# Patient Record
Sex: FEMALE | Race: WHITE | NOT HISPANIC OR LATINO | Employment: OTHER | URBAN - METROPOLITAN AREA
[De-identification: names, ages, dates, MRNs, and addresses within clinical notes are randomized per-mention and may not be internally consistent; named-entity substitution may affect disease eponyms.]

---

## 2017-01-18 ENCOUNTER — ALLSCRIPTS OFFICE VISIT (OUTPATIENT)
Dept: OTHER | Facility: OTHER | Age: 62
End: 2017-01-18

## 2017-01-18 DIAGNOSIS — E04.1 NONTOXIC SINGLE THYROID NODULE: ICD-10-CM

## 2017-01-19 ENCOUNTER — GENERIC CONVERSION - ENCOUNTER (OUTPATIENT)
Dept: OTHER | Facility: OTHER | Age: 62
End: 2017-01-19

## 2017-01-19 ENCOUNTER — LAB CONVERSION - ENCOUNTER (OUTPATIENT)
Dept: OTHER | Facility: OTHER | Age: 62
End: 2017-01-19

## 2017-01-19 LAB
A/G RATIO (HISTORICAL): 2 (CALC) (ref 1–2.5)
ALBUMIN SERPL BCP-MCNC: 4.4 G/DL (ref 3.6–5.1)
ALP SERPL-CCNC: 69 U/L (ref 33–130)
ALT SERPL W P-5'-P-CCNC: 23 U/L (ref 6–29)
AST SERPL W P-5'-P-CCNC: 18 U/L (ref 10–35)
BILIRUB SERPL-MCNC: 0.4 MG/DL (ref 0.2–1.2)
BUN SERPL-MCNC: 21 MG/DL (ref 7–25)
BUN/CREA RATIO (HISTORICAL): NORMAL (CALC) (ref 6–22)
CALCIUM SERPL-MCNC: 9.4 MG/DL (ref 8.6–10.4)
CHLORIDE SERPL-SCNC: 105 MMOL/L (ref 98–110)
CHOLEST SERPL-MCNC: 211 MG/DL (ref 125–200)
CHOLEST/HDLC SERPL: 4.2 (CALC)
CO2 SERPL-SCNC: 30 MMOL/L (ref 20–31)
CREAT SERPL-MCNC: 0.66 MG/DL (ref 0.5–0.99)
EGFR AFRICAN AMERICAN (HISTORICAL): 109 ML/MIN/1.73M2
EGFR-AMERICAN CALC (HISTORICAL): 94 ML/MIN/1.73M2
GAMMA GLOBULIN (HISTORICAL): 2.2 G/DL (CALC) (ref 1.9–3.7)
GLUCOSE (HISTORICAL): 90 MG/DL (ref 65–99)
HDLC SERPL-MCNC: 50 MG/DL
LDL CHOLESTEROL (HISTORICAL): 142 MG/DL (CALC)
NON-HDL-CHOL (CHOL-HDL) (HISTORICAL): 161 MG/DL (CALC)
POTASSIUM SERPL-SCNC: 4.5 MMOL/L (ref 3.5–5.3)
SODIUM SERPL-SCNC: 142 MMOL/L (ref 135–146)
TOTAL PROTEIN (HISTORICAL): 6.6 G/DL (ref 6.1–8.1)
TRIGL SERPL-MCNC: 94 MG/DL
TSH SERPL DL<=0.05 MIU/L-ACNC: 2.68 MIU/L (ref 0.4–4.5)

## 2017-01-23 ENCOUNTER — HOSPITAL ENCOUNTER (OUTPATIENT)
Dept: ULTRASOUND IMAGING | Facility: HOSPITAL | Age: 62
Discharge: HOME | End: 2017-01-23
Payer: COMMERCIAL

## 2017-01-23 DIAGNOSIS — E04.1 NONTOXIC SINGLE THYROID NODULE: ICD-10-CM

## 2017-01-23 PROCEDURE — 76536 US EXAM OF HEAD AND NECK: CPT

## 2017-01-24 ENCOUNTER — GENERIC CONVERSION - ENCOUNTER (OUTPATIENT)
Dept: OTHER | Facility: OTHER | Age: 62
End: 2017-01-24

## 2017-02-10 ENCOUNTER — ALLSCRIPTS OFFICE VISIT (OUTPATIENT)
Dept: OTHER | Facility: OTHER | Age: 62
End: 2017-02-10

## 2017-04-03 ENCOUNTER — ALLSCRIPTS OFFICE VISIT (OUTPATIENT)
Dept: OTHER | Facility: OTHER | Age: 62
End: 2017-04-03

## 2017-04-05 ENCOUNTER — GENERIC CONVERSION - ENCOUNTER (OUTPATIENT)
Dept: OTHER | Facility: OTHER | Age: 62
End: 2017-04-05

## 2017-05-08 ENCOUNTER — TRANSCRIBE ORDERS (OUTPATIENT)
Dept: ADMINISTRATIVE | Facility: HOSPITAL | Age: 62
End: 2017-05-08

## 2017-05-08 DIAGNOSIS — Z12.39 SCREENING BREAST EXAMINATION: Primary | ICD-10-CM

## 2017-06-03 ENCOUNTER — HOSPITAL ENCOUNTER (OUTPATIENT)
Dept: MAMMOGRAPHY | Facility: HOSPITAL | Age: 62
Discharge: HOME | End: 2017-06-03
Payer: COMMERCIAL

## 2017-06-03 DIAGNOSIS — Z12.39 SCREENING BREAST EXAMINATION: ICD-10-CM

## 2017-06-03 PROCEDURE — G0202 SCR MAMMO BI INCL CAD: HCPCS

## 2017-06-05 ENCOUNTER — GENERIC CONVERSION - ENCOUNTER (OUTPATIENT)
Dept: OTHER | Facility: OTHER | Age: 62
End: 2017-06-05

## 2018-01-09 NOTE — RESULT NOTES
Verified Results  TEST IN QUESTIONTrina Shelton ORDER [id number] 12:00AM Lethaniel Na     Test Name Result Flag Reference   We are unable to ascertain the test(s) you desire  for the irreplaceable specimen you submitted     UNCLEAR ORDER:      CLARIFY L UPPER RESPIRATORY CULTURE   SPECIMEN(S) SUBMITTED:      BLUE CAP AMIES GEL SWAB   RESOLUTION:      **     **     *******     *******     *********    **     **   ******   **     **     **   **     **          **           ****   **     **   **     **     ** ***      **          **           **  ** **     **   **     **     *****       ** *****    *******      **    ***     **   **     **     **   **     **     **   **           **     **     **   **     **     **    **    **     **   **           **     **     **   *********     **    **    *********   *********    **     **     **

## 2018-01-11 NOTE — RESULT NOTES
Message   Please tell her blood work looked pretty good  CBC and thyroid function was normal as well as CBC  Cholesterol was mildly elevated at 211 with a good ratio  Continue diet and weight loss as well as exercise     Verified Results  (1) COMPREHENSIVE METABOLIC PANEL [id number] 47:25WL Derrick Mccain     Test Name Result Flag Reference   GLUCOSE 90 mg/dL  65-99   Fasting reference interval   UREA NITROGEN (BUN) 21 mg/dL  7-25   CREATININE 0 66 mg/dL  0 50-0 99   For patients >52years of age, the reference limit  for Creatinine is approximately 13% higher for people  identified as -American  eGFR NON-AFR  AMERICAN 94 mL/min/1 73m2  > OR = 60   eGFR AFRICAN AMERICAN 109 mL/min/1 73m2  > OR = 60   BUN/CREATININE RATIO   9-45   NOT APPLICABLE (calc)   SODIUM 142 mmol/L  135-146   POTASSIUM 4 5 mmol/L  3 5-5 3   CHLORIDE 105 mmol/L     CARBON DIOXIDE 30 mmol/L  20-31   CALCIUM 9 4 mg/dL  8 6-10 4   PROTEIN, TOTAL 6 6 g/dL  6 1-8 1   ALBUMIN 4 4 g/dL  3 6-5 1   GLOBULIN 2 2 g/dL (calc)  1 9-3 7   ALBUMIN/GLOBULIN RATIO 2 0 (calc)  1 0-2 5   BILIRUBIN, TOTAL 0 4 mg/dL  0 2-1 2   ALKALINE PHOSPHATASE 69 U/L     AST 18 U/L  10-35   ALT 23 U/L  6-29     (1) LIPID PANEL, FASTING [id number] 12:00AM Tito Alcaraz     Test Name Result Flag Reference   CHOLESTEROL, TOTAL 211 mg/dL H 125-200   HDL CHOLESTEROL 50 mg/dL  > OR = 46   TRIGLICERIDES 94 mg/dL  <395   LDL-CHOLESTEROL 142 mg/dL (calc) H <130   Desirable range <100 mg/dL for patients with CHD or  diabetes and <70 mg/dL for diabetic patients with  known heart disease  CHOL/HDLC RATIO 4 2 (calc)  < OR = 5 0   NON HDL CHOLESTEROL 161 mg/dL (calc) H    Target for non-HDL cholesterol is 30 mg/dL higher than   LDL cholesterol target       (Q) CBC (H/H, RBC, INDICES, WBC, PLT) [id number] 12:00AM Tito Alcaraz     Test Name Result Flag Reference   WHITE BLOOD CELL COUNT 7 0 Thousand/uL  3 8-10 8   RED BLOOD CELL COUNT 4 81 Million/uL  3 80-5 10 HEMOGLOBIN 14 0 g/dL  11 7-15 5   HEMATOCRIT 42 6 %  35 0-45 0   MCV 88 6 fL  80 0-100 0   MCH 29 2 pg  27 0-33 0   MCHC 33 0 g/dL  32 0-36 0   RDW 15 6 % H 11 0-15 0   PLATELET COUNT 740 Thousand/uL  140-400   MPV 10 7 fL  7 5-11 5   WE RECEIVED YOUR HANDWRITTEN TEST ORDER AND  PERFORMED A HEMOGRAM WITH A PLATELET WITHOUT  A DIFFERENTIAL  IF THIS IS NOT WHAT YOU INTENDED  TO ORDER, PLEASE CONTACT YOUR LOCAL CLIENT SERVICE  REPRESENTATIVE IMMEDIATELY SO THAT WE CAN   ADJUST OUR BILLING APPROPRIATELY  YOU MAY ALSO   INQUIRE ABOUT ALTERNATIVE OR ADDITIONAL TESTING            (Q) TSH, 3RD GENERATION [id number] 12:00AM Jerri Wisdom     Test Name Result Flag Reference   TSH 2 68 mIU/L  0 40-4 50       Plan  PMH: Need for immunization against influenza    · Fluzone Quadrivalent 0 5 ML Intramuscular Suspension; INJECT 0 5  ML  Intramuscular;  To Be Done: [id number]

## 2018-01-12 NOTE — RESULT NOTES
Message   Please tell sooner that her blood work is okay  Sugar kidney function liver function blood salts all good  Cholesterol reveals mildly elevated total cholesterol as well as LDL or bad cholesterol  She should continue with diet and weight loss in regards to this  Verified Results  (1) COMPREHENSIVE METABOLIC PANEL [id number] 41:71SY Lamberto Purdy     Test Name Result Flag Reference   GLUCOSE 98 mg/dL  65-99   Fasting reference interval   UREA NITROGEN (BUN) 17 mg/dL  7-25   CREATININE 0 64 mg/dL  0 50-0 99   For patients >52years of age, the reference limit  for Creatinine is approximately 13% higher for people  identified as -American  eGFR NON-AFR  AMERICAN 96 mL/min/1 73m2  > OR = 60   eGFR AFRICAN AMERICAN 111 mL/min/1 73m2  > OR = 60   BUN/CREATININE RATIO   2-58   NOT APPLICABLE (calc)   SODIUM 140 mmol/L  135-146   POTASSIUM 4 4 mmol/L  3 5-5 3   CHLORIDE 104 mmol/L     CARBON DIOXIDE 27 mmol/L  19-30   CALCIUM 9 4 mg/dL  8 6-10 4   PROTEIN, TOTAL 6 6 g/dL  6 1-8 1   ALBUMIN 4 4 g/dL  3 6-5 1   GLOBULIN 2 2 g/dL (calc)  1 9-3 7   ALBUMIN/GLOBULIN RATIO 2 0 (calc)  1 0-2 5   BILIRUBIN, TOTAL 0 6 mg/dL  0 2-1 2   ALKALINE PHOSPHATASE 68 U/L     AST 17 U/L  10-35   ALT 23 U/L  6-29     (1) LIPID PANEL, FASTING [id number] 12:00AM Tito Alcaraz     Test Name Result Flag Reference   CHOLESTEROL, TOTAL 212 mg/dL H 125-200   HDL CHOLESTEROL 49 mg/dL  > OR = 46   TRIGLICERIDES 548 mg/dL H <150   LDL-CHOLESTEROL 133 mg/dL (calc) H <130   Desirable range <100 mg/dL for patients with CHD or  diabetes and <70 mg/dL for diabetic patients with  known heart disease  CHOL/HDLC RATIO 4 3 (calc)  < OR = 5 0   NON HDL CHOLESTEROL 163 mg/dL (calc) H    Target for non-HDL cholesterol is 30 mg/dL higher than   LDL cholesterol target

## 2018-01-13 NOTE — RESULT NOTES
Verified Results  * XR FOOT 3+ VIEW LEFT [id number] 10:48AM Paula Valencia Order Number: [id number]     Test Name Result Flag Reference   XR FOOT 3+ VW LEFT (Report)     LEFT FOOT     INDICATION: Left foot pain  COMPARISON: None     VIEWS: 3; 3 images     FINDINGS:     There is no acute fracture or dislocation  No degenerative changes  No lytic or blastic lesions are seen  Soft tissues are unremarkable  IMPRESSION:     No acute osseous abnormality         Workstation performed: [id number]     Signed by:   Lana Espana MD   7/8/16

## 2018-01-13 NOTE — RESULT NOTES
Message   Please tell Griselda Gomez that her nodule has actually diminished in size and I'm really not concerned about it presently  We'll discuss timing future ultrasound at her next visit  Verified Results  US THYROID [id number] 10:27AM Madeline Alpers Order Number: [id number]    - Patient Instructions: To schedule this appointment, please contact Central Scheduling at [phone]  Test Name Result Flag Reference   US THYROID (Report)     THYROID ULTRASOUND     INDICATION: Nodule follow-up     COMPARISON: 5/4/2010 and prior     TECHNIQUE:  Ultrasound of the thyroid was performed with a high frequency linear transducer in transverse and sagittal planes including volumetric imaging sweeps as well as traditional still imaging technique  FINDINGS:   Normal homogeneous smooth echotexture  Right gland: 4 6 x 1 4 x 1 8 cm  No dominant nodules  6 mm calcified lower pole nodule, stable  Left gland: 4 5 x 1 3 x 1 8 cm  Dominant heterogeneous lower pole nodule 2 1 x 1 8 x 1 9 cm and previously 3 0 x 1 9 x 1 9 cm  Isthmus: The isthmus is 0 2 cm in AP dimension  IMPRESSION:      Reduction in size of the dominant nodule left lower pole  Reassess at follow-up               Workstation performed: [id number]     Signed by:   Tanya Gifford MD   1/24/17

## 2018-01-13 NOTE — RESULT NOTES
Message   Please tell so that her bone density scan was normal      Verified Results  * DXA BONE DENSITY SPINE HIP AND PELVIS [id number] 10:47AM Jameson Old Order Number: [id number]     Test Name Result Flag Reference   DXA BONE DENSITY SPINE HIP AND PELVIS (Report)     CENTRAL DXA SCAN     CLINICAL HISTORY:  56year old post-menopausal  female risk factors include family history of osteoporosis  TECHNIQUE: Bone densitometry was performed using a [employer] bone densitometer  Regions of interest appear properly placed  There are no obvious fractures or other confounding variables which could limit the study  COMPARISON: None  RESULTS:    LUMBAR SPINE: L1-L3:   BMD 1 406 gm/cm2   T-score 1 8   Z-score 3 3     LEFT TOTAL HIP:   BMD 1 034 gm/cm2   T-score 0 2   Z-score 1 3     LEFT FEMORAL NECK:   BMD 1 020 gm/cm2   T-score -0 1   Z-score 1 2     RIGHT TOTAL HIP:   BMD 1 008 gm/cm2   T-score 0 0   Z-score 1 1     RIGHT FEMORAL NECK:   BMD 0 960 gm/cm2   T-score -0 6   Z-score 0 8     ASSESSMENT:   1  Based on the WHO classification, this study is normal and the patient is considered at low risk for fracture  2  A daily intake of calcium of at least 1200 mg and vitamin D, 800-1000 IU, as well as weight bearing and muscle strengthening exercise, fall prevention and avoidance of tobacco and excessive alcohol intake as basic preventive measures are recommended  3  Repeat DXA scan in 18-24 months as clinically indicated        WHO CLASSIFICATION:   Normal (a T-score of -1 0 or higher)   Low bone mineral density (a T-score of less than -1 0 but higher than -2 5)   Osteoporosis (a T-score of -2 5 or less)   Severe osteoporosis (a T-score of -2 5 or less with a fragility fracture)             Workstation performed: [id number]

## 2018-01-14 VITALS
WEIGHT: 210 LBS | BODY MASS INDEX: 33.75 KG/M2 | TEMPERATURE: 98.7 F | SYSTOLIC BLOOD PRESSURE: 132 MMHG | DIASTOLIC BLOOD PRESSURE: 70 MMHG | HEIGHT: 66 IN

## 2018-01-14 VITALS
BODY MASS INDEX: 33.59 KG/M2 | HEIGHT: 66 IN | WEIGHT: 209 LBS | DIASTOLIC BLOOD PRESSURE: 70 MMHG | TEMPERATURE: 98.2 F | SYSTOLIC BLOOD PRESSURE: 140 MMHG

## 2018-01-15 VITALS
WEIGHT: 208 LBS | HEIGHT: 66 IN | SYSTOLIC BLOOD PRESSURE: 122 MMHG | TEMPERATURE: 97.1 F | DIASTOLIC BLOOD PRESSURE: 70 MMHG | BODY MASS INDEX: 33.43 KG/M2

## 2018-01-15 NOTE — RESULT NOTES
Verified Results  * MAMMO SCREENING BILATERAL W CAD [id number] 12:52PM Oma Villegas     Test Name Result Flag Reference   MAMMO SCREENING BILATERAL W CAD (Report)     Patient History:   Patient is postmenopausal    Family history of breast cancer at age 61 in mother, breast    cancer at age 61 in maternal aunt  Benign excisional biopsy of the right breast, December 29, 1999  Excisional biopsy of the right breast    Patient has never smoked  Patient's BMI is 31 9      Reason for exam: screening, asymptomatic  Mammo Screening Bilateral W CAD: Savita 3, 2017 - Check In #:    [de-identified]   Bilateral CC and MLO view(s) were taken  Technologist: Oscar Regalado RT(R)(M)   Prior study comparison: June 2, 2016, mammo screening bilateral W   CAD performed at [facility]  May 4,    2015, bilateral digital screening mammogram performed at [facility] April 22, 2014, bilateral    digital screening mammogram performed at [facility] March 22, 2013, bilateral mammogram, performed    at [address] [place] [address]  January 10, 2012,    bilateral mammogram, performed at [address] [place] [address]  November 30, 2010, bilateral mammogram, performed    at [address] [place] [address]  There are scattered fibroglandular densities  No dominant soft tissue mass, architectural distortion or    suspicious calcifications are noted  The skin and nipple    contours are within normal limits  No evidence of malignancy  No significant changes   when compared with prior studies  ACR BI-RADSï¾® Assessments: BiRad:1 - Negative     Recommendation:   Routine screening mammogram of both breasts in 1 year  A    reminder letter will be scheduled  Analyzed by CAD     8-10% of cancers will be missed on mammography  Management of a    palpable abnormality must be based on clinical grounds   Patients   will be notified of their results via letter from our facility  Accredited by [employer] of Radiology and FDA       Transcription Location: JEFF [facility] 98: [id number]     Risk Value(s):   Tyrer-Cuzick 10 Year: 9 100%, Tyrer-Cuzick Lifetime: 19 600%,    Myriad Table: 1 5%, CHERRY 5 Year: 4 8%, NCI Lifetime: 19 2%   Signed by:   Taylor Tillman MD   6/5/17

## 2018-01-16 NOTE — RESULT NOTES
Verified Results  (1) THROAT CULTURE (CULTURE, UPPER RESPIRATORY) [id number] 12:00AM Carlos Enrique Carson     Test Name Result Flag Reference   CULTURE, THROAT      CULTURE, THROAT         MICRO NUMBER:      [id number]    TEST STATUS:       FINAL    SPECIMEN SOURCE:   THROAT    SPECIMEN QUALITY:  ADEQUATE    RESULT:            No oropharyngeal pathogens recovered  (Q) TEST AUTHORIZATION [id number] 12:00AM Carlos Enrique Carson     Test Name Result Flag Reference   TEST(S) ORDERED ON$REQUISITION CULTURE, THROAT     TEST CODE: 394X     CLIENT CONTACT: CHALO BEARDEN     REPORT ALWAYS MESSAGE$SIGNATURE See Below     The laboratory testing on this patient was verbally requested  or confirmed by the ordering physician or his or her authorized  representative after contact with an employee of [employer]  Federal regulations require that we maintain on file written  authorization for all laboratory testing  Accordingly we are asking  that the ordering physician or his or her authorized representative  sign a copy of this report and promptly return it to the client            Signature:____________________________________________________

## 2018-03-06 ENCOUNTER — OFFICE VISIT (OUTPATIENT)
Dept: FAMILY MEDICINE CLINIC | Facility: CLINIC | Age: 63
End: 2018-03-06
Payer: COMMERCIAL

## 2018-03-06 VITALS
DIASTOLIC BLOOD PRESSURE: 60 MMHG | SYSTOLIC BLOOD PRESSURE: 130 MMHG | WEIGHT: 202 LBS | BODY MASS INDEX: 33.66 KG/M2 | HEIGHT: 65 IN

## 2018-03-06 DIAGNOSIS — K21.9 GERD WITHOUT ESOPHAGITIS: Primary | ICD-10-CM

## 2018-03-06 DIAGNOSIS — J04.0 LARYNGITIS: ICD-10-CM

## 2018-03-06 PROCEDURE — 99214 OFFICE O/P EST MOD 30 MIN: CPT | Performed by: FAMILY MEDICINE

## 2018-03-06 RX ORDER — DILTIAZEM HYDROCHLORIDE EXTENDED-RELEASE TABLETS 180 MG/1
1 TABLET, EXTENDED RELEASE ORAL DAILY
COMMUNITY
Start: 2016-06-27 | End: 2020-09-21 | Stop reason: ALTCHOICE

## 2018-03-06 RX ORDER — ESOMEPRAZOLE MAGNESIUM 40 MG/1
1 CAPSULE, DELAYED RELEASE ORAL DAILY
COMMUNITY
Start: 2011-12-20 | End: 2018-03-06 | Stop reason: SDUPTHER

## 2018-03-06 RX ORDER — TELMISARTAN AND HYDROCHLORTHIAZIDE 80; 25 MG/1; MG/1
TABLET ORAL
COMMUNITY
Start: 2011-05-24 | End: 2019-05-28 | Stop reason: ALTCHOICE

## 2018-03-06 RX ORDER — RANITIDINE 300 MG/1
300 CAPSULE ORAL EVERY EVENING
Qty: 30 CAPSULE | Refills: 1 | Status: SHIPPED | OUTPATIENT
Start: 2018-03-06 | End: 2018-09-17 | Stop reason: ALTCHOICE

## 2018-03-06 RX ORDER — ESOMEPRAZOLE MAGNESIUM 40 MG/1
40 CAPSULE, DELAYED RELEASE ORAL DAILY
Qty: 90 CAPSULE | Refills: 1 | Status: SHIPPED | OUTPATIENT
Start: 2018-03-06 | End: 2018-08-07 | Stop reason: SDUPTHER

## 2018-03-06 RX ORDER — NAPROXEN SODIUM 220 MG
220 TABLET ORAL
COMMUNITY
End: 2021-05-21

## 2018-03-06 NOTE — PROGRESS NOTES
Assessment/Plan:  GERD without esophagitis  Patient is having symptoms consistent with worsening of her gastroesophageal reflux disease  We are going to add Zantac to her Nexium  She is having no worrisome symptoms such as weight loss night sweats or significant dysphagia  She did have EGD in 2013 which was without mass stricture Swartz's X cetera  She is going to call next week with report of her condition  Laryngitis  She has had some persistent worse voice  She has a distant history of smoking  She does not use alcohol  She has no worrisome symptoms or findings such as weight loss adenopathy X cetera  Differential is large including her GERD leading to frequent cough, anxiety due to her recent family issues or less likely vocal cord polyp or laryngeal neoplasm  Due to the duration of her symptoms we did ask her to contact otolaryngology for evaluation such as nasolaryngoscopy  She agrees to this  She is given the information for contact with Dr Rodríguez Poe essential hypertension  Her hypertension is currently well controlled  Patient is a 75-year-old female who has a long history of gastroesophageal reflux disease maintained on Nexium 40 mg daily  Recently her GERD has been much worse  Her mother did pass away approximately 2 months ago and she has been having to deal with her father having issues with her passing  This may be contributing to the worsening of her GERD  We reviewed that she had EGD approximately 40 half years ago with Dr Eulalio Mejia  She did have mild reflux induced esophagitis in the lower 3rd of her esophagus  There was no hiatal hernia there was no mass  She has no night sweats loss of appetite or significant weight loss nor other constitutional symptom  We are going to have her continue her Nexium and had Zantac 300 mg HS  If her symptoms are not improving over the next 2-3 weeks we will consider having her go for follow-up EGD    She also has laryngitis which again may be related to her worsening reflux or could possibly be stress related  She has a distant history of tobacco smoking and no alcohol use  I do feel would be prudent for her to get nasal laryngoscopy and we gave her the name of Dr Alejandra Bell and [facility] for evaluation to which she agrees  She is going to call back next week with report of her condition  She will call sooner as needed  Diagnoses and all orders for this visit:    GERD without esophagitis  -     esomeprazole (NEXIUM) 40 MG capsule; Take 1 capsule (40 mg total) by mouth daily  -     ranitidine (ZANTAC) 300 MG capsule; Take 1 capsule (300 mg total) by mouth every evening    Laryngitis  -     Ambulatory Referral to Otolaryngology; Future    Other orders  -     aspirin 81 MG tablet; Take 81 mg by mouth  -     diltiazem (CARDIZEM LA) 180 MG 24 hr tablet; Take 1 capsule by mouth daily  -     Discontinue: esomeprazole (NEXIUM) 40 MG capsule; Take 1 capsule by mouth daily  -     telmisartan-hydrochlorothiazide (MICARDIS HCT) 80-25 MG per tablet; Take by mouth  -     naproxen sodium (ALEVE) 220 MG tablet; Take 220 mg by mouth          Subjective:   Chief Complaint   Patient presents with    Heartburn    Hoarse     I have hoarseness for 2 months and terrible heartburn for 2 weeks  Hoarseness began before Az  Mom    Hoarseness is worse  Burns and hurts in chest   No true dysphagia but some mild odynophagia  Had upper endoscopy  Colonoscopy as well  2013  Non exertional and no SOB  Worse when recumbence  Takes Nexium daily in AM for years  No LGI sx  Down 8 pounds from last year  Patient ID: Anastasiia Handy is a 57 y o  female  HPI  The patient is a 52-year-old female who presents today with 2 complaints  She states her heartburn has been terrible for 2 weeks  She has been compliant with her Nexium  She has also had a hoarse voice for 2 months or more  Her mother  exactly 2 months ago    She has had some increased stress dealing with her father who now lives alone but requires his children to provide increased visits and care  She did see Dr Janae Jiménez for an EGD last in 2013 as well as colonoscopy  We reviewed this  There was no stricture, neoplasm, Swartz's X cetera at that time  There was some mild esophagitis distally  She has been compliant with her Nexium since that time  She does state that her symptoms seem worse with recumbency  They are not exertional and she has no chest pain shortness of breath related to exertion  She has no lower GI complaints  She states she has had no change in appetite or weight loss though we did note she has lost 8 lb since a visit several months ago  She has had no wheezing  She does note that she does have cough which is worse when her reflux is worse she has had no sputum opts 6 cetera  No PND, orthopnea      The following portions of the patient's history were reviewed and updated as appropriate: allergies, current medications, past family history, past medical history, past social history, past surgical history and problem list     Review of Systems   Constitution: Negative for chills, decreased appetite, fever, malaise/fatigue, night sweats, weight gain and weight loss  She has lost 8 lb since her last visit that she was unaware of this   HENT: Positive for hoarse voice  Negative for congestion and sore throat  Cardiovascular: Negative for chest pain, irregular heartbeat and leg swelling  Respiratory: Positive for cough  Negative for shortness of breath, sputum production and wheezing  Hematologic/Lymphatic: Negative for adenopathy  Gastrointestinal: Positive for heartburn  Negative for bloating, anorexia, change in bowel habit, bowel incontinence, dysphagia, nausea and vomiting  Worsening GERD is noted in HPI   Genitourinary: Negative for bladder incontinence  Objective:    Physical Exam   Constitutional: She is oriented to person, place, and time  She appears well-developed and well-nourished  Obese in no apparent distress   HENT:   Mouth/Throat: Oropharynx is clear and moist  No oropharyngeal exudate  Eyes: No scleral icterus  Neck: Neck supple  No JVD present  No thyromegaly present  Cardiovascular: Regular rhythm and normal heart sounds  Pulmonary/Chest: Effort normal and breath sounds normal  She has no wheezes  She has no rales  Abdominal: Soft  Bowel sounds are normal    Obese abdomen  There is some epigastric tenderness and compression of the epigastrium reproduces her symptoms substernally  She has no mass, no organomegaly   Musculoskeletal: She exhibits no edema  Lymphadenopathy:     She has no cervical adenopathy  Neurological: She is alert and oriented to person, place, and time     Psychiatric:   Mildly anxious appearance

## 2018-03-07 NOTE — ASSESSMENT & PLAN NOTE
She has had some persistent worse voice  She has a distant history of smoking  She does not use alcohol  She has no worrisome symptoms or findings such as weight loss adenopathy X cetera  Differential is large including her GERD leading to frequent cough, anxiety due to her recent family issues or less likely vocal cord polyp or laryngeal neoplasm  Due to the duration of her symptoms we did ask her to contact otolaryngology for evaluation such as nasolaryngoscopy  She agrees to this    She is given the information for contact with Dr Arron Rangel

## 2018-03-07 NOTE — ASSESSMENT & PLAN NOTE
Patient is having symptoms consistent with worsening of her gastroesophageal reflux disease  We are going to add Zantac to her Nexium  She is having no worrisome symptoms such as weight loss night sweats or significant dysphagia  She did have EGD in 2013 which was without mass stricture Swartz's X cetera  She is going to call next week with report of her condition

## 2018-03-22 ENCOUNTER — OFFICE VISIT (OUTPATIENT)
Dept: FAMILY MEDICINE CLINIC | Facility: CLINIC | Age: 63
End: 2018-03-22
Payer: COMMERCIAL

## 2018-03-22 VITALS
WEIGHT: 203 LBS | BODY MASS INDEX: 34.04 KG/M2 | DIASTOLIC BLOOD PRESSURE: 82 MMHG | TEMPERATURE: 97.5 F | SYSTOLIC BLOOD PRESSURE: 130 MMHG

## 2018-03-22 DIAGNOSIS — K21.9 GERD WITHOUT ESOPHAGITIS: ICD-10-CM

## 2018-03-22 DIAGNOSIS — E78.5 HYPERLIPOPROTEINEMIA: ICD-10-CM

## 2018-03-22 DIAGNOSIS — I10 BENIGN ESSENTIAL HYPERTENSION: Primary | ICD-10-CM

## 2018-03-22 DIAGNOSIS — J04.0 LARYNGITIS: ICD-10-CM

## 2018-03-22 PROCEDURE — 99214 OFFICE O/P EST MOD 30 MIN: CPT | Performed by: FAMILY MEDICINE

## 2018-03-22 PROCEDURE — 3079F DIAST BP 80-89 MM HG: CPT | Performed by: FAMILY MEDICINE

## 2018-03-22 PROCEDURE — 36415 COLL VENOUS BLD VENIPUNCTURE: CPT | Performed by: FAMILY MEDICINE

## 2018-03-22 PROCEDURE — 3075F SYST BP GE 130 - 139MM HG: CPT | Performed by: FAMILY MEDICINE

## 2018-03-22 RX ORDER — DIPHENOXYLATE HYDROCHLORIDE AND ATROPINE SULFATE 2.5; .025 MG/1; MG/1
1 TABLET ORAL DAILY
COMMUNITY

## 2018-03-22 NOTE — PROGRESS NOTES
Assessment/Plan:  GERD without esophagitis  She is going to continue with Nexium as well as Zantac  She has no dysphagia, weight loss or other worrisome symptoms  She did have EGD in 2013  May need further evaluation if her symptoms do not improve  Nontoxic single thyroid nodule  This nodule was reduced in size at its last assessment in January of 2017  Will consider a follow-up exam in 6-12 months  Laryngitis  She has an appointment with Dr Lashell Singh tomorrow  Hopefully he will be able to directly visualize her larynx for further evaluation of her persistent hoarseness  She continues with treatment of her GERD symptoms  She has been a nonsmoker for many years  Currently her allergies are relatively quiescent although this may also be potential etiology for it  Benign essential hypertension  Blood pressure well controlled  No change in therapy presently  Hyperlipoproteinemia  Lipid profile today  Dr Lashell Singh tomorrow  Consider Sleep Study  Diagnoses and all orders for this visit:    Benign essential hypertension  -     CBC  -     Comprehensive metabolic panel    GERD without esophagitis    Hyperlipoproteinemia  -     Lipid panel  -     TSH, 3rd generation with T4 reflex    Laryngitis    Other orders  -     BIOTIN PO; Take by mouth  -     multivitamin (THERAGRAN) TABS; Take 1 tablet by mouth daily          Subjective:   Chief Complaint   Patient presents with    Medication Refill     FBW   Weight was 210 last spring and 203 now  Zantac did not help at all  I am seeing Dr Lashell Singh tomorrow  I am still hoarse  C/w Nexium as well  I wake up and it is worse  Sees Dr Chapo Acosta in May  I wake up with heartburn  AM Has, sedentary sleeping  Patient ID: Troy Walker is a 57 y o  female      HPI  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 for follow-up of multiple medical problems including essential hypertension, hyperlipidemia as well as gastroesophageal reflux disease and recent development of laryngitis  She tempted to contact Dr Agus Bhagat and [facility] at our direction after her last visit but she was told she cannot be seen until June  They referred her to [facility] and she has an appointment to see him tomorrow  She continues with laryngitis  Her symptoms do seem worse in the morning  She started Zantac in addition to her Nexium at her direction but she states this has not improved her symptoms  This is in regard to both reflux as well as hoarseness  She also complains of worsening fatigue  On questioning she has 1st morning headaches as well as sedentary sleepiness  On further questioning her  has stated that she snores  He is treated with CPAP but she has never been tested  She follows with Cardiology, Maryann and has an appointment with her in May  She has had no exertional chest pain shortness of breath X cetera  She has been compliant with her Cardizem as well as Micardis  The following portions of the patient's history were reviewed and updated as appropriate: allergies, current medications, past family history, past medical history, past social history, past surgical history and problem list     Review of Systems   Constitution: Positive for weight loss  Negative for decreased appetite  She has lost 7 lb with diet   HENT: Positive for hoarse voice and sore throat  Negative for congestion, ear pain and odynophagia  Cardiovascular: Positive for dyspnea on exertion  Negative for chest pain, leg swelling, orthopnea and paroxysmal nocturnal dyspnea  Respiratory: Positive for cough, shortness of breath and snoring  Negative for hemoptysis, sleep disturbances due to breathing and sputum production  Endocrine: Positive for polyuria  Negative for polydipsia and polyphagia  Hematologic/Lymphatic: Negative for adenopathy and bleeding problem  Gastrointestinal: Negative for constipation, diarrhea and hematochezia     Genitourinary: Positive for frequency, nocturia and urgency  Negative for incomplete emptying  Nocturia x 3  Had URO GYN procedure without improvement  Neurological: Positive for headaches  Psychiatric/Behavioral: Negative for depression  The patient is not nervous/anxious  Objective:    Physical Exam   Constitutional: She is oriented to person, place, and time  She appears well-developed and well-nourished  Obese   HENT:   Mouth/Throat: Oropharynx is clear and moist  No oropharyngeal exudate  Minimal erythema of the posterior pharynx  Uvula is not redundant  Neck is 14 inches in diameter  Eyes: No scleral icterus  Neck: No JVD present  No thyromegaly present  Cardiovascular: Normal rate, regular rhythm and normal heart sounds  Exam reveals no gallop  No murmur heard  Pulmonary/Chest: Effort normal and breath sounds normal  She has no wheezes  She has no rales  Abdominal: She exhibits no mass  There is no tenderness  Musculoskeletal: She exhibits no edema  Lymphadenopathy:     She has no cervical adenopathy  Neurological: She is alert and oriented to person, place, and time  Skin: No erythema  Psychiatric: She has a normal mood and affect

## 2018-03-22 NOTE — ASSESSMENT & PLAN NOTE
She is going to continue with Nexium as well as Zantac  She has no dysphagia, weight loss or other worrisome symptoms  She did have EGD in 2013  May need further evaluation if her symptoms do not improve

## 2018-03-22 NOTE — ASSESSMENT & PLAN NOTE
This nodule was reduced in size at its last assessment in January of 2017  Will consider a follow-up exam in 6-12 months

## 2018-03-22 NOTE — ASSESSMENT & PLAN NOTE
She has an appointment with Dr Ev Oshea tomorrow  Hopefully he will be able to directly visualize her larynx for further evaluation of her persistent hoarseness  She continues with treatment of her GERD symptoms  She has been a nonsmoker for many years  Currently her allergies are relatively quiescent although this may also be potential etiology for it

## 2018-03-27 ENCOUNTER — TRANSCRIBE ORDERS (OUTPATIENT)
Dept: LAB | Facility: CLINIC | Age: 63
End: 2018-03-27

## 2018-03-27 ENCOUNTER — OFFICE VISIT (OUTPATIENT)
Dept: LAB | Facility: CLINIC | Age: 63
End: 2018-03-27
Payer: COMMERCIAL

## 2018-03-27 DIAGNOSIS — J38.1 POLYP OF LARYNX: ICD-10-CM

## 2018-03-27 DIAGNOSIS — R49.8 NEUROLOGIC VOICE DISORDER: ICD-10-CM

## 2018-03-27 DIAGNOSIS — R49.8 NEUROLOGIC VOICE DISORDER: Primary | ICD-10-CM

## 2018-03-27 LAB
ATRIAL RATE: 72 BPM
P AXIS: 41 DEGREES
PR INTERVAL: 170 MS
QRS AXIS: 20 DEGREES
QRSD INTERVAL: 84 MS
QT INTERVAL: 402 MS
QTC INTERVAL: 440 MS
T WAVE AXIS: 45 DEGREES
VENTRICULAR RATE: 72 BPM

## 2018-03-27 PROCEDURE — 93010 ELECTROCARDIOGRAM REPORT: CPT | Performed by: INTERNAL MEDICINE

## 2018-03-27 PROCEDURE — 93005 ELECTROCARDIOGRAM TRACING: CPT

## 2018-03-27 NOTE — PRE-PROCEDURE INSTRUCTIONS
Pre-Surgery Instructions:   Medication Instructions    aspirin 81 MG tablet Instructed patient per Anesthesia Guidelines   BIOTIN PO Instructed patient per Anesthesia Guidelines   diltiazem (CARDIZEM LA) 180 MG 24 hr tablet Instructed patient per Anesthesia Guidelines   esomeprazole (NEXIUM) 40 MG capsule Instructed patient per Anesthesia Guidelines   multivitamin (THERAGRAN) TABS Instructed patient per Anesthesia Guidelines   naproxen sodium (ALEVE) 220 MG tablet Instructed patient per Anesthesia Guidelines   telmisartan-hydrochlorothiazide (MICARDIS HCT) 80-25 MG per tablet Instructed patient per Anesthesia Guidelines  Pre-op and bathing instructions given

## 2018-04-01 ENCOUNTER — ANESTHESIA EVENT (OUTPATIENT)
Dept: PERIOP | Facility: HOSPITAL | Age: 63
End: 2018-04-01
Payer: COMMERCIAL

## 2018-04-02 ENCOUNTER — HOSPITAL ENCOUNTER (OUTPATIENT)
Facility: HOSPITAL | Age: 63
Setting detail: OUTPATIENT SURGERY
Discharge: HOME | End: 2018-04-02
Attending: OTOLARYNGOLOGY | Admitting: OTOLARYNGOLOGY
Payer: COMMERCIAL

## 2018-04-02 ENCOUNTER — ANESTHESIA (OUTPATIENT)
Dept: PERIOP | Facility: HOSPITAL | Age: 63
End: 2018-04-02
Payer: COMMERCIAL

## 2018-04-02 VITALS
HEART RATE: 88 BPM | WEIGHT: 205 LBS | HEIGHT: 65 IN | BODY MASS INDEX: 34.16 KG/M2 | TEMPERATURE: 97.9 F | SYSTOLIC BLOOD PRESSURE: 117 MMHG | DIASTOLIC BLOOD PRESSURE: 60 MMHG | OXYGEN SATURATION: 96 % | RESPIRATION RATE: 18 BRPM

## 2018-04-02 DIAGNOSIS — R49.0 DYSPHONIA: ICD-10-CM

## 2018-04-02 DIAGNOSIS — J38.1 VOCAL CORD POLYP: ICD-10-CM

## 2018-04-02 PROCEDURE — 88307 TISSUE EXAM BY PATHOLOGIST: CPT | Performed by: PATHOLOGY

## 2018-04-02 RX ORDER — ONDANSETRON 2 MG/ML
INJECTION INTRAMUSCULAR; INTRAVENOUS AS NEEDED
Status: DISCONTINUED | OUTPATIENT
Start: 2018-04-02 | End: 2018-04-02 | Stop reason: SURG

## 2018-04-02 RX ORDER — FENTANYL CITRATE/PF 50 MCG/ML
50 SYRINGE (ML) INJECTION
Status: DISCONTINUED | OUTPATIENT
Start: 2018-04-02 | End: 2018-04-02 | Stop reason: HOSPADM

## 2018-04-02 RX ORDER — METOCLOPRAMIDE HYDROCHLORIDE 5 MG/ML
10 INJECTION INTRAMUSCULAR; INTRAVENOUS ONCE AS NEEDED
Status: DISCONTINUED | OUTPATIENT
Start: 2018-04-02 | End: 2018-04-02 | Stop reason: HOSPADM

## 2018-04-02 RX ORDER — EPINEPHRINE 1 MG/ML
INJECTION, SOLUTION, CONCENTRATE INTRAVENOUS AS NEEDED
Status: DISCONTINUED | OUTPATIENT
Start: 2018-04-02 | End: 2018-04-02 | Stop reason: HOSPADM

## 2018-04-02 RX ORDER — SODIUM CHLORIDE 9 MG/ML
INJECTION, SOLUTION INTRAVENOUS CONTINUOUS PRN
Status: DISCONTINUED | OUTPATIENT
Start: 2018-04-02 | End: 2018-04-02 | Stop reason: SURG

## 2018-04-02 RX ORDER — DIPHENHYDRAMINE HYDROCHLORIDE 50 MG/ML
12.5 INJECTION INTRAMUSCULAR; INTRAVENOUS ONCE AS NEEDED
Status: DISCONTINUED | OUTPATIENT
Start: 2018-04-02 | End: 2018-04-02 | Stop reason: HOSPADM

## 2018-04-02 RX ORDER — PROPOFOL 10 MG/ML
INJECTION, EMULSION INTRAVENOUS AS NEEDED
Status: DISCONTINUED | OUTPATIENT
Start: 2018-04-02 | End: 2018-04-02 | Stop reason: SURG

## 2018-04-02 RX ORDER — ONDANSETRON 2 MG/ML
4 INJECTION INTRAMUSCULAR; INTRAVENOUS ONCE AS NEEDED
Status: DISCONTINUED | OUTPATIENT
Start: 2018-04-02 | End: 2018-04-02 | Stop reason: HOSPADM

## 2018-04-02 RX ORDER — SUCCINYLCHOLINE/SOD CL,ISO/PF 100 MG/5ML
SYRINGE (ML) INTRAVENOUS AS NEEDED
Status: DISCONTINUED | OUTPATIENT
Start: 2018-04-02 | End: 2018-04-02 | Stop reason: SURG

## 2018-04-02 RX ORDER — EPINEPHRINE NASAL SOLUTION 1 MG/ML
SOLUTION NASAL AS NEEDED
Status: DISCONTINUED | OUTPATIENT
Start: 2018-04-02 | End: 2018-04-02 | Stop reason: HOSPADM

## 2018-04-02 RX ADMIN — Medication 100 MG: at 07:35

## 2018-04-02 RX ADMIN — DEXAMETHASONE SODIUM PHOSPHATE 10 MG: 10 INJECTION INTRAMUSCULAR; INTRAVENOUS at 07:41

## 2018-04-02 RX ADMIN — PROPOFOL 50 MG: 10 INJECTION, EMULSION INTRAVENOUS at 07:45

## 2018-04-02 RX ADMIN — FENTANYL CITRATE 50 MCG: 50 INJECTION, SOLUTION INTRAMUSCULAR; INTRAVENOUS at 08:59

## 2018-04-02 RX ADMIN — LIDOCAINE HYDROCHLORIDE 100 MG: 20 INJECTION, SOLUTION INTRAVENOUS at 07:35

## 2018-04-02 RX ADMIN — SODIUM CHLORIDE: 0.9 INJECTION, SOLUTION INTRAVENOUS at 07:10

## 2018-04-02 RX ADMIN — PROPOFOL 200 MG: 10 INJECTION, EMULSION INTRAVENOUS at 07:35

## 2018-04-02 RX ADMIN — FENTANYL CITRATE 50 MCG: 50 INJECTION, SOLUTION INTRAMUSCULAR; INTRAVENOUS at 09:06

## 2018-04-02 RX ADMIN — REMIFENTANIL HYDROCHLORIDE 0.1 MCG/KG/MIN: 1 INJECTION, POWDER, LYOPHILIZED, FOR SOLUTION INTRAVENOUS at 07:38

## 2018-04-02 RX ADMIN — ONDANSETRON 4 MG: 2 INJECTION INTRAMUSCULAR; INTRAVENOUS at 08:10

## 2018-04-02 NOTE — ANESTHESIA POSTPROCEDURE EVALUATION
Post-Op Assessment Note      CV Status:  Stable    Post-procedure mental status: arousable      Hydration Status:  Stable    PONV Controlled:  None    Airway Patency:  Patent    Post Op Vitals Reviewed: Yes          Staff: CRNA           BP   144/67   Temp   98 4   Pulse  86   Resp   24   SpO2   95% on 6LFM   Postop VS in PACU noted above, SV non-obstructed

## 2018-04-02 NOTE — ANESTHESIA PREPROCEDURE EVALUATION
Review of Systems/Medical History  Patient summary reviewed  Chart reviewed      Cardiovascular  EKG reviewed, Hypertension (Cardizem ,no Micardis this am) controlled,    Pulmonary  Smoker ex-smoker  , No shortness of breath, No recent URI ,        GI/Hepatic    GERD (Nexium this am) well controlled,             Endo/Other  History of thyroid disease (Thyroid Nodule) ,   Obesity (BMI-42)  morbid obesity   GYN       Hematology   Musculoskeletal    Arthritis     Neurology   Psychology           Physical Exam    Airway    Mallampati score: I  TM Distance: >3 FB  Neck ROM: full     Dental   Comment: Multiple Upper Caps ,     Cardiovascular      Pulmonary      Other Findings        Lab Results   Component Value Date    ALT 23 01/18/2017    AST 18 01/18/2017    BUN 21 01/18/2017    CALCIUM 9 4 01/18/2017     01/18/2017    CHOL 211 (H) 01/18/2017    CO2 30 01/18/2017    CREATININE 0 66 01/18/2017    HDL 50 01/18/2017    PROT 6 6 01/18/2017    K 4 5 01/18/2017     01/18/2017    TRIG 94 01/18/2017     Anesthesia Plan  ASA Score- 3     Anesthesia Type- general with ASA Monitors  Additional Monitors:   Airway Plan: ETT  Plan Factors-Patient not instructed to abstain from smoking on day of procedure  Patient did not smoke on day of surgery  Induction- intravenous  Postoperative Plan-     Informed Consent- Anesthetic plan and risks discussed with patient and spouse  I personally reviewed this patient with the CRNA  Discussed and agreed on the Anesthesia Plan with the CRNA  Sarabjit Weaver

## 2018-04-02 NOTE — DISCHARGE INSTRUCTIONS
Vocal Cord Polyps   AMBULATORY CARE:   A vocal cord polyp  is a growth that develops on your vocal cords  Vocal cord polyps can occur on one or both vocal cords  They may be caused by overuse of your voice  Examples include singing, yelling, or frequent talking required by a job such as teaching  Hypothyroidism, allergies, gastroesophageal reflux disease and smoking may also cause polyps  The most common symptom is a hoarse or husky voice  Contact your healthcare provider for the following:   · No improvement or worsening of your symptoms    · Questions or concerns about your condition or care  Treatment for vocal cord polyps  include voice rest and voice therapy  Voice therapy involves training to decrease the strain you put on your vocal cords  You may also need treatment for any conditions that are causing your polyps  Surgery may be done to remove the polyps  Follow up with your healthcare provider as directed:  Write down your questions so you remember to ask them during your visits  © 2017 [address] Information is for End User's use only and may not be sold, redistributed or otherwise used for commercial purposes  All illustrations and images included in CareNotes® are the copyrighted property of [employer]  or Benny Blevins  The above information is an  only  It is not intended as medical advice for individual conditions or treatments  Talk to your doctor, nurse or pharmacist before following any medical regimen to see if it is safe and effective for you

## 2018-04-02 NOTE — OP NOTE
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]    Procedure(s) (LRB):  MICROLARYNGOSCOPY AND EXCISION OF LEFT VOCAL FOLD POLYP (Left)    Specimen(s):  ID Type Source Tests Collected by Time Destination   1 : LEFT VOCAL FOLD POLYP Tissue Vocal cord TISSUE EXAM Paulette Shaver MD 2018 [id number]        Estimated Blood Loss:   Minimal    Drains:       Anesthesia Type:   General    Operative Indications:  Dysphonia [R49 0]  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]      Operative Findings:  Large vascular polyp left vocal fold, free edge, anterior    Complications:   None    Procedure and Technique:  After induction of general endotracheal anesthesia with a # 5 MLT endotracheal tube, the patient was draped in the sterile fashion  A tooth guard was placed on the upper teeth  The Sataloff anterior commissure direct laryngoscope was inserted, and placed in the suspension apparatus  Tape was used for anterior laryngeal traction  The operating microscope was positioned to provide a magnified view of the larynx  The left vocal fold contained a polypoid mass arising from the free edge, at about anterior one-third, with vessels feeding into it  The area was injected with epinephrine 1:1,000  Micro scissors and cupped forceps were used to excise the lesion at its base  Bleeding was controlled with adrenaline pledgets, and topical application of woven Surgicel  At the end of the procedure, the patient was released from suspension, and the laryngoscope and tooth guard were removed  The teeth and pharynx were examined for any injuries, and no injuries were seen    When the patient awoke from general anesthesia, the patient was extubated and transported to the recovery room in satisfactory condition     I was present for the entire procedure   I was present for the entire procedure    Patient Disposition:  PACU     SIGNATURE: Yamilex Sandhu MD  DATE: April 2, 2018  TIME: 8:36 AM

## 2018-07-09 ENCOUNTER — TRANSCRIBE ORDERS (OUTPATIENT)
Dept: ADMINISTRATIVE | Facility: HOSPITAL | Age: 63
End: 2018-07-09

## 2018-07-09 DIAGNOSIS — Z12.39 SCREENING BREAST EXAMINATION: Primary | ICD-10-CM

## 2018-07-13 ENCOUNTER — HOSPITAL ENCOUNTER (OUTPATIENT)
Dept: MAMMOGRAPHY | Facility: HOSPITAL | Age: 63
Discharge: HOME | End: 2018-07-13
Payer: MEDICARE

## 2018-07-13 DIAGNOSIS — Z12.39 SCREENING BREAST EXAMINATION: ICD-10-CM

## 2018-07-13 PROCEDURE — 77067 SCR MAMMO BI INCL CAD: CPT

## 2018-08-07 DIAGNOSIS — K21.9 GERD WITHOUT ESOPHAGITIS: ICD-10-CM

## 2018-08-07 RX ORDER — ESOMEPRAZOLE MAGNESIUM 40 MG/1
40 CAPSULE, DELAYED RELEASE ORAL DAILY
Qty: 90 CAPSULE | Refills: 0 | Status: SHIPPED | OUTPATIENT
Start: 2018-08-07 | End: 2018-09-17 | Stop reason: SDUPTHER

## 2018-09-17 ENCOUNTER — OFFICE VISIT (OUTPATIENT)
Dept: FAMILY MEDICINE CLINIC | Facility: CLINIC | Age: 63
End: 2018-09-17
Payer: MEDICARE

## 2018-09-17 VITALS
SYSTOLIC BLOOD PRESSURE: 130 MMHG | OXYGEN SATURATION: 98 % | HEIGHT: 65 IN | BODY MASS INDEX: 36.65 KG/M2 | HEART RATE: 99 BPM | WEIGHT: 220 LBS | TEMPERATURE: 97.2 F | DIASTOLIC BLOOD PRESSURE: 80 MMHG

## 2018-09-17 DIAGNOSIS — K21.9 GERD WITHOUT ESOPHAGITIS: Primary | ICD-10-CM

## 2018-09-17 DIAGNOSIS — E66.9 OBESITY (BMI 30-39.9): ICD-10-CM

## 2018-09-17 DIAGNOSIS — I83.899 VARICOSE VEINS OF LEG WITH EDEMA, UNSPECIFIED LATERALITY: ICD-10-CM

## 2018-09-17 PROBLEM — E78.00 PURE HYPERCHOLESTEROLEMIA: Status: ACTIVE | Noted: 2018-05-01

## 2018-09-17 PROCEDURE — 99214 OFFICE O/P EST MOD 30 MIN: CPT | Performed by: FAMILY MEDICINE

## 2018-09-17 RX ORDER — ESOMEPRAZOLE MAGNESIUM 40 MG/1
40 CAPSULE, DELAYED RELEASE ORAL DAILY
Qty: 90 CAPSULE | Refills: 1 | Status: SHIPPED | OUTPATIENT
Start: 2018-09-17 | End: 2019-05-06 | Stop reason: SDUPTHER

## 2018-09-17 NOTE — ASSESSMENT & PLAN NOTE
We noted a 15 lb weight loss since her last visit  We asked her to try to begin an exercise pattern to include 45 min of aerobic exercise 3 times a week  She states that she will try  She is also asked to try to cut down her total caloric intake  Again she will try

## 2018-09-17 NOTE — ASSESSMENT & PLAN NOTE
Continue with Nexium  Attempts at weaning in the past have been successful  She did have a bone density scan in 2016 which revealed normal bone density  We suggested that she had vitamin-D 2000 U daily to her regimen  She agrees

## 2018-09-17 NOTE — ASSESSMENT & PLAN NOTE
Her varicosities becoming more extra symptomatic    We are going to refer her to Dr Eugene [address] Dr  For evaluation

## 2018-09-17 NOTE — PROGRESS NOTES
Assessment/Plan:  GERD without esophagitis    Continue with Nexium  Attempts at weaning in the past have been successful  She did have a bone density scan in 2016 which revealed normal bone density  We suggested that she had vitamin-D 2000 U daily to her regimen  She agrees  Varicose veins of leg with edema, unspecified laterality    Her varicosities becoming more extra symptomatic  We are going to refer her to Dr Andie Enriquez Dr  For evaluation    Obesity (BMI 30-39  9)   We noted a 15 lb weight loss since her last visit  We asked her to try to begin an exercise pattern to include 45 min of aerobic exercise 3 times a week  She states that she will try  She is also asked to try to cut down her total caloric intake  Again she will try  Diagnoses and all orders for this visit:    GERD without esophagitis  -     esomeprazole (NEXIUM) 40 MG capsule; Take 1 capsule (40 mg total) by mouth daily    Varicose veins of leg with edema, unspecified laterality  -     Ambulatory referral to Vascular Surgery; Future    Obesity (BMI 30-39  9)          Subjective:   Chief Complaint   Patient presents with    Blood Pressure Check     pt here for med check today  Patient ID: Kb Nguyen is a 70 y o  female  C/w Nexium, not using Zantac  Cardiology following   HPI   the patient is a 78-year-old female with multiple medical problems who presents today for follow-up of gastroesophageal reflux disease  She states that she has been compliant with her Nexium and is relatively asymptomatic  She has no dysphagia weight loss anorexia or other constitutional symptoms  She discontinued Zantac  She has had a DEXA scan 2016 which reported normal bone mineral density  She follows with Cardiology for hyperlipidemia as well as essential hypertension  She also complains of worsening of the varicosity of over lower extremities  They are tender to the touch  No erythema fever calf pain swelling X cetera            The following portions of the patient's history were reviewed and updated as appropriate: allergies, current medications, past family history, past medical history, past social history, past surgical history and problem list     Review of Systems   Constitution: Positive for weight gain  Negative for decreased appetite and weight loss  Cardiovascular: Negative for chest pain, irregular heartbeat and leg swelling  Worsening venous varicosities lower extremities   Respiratory: Negative for cough, shortness of breath and sputum production  Endocrine: Negative for polydipsia, polyphagia and polyuria  Skin: Negative for rash  Gastrointestinal: Negative for dysphagia, heartburn and nausea  Neurological: Negative for headaches and light-headedness  Psychiatric/Behavioral: Negative for depression  The patient does not have insomnia and is not nervous/anxious  Objective:    Physical Exam   Constitutional: She is oriented to person, place, and time  She appears well-developed and well-nourished  No distress  Obese   Eyes: Conjunctivae are normal  No scleral icterus  Neck: Neck supple  No JVD present  No thyromegaly present  Cardiovascular: Normal rate, regular rhythm and normal heart sounds  She does have significant varicosities of her lower extremities bilaterally  I see no erythema no significant tenderness and no cord palpable   Pulmonary/Chest: Effort normal and breath sounds normal  No respiratory distress  She has no wheezes  She has no rales  Abdominal: Soft  Bowel sounds are normal  She exhibits no mass  There is no tenderness  Musculoskeletal: She exhibits no edema  Lymphadenopathy:     She has no cervical adenopathy  Neurological: She is alert and oriented to person, place, and time  Psychiatric: She has a normal mood and affect     Mildly anxious

## 2018-10-23 ENCOUNTER — OFFICE VISIT (OUTPATIENT)
Dept: VASCULAR SURGERY | Facility: CLINIC | Age: 63
End: 2018-10-23
Payer: MEDICARE

## 2018-10-23 VITALS
WEIGHT: 206 LBS | SYSTOLIC BLOOD PRESSURE: 120 MMHG | DIASTOLIC BLOOD PRESSURE: 80 MMHG | BODY MASS INDEX: 34.32 KG/M2 | HEIGHT: 65 IN | HEART RATE: 72 BPM

## 2018-10-23 DIAGNOSIS — I83.899 VARICOSE VEINS OF LEG WITH EDEMA, UNSPECIFIED LATERALITY: ICD-10-CM

## 2018-10-23 DIAGNOSIS — I87.2 CHRONIC VENOUS INSUFFICIENCY: Primary | ICD-10-CM

## 2018-10-23 PROCEDURE — 99203 OFFICE O/P NEW LOW 30 MIN: CPT | Performed by: NURSE PRACTITIONER

## 2018-10-23 RX ORDER — ACETAMINOPHEN 325 MG/1
650 TABLET ORAL EVERY 6 HOURS PRN
COMMUNITY
End: 2019-05-28 | Stop reason: ALTCHOICE

## 2018-10-23 NOTE — PROGRESS NOTES
Assessment/Plan:   70year old female with Obesity, HTN,HLD,GERD, OA s/p R knee replacement, chronic venous insufficiency who presents for evaluation of bilateral lower extremity varicosities  She is experiencing throbbing discomfort in the left thigh varicosity  She has a history of laser ablation and microphlebectomies of the left leg by Dr Evan Serrano in 2009    -We discussed the physiology of venous disease, treatment options and indications for treatment  -At this time recommending conservative measures  -this includes the daily use of compression stockings, weight loss, exercise, periodic leg elevation and moisturizers to maintain skin integrity    -I will see her back in the office in 3 months to check her symptoms with the use of compression  Problem List Items Addressed This Visit        Cardiovascular and Mediastinum    Varicose veins of leg with edema, unspecified laterality    Relevant Orders    Compression Stocking    Chronic venous insufficiency - Primary            Subjective: "I am here to have my veins checked "     Patient ID: Addie Fraser is a 70 y o  female          Chief Complaint:   Patient is a new to our practice  She was referred by Dr Angulo Bias  She is a retired nurse  She is complain of left leg pain and burning from her varicose veins  She says that her legs have had swelling which is causing her legs to feel tired and heavy  Her veins itch and burn at times  She states that she wears (20-30mmHg, last gotten about 5 years ago) compression stocking but not daily  She also has been elevating her legs  She had denies vein bleeding and blood clots  She says that she has had bulging veins for the past 25 years  She had previous vein surgery on the left leg done by Dr Nakita Chen in 2009  She is currently on ASA 81 mg           HPI  Varicose Veins    Addie Fraser is seen for evaluation of: [x]Varicose veins/legs  []Spider veins/legs  []Spider veins/face  []Venous stasis ulcer  []Superficial thrombophlebitis  []Other -      She complains of []none  [x]bulging veins  []dilated veins  []discolored veins         There is []no edema              []right leg edema  []left leg edema       [x]bilateral lower extremity edema     There is   []no leg pain          []right leg pain  []left leg pain         []bilateral leg pain  [x]bilateral leg pain; L>R   []bilateral leg pain; R>L     Pain is described as [x]aching              []itching  []sharp                []burning  [x]throbbing         []stinging  [x]heavy                [x]dull  []other -      Symptoms have been ongoing for:  Years    There is  [x]no pertinent medical history  []history of DVT  []PE  []superficial venous thrombosis     Prior treatment includes []none  []EVLT  []OTC stockings  [x]prescription compression stockings  []vein ligation  []vein stripping  []stab phlebectomy  []sclerotherapy injections  []Other -      Current treatment includes []none  []compression socks  []avoiding tight clothing  [x]leg elevation  [x]rest  []exercise  []weight management  []skin care  []periodic evaluation   []Other-     Treatment has been []effective  []ineffective     Review of Systems   Constitutional: Positive for fatigue  HENT: Positive for sinus pain and sinus pressure  Eyes: Positive for redness and itching  Respiratory: Negative  Cardiovascular: Positive for leg swelling  Painful veins   Gastrointestinal: Positive for abdominal pain  Endocrine: Positive for heat intolerance  Genitourinary: Positive for frequency and urgency  Musculoskeletal: Positive for back pain and neck pain  Leg pain   Skin: Negative  Allergic/Immunologic: Positive for environmental allergies  Neurological: Negative  Hematological: Negative  Psychiatric/Behavioral: Negative  Objective   Physical Exam   Constitutional: She is oriented to person, place, and time  She appears well-developed and well-nourished  HENT:   Head: Normocephalic and atraumatic  Eyes: EOM are normal    Neck: Neck supple  Cardiovascular: Normal heart sounds  Pulses:       Femoral pulses are 2+ on the right side, and 2+ on the left side  Dorsalis pedis pulses are 2+ on the right side, and 2+ on the left side  Posterior tibial pulses are 2+ on the right side, and 2+ on the left side  Pulmonary/Chest: Effort normal and breath sounds normal    Abdominal: Soft  Bowel sounds are normal    Musculoskeletal: Normal range of motion  She exhibits edema  1+ bilateral lower extremity edema    Neurological: She is alert and oriented to person, place, and time  Skin: Skin is warm  Psychiatric: Her behavior is normal  Thought content normal    Nursing note and vitals reviewed  The following portions of the patient's history were reviewed and updated as appropriate: allergies, current medications, past family history, past medical history, past social history, past surgical history and problem list     Review of Systems      Objective:     Vitals:    10/23/18 1342   BP: 120/80   BP Location: Left arm   Patient Position: Sitting   Pulse: 72   Weight: 93 4 kg (206 lb)   Height: 5' 5" (1 651 m)       Patient Active Problem List   Diagnosis    Allergic rhinitis    Benign essential hypertension    Dermatitis    GERD without esophagitis    Hyperlipoproteinemia    Nontoxic single thyroid nodule    Osteoarthritis of knee    Rectocele    Uterovaginal prolapse    Cystocele, lateral    Varicose veins of leg with edema, unspecified laterality    Family history of sudden cardiac death    Laryngitis    Pure hypercholesterolemia    Obesity (BMI 30-39  9)    Chronic venous insufficiency       Past Surgical History:   Procedure Laterality Date    BLADDER SURGERY  08/2012    Bladder "lift"    BREAST LUMPECTOMY  2000    COLONOSCOPY      HYSTERECTOMY  1990    KNEE SURGERY Right     x 2    NOSE SURGERY      HI LARYNGOSCOPY,DIRCT,OP SCOPE,BIOPSY Left 4/2/2018    Procedure: MICROLARYNGOSCOPY AND EXCISION OF LEFT VOCAL FOLD POLYP;  Surgeon: Montana Mcallister MD;  [facility];  Service: ENT    TONSILLECTOMY      TUBAL LIGATION      VARICOSE VEIN SURGERY Left 12/2009       Family History   Problem Relation Age of Onset   Thomasifzafar Godfrey Breast cancer Mother     Hypertension Mother     Varicose Veins Mother         of lower extremities    Hypertension Father     Coronary artery disease Father     Coronary artery disease Sister     Heart disease Sister     Varicose Veins Sister         of lower extremities       Social History     Social History    Marital status: /Civil Union     Spouse name: N/A    Number of children: N/A    Years of education: N/A     Occupational History    Not on file       Social History Main Topics    Smoking status: Former Smoker     Quit date: 1976    Smokeless tobacco: Never Used    Alcohol use Yes      Comment: rarely, twice per year / social drinker per [employer]                 Drug use: No    Sexual activity: Not on file     Other Topics Concern    Not on file     Social History Narrative    No narrative on file       Allergies   Allergen Reactions    Lisinopril      Other reaction(s): swollen legs    Hydrocodone-Acetaminophen Dizziness    Irbesartan-Hydrochlorothiazide      Other reaction(s): Hypertension, Other (See Comments)  Pt had hypertension, arm heaviness and headache         Current Outpatient Prescriptions:     acetaminophen (TYLENOL) 325 mg tablet, Take 650 mg by mouth every 6 (six) hours as needed for mild pain, Disp: , Rfl:     aspirin 81 MG tablet, Take 81 mg by mouth, Disp: , Rfl:     BIOTIN PO, Take by mouth, Disp: , Rfl:     diltiazem (CARDIZEM LA) 180 MG 24 hr tablet, Take 1 capsule by mouth daily, Disp: , Rfl:     esomeprazole (NEXIUM) 40 MG capsule, Take 1 capsule (40 mg total) by mouth daily, Disp: 90 capsule, Rfl: 1    multivitamin (THERAGRAN) TABS, Take 1 tablet by mouth daily, Disp: , Rfl:     naproxen sodium (ALEVE) 220 MG tablet, Take 220 mg by mouth, Disp: , Rfl:     telmisartan-hydrochlorothiazide (MICARDIS HCT) 80-25 MG per tablet, Take by mouth, Disp: , Rfl:       Vitals:    10/23/18 1342   BP: 120/80   Pulse: 72            Physical Exam

## 2018-10-23 NOTE — PATIENT INSTRUCTIONS
Varicose Veins   AMBULATORY CARE:   Varicose veins  are veins that become large, twisted, and swollen  They are common on the back of the calves, knees, and thighs  Varicose veins are caused by valves in your veins that do not work properly  This causes blood to collect and increase pressure in the veins of your legs  The increased pressure causes your veins to stretch, get larger, swell, and twist        Common symptoms include the following: Your symptoms may be worse after you stand or sit for long periods of time  You may have any of the following:  · Blue, purple, or bulging veins in your legs     · Pain, swelling, or muscle cramps in your legs    · Feeling of fatigue or heaviness in your legs    · Cramping in your legs  Seek care immediately if:   · You have a wound that does not heal or is infected  · You have an injury that has broken your skin and caused your varicose veins to bleed  · Your leg is swollen and hard  · You notice that your legs or feet are turning blue or black  · Your leg feels warm, tender, and painful  It may look swollen and red  Contact your healthcare provider if:   · You have pain in your leg that does not go away or gets worse  · You notice sudden large bruising on your legs  · You have a rash on your leg  · Your symptoms keep you from doing your daily activities  · You have questions or concerns about your condition or care  Treatment of varicose veins  aims to decrease symptoms, improve appearance, and prevent further problems  Treatment will depend on which veins are affected and how severe your condition is  You may need procedures to treat or remove your varicose veins  For example, your healthcare provider may inject a solution or use a laser to close the varicose veins  Surgery to remove long veins may also be done  Ask your healthcare provider for more information about procedures used to treat varicose veins    Manage varicose veins:   · Do not sit or stand for long periods of time  This can cause the blood to collect in your legs and make your symptoms worse  Bend or rotate your ankles several times every hour  Walk around for a few minutes every hour to get blood moving in your legs  · Do not cross your legs when you sit  This decreases blood flow to your feet and can make your symptoms worse  · Do not wear tight clothing or shoes  Do not wear high-heeled shoes  Do not wear clothes that are tight around the waist or knees  · Maintain a healthy weight  Being overweight or obese can make your varicose veins worse  Ask your healthcare provider how much you should weigh  Ask him or her to help you create a weight loss plan if you are overweight  · Wear pressure stockings as directed  The stockings are tight and put pressure on your legs  They improve blood flow and help prevent clots  · Elevate your legs  Keep them above the level of your heart for 15 to 30 minutes several times a day  You can also prop the end of your bed up slightly to elevate your legs while you sleep  This will help blood to flow back to your heart  · Get regular exercise  Talk to your healthcare provider about the best exercise plan for you  Exercise can improve blood flow to your legs and feet  Follow up with your healthcare provider as directed:  Write down your questions so you remember to ask them during your visits  © 2017 [employer] Information is for End User's use only and may not be sold, redistributed or otherwise used for commercial purposes  All illustrations and images included in CareNotes® are the copyrighted property of [employer]  or [employer]  The above information is an  only  It is not intended as medical advice for individual conditions or treatments  Talk to your doctor, nurse or pharmacist before following any medical regimen to see if it is safe and effective for you

## 2019-02-28 ENCOUNTER — TELEPHONE (OUTPATIENT)
Dept: ADMINISTRATIVE | Facility: HOSPITAL | Age: 64
End: 2019-02-28

## 2019-02-28 NOTE — TELEPHONE ENCOUNTER
Patient does not wish to follow up at this time  She has been wearing compression with no change and does not wish to follow up   Will call if she needs vascular

## 2019-03-18 ENCOUNTER — OFFICE VISIT (OUTPATIENT)
Dept: FAMILY MEDICINE CLINIC | Facility: CLINIC | Age: 64
End: 2019-03-18
Payer: MEDICARE

## 2019-03-18 VITALS
WEIGHT: 212 LBS | HEIGHT: 65 IN | HEART RATE: 77 BPM | TEMPERATURE: 97 F | BODY MASS INDEX: 35.32 KG/M2 | DIASTOLIC BLOOD PRESSURE: 84 MMHG | SYSTOLIC BLOOD PRESSURE: 140 MMHG | OXYGEN SATURATION: 99 %

## 2019-03-18 DIAGNOSIS — Z13.0 SCREENING FOR IRON DEFICIENCY ANEMIA: ICD-10-CM

## 2019-03-18 DIAGNOSIS — E78.5 HYPERLIPOPROTEINEMIA: ICD-10-CM

## 2019-03-18 DIAGNOSIS — Z13.1 SCREENING FOR DIABETES MELLITUS: ICD-10-CM

## 2019-03-18 DIAGNOSIS — Z23 ENCOUNTER FOR IMMUNIZATION: ICD-10-CM

## 2019-03-18 DIAGNOSIS — J38.2 VOCAL CORD NODULE: ICD-10-CM

## 2019-03-18 DIAGNOSIS — L57.0 ACTINIC KERATOSES: ICD-10-CM

## 2019-03-18 DIAGNOSIS — Z00.00 WELCOME TO MEDICARE PREVENTIVE VISIT: Primary | ICD-10-CM

## 2019-03-18 DIAGNOSIS — K21.9 GERD WITHOUT ESOPHAGITIS: ICD-10-CM

## 2019-03-18 DIAGNOSIS — Z11.59 NEED FOR HEPATITIS C SCREENING TEST: ICD-10-CM

## 2019-03-18 PROBLEM — J04.0 LARYNGITIS: Status: RESOLVED | Noted: 2018-03-06 | Resolved: 2019-03-18

## 2019-03-18 PROCEDURE — 17000 DESTRUCT PREMALG LESION: CPT | Performed by: FAMILY MEDICINE

## 2019-03-18 PROCEDURE — 17003 DESTRUCT PREMALG LES 2-14: CPT | Performed by: FAMILY MEDICINE

## 2019-03-18 PROCEDURE — 90670 PCV13 VACCINE IM: CPT | Performed by: FAMILY MEDICINE

## 2019-03-18 PROCEDURE — G0009 ADMIN PNEUMOCOCCAL VACCINE: HCPCS | Performed by: FAMILY MEDICINE

## 2019-03-18 PROCEDURE — 99214 OFFICE O/P EST MOD 30 MIN: CPT | Performed by: FAMILY MEDICINE

## 2019-03-18 NOTE — ASSESSMENT & PLAN NOTE
Patient is going to continue with her Nexium  Attempts at weaning have not been successful    She has had DEXA scan within the past year which was normal   She has had EGD by history

## 2019-03-18 NOTE — PROGRESS NOTES
Assessment/Plan:  GERD without esophagitis  Patient is going to continue with her Nexium  Attempts at weaning have not been successful  She has had DEXA scan within the past year which was normal   She has had EGD by history     Hyperlipoproteinemia  Lipid profile today  Continue follow-up with Cardiology    Actinic keratoses  Multiple actinic keratoses were noted today after patient point in the mouth  There pre malignant potential these lesions was discussed with the patient  She agreed to cryotherapy for definitive treatment  She is going to call back in 2 weeks if each lesion has not completely resolved  She agrees  Diagnoses and all orders for this visit:    Actinic keratoses    Encounter for immunization  -     PNEUMOCOCCAL CONJUGATE VACCINE 13-VALENT GREATER THAN 6 MONTHS  -     PREFERRED: influenza vaccine, [id number], high-dose, PF 0 5 mL, for patients 65 yr+ (FLUZONE HIGH-DOSE)    Vocal cord nodule    Hyperlipoproteinemia    GERD without esophagitis    Other orders  -     Lesion Destruction          Subjective:   Chief Complaint   Patient presents with   [facility] Visit     here for her welcom to medicare wellness today  pt given a freezer pack  pt declined the flu shot  she will get fbw today  Check aminah on my hand  GERD sx  Good  Takes Nexium daily  No dysphagia  DEXA normal last summer  1 lesion R infranasal and 4 LUE, 1 hand dorsum and 3 distal dorsal forearm  Patient ID: Nereida He is a 70 y o  female  HPI  The patient is a 68-year-old female with history of gastroesophageal reflux disease in addition to essential hypertension and hyperlipidemia who presents today for a Welcome to Medicare visit  She also has some lesions on her left arm and hand which she would like evaluated  She has follow-up in regards to her gastroesophageal reflux disease as well  She states that as long she takes her Nexium her symptoms are fairly well controlled    If she misses it she continues to have recurrent GERD symptoms  She has had no dysphagia or weight loss  She did have a DEXA scan last summer which was normal   She has had no cardiovascular pulmonary complaint  The following portions of the patient's history were reviewed and updated as appropriate: allergies, current medications, past family history, past medical history, past social history, past surgical history and problem list     Review of Systems   Constitution: Negative for decreased appetite, fever, malaise/fatigue, weight gain and weight loss  Cardiovascular: Negative for irregular heartbeat  Respiratory: Negative for cough, shortness of breath and wheezing  Skin: Positive for color change, itching and suspicious lesions  Gastrointestinal: Positive for heartburn  Negative for constipation, diarrhea, dysphagia and hematochezia  Neurological: Negative for focal weakness and headaches  Objective:    Physical Exam   Constitutional: She is oriented to person, place, and time  She appears well-developed and well-nourished  Cardiovascular: Normal rate and regular rhythm  Pulmonary/Chest: Effort normal    Neurological: She is alert and oriented to person, place, and time  Skin: There is erythema  She has on the dorsum of her left hand a crusty erythematous lesion which appears consistent with an actinic keratosis  She also has 3 similar lesions on her proximal dorsal left forearm and additionally 1 more in her right infra nasal region  There is no induration noted  No rolled borders  No telangiectasias  Psychiatric: She has a normal mood and affect  Nursing note and vitals reviewed      Lesion Destruction  Date/Time: 3/18/2019 12:35 PM  Performed by: Flores St MD  Authorized by: Flores St MD     Procedure Details - Lesion Destruction:     Number of Lesions:  5  Lesion 1:     Body area:  [address] location:  Upper lip    Malignancy: pre-malignant lesion      Destruction method: cryotherapy    Lesion 2:     Body area:  Upper extremity    Upper extremity location:  L hand    Malignancy: pre-malignant lesion      Destruction method: cryotherapy    Lesion 3:     Body area:  Upper extremity    Upper extremity location:  L lower arm    Malignancy: pre-malignant lesion      Destruction method: cryotherapy    Lesion 4:     Body area:  Upper extremity    Upper extremity location:  L lower arm    Malignancy: pre-malignant lesion      Destruction method: cryotherapy    Lesion 5:     Body area:  Upper extremity    Upper extremity location:  L lower arm    Malignancy: pre-malignant lesion      Destruction method: cryotherapy

## 2019-03-18 NOTE — ASSESSMENT & PLAN NOTE
Multiple actinic keratoses were noted today after patient point in the mouth  There pre malignant potential these lesions was discussed with the patient  She agreed to cryotherapy for definitive treatment  She is going to call back in 2 weeks if each lesion has not completely resolved  She agrees

## 2019-03-18 NOTE — ASSESSMENT & PLAN NOTE
We performed a Medicare welcome visit on the patient today  All components of the examination were discussed completed  Salient points include that she needs hepatitis C screening which was performed today  She has not had influenza or pneumococcal vaccination  She declines influenza vaccination but she accepted pneumococcal vaccination  We taras lipids for her cardiologist   Will of the patient know as the results when available  We gave her a copy of 5 wishes and freezer pack, discussed with her and she seems eager to complete this

## 2019-03-18 NOTE — PROGRESS NOTES
Assessment and Plan:    Problem List Items Addressed This Visit        Digestive    GERD without esophagitis     Patient is going to continue with her Nexium  Attempts at weaning have not been successful  She has had DEXA scan within the past year which was normal   She has had EGD by history             Musculoskeletal and Integument    Actinic keratoses     Multiple actinic keratoses were noted today after patient point in the mouth  There pre malignant potential these lesions was discussed with the patient  She agreed to cryotherapy for definitive treatment  She is going to call back in 2 weeks if each lesion has not completely resolved  She agrees  Relevant Orders    Lesion Destruction       Other    Welcome to Medicare preventive visit - Primary     We performed a Medicare welcome visit on the patient today  All components of the examination were discussed completed  Salient points include that she needs hepatitis C screening which was performed today  She has not had influenza or pneumococcal vaccination  She declines influenza vaccination but she accepted pneumococcal vaccination  We taras lipids for her cardiologist   Will of the patient know as the results when available  We gave her a copy of 5 wishes and freezer pack, discussed with her and she seems eager to complete this  Hyperlipoproteinemia     Lipid profile today    Continue follow-up with Cardiology           Other Visit Diagnoses     Encounter for immunization        Relevant Orders    PNEUMOCOCCAL CONJUGATE VACCINE 13-VALENT GREATER THAN 6 MONTHS (Completed)    PREFERRED: influenza vaccine, [id number], high-dose, PF 0 5 mL, for patients 65 yr+ (FLUZONE HIGH-DOSE)    Vocal cord nodule            Health Maintenance Due   Topic Date Due    Hepatitis C Screening  1955    Medicare Annual Wellness Visit (AWV)  1955    BMI: Followup Plan  07/23/1971    DTaP,Tdap,and Td Vaccines (1 - Tdap) 07/23/1974     I am always tired and need to lose weight  No walking  HPI:  Mateo Lopez is a 70 y o  female here for her IPPE(Welcome to Medicare Visit )    Patient Active Problem List   Diagnosis    Allergic rhinitis    Benign essential hypertension    Dermatitis    GERD without esophagitis    Hyperlipoproteinemia    Nontoxic single thyroid nodule    Osteoarthritis of knee    Rectocele    Uterovaginal prolapse    Cystocele, lateral    Varicose veins of leg with edema, unspecified laterality    Family history of sudden cardiac death    Pure hypercholesterolemia    Obesity (BMI 30-39  9)    Chronic venous insufficiency    Actinic keratoses    Welcome to Medicare preventive visit     Past Medical History:   Diagnosis Date    Breast lump     Disease of thyroid gland     nodule, benign    GERD (gastroesophageal reflux disease)     History of transfusion     at 9years old for bleeding during tonsillectomy, no reaction    Hypertension     Rectocele     Uterine leiomyoma     Varicose veins of both lower extremities      Past Surgical History:   Procedure Laterality Date    BLADDER SURGERY  08/2012    Bladder "lift"    BREAST LUMPECTOMY  2000    COLONOSCOPY      HYSTERECTOMY  1990    KNEE SURGERY Right     x 2    NOSE SURGERY      MO LARYNGOSCOPY,DIRCT,OP SCOPE,BIOPSY Left 4/2/2018    Procedure: MICROLARYNGOSCOPY AND EXCISION OF LEFT VOCAL FOLD POLYP;  Surgeon: Lorenza Lay MD;  [facility];  Service: ENT    TONSILLECTOMY      TUBAL LIGATION      VARICOSE VEIN SURGERY Left 12/2009     Family History   Problem Relation Age of Onset   [facility] Breast cancer Mother     Hypertension Mother     Varicose Veins Mother         of lower extremities    Hypertension Father     Coronary artery disease Father     Coronary artery disease Sister     Heart disease Sister     Varicose Veins Sister         of lower extremities     Social History     Tobacco Use   Smoking Status Former Smoker    Last attempt to quit: Amador Sanchez Years since quittin 2   Smokeless Tobacco Never Used     Social History     Substance and Sexual Activity   Alcohol Use Yes    Comment: rarely, twice per year / social drinker per [employer]script                   Social History     Substance and Sexual Activity   Drug Use No       Current Outpatient Medications   Medication Sig Dispense Refill    acetaminophen (TYLENOL) 325 mg tablet Take 650 mg by mouth every 6 (six) hours as needed for mild pain      aspirin 81 MG tablet Take 81 mg by mouth      BIOTIN PO Take by mouth      diltiazem (CARDIZEM LA) 180 MG 24 hr tablet Take 1 capsule by mouth daily      esomeprazole (NEXIUM) 40 MG capsule Take 1 capsule (40 mg total) by mouth daily 90 capsule 1    multivitamin (THERAGRAN) TABS Take 1 tablet by mouth daily      naproxen sodium (ALEVE) 220 MG tablet Take 220 mg by mouth      telmisartan-hydrochlorothiazide (MICARDIS HCT) 80-25 MG per tablet Take by mouth       No current facility-administered medications for this visit  Allergies   Allergen Reactions    Lisinopril      Other reaction(s): swollen legs    Hydrocodone-Acetaminophen Dizziness    Irbesartan-Hydrochlorothiazide      Other reaction(s): Hypertension, Other (See Comments)  Pt had hypertension, arm heaviness and headache     Immunization History   Administered Date(s) Administered    Pneumococcal Conjugate 13-Valent 2019       Patient Care Team:  Rodger Woodward MD as PCP - CECI Neal MD    Medicare Screening Tests and Risk Assessments:      Health Risk Assessment:  Patient rates overall health as good  Patient feels that their physical health rating is Same  Eyesight was rated as Same  Hearing was rated as Same  Patient feels that their emotional and mental health rating is Same  Pain experienced by patient in the last 7 days has been Some  Patient's pain rating has been 3/10       Emotional/Mental Health:  Patient has been feeling nervous/anxious  PHQ-9 Depression Screening:    Frequency of the following problems over the past two weeks:      1  Little interest or pleasure in doing things: 0 - not at all      2  Feeling down, depressed, or hopeless: 0 - not at all  PHQ-2 Score: 0          Broken Bones/Falls: Fall Risk Assessment:    In the past year, patient has experienced: No history of falling in past year          Bladder/Bowel:  Patient has leaked urine accidently in the last six months  Patient reports no loss of bowel control  Immunizations:  Patient has not had a flu vaccination within the last year  Patient has not received a pneumonia shot  Patient has not received a shingles shot  Patient has not received tetanus/diphtheria shot  Home Safety:  Patient does not have trouble with stairs inside or outside of their home  Patient currently reports that there are no safety hazards present in home, working smoke alarms, working carbon monoxide detectors  Preventative Screenings:   Breast cancer screening performed, 7/13/2018  colon cancer screen completed, 9/13/2013  cholesterol screen completed, 3/22/2018  glaucoma eye exam completed, 1/21/2019      Nutrition:  Current diet: Regular with servings of the following:    Medications:  Patient is currently taking over-the-counter supplements  Patient is able to manage medications  Lifestyle Choices:  Patient reports no tobacco use  Patient has smoked or used tobacco in the past   Patient has stopped her tobacco use  Patient reports no alcohol use  Patient drives a vehicle  Patient wears seat belt  Current level of exercise of physical activity described by patient as: limited          Activities of Daily Living:  Can get out of bed by his or her self, able to dress self, able to make own meals, able to do own shopping, able to bathe self, can do own laundry/housekeeping, can manage own money, pay bills and track expenses    Previous Hospitalizations:  No hospitalization or ED visit in past 12 months        Advanced Directives:  Patient has decided on a power of   Patient has spoken to designated power of   Patient has not completed advanced directive  Social Support: Amy Flanagan  [address] [place] [address]  [phone]    Preventative Screening/Counseling:      Cardiovascular:      General: Risks and Benefits Discussed and Screening Current      Counseling: Healthy Diet and Healthy Weight      Comments: Follows with Dr Miah Phillips has recommended lipid lowering levels  Diabetes:      General: Risks and Benefits Discussed and Screening Current      Counseling: Healthy Diet, Healthy Weight and Improve Physical Activity      Comments: FBS normal 3/18        Colorectal Cancer:      General: Risks and Benefits Discussed and Screening Current      Comments: Dr Joshua Rockwell, 12/18  10 year recall  Breast Cancer:      General: Risks and Benefits Discussed and Screening Current      Comments: Normal 7/18  Cervical Cancer:      General: Risks and Benefits Discussed      Comments: PAP normal 11/18 normal  LV GYN        Osteoporosis:      General: Risks and Benefits Discussed and Screening Current      Comments: Normal DEXA, 7/18        AAA:      General: Screening Not Indicated          Glaucoma:      General: Risks and Benefits Discussed and Screening Current      Comments: Girish Albright  IOP normal          HIV:      General: Risks and Benefits Discussed and Patient Declines          Hepatitis C:      General: Risks and Benefits Discussed      Counseling: has received general HCV counseling      Additional Comments: Perform today    Advanced Directives:   patient does not have an advanced directive  5 wishes given  Provider agrees with end of life decisions    Additional Comments: Discussed    Immunizations:      Influenza: Patient Declines      Pneumococcal: Risks & Benefits Discussed

## 2019-03-19 LAB
ALBUMIN SERPL-MCNC: 4.3 G/DL (ref 3.6–5.1)
ALBUMIN/GLOB SERPL: 1.9 (CALC) (ref 1–2.5)
ALP SERPL-CCNC: 82 U/L (ref 33–130)
ALT SERPL-CCNC: 18 U/L (ref 6–29)
AST SERPL-CCNC: 15 U/L (ref 10–35)
BILIRUB SERPL-MCNC: 0.5 MG/DL (ref 0.2–1.2)
BUN SERPL-MCNC: 20 MG/DL (ref 7–25)
BUN/CREAT SERPL: NORMAL (CALC) (ref 6–22)
CALCIUM SERPL-MCNC: 9.7 MG/DL (ref 8.6–10.4)
CHLORIDE SERPL-SCNC: 103 MMOL/L (ref 98–110)
CHOLEST SERPL-MCNC: 213 MG/DL
CHOLEST/HDLC SERPL: 3.5 (CALC)
CO2 SERPL-SCNC: 26 MMOL/L (ref 20–32)
CREAT SERPL-MCNC: 0.7 MG/DL (ref 0.5–0.99)
ERYTHROCYTE [DISTWIDTH] IN BLOOD BY AUTOMATED COUNT: 12.9 % (ref 11–15)
GLOBULIN SER CALC-MCNC: 2.3 G/DL (CALC) (ref 1.9–3.7)
GLUCOSE SERPL-MCNC: 82 MG/DL (ref 65–99)
HCT VFR BLD AUTO: 41.2 % (ref 35–45)
HCV AB S/CO SERPL IA: 0.12
HCV AB SERPL QL IA: NORMAL
HDLC SERPL-MCNC: 61 MG/DL
HGB BLD-MCNC: 13.8 G/DL (ref 11.7–15.5)
LDLC SERPL CALC-MCNC: 133 MG/DL (CALC)
MCH RBC QN AUTO: 30.7 PG (ref 27–33)
MCHC RBC AUTO-ENTMCNC: 33.5 G/DL (ref 32–36)
MCV RBC AUTO: 91.8 FL (ref 80–100)
NONHDLC SERPL-MCNC: 152 MG/DL (CALC)
PLATELET # BLD AUTO: 186 THOUSAND/UL (ref 140–400)
PMV BLD REES-ECKER: 11.7 FL (ref 7.5–12.5)
POTASSIUM SERPL-SCNC: 4.2 MMOL/L (ref 3.5–5.3)
PROT SERPL-MCNC: 6.6 G/DL (ref 6.1–8.1)
RBC # BLD AUTO: 4.49 MILLION/UL (ref 3.8–5.1)
SL AMB EGFR AFRICAN AMERICAN: 105 ML/MIN/1.73M2
SL AMB EGFR NON AFRICAN AMERICAN: 91 ML/MIN/1.73M2
SODIUM SERPL-SCNC: 142 MMOL/L (ref 135–146)
TRIGL SERPL-MCNC: 89 MG/DL
WBC # BLD AUTO: 6.4 THOUSAND/UL (ref 3.8–10.8)

## 2019-05-06 DIAGNOSIS — K21.9 GERD WITHOUT ESOPHAGITIS: ICD-10-CM

## 2019-05-06 RX ORDER — ESOMEPRAZOLE MAGNESIUM 40 MG/1
40 CAPSULE, DELAYED RELEASE ORAL DAILY
Qty: 90 CAPSULE | Refills: 1 | Status: SHIPPED | OUTPATIENT
Start: 2019-05-06 | End: 2019-09-23 | Stop reason: SDUPTHER

## 2019-05-28 ENCOUNTER — OFFICE VISIT (OUTPATIENT)
Dept: FAMILY MEDICINE CLINIC | Facility: CLINIC | Age: 64
End: 2019-05-28
Payer: MEDICARE

## 2019-05-28 VITALS
BODY MASS INDEX: 34.99 KG/M2 | HEART RATE: 75 BPM | OXYGEN SATURATION: 98 % | TEMPERATURE: 97 F | SYSTOLIC BLOOD PRESSURE: 138 MMHG | HEIGHT: 65 IN | WEIGHT: 210 LBS | DIASTOLIC BLOOD PRESSURE: 88 MMHG

## 2019-05-28 DIAGNOSIS — I10 BENIGN ESSENTIAL HYPERTENSION: ICD-10-CM

## 2019-05-28 DIAGNOSIS — R07.81 PLEURITIC CHEST PAIN: Primary | ICD-10-CM

## 2019-05-28 PROCEDURE — 99214 OFFICE O/P EST MOD 30 MIN: CPT | Performed by: FAMILY MEDICINE

## 2019-05-28 RX ORDER — TELMISARTAN AND HYDROCHLORTHIAZIDE 80; 12.5 MG/1; MG/1
TABLET ORAL 2 TIMES DAILY
COMMUNITY
Start: 2019-05-08 | End: 2021-02-10

## 2019-05-28 RX ORDER — GUAIFENESIN AND CODEINE PHOSPHATE 100; 10 MG/5ML; MG/5ML
5 SOLUTION ORAL 3 TIMES DAILY PRN
Qty: 120 ML | Refills: 0 | Status: SHIPPED | OUTPATIENT
Start: 2019-05-28 | End: 2019-09-23 | Stop reason: ALTCHOICE

## 2019-07-08 ENCOUNTER — TRANSCRIBE ORDERS (OUTPATIENT)
Dept: ADMINISTRATIVE | Facility: HOSPITAL | Age: 64
End: 2019-07-08

## 2019-07-08 DIAGNOSIS — Z12.39 BREAST SCREENING, UNSPECIFIED: Primary | ICD-10-CM

## 2019-07-17 ENCOUNTER — CLINICAL SUPPORT (OUTPATIENT)
Dept: FAMILY MEDICINE CLINIC | Facility: CLINIC | Age: 64
End: 2019-07-17
Payer: MEDICARE

## 2019-07-17 DIAGNOSIS — R53.83 FATIGUE, UNSPECIFIED TYPE: Primary | ICD-10-CM

## 2019-07-17 PROCEDURE — 36415 COLL VENOUS BLD VENIPUNCTURE: CPT

## 2019-07-18 LAB
BUN SERPL-MCNC: 21 MG/DL (ref 7–25)
BUN/CREAT SERPL: NORMAL (CALC) (ref 6–22)
CALCIUM SERPL-MCNC: 9.4 MG/DL (ref 8.6–10.4)
CHLORIDE SERPL-SCNC: 106 MMOL/L (ref 98–110)
CO2 SERPL-SCNC: 29 MMOL/L (ref 20–32)
CREAT SERPL-MCNC: 0.78 MG/DL (ref 0.5–0.99)
GLUCOSE SERPL-MCNC: 97 MG/DL (ref 65–99)
POTASSIUM SERPL-SCNC: 4 MMOL/L (ref 3.5–5.3)
SL AMB EGFR AFRICAN AMERICAN: 92 ML/MIN/1.73M2
SL AMB EGFR NON AFRICAN AMERICAN: 80 ML/MIN/1.73M2
SODIUM SERPL-SCNC: 143 MMOL/L (ref 135–146)
TSH SERPL-ACNC: 4.15 MIU/L (ref 0.4–4.5)

## 2019-09-17 ENCOUNTER — HOSPITAL ENCOUNTER (OUTPATIENT)
Dept: MAMMOGRAPHY | Facility: HOSPITAL | Age: 64
Discharge: HOME | End: 2019-09-17
Payer: MEDICARE

## 2019-09-17 VITALS — BODY MASS INDEX: 34.99 KG/M2 | WEIGHT: 210 LBS | HEIGHT: 65 IN

## 2019-09-17 DIAGNOSIS — Z12.39 BREAST SCREENING, UNSPECIFIED: ICD-10-CM

## 2019-09-17 PROCEDURE — 77067 SCR MAMMO BI INCL CAD: CPT

## 2019-09-23 ENCOUNTER — OFFICE VISIT (OUTPATIENT)
Dept: FAMILY MEDICINE CLINIC | Facility: CLINIC | Age: 64
End: 2019-09-23
Payer: MEDICARE

## 2019-09-23 VITALS
TEMPERATURE: 98.1 F | OXYGEN SATURATION: 95 % | HEIGHT: 65 IN | HEART RATE: 78 BPM | DIASTOLIC BLOOD PRESSURE: 82 MMHG | WEIGHT: 210 LBS | BODY MASS INDEX: 34.99 KG/M2 | SYSTOLIC BLOOD PRESSURE: 130 MMHG

## 2019-09-23 DIAGNOSIS — K21.9 GERD WITHOUT ESOPHAGITIS: ICD-10-CM

## 2019-09-23 PROCEDURE — 99213 OFFICE O/P EST LOW 20 MIN: CPT | Performed by: FAMILY MEDICINE

## 2019-09-23 RX ORDER — ESOMEPRAZOLE MAGNESIUM 40 MG/1
40 CAPSULE, DELAYED RELEASE ORAL DAILY
Qty: 90 CAPSULE | Refills: 1 | Status: SHIPPED | OUTPATIENT
Start: 2019-09-23 | End: 2020-03-23 | Stop reason: SDUPTHER

## 2019-09-23 NOTE — PROGRESS NOTES
Assessment/Plan:  GERD without esophagitis  She continues with Nexium which is effective at relieving her symptomatology  Efforts at weaning have been unsuccessful  Her prescriptions refilled today  She has had a normal DEXA scan and she has had EGD  Diagnoses and all orders for this visit:    GERD without esophagitis  -     esomeprazole (NEXIUM) 40 MG capsule; Take 1 capsule (40 mg total) by mouth daily          Subjective:   Chief Complaint   Patient presents with    GERD     here for refills today  pt will schedule for fbw  Patient ID: Emma Rodriguez is a 75 y o  female  HPI  The patient is a 57-year-old female who presents today for routine follow-up of gastroesophageal reflux disease  She states that she has been compliant with her Nexium which relieved her symptoms  She has had no odynophagia, dysphagia, anorexia weight loss or other constitutional symptoms  She does state that she has made attempts to wean from the Nexium but she has immediate return of her symptoms  She is compliant with her cardiovascular medication through Cardiology  The following portions of the patient's history were reviewed and updated as appropriate: allergies, current medications, past family history, past medical history, past social history, past surgical history and problem list     Review of Systems   HENT: Positive for congestion  Ocular pruritus  Congestion and PND   Cardiovascular: Positive for leg swelling  Negative for chest pain, irregular heartbeat, orthopnea and paroxysmal nocturnal dyspnea  Ankles swelling with bus trip  Respiratory: Positive for cough  Negative for shortness of breath and wheezing  Endocrine: Positive for polyuria  Negative for polydipsia and polyphagia  Gastrointestinal: Positive for heartburn  Negative for constipation, diarrhea and dysphagia  Genitourinary: Positive for bladder incontinence and urgency  Negative for dysuria  Objective:    Physical Exam   Constitutional: She is oriented to person, place, and time  She appears well-developed and well-nourished  Overweight in NAD   HENT:   Mouth/Throat: No oropharyngeal exudate  Neck: No JVD present  Cardiovascular: Normal rate, regular rhythm and normal heart sounds  Exam reveals no gallop  No murmur heard  Pulmonary/Chest: Effort normal and breath sounds normal  No respiratory distress  She has no wheezes  She has no rales  Abdominal: Soft  Bowel sounds are normal    Obese abdomen, no mass, organomegaly or tenderness   Lymphadenopathy:     She has no cervical adenopathy  Neurological: She is alert and oriented to person, place, and time  Psychiatric: She has a normal mood and affect  Thought content normal    Nursing note and vitals reviewed

## 2019-09-23 NOTE — ASSESSMENT & PLAN NOTE
She continues with Nexium which is effective at relieving her symptomatology  Efforts at weaning have been unsuccessful  Her prescriptions refilled today  She has had a normal DEXA scan and she has had EGD

## 2020-01-20 ENCOUNTER — OFFICE VISIT (OUTPATIENT)
Dept: FAMILY MEDICINE CLINIC | Facility: CLINIC | Age: 65
End: 2020-01-20
Payer: MEDICARE

## 2020-01-20 VITALS
HEIGHT: 65 IN | TEMPERATURE: 97.5 F | HEART RATE: 94 BPM | DIASTOLIC BLOOD PRESSURE: 80 MMHG | OXYGEN SATURATION: 95 % | SYSTOLIC BLOOD PRESSURE: 130 MMHG | BODY MASS INDEX: 34.32 KG/M2 | WEIGHT: 206 LBS

## 2020-01-20 DIAGNOSIS — H66.90 ACUTE OTITIS MEDIA, UNSPECIFIED OTITIS MEDIA TYPE: Primary | ICD-10-CM

## 2020-01-20 DIAGNOSIS — J98.01 BRONCHOSPASM: ICD-10-CM

## 2020-01-20 PROCEDURE — 99214 OFFICE O/P EST MOD 30 MIN: CPT | Performed by: FAMILY MEDICINE

## 2020-01-20 RX ORDER — AZITHROMYCIN 250 MG/1
TABLET, FILM COATED ORAL
Qty: 6 TABLET | Refills: 0 | Status: SHIPPED | OUTPATIENT
Start: 2020-01-20 | End: 2020-01-24

## 2020-01-20 RX ORDER — LATANOPROST 50 UG/ML
SOLUTION/ DROPS OPHTHALMIC
COMMUNITY
Start: 2020-01-13

## 2020-01-20 RX ORDER — ALBUTEROL SULFATE 90 UG/1
2 AEROSOL, METERED RESPIRATORY (INHALATION) EVERY 6 HOURS PRN
Qty: 1 INHALER | Refills: 5 | Status: SHIPPED | OUTPATIENT
Start: 2020-01-20 | End: 2022-04-20

## 2020-01-20 RX ORDER — PREDNISONE 10 MG/1
10 TABLET ORAL DAILY
Qty: 22 TABLET | Refills: 0 | Status: SHIPPED | OUTPATIENT
Start: 2020-01-20 | End: 2020-02-27 | Stop reason: ALTCHOICE

## 2020-01-20 NOTE — ASSESSMENT & PLAN NOTE
Patient has a bronchospastic cough  May represent RSV  I do not believe she had influenza  She did not have a flu shot in declines  We are going to treat her with an albuterol inhaler as well as a prednisone taper  She is asked to call she develops purulent sputum chest pain shortness of breath X cetera  She agrees

## 2020-01-20 NOTE — PROGRESS NOTES
BMI Counseling: Body mass index is 34 28 kg/m²  The BMI is above normal  Nutrition recommendations include decreasing portion sizes, encouraging healthy choices of fruits and vegetables and moderation in carbohydrate intake  Exercise recommendations include moderate physical activity 150 minutes/week and exercising 3-5 times per week  No pharmacotherapy was ordered  Assessment/Plan:  Bronchospasm  Patient has a bronchospastic cough  May represent RSV  I do not believe she had influenza  She did not have a flu shot in declines  We are going to treat her with an albuterol inhaler as well as a prednisone taper  She is asked to call she develops purulent sputum chest pain shortness of breath X cetera  She agrees  Acute otitis media  Patient has acute otitis media  We are going to treat with azithromycin  Diagnoses and all orders for this visit:    Acute otitis media, unspecified otitis media type  -     azithromycin (ZITHROMAX) 250 mg tablet; 2 stat and then 1 QD    Bronchospasm  -     predniSONE 10 mg tablet; Take 1 tablet (10 mg total) by mouth daily 4 daily for 2 days then 3 daily for 2 days then 2 daily for 2 days then 1 daily for 4 days  -     albuterol (PROVENTIL HFA,VENTOLIN HFA) 90 mcg/act inhaler; Inhale 2 puffs every 6 (six) hours as needed for wheezing or shortness of breath    Other orders  -     latanoprost (XALATAN) 0 005 % ophthalmic solution  -     Ginger, Zingiber officinalis, (GINGER ROOT PO); Take by mouth  -     Multiple Vitamins-Minerals (VITAMIN D3 COMPLETE PO); Take by mouth          Subjective:   Chief Complaint   Patient presents with    Cough     dry, chest tightness, L ear pain     I started last Monday  I didn't feel good  Started with the cough  Nocturnal  No sputum  Hoarse voice  I hear weird noises at night  Mild SOB  No fever  No HA or myalgias but L pleuritic CP  L otalgia  L ETD sx  Patient ID: Ernie Gar is a 75 y o  female      HPI  Patient is a 33-year-old female who presents today with a persistent cough  She states that she was in her usual state of good health until 1 week ago on Monday she states that I did not feel good  She had been it seen of the previous weekend neurologic people coughing around her  It started with a cough which is worse nocturnally  She has been wheezing  She has had no sputum  She has had clear nasal discharge  She has had a hoarse voice and left-sided otalgia  She does feel mildly short of breath with it  She had no headaches myalgia or fever  No nausea vomiting or diarrhea  No rash  No GI or other  symptom  The following portions of the patient's history were reviewed and updated as appropriate: allergies, current medications, past family history, past medical history, past social history, past surgical history and problem list     Review of Systems   Constitution: Negative for chills, decreased appetite, diaphoresis, fever and malaise/fatigue  HENT: Positive for congestion, ear pain and hearing loss  Negative for ear discharge and sore throat  Cardiovascular: Negative for chest pain, irregular heartbeat, leg swelling, orthopnea and paroxysmal nocturnal dyspnea  Respiratory: Positive for cough, shortness of breath, sleep disturbances due to breathing, sputum production and wheezing  Negative for hemoptysis  Endocrine: Negative for polydipsia, polyphagia and polyuria  Hematologic/Lymphatic: Negative for adenopathy and bleeding problem  Skin: Negative for rash  Musculoskeletal: Negative for myalgias  Gastrointestinal: Negative  Genitourinary: Negative  Neurological: Negative for dizziness and headaches  Objective:    Physical Exam   Constitutional: She is oriented to person, place, and time  She appears well-developed and well-nourished  No distress  HENT:   Mouth/Throat: Oropharynx is clear and moist  No oropharyngeal exudate  Left serous otitis media with bubbles    There is erythema of the tympanic membrane as well  No perforation noted  Eyes: Right eye exhibits no discharge  Left eye exhibits no discharge  Neck: No JVD present  No thyromegaly present  Cardiovascular: Normal rate and regular rhythm  No murmur heard  Pulmonary/Chest: Effort normal    Patient has mild to moderate diffuse expiratory wheezing  No crackles or rhonchi appreciated presently  Musculoskeletal: She exhibits no edema  Lymphadenopathy:     She has no cervical adenopathy  Neurological: She is alert and oriented to person, place, and time  Psychiatric: She has a normal mood and affect  Thought content normal    Nursing note and vitals reviewed

## 2020-02-27 ENCOUNTER — OFFICE VISIT (OUTPATIENT)
Dept: FAMILY MEDICINE CLINIC | Facility: CLINIC | Age: 65
End: 2020-02-27
Payer: MEDICARE

## 2020-02-27 VITALS
HEIGHT: 65 IN | HEART RATE: 93 BPM | TEMPERATURE: 98.6 F | SYSTOLIC BLOOD PRESSURE: 126 MMHG | WEIGHT: 210 LBS | OXYGEN SATURATION: 97 % | BODY MASS INDEX: 34.99 KG/M2 | DIASTOLIC BLOOD PRESSURE: 82 MMHG

## 2020-02-27 DIAGNOSIS — R05.9 COUGH: Primary | ICD-10-CM

## 2020-02-27 DIAGNOSIS — B34.9 VIRAL ILLNESS: ICD-10-CM

## 2020-02-27 PROBLEM — H66.90 ACUTE OTITIS MEDIA: Status: RESOLVED | Noted: 2020-01-20 | Resolved: 2020-02-27

## 2020-02-27 PROCEDURE — 1160F RVW MEDS BY RX/DR IN RCRD: CPT | Performed by: FAMILY MEDICINE

## 2020-02-27 PROCEDURE — 1036F TOBACCO NON-USER: CPT | Performed by: FAMILY MEDICINE

## 2020-02-27 PROCEDURE — 3074F SYST BP LT 130 MM HG: CPT | Performed by: FAMILY MEDICINE

## 2020-02-27 PROCEDURE — 4040F PNEUMOC VAC/ADMIN/RCVD: CPT | Performed by: FAMILY MEDICINE

## 2020-02-27 PROCEDURE — 3079F DIAST BP 80-89 MM HG: CPT | Performed by: FAMILY MEDICINE

## 2020-02-27 PROCEDURE — 3008F BODY MASS INDEX DOCD: CPT | Performed by: FAMILY MEDICINE

## 2020-02-27 PROCEDURE — 99213 OFFICE O/P EST LOW 20 MIN: CPT | Performed by: FAMILY MEDICINE

## 2020-02-27 RX ORDER — BENZONATATE 200 MG/1
200 CAPSULE ORAL 3 TIMES DAILY PRN
Qty: 20 CAPSULE | Refills: 0 | Status: SHIPPED | OUTPATIENT
Start: 2020-02-27 | End: 2020-03-23 | Stop reason: ALTCHOICE

## 2020-02-27 NOTE — PROGRESS NOTES
Assessment/Plan:  Cough  The patient has a cough which I believe is related to a viral upper respiratory illness  She has no fever of significance, normal respiratory rate, heart rate as well as pulse ox  Pulmonary exam is nonfocal   We are going to obtain nasopharyngeal specimen for flu and RSV PCR  We are going to give her some Tessalon for symptomatic relief and have her push fluids and rest   She is asked call tomorrow with report of her condition  Diagnoses and all orders for this visit:    Cough  -     benzonatate (TESSALON) 200 MG capsule; Take 1 capsule (200 mg total) by mouth 3 (three) times a day as needed for cough  -     Influenza A/B and RSV PCR    Viral illness          Subjective:   Chief Complaint   Patient presents with    Cough     dry cough, runny nose, wheezing last night        Patient ID: Latosha Bautista is a 75 y o  female  I seemed to get better but a couple of days ago I started up with the hacking cough  Albuterol inhaler helped with wheezing overnight and first am today  Pleuritic anterior CP  No fever or myalgias  HA overnight  No N/V/D/irritative  sx  or flank pain  Dry cough  HPI  The patient is a 76-year-old female who was seen about a month ago with respiratory syndrome  That time she was diagnosed with acute otitis media as well as bronchospasm  There is question as to whether she may be suffering from RSV  It did not appear that she was suffering from influenza  She had not had an influenza vaccine  She declined same  She was given albuterol inhaler as well as prednisone taper and she states that her symptoms resolved completely  She states that a couple of days ago she started with a hacking cough again  She had some wheezing overnight in a does 1st morning hours  She states her albuterol inhaler seems to help that  She has some pleuritic anterior chest pain  She has no exertional chest pain, PND, orthopnea edema X cetera    She has had no fevers or myalgias  She did have a headache overnight which resolved  No nausea vomiting diarrhea or irritative genitourinary symptoms  No flank pain  No sputum  No rash  The following portions of the patient's history were reviewed and updated as appropriate: allergies, current medications, past family history, past medical history, past social history, past surgical history and problem list     ROS    Limited pertinent review of systems is per the HPI  Objective:    Physical Exam   Constitutional: She is oriented to person, place, and time  She appears well-developed  Obese in no distress   HENT:   Mouth/Throat: No oropharyngeal exudate  Oral cavity /oropharynx reveals moist mucosa  No ulcer exudate lesion petechiae or other abnormality noted  She is boggy and erythematous nasal turbinates  Some minimal clear nasal discharge  Eyes: Conjunctivae are normal  Right eye exhibits no discharge  Left eye exhibits no discharge  Neck: No JVD present  No thyromegaly present  Cardiovascular: Normal rate and regular rhythm  Pulmonary/Chest: Effort normal and breath sounds normal  No respiratory distress  She has no wheezes  She has no rales  Musculoskeletal: She exhibits no edema  Lymphadenopathy:     She has no cervical adenopathy  Neurological: She is alert and oriented to person, place, and time  Skin: No rash noted  No erythema  Psychiatric: She has a normal mood and affect  Thought content normal    Nursing note and vitals reviewed

## 2020-02-28 DIAGNOSIS — J10.1 INFLUENZA A: Primary | ICD-10-CM

## 2020-02-28 LAB
FLUAV RNA SPEC QL NAA+PROBE: DETECTED
FLUBV RNA SPEC QL NAA+PROBE: NOT DETECTED
RSV RNA SPEC QL NAA+PROBE: NOT DETECTED

## 2020-02-28 RX ORDER — OSELTAMIVIR PHOSPHATE 75 MG/1
75 CAPSULE ORAL EVERY 12 HOURS SCHEDULED
Qty: 10 CAPSULE | Refills: 0 | Status: SHIPPED | OUTPATIENT
Start: 2020-02-28 | End: 2020-03-04

## 2020-02-28 NOTE — PROGRESS NOTES
Patient's nasopharyngeal PCR came back positive for influenza A  She will start on Tamiflu asap  We discussed this with her

## 2020-02-28 NOTE — ASSESSMENT & PLAN NOTE
The patient has a cough which I believe is related to a viral upper respiratory illness  She has no fever of significance, normal respiratory rate, heart rate as well as pulse ox  Pulmonary exam is nonfocal   We are going to obtain nasopharyngeal specimen for flu and RSV PCR  We are going to give her some Tessalon for symptomatic relief and have her push fluids and rest   She is asked call tomorrow with report of her condition

## 2020-03-23 ENCOUNTER — OFFICE VISIT (OUTPATIENT)
Dept: FAMILY MEDICINE CLINIC | Facility: CLINIC | Age: 65
End: 2020-03-23
Payer: MEDICARE

## 2020-03-23 VITALS
TEMPERATURE: 97.3 F | BODY MASS INDEX: 35.32 KG/M2 | HEART RATE: 87 BPM | HEIGHT: 65 IN | DIASTOLIC BLOOD PRESSURE: 78 MMHG | WEIGHT: 212 LBS | SYSTOLIC BLOOD PRESSURE: 128 MMHG | OXYGEN SATURATION: 99 %

## 2020-03-23 DIAGNOSIS — E78.5 HYPERLIPOPROTEINEMIA: ICD-10-CM

## 2020-03-23 DIAGNOSIS — I10 BENIGN ESSENTIAL HYPERTENSION: ICD-10-CM

## 2020-03-23 DIAGNOSIS — E66.9 OBESITY (BMI 30-39.9): ICD-10-CM

## 2020-03-23 DIAGNOSIS — K21.9 GERD WITHOUT ESOPHAGITIS: ICD-10-CM

## 2020-03-23 DIAGNOSIS — Z00.00 MEDICARE ANNUAL WELLNESS VISIT, INITIAL: Primary | ICD-10-CM

## 2020-03-23 DIAGNOSIS — Z13.0 SCREENING FOR IRON DEFICIENCY ANEMIA: ICD-10-CM

## 2020-03-23 PROBLEM — R05.9 COUGH: Status: RESOLVED | Noted: 2020-02-27 | Resolved: 2020-03-23

## 2020-03-23 PROBLEM — J98.01 BRONCHOSPASM: Status: RESOLVED | Noted: 2020-01-20 | Resolved: 2020-03-23

## 2020-03-23 PROCEDURE — 1160F RVW MEDS BY RX/DR IN RCRD: CPT | Performed by: FAMILY MEDICINE

## 2020-03-23 PROCEDURE — 4040F PNEUMOC VAC/ADMIN/RCVD: CPT | Performed by: FAMILY MEDICINE

## 2020-03-23 PROCEDURE — 3008F BODY MASS INDEX DOCD: CPT | Performed by: FAMILY MEDICINE

## 2020-03-23 PROCEDURE — 1123F ACP DISCUSS/DSCN MKR DOCD: CPT | Performed by: FAMILY MEDICINE

## 2020-03-23 PROCEDURE — 1170F FXNL STATUS ASSESSED: CPT | Performed by: FAMILY MEDICINE

## 2020-03-23 PROCEDURE — 99213 OFFICE O/P EST LOW 20 MIN: CPT | Performed by: FAMILY MEDICINE

## 2020-03-23 PROCEDURE — 3074F SYST BP LT 130 MM HG: CPT | Performed by: FAMILY MEDICINE

## 2020-03-23 PROCEDURE — G0438 PPPS, INITIAL VISIT: HCPCS | Performed by: FAMILY MEDICINE

## 2020-03-23 PROCEDURE — 1036F TOBACCO NON-USER: CPT | Performed by: FAMILY MEDICINE

## 2020-03-23 PROCEDURE — 1125F AMNT PAIN NOTED PAIN PRSNT: CPT | Performed by: FAMILY MEDICINE

## 2020-03-23 PROCEDURE — 3078F DIAST BP <80 MM HG: CPT | Performed by: FAMILY MEDICINE

## 2020-03-23 RX ORDER — ESOMEPRAZOLE MAGNESIUM 40 MG/1
40 CAPSULE, DELAYED RELEASE ORAL DAILY
Qty: 90 CAPSULE | Refills: 1 | Status: SHIPPED | OUTPATIENT
Start: 2020-03-23 | End: 2020-09-22 | Stop reason: SDUPTHER

## 2020-03-23 NOTE — PROGRESS NOTES
Assessment/Plan:  GERD without esophagitis  Patient presents today for a follow-up examination for her gastroesophageal reflux without esophagitis  She is compliant with Nexium  This alleviates her symptoms relatively completely  She has no dysphagia, odynophagia, weight loss anorexia X cetera she had bone density testing in 2016 which was normal   She will continue on her Nexium and will see her back in 6 months or sooner as needed  She agrees  Benign essential hypertension  Blood pressure remains well controlled  No change in Micardis/hydrochlorothiazide  Fasting blood work today to include CBC CMP lipids    Hyperlipoproteinemia  Continued efforts at improved diet and exercise for weight loss as well as improve lipids  Lipid profile today  Obesity (BMI 30-39  9)  We asked her to try to resume regular exercise such as walking  It can she needs to work more diligently on her diet  She states she has been cooking and baking a lot because of the current Pandemic and staying home but we asked her to try to eat sensibly as much as possible  Diagnoses and all orders for this visit:    Hyperlipoproteinemia  -     Lipid Panel with Direct LDL reflex    GERD without esophagitis  -     esomeprazole (NexIUM) 40 MG capsule; Take 1 capsule (40 mg total) by mouth daily    Benign essential hypertension  -     Comprehensive metabolic panel    Screening for iron deficiency anemia  -     CBC    Obesity (BMI 30-39  9)          Subjective:   Chief Complaint   Patient presents with   [facility] Wellness Visit     here for initial medicare wellness toda/6 mth med check  pt had fbw done        Patient ID: Kacy Pham is a 75 y o  female  HPI  The patient is a 59-year-old female who presents today for a Medicare initial wellness visit  She also requests discussion of her gastroesophageal reflux disease  She states that the Nexium remains effective in alleviating her reflux  She has no dysphagia or odynophagia  No weight loss night sweats anorexia or other constitutional symptoms  Overall she feels well and requests a refill  The following portions of the patient's history were reviewed and updated as appropriate: allergies, current medications, past family history, past medical history, past social history, past surgical history and problem list     Review of Systems   Constitution: Positive for weight gain  Negative for decreased appetite and weight loss  Cardiovascular: Negative for chest pain, irregular heartbeat, leg swelling, orthopnea and paroxysmal nocturnal dyspnea  Respiratory: Negative for cough, shortness of breath and wheezing  Endocrine: Negative for polydipsia, polyphagia and polyuria  Hematologic/Lymphatic: Negative for adenopathy and bleeding problem  Does not bruise/bleed easily  Gastrointestinal: Negative for constipation and diarrhea  Genitourinary: Positive for bladder incontinence and frequency  Negative for dysuria  Neurological: Negative for dizziness and headaches  Objective:    Physical Exam   Constitutional: She is oriented to person, place, and time  She appears well-developed and well-nourished  Obese and in no distress   Eyes: Right eye exhibits no discharge  Left eye exhibits no discharge  No scleral icterus  Neck: Neck supple  No JVD present  No thyromegaly present  Cardiovascular: Normal rate and regular rhythm  Pulmonary/Chest: Effort normal and breath sounds normal  No respiratory distress  She has no wheezes  She has no rales  Musculoskeletal: She exhibits no edema  Lymphadenopathy:     She has no cervical adenopathy  Neurological: She is alert and oriented to person, place, and time  Psychiatric: She has a normal mood and affect  Her behavior is normal  Thought content normal    Nursing note and vitals reviewed

## 2020-03-23 NOTE — PATIENT INSTRUCTIONS
Medicare Preventive Visit Patient Instructions  Thank you for completing your Welcome to Medicare Visit or Medicare Annual Wellness Visit today  Your next wellness visit will be due in one year (3/23/2021)  The screening/preventive services that you may require over the next 5-10 years are detailed below  Some tests may not apply to you based off risk factors and/or age  Screening tests ordered at today's visit but not completed yet may show as past due  Also, please note that scanned in results may not display below  Preventive Screenings:  Service Recommendations Previous Testing/Comments   Colorectal Cancer Screening  * Colonoscopy    * Fecal Occult Blood Test (FOBT)/Fecal Immunochemical Test (FIT)  * Fecal DNA/Cologuard Test  * Flexible Sigmoidoscopy Age: 54-65 years old   Colonoscopy: every 10 years (may be performed more frequently if at higher risk)  OR  FOBT/FIT: every 1 year  OR  Cologuard: every 3 years  OR  Sigmoidoscopy: every 5 years  Screening may be recommended earlier than age 48 if at higher risk for colorectal cancer  Also, an individualized decision between you and your healthcare provider will decide whether screening between the ages of 74-80 would be appropriate  Colonoscopy: 09/13/2013  FOBT/FIT: Not on file  Cologuard: Not on file  Sigmoidoscopy: Not on file    Screening Current     Breast Cancer Screening Age: 36 years old  Frequency: every 1-2 years  Not required if history of left and right mastectomy Mammogram: 09/18/2019    Screening Current   Cervical Cancer Screening Between the ages of 21-29, pap smear recommended once every 3 years  Between the ages of 33-67, can perform pap smear with HPV co-testing every 5 years     Recommendations may differ for women with a history of total hysterectomy, cervical cancer, or abnormal pap smears in past  Pap Smear: Not on file    Screening Not Indicated   Hepatitis C Screening Once for adults born between 1945 and 1965  More frequently in patients at high risk for Hepatitis C Hep C Antibody: 03/18/2019    Screening Current   Diabetes Screening 1-2 times per year if you're at risk for diabetes or have pre-diabetes Fasting glucose: No results in last 5 years   A1C: No results in last 5 years    Screening Current   Cholesterol Screening Once every 5 years if you don't have a lipid disorder  May order more often based on risk factors  Lipid panel: 03/18/2019    Screening Not Indicated  History Lipid Disorder     Other Preventive Screenings Covered by Medicare:  1  Abdominal Aortic Aneurysm (AAA) Screening: covered once if your at risk  You're considered to be at risk if you have a family history of AAA  2  Lung Cancer Screening: covers low dose CT scan once per year if you meet all of the following conditions: (1) Age 50-69; (2) No signs or symptoms of lung cancer; (3) Current smoker or have quit smoking within the last 15 years; (4) You have a tobacco smoking history of at least 30 pack years (packs per day multiplied by number of years you smoked); (5) You get a written order from a healthcare provider  3  Glaucoma Screening: covered annually if you're considered high risk: (1) You have diabetes OR (2) Family history of glaucoma OR (3)  aged 48 and older OR (3)  American aged 72 and older  3  Osteoporosis Screening: covered every 2 years if you meet one of the following conditions: (1) You're estrogen deficient and at risk for osteoporosis based off medical history and other findings; (2) Have a vertebral abnormality; (3) On glucocorticoid therapy for more than 3 months; (4) Have primary hyperparathyroidism; (5) On osteoporosis medications and need to assess response to drug therapy  · Last bone density test (DXA Scan): 07/07/2016   5  HIV Screening: covered annually if you're between the age of 15-65  Also covered annually if you are younger than 13 and older than 72 with risk factors for HIV infection   For pregnant patients, it is covered up to 3 times per pregnancy  Immunizations:  Immunization Recommendations   Influenza Vaccine Annual influenza vaccination during flu season is recommended for all persons aged >= 6 months who do not have contraindications   Pneumococcal Vaccine (Prevnar and Pneumovax)  * Prevnar = PCV13  * Pneumovax = PPSV23   Adults 25-60 years old: 1-3 doses may be recommended based on certain risk factors  Adults 72 years old: Prevnar (PCV13) vaccine recommended followed by Pneumovax (PPSV23) vaccine  If already received PPSV23 since turning 65, then PCV13 recommended at least one year after PPSV23 dose  Hepatitis B Vaccine 3 dose series if at intermediate or high risk (ex: diabetes, end stage renal disease, liver disease)   Tetanus (Td) Vaccine - COST NOT COVERED BY MEDICARE PART B Following completion of primary series, a booster dose should be given every 10 years to maintain immunity against tetanus  Td may also be given as tetanus wound prophylaxis  Tdap Vaccine - COST NOT COVERED BY MEDICARE PART B Recommended at least once for all adults  For pregnant patients, recommended with each pregnancy  Shingles Vaccine (Shingrix) - COST NOT COVERED BY MEDICARE PART B  2 shot series recommended in those aged 48 and above     Health Maintenance Due:      Topic Date Due    MAMMOGRAM  09/18/2020    CRC Screening: Colonoscopy  09/13/2023    Hepatitis C Screening  Completed     Immunizations Due:      Topic Date Due    DTaP,Tdap,and Td Vaccines (1 - Tdap) 07[birth year]1964    Influenza Vaccine  07/01/2019    Pneumococcal Vaccine: 65+ Years (2 of 2 - PPSV23) 03/18/2020     Advance Directives   What are advance directives? Advance directives are legal documents that state your wishes and plans for medical care  These plans are made ahead of time in case you lose your ability to make decisions for yourself   Advance directives can apply to any medical decision, such as the treatments you want, and if you want to donate organs  What are the types of advance directives? There are many types of advance directives, and each state has rules about how to use them  You may choose a combination of any of the following:  · Living will: This is a written record of the treatment you want  You can also choose which treatments you do not want, which to limit, and which to stop at a certain time  This includes surgery, medicine, IV fluid, and tube feedings  · Durable power of  for healthcare [facility]): This is a written record that states who you want to make healthcare choices for you when you are unable to make them for yourself  This person, called a proxy, is usually a family member or a friend  You may choose more than 1 proxy  · Do not resuscitate (DNR) order:  A DNR order is used in case your heart stops beating or you stop breathing  It is a request not to have certain forms of treatment, such as CPR  A DNR order may be included in other types of advance directives  · Medical directive: This covers the care that you want if you are in a coma, near death, or unable to make decisions for yourself  You can list the treatments you want for each condition  Treatment may include pain medicine, surgery, blood transfusions, dialysis, IV or tube feedings, and a ventilator (breathing machine)  · Values history: This document has questions about your views, beliefs, and how you feel and think about life  This information can help others choose the care that you would choose  Why are advance directives important? An advance directive helps you control your care  Although spoken wishes may be used, it is better to have your wishes written down  Spoken wishes can be misunderstood, or not followed  Treatments may be given even if you do not want them  An advance directive may make it easier for your family to make difficult choices about your care     Urinary Incontinence   Urinary incontinence (UI)  is when you lose control of your bladder  UI develops because your bladder cannot store or empty urine properly  The 3 most common types of UI are stress incontinence, urge incontinence, or both  Medicines:   · May be given to help strengthen your bladder control  Report any side effects of medication to your healthcare provider  Do pelvic muscle exercises often:  Your pelvic muscles help you stop urinating  Squeeze these muscles tight for 5 seconds, then relax for 5 seconds  Gradually work up to squeezing for 10 seconds  Do 3 sets of 15 repetitions a day, or as directed  This will help strengthen your pelvic muscles and improve bladder control  Train your bladder:  Go to the bathroom at set times, such as every 2 hours, even if you do not feel the urge to go  You can also try to hold your urine when you feel the urge to go  For example, hold your urine for 5 minutes when you feel the urge to go  As that becomes easier, hold your urine for 10 minutes  Self-care:   · Keep a UI record  Write down how often you leak urine and how much you leak  Make a note of what you were doing when you leaked urine  · Drink liquids as directed  You may need to limit the amount of liquid you drink to help control your urine leakage  Do not drink any liquid right before you go to bed  Limit or do not have drinks that contain caffeine or alcohol  · Prevent constipation  Eat a variety of high-fiber foods  Good examples are high-fiber cereals, beans, vegetables, and whole-grain breads  Walking is the best way to trigger your intestines to have a bowel movement  · Exercise regularly and maintain a healthy weight  Weight loss and exercise will decrease pressure on your bladder and help you control your leakage  · Use a catheter as directed  to help empty your bladder  A catheter is a tiny, plastic tube that is put into your bladder to drain your urine  · Go to behavior therapy as directed    Behavior therapy may be used to help you learn to control your urge to urinate  Weight Management   Why it is important to manage your weight:  Being overweight increases your risk of health conditions such as heart disease, high blood pressure, type 2 diabetes, and certain types of cancer  It can also increase your risk for osteoarthritis, sleep apnea, and other respiratory problems  Aim for a slow, steady weight loss  Even a small amount of weight loss can lower your risk of health problems  How to lose weight safely:  A safe and healthy way to lose weight is to eat fewer calories and get regular exercise  You can lose up about 1 pound a week by decreasing the number of calories you eat by 500 calories each day  Healthy meal plan for weight management:  A healthy meal plan includes a variety of foods, contains fewer calories, and helps you stay healthy  A healthy meal plan includes the following:  · Eat whole-grain foods more often  A healthy meal plan should contain fiber  Fiber is the part of grains, fruits, and vegetables that is not broken down by your body  Whole-grain foods are healthy and provide extra fiber in your diet  Some examples of whole-grain foods are whole-wheat breads and pastas, oatmeal, brown rice, and bulgur  · Eat a variety of vegetables every day  Include dark, leafy greens such as spinach, kale, belkis greens, and mustard greens  Eat yellow and orange vegetables such as carrots, sweet potatoes, and winter squash  · Eat a variety of fruits every day  Choose fresh or canned fruit (canned in its own juice or light syrup) instead of juice  Fruit juice has very little or no fiber  · Eat low-fat dairy foods  Drink fat-free (skim) milk or 1% milk  Eat fat-free yogurt and low-fat cottage cheese  Try low-fat cheeses such as mozzarella and other reduced-fat cheeses  · Choose meat and other protein foods that are low in fat  Choose beans or other legumes such as split peas or lentils   Choose fish, skinless poultry (chicken or turkey), or lean cuts of red meat (beef or pork)  Before you cook meat or poultry, cut off any visible fat  · Use less fat and oil  Try baking foods instead of frying them  Add less fat, such as margarine, sour cream, regular salad dressing and mayonnaise to foods  Eat fewer high-fat foods  Some examples of high-fat foods include french fries, doughnuts, ice cream, and cakes  · Eat fewer sweets  Limit foods and drinks that are high in sugar  This includes candy, cookies, regular soda, and sweetened drinks  Exercise:  Exercise at least 30 minutes per day on most days of the week  Some examples of exercise include walking, biking, dancing, and swimming  You can also fit in more physical activity by taking the stairs instead of the elevator or parking farther away from stores  Ask your healthcare provider about the best exercise plan for you  © Copyright [employer] 2018 Information is for End User's use only and may not be sold, redistributed or otherwise used for commercial purposes  All illustrations and images included in CareNotes® are the copyrighted property of [employer]  or [facility] Preventive Visit Patient Instructions  Thank you for completing your Welcome to Medicare Visit or Medicare Annual Wellness Visit today  Your next wellness visit will be due in one year (3/23/2021)  The screening/preventive services that you may require over the next 5-10 years are detailed below  Some tests may not apply to you based off risk factors and/or age  Screening tests ordered at today's visit but not completed yet may show as past due  Also, please note that scanned in results may not display below    Preventive Screenings:  Service Recommendations Previous Testing/Comments   Colorectal Cancer Screening  * Colonoscopy    * Fecal Occult Blood Test (FOBT)/Fecal Immunochemical Test (FIT)  * Fecal DNA/Cologuard Test  * Flexible Sigmoidoscopy Age: 54-65 years old   Colonoscopy: every 10 years (may be performed more frequently if at higher risk)  OR  FOBT/FIT: every 1 year  OR  Cologuard: every 3 years  OR  Sigmoidoscopy: every 5 years  Screening may be recommended earlier than age 48 if at higher risk for colorectal cancer  Also, an individualized decision between you and your healthcare provider will decide whether screening between the ages of 74-80 would be appropriate  Colonoscopy: 09/13/2013  FOBT/FIT: Not on file  Cologuard: Not on file  Sigmoidoscopy: Not on file    Screening Current     Breast Cancer Screening Age: 36 years old  Frequency: every 1-2 years  Not required if history of left and right mastectomy Mammogram: 09/18/2019    Screening Current   Cervical Cancer Screening Between the ages of 21-29, pap smear recommended once every 3 years  Between the ages of 33-67, can perform pap smear with HPV co-testing every 5 years  Recommendations may differ for women with a history of total hysterectomy, cervical cancer, or abnormal pap smears in past  Pap Smear: Not on file    Screening Not Indicated   Hepatitis C Screening Once for adults born between 1945 and 1965  More frequently in patients at high risk for Hepatitis C Hep C Antibody: 03/18/2019    Screening Current   Diabetes Screening 1-2 times per year if you're at risk for diabetes or have pre-diabetes Fasting glucose: No results in last 5 years   A1C: No results in last 5 years    Screening Current   Cholesterol Screening Once every 5 years if you don't have a lipid disorder  May order more often based on risk factors  Lipid panel: 03/18/2019    Screening Not Indicated  History Lipid Disorder     Other Preventive Screenings Covered by Medicare:  6  Abdominal Aortic Aneurysm (AAA) Screening: covered once if your at risk  You're considered to be at risk if you have a family history of AAA    7  Lung Cancer Screening: covers low dose CT scan once per year if you meet all of the following conditions: (1) Age 50-69; (2) No signs or symptoms of lung cancer; (3) Current smoker or have quit smoking within the last 15 years; (4) You have a tobacco smoking history of at least 30 pack years (packs per day multiplied by number of years you smoked); (5) You get a written order from a healthcare provider  8  Glaucoma Screening: covered annually if you're considered high risk: (1) You have diabetes OR (2) Family history of glaucoma OR (3)  aged 48 and older OR (3)  American aged 72 and older  5  Osteoporosis Screening: covered every 2 years if you meet one of the following conditions: (1) You're estrogen deficient and at risk for osteoporosis based off medical history and other findings; (2) Have a vertebral abnormality; (3) On glucocorticoid therapy for more than 3 months; (4) Have primary hyperparathyroidism; (5) On osteoporosis medications and need to assess response to drug therapy  · Last bone density test (DXA Scan): 07/07/2016   10  HIV Screening: covered annually if you're between the age of 15-65  Also covered annually if you are younger than 13 and older than 72 with risk factors for HIV infection  For pregnant patients, it is covered up to 3 times per pregnancy  Immunizations:  Immunization Recommendations   Influenza Vaccine Annual influenza vaccination during flu season is recommended for all persons aged >= 6 months who do not have contraindications   Pneumococcal Vaccine (Prevnar and Pneumovax)  * Prevnar = PCV13  * Pneumovax = PPSV23   Adults 25-60 years old: 1-3 doses may be recommended based on certain risk factors  Adults 72 years old: Prevnar (PCV13) vaccine recommended followed by Pneumovax (PPSV23) vaccine  If already received PPSV23 since turning 65, then PCV13 recommended at least one year after PPSV23 dose     Hepatitis B Vaccine 3 dose series if at intermediate or high risk (ex: diabetes, end stage renal disease, liver disease)   Tetanus (Td) Vaccine - COST NOT COVERED BY MEDICARE PART B Following completion of primary series, a booster dose should be given every 10 years to maintain immunity against tetanus  Td may also be given as tetanus wound prophylaxis  Tdap Vaccine - COST NOT COVERED BY MEDICARE PART B Recommended at least once for all adults  For pregnant patients, recommended with each pregnancy  Shingles Vaccine (Shingrix) - COST NOT COVERED BY MEDICARE PART B  2 shot series recommended in those aged 48 and above     Health Maintenance Due:      Topic Date Due    MAMMOGRAM  09/18/2020    CRC Screening: Colonoscopy  09/13/2023    Hepatitis C Screening  Completed     Immunizations Due:      Topic Date Due    DTaP,Tdap,and Td Vaccines (1 - Tdap) 07/23/1964    Pneumococcal Vaccine: 65+ Years (2 of 2 - PPSV23) 03/18/2020     Advance Directives   What are advance directives? Advance directives are legal documents that state your wishes and plans for medical care  These plans are made ahead of time in case you lose your ability to make decisions for yourself  Advance directives can apply to any medical decision, such as the treatments you want, and if you want to donate organs  What are the types of advance directives? There are many types of advance directives, and each state has rules about how to use them  You may choose a combination of any of the following:  · Living will: This is a written record of the treatment you want  You can also choose which treatments you do not want, which to limit, and which to stop at a certain time  This includes surgery, medicine, IV fluid, and tube feedings  · Durable power of  for healthcare [place] [facility]): This is a written record that states who you want to make healthcare choices for you when you are unable to make them for yourself  This person, called a proxy, is usually a family member or a friend  You may choose more than 1 proxy  · Do not resuscitate (DNR) order:  A DNR order is used in case your heart stops beating or you stop breathing   It is a request not to have certain forms of treatment, such as CPR  A DNR order may be included in other types of advance directives  · Medical directive: This covers the care that you want if you are in a coma, near death, or unable to make decisions for yourself  You can list the treatments you want for each condition  Treatment may include pain medicine, surgery, blood transfusions, dialysis, IV or tube feedings, and a ventilator (breathing machine)  · Values history: This document has questions about your views, beliefs, and how you feel and think about life  This information can help others choose the care that you would choose  Why are advance directives important? An advance directive helps you control your care  Although spoken wishes may be used, it is better to have your wishes written down  Spoken wishes can be misunderstood, or not followed  Treatments may be given even if you do not want them  An advance directive may make it easier for your family to make difficult choices about your care  Urinary Incontinence   Urinary incontinence (UI)  is when you lose control of your bladder  UI develops because your bladder cannot store or empty urine properly  The 3 most common types of UI are stress incontinence, urge incontinence, or both  Medicines:   · May be given to help strengthen your bladder control  Report any side effects of medication to your healthcare provider  Do pelvic muscle exercises often:  Your pelvic muscles help you stop urinating  Squeeze these muscles tight for 5 seconds, then relax for 5 seconds  Gradually work up to squeezing for 10 seconds  Do 3 sets of 15 repetitions a day, or as directed  This will help strengthen your pelvic muscles and improve bladder control  Train your bladder:  Go to the bathroom at set times, such as every 2 hours, even if you do not feel the urge to go  You can also try to hold your urine when you feel the urge to go   For example, hold your urine for 5 minutes when you feel the urge to go  As that becomes easier, hold your urine for 10 minutes  Self-care:   · Keep a UI record  Write down how often you leak urine and how much you leak  Make a note of what you were doing when you leaked urine  · Drink liquids as directed  You may need to limit the amount of liquid you drink to help control your urine leakage  Do not drink any liquid right before you go to bed  Limit or do not have drinks that contain caffeine or alcohol  · Prevent constipation  Eat a variety of high-fiber foods  Good examples are high-fiber cereals, beans, vegetables, and whole-grain breads  Walking is the best way to trigger your intestines to have a bowel movement  · Exercise regularly and maintain a healthy weight  Weight loss and exercise will decrease pressure on your bladder and help you control your leakage  · Use a catheter as directed  to help empty your bladder  A catheter is a tiny, plastic tube that is put into your bladder to drain your urine  · Go to behavior therapy as directed  Behavior therapy may be used to help you learn to control your urge to urinate  Weight Management   Why it is important to manage your weight:  Being overweight increases your risk of health conditions such as heart disease, high blood pressure, type 2 diabetes, and certain types of cancer  It can also increase your risk for osteoarthritis, sleep apnea, and other respiratory problems  Aim for a slow, steady weight loss  Even a small amount of weight loss can lower your risk of health problems  How to lose weight safely:  A safe and healthy way to lose weight is to eat fewer calories and get regular exercise  You can lose up about 1 pound a week by decreasing the number of calories you eat by 500 calories each day  Healthy meal plan for weight management:  A healthy meal plan includes a variety of foods, contains fewer calories, and helps you stay healthy   A healthy meal plan includes the following:  · Eat whole-grain foods more often  A healthy meal plan should contain fiber  Fiber is the part of grains, fruits, and vegetables that is not broken down by your body  Whole-grain foods are healthy and provide extra fiber in your diet  Some examples of whole-grain foods are whole-wheat breads and pastas, oatmeal, brown rice, and bulgur  · Eat a variety of vegetables every day  Include dark, leafy greens such as spinach, kale, belkis greens, and mustard greens  Eat yellow and orange vegetables such as carrots, sweet potatoes, and winter squash  · Eat a variety of fruits every day  Choose fresh or canned fruit (canned in its own juice or light syrup) instead of juice  Fruit juice has very little or no fiber  · Eat low-fat dairy foods  Drink fat-free (skim) milk or 1% milk  Eat fat-free yogurt and low-fat cottage cheese  Try low-fat cheeses such as mozzarella and other reduced-fat cheeses  · Choose meat and other protein foods that are low in fat  Choose beans or other legumes such as split peas or lentils  Choose fish, skinless poultry (chicken or turkey), or lean cuts of red meat (beef or pork)  Before you cook meat or poultry, cut off any visible fat  · Use less fat and oil  Try baking foods instead of frying them  Add less fat, such as margarine, sour cream, regular salad dressing and mayonnaise to foods  Eat fewer high-fat foods  Some examples of high-fat foods include french fries, doughnuts, ice cream, and cakes  · Eat fewer sweets  Limit foods and drinks that are high in sugar  This includes candy, cookies, regular soda, and sweetened drinks  Exercise:  Exercise at least 30 minutes per day on most days of the week  Some examples of exercise include walking, biking, dancing, and swimming  You can also fit in more physical activity by taking the stairs instead of the elevator or parking farther away from stores  Ask your healthcare provider about the best exercise plan for you        © Copyright [employer] 2018 Information is for [employer] use only and may not be sold, redistributed or otherwise used for commercial purposes   All illustrations and images included in CareNotes® are the copyrighted property of A D A M , Inc  or [facility] Umer Leal

## 2020-03-23 NOTE — ASSESSMENT & PLAN NOTE
We asked her to try to resume regular exercise such as walking  It can she needs to work more diligently on her diet  She states she has been cooking and baking a lot because of the current Pandemic and staying home but we asked her to try to eat sensibly as much as possible

## 2020-03-23 NOTE — ASSESSMENT & PLAN NOTE
Patient presents today for a follow-up examination for her gastroesophageal reflux without esophagitis  She is compliant with Nexium  This alleviates her symptoms relatively completely  She has no dysphagia, odynophagia, weight loss anorexia X cetera she had bone density testing in 2016 which was normal   She will continue on her Nexium and will see her back in 6 months or sooner as needed  She agrees

## 2020-03-23 NOTE — PROGRESS NOTES
Assessment and Plan:     Problem List Items Addressed This Visit        Digestive    GERD without esophagitis     Patient presents today for a follow-up examination for her gastroesophageal reflux without esophagitis  She is compliant with Nexium  This alleviates her symptoms relatively completely  She has no dysphagia, odynophagia, weight loss anorexia X cetera she had bone density testing in 2016 which was normal   She will continue on her Nexium and will see her back in 6 months or sooner as needed  She agrees  Relevant Medications    esomeprazole (NexIUM) 40 MG capsule       Cardiovascular and Mediastinum    Benign essential hypertension     Blood pressure remains well controlled  No change in Micardis/hydrochlorothiazide  Fasting blood work today to include CBC CMP lipids         Relevant Orders    Comprehensive metabolic panel       Other    Obesity (BMI 30-39  9)     We asked her to try to resume regular exercise such as walking  It can she needs to work more diligently on her diet  She states she has been cooking and baking a lot because of the current Pandemic and staying home but we asked her to try to eat sensibly as much as possible  Medicare annual wellness visit, initial - Primary     Patient presents for a Medicare initial wellness examination  All components examination were addressed in completed  Anticipatory guidance was provided  All care gaps were closed with the exception pneumococcal vaccination  She did have Prevnar 13 last year and was due for Pneumovax today which we discussed  She ended up leaving the office without getting it  We will give her this vaccination at her next routine follow-up  She did have clinical influenza with positive PCR this year  She states she will consider influenza vaccination next year  Hyperlipoproteinemia     Continued efforts at improved diet and exercise for weight loss as well as improve lipids  Lipid profile today  Relevant Orders    Lipid Panel with Direct LDL reflex      Other Visit Diagnoses     Screening for iron deficiency anemia        Relevant Orders    CBC           Preventive health issues were discussed with patient, and age appropriate screening tests were ordered as noted in patient's After Visit Summary  Personalized health advice and appropriate referrals for health education or preventive services given if needed, as noted in patient's After Visit Summary  History of Present Illness:     Patient presents for Medicare Annual Wellness visit    Patient Care Team:  Robert Braxton MD as PCP - General  CECI Cody MD     Problem List:     Patient Active Problem List   Diagnosis    Allergic rhinitis    Benign essential hypertension    Dermatitis    GERD without esophagitis    Hyperlipoproteinemia    Nontoxic single thyroid nodule    Osteoarthritis of knee    Rectocele    Uterovaginal prolapse    Cystocele, lateral    Varicose veins of leg with edema, unspecified laterality    Family history of sudden cardiac death    Pure hypercholesterolemia    Obesity (BMI 30-39  9)    Chronic venous insufficiency    Actinic keratoses    Medicare annual wellness visit, initial    Pleuritic chest pain      Past Medical and Surgical History:     Past Medical History:   Diagnosis Date    Breast lump     Disease of thyroid gland     nodule, benign    GERD (gastroesophageal reflux disease)     History of transfusion     at 9years old for bleeding during tonsillectomy, no reaction    Hypertension     Rectocele     Uterine leiomyoma     Varicose veins of both lower extremities      Past Surgical History:   Procedure Laterality Date    BLADDER SURGERY  08/2012    Bladder "lift"    BREAST LUMPECTOMY  2000    COLONOSCOPY      HYSTERECTOMY  1990    KNEE SURGERY Right     x 2    NOSE SURGERY      NV LARYNGOSCOPY,DIRCT,OP SCOPE,BIOPSY Left 4/2/2018    Procedure: MICROLARYNGOSCOPY AND EXCISION OF LEFT VOCAL FOLD POLYP;  Surgeon: Cande Ho MD;  Location: [facility];  Service: ENT   [address] SURGERY Left 2009      Family History:     Family History   Problem Relation Age of Onset   Emaline Folds Breast cancer Mother 77    Hypertension Mother     Varicose Veins Mother         of lower extremities    Hypertension Father     Coronary artery disease Father     Coronary artery disease Sister     Heart disease Sister     Varicose Veins Sister         of lower extremities    No Known Problems Daughter     Breast cancer Maternal Aunt 72    No Known Problems Maternal Aunt     No Known Problems Sister     No Known Problems Sister       Social History:     E-Cigarette/Vaping    E-Cigarette Use Never User      E-Cigarette/Vaping Substances    Nicotine No     THC No     CBD No     Flavoring No     Other No     Unknown No      Social History     Socioeconomic History    Marital status: /Civil Union     Spouse name: None    Number of children: None    Years of education: None    Highest education level: None   Occupational History    None   Social Needs    Financial resource strain: None    Food insecurity:     Worry: None     Inability: None    Transportation needs:     Medical: None     Non-medical: None   Tobacco Use    Smoking status: Former Smoker     Last attempt to quit:      Years since quittin 2    Smokeless tobacco: Never Used   Substance and Sexual Activity    Alcohol use: Yes     Comment: rarely, twice per year / social drinker per allscript                 Drug use: No    Sexual activity: None   Lifestyle    Physical activity:     Days per week: None     Minutes per session: None    Stress: None   Relationships    Social connections:     Talks on phone: None     Gets together: None     Attends Oriental orthodox service: None     Active member of club or organization: None     Attends meetings of clubs or organizations: None     Relationship status: None    Intimate partner violence:     Fear of current or ex partner: None     Emotionally abused: None     Physically abused: None     Forced sexual activity: None   Other Topics Concern    None   Social History Narrative    None      Medications and Allergies:     Current Outpatient Medications   Medication Sig Dispense Refill    albuterol (PROVENTIL HFA,VENTOLIN HFA) 90 mcg/act inhaler Inhale 2 puffs every 6 (six) hours as needed for wheezing or shortness of breath 1 Inhaler 5    aspirin 81 MG tablet Take 81 mg by mouth      BIOTIN PO Take by mouth      diltiazem (CARDIZEM LA) 180 MG 24 hr tablet Take 1 capsule by mouth daily      esomeprazole (NexIUM) 40 MG capsule Take 1 capsule (40 mg total) by mouth daily 90 capsule 1    Ginger, Zingiber officinalis, (GINGER ROOT PO) Take by mouth      latanoprost (XALATAN) 0 005 % ophthalmic solution       Multiple Vitamins-Minerals (VITAMIN D3 COMPLETE PO) Take by mouth      multivitamin (THERAGRAN) TABS Take 1 tablet by mouth daily      naproxen sodium (ALEVE) 220 MG tablet Take 220 mg by mouth      telmisartan-hydrochlorothiazide (MICARDIS HCT) 80-12 5 MG per tablet 2 (two) times a day        No current facility-administered medications for this visit        Allergies   Allergen Reactions    Lisinopril      Other reaction(s): swollen legs    Hydrocodone-Acetaminophen Dizziness    Irbesartan-Hydrochlorothiazide      Other reaction(s): Hypertension, Other (See Comments)  Pt had hypertension, arm heaviness and headache      Immunizations:     Immunization History   Administered Date(s) Administered    Pneumococcal Conjugate 13-Valent 03/18/2019      Health Maintenance:         Topic Date Due    MAMMOGRAM  09/18/2020    CRC Screening: Colonoscopy  09/13/2023    Hepatitis C Screening  Completed         Topic Date Due    DTaP,Tdap,and Td Vaccines (1 - Tdap) 07/23/1966    Pneumococcal Vaccine: 65+ Years (2 of 2 - PPSV23) 03/18/2020      Medicare Health Risk Assessment:     /78   Pulse 87   Temp (!) 97 3 °F (36 3 °C)   Ht 5' 5" (1 651 m)   Wt 96 2 kg (212 lb)   SpO2 99%   BMI 35 28 kg/m²      Caitie Zhou is here for her Subsequent Wellness visit  Health Risk Assessment:   Patient rates overall health as good  Patient feels that their physical health rating is same  Eyesight was rated as slightly worse  Hearing was rated as same  Patient feels that their emotional and mental health rating is same  Pain experienced in the last 7 days has been some  Patient's pain rating has been 3/10  Knee pain, not TKR, contralateral      Depression Screening:   PHQ-2 Score: 0      Fall Risk Screening: In the past year, patient has experienced: no history of falling in past year      Urinary Incontinence Screening:   Patient has leaked urine accidently in the last six months  Urgency    Home Safety:  Patient does not have trouble with stairs inside or outside of their home  Patient has working smoke alarms and has working carbon monoxide detector  Home safety hazards include: none  Nutrition:   Current diet is Regular  Medications:   Patient is currently taking over-the-counter supplements  OTC medications include: see medication list  Patient is able to manage medications  Activities of Daily Living (ADLs)/Instrumental Activities of Daily Living (IADLs):   Walk and transfer into and out of bed and chair?: Yes  Dress and groom yourself?: Yes    Bathe or shower yourself?: Yes    Feed yourself? Yes  Do your laundry/housekeeping?: Yes  Manage your money, pay your bills and track your expenses?: Yes  Make your own meals?: Yes    Do your own shopping?: Yes    Durable Medical Equipment Suppliers  None    Previous Hospitalizations:   Any hospitalizations or ED visits within the last 12 months?: No      Advance Care Planning:   Living will: No    Durable POA for healthcare:  Yes    Advanced directive: No      Comments: Has 5 Wishes and will consider filling       Cognitive Screening:   Provider or family/friend/caregiver concerned regarding cognition?: No    PREVENTIVE SCREENINGS      Cardiovascular Screening:    General: Screening Not Indicated and History Lipid Disorder      Diabetes Screening:     General: Screening Current      Colorectal Cancer Screening:     General: Screening Current      Breast Cancer Screening:     General: Screening Current      Cervical Cancer Screening:    General: Screening Not Indicated      Osteoporosis Screening:    General: Screening Current      Abdominal Aortic Aneurysm (AAA) Screening:        General: Screening Not Indicated      Lung Cancer Screening:     General: Screening Not Indicated      Hepatitis C Screening:    General: Screening Current      Robert Braxton MD

## 2020-03-23 NOTE — ASSESSMENT & PLAN NOTE
Patient presents for a Medicare initial wellness examination  All components examination were addressed in completed  Anticipatory guidance was provided  All care gaps were closed with the exception pneumococcal vaccination  She did have Prevnar 13 last year and was due for Pneumovax today which we discussed  She ended up leaving the office without getting it  We will give her this vaccination at her next routine follow-up  She did have clinical influenza with positive PCR this year  She states she will consider influenza vaccination next year

## 2020-03-23 NOTE — ASSESSMENT & PLAN NOTE
Continued efforts at improved diet and exercise for weight loss as well as improve lipids  Lipid profile today

## 2020-03-23 NOTE — ASSESSMENT & PLAN NOTE
Blood pressure remains well controlled  No change in Micardis/hydrochlorothiazide    Fasting blood work today to include CBC CMP lipids

## 2020-03-24 LAB
ALBUMIN SERPL-MCNC: 4.4 G/DL (ref 3.6–5.1)
ALBUMIN/GLOB SERPL: 1.8 (CALC) (ref 1–2.5)
ALP SERPL-CCNC: 91 U/L (ref 37–153)
ALT SERPL-CCNC: 20 U/L (ref 6–29)
AST SERPL-CCNC: 17 U/L (ref 10–35)
BILIRUB SERPL-MCNC: 0.4 MG/DL (ref 0.2–1.2)
BUN SERPL-MCNC: 24 MG/DL (ref 7–25)
BUN/CREAT SERPL: NORMAL (CALC) (ref 6–22)
CALCIUM SERPL-MCNC: 9.7 MG/DL (ref 8.6–10.4)
CHLORIDE SERPL-SCNC: 103 MMOL/L (ref 98–110)
CHOLEST SERPL-MCNC: 230 MG/DL
CHOLEST/HDLC SERPL: 4 (CALC)
CO2 SERPL-SCNC: 27 MMOL/L (ref 20–32)
CREAT SERPL-MCNC: 0.73 MG/DL (ref 0.5–0.99)
ERYTHROCYTE [DISTWIDTH] IN BLOOD BY AUTOMATED COUNT: 13.1 % (ref 11–15)
GLOBULIN SER CALC-MCNC: 2.4 G/DL (CALC) (ref 1.9–3.7)
GLUCOSE SERPL-MCNC: 89 MG/DL (ref 65–99)
HCT VFR BLD AUTO: 44 % (ref 35–45)
HDLC SERPL-MCNC: 58 MG/DL
HGB BLD-MCNC: 14.4 G/DL (ref 11.7–15.5)
LDLC SERPL CALC-MCNC: 149 MG/DL (CALC)
MCH RBC QN AUTO: 29.8 PG (ref 27–33)
MCHC RBC AUTO-ENTMCNC: 32.7 G/DL (ref 32–36)
MCV RBC AUTO: 90.9 FL (ref 80–100)
NONHDLC SERPL-MCNC: 172 MG/DL (CALC)
PLATELET # BLD AUTO: 199 THOUSAND/UL (ref 140–400)
PMV BLD REES-ECKER: 11.9 FL (ref 7.5–12.5)
POTASSIUM SERPL-SCNC: 4.4 MMOL/L (ref 3.5–5.3)
PROT SERPL-MCNC: 6.8 G/DL (ref 6.1–8.1)
RBC # BLD AUTO: 4.84 MILLION/UL (ref 3.8–5.1)
SL AMB EGFR AFRICAN AMERICAN: 99 ML/MIN/1.73M2
SL AMB EGFR NON AFRICAN AMERICAN: 86 ML/MIN/1.73M2
SODIUM SERPL-SCNC: 140 MMOL/L (ref 135–146)
TRIGL SERPL-MCNC: 110 MG/DL
WBC # BLD AUTO: 7 THOUSAND/UL (ref 3.8–10.8)

## 2020-09-15 ENCOUNTER — TRANSCRIBE ORDERS (OUTPATIENT)
Dept: ADMINISTRATIVE | Facility: HOSPITAL | Age: 65
End: 2020-09-15

## 2020-09-15 DIAGNOSIS — Z12.31 ENCOUNTER FOR SCREENING MAMMOGRAM FOR MALIGNANT NEOPLASM OF BREAST: Primary | ICD-10-CM

## 2020-09-21 ENCOUNTER — OFFICE VISIT (OUTPATIENT)
Dept: FAMILY MEDICINE CLINIC | Facility: CLINIC | Age: 65
End: 2020-09-21
Payer: MEDICARE

## 2020-09-21 VITALS
HEIGHT: 65 IN | SYSTOLIC BLOOD PRESSURE: 138 MMHG | BODY MASS INDEX: 34.99 KG/M2 | TEMPERATURE: 97.7 F | WEIGHT: 210 LBS | DIASTOLIC BLOOD PRESSURE: 86 MMHG

## 2020-09-21 DIAGNOSIS — I10 BENIGN ESSENTIAL HYPERTENSION: ICD-10-CM

## 2020-09-21 DIAGNOSIS — E04.1 NONTOXIC SINGLE THYROID NODULE: Primary | ICD-10-CM

## 2020-09-21 DIAGNOSIS — K21.9 GERD WITHOUT ESOPHAGITIS: ICD-10-CM

## 2020-09-21 DIAGNOSIS — E66.9 OBESITY (BMI 30-39.9): ICD-10-CM

## 2020-09-21 DIAGNOSIS — E78.5 HYPERLIPOPROTEINEMIA: ICD-10-CM

## 2020-09-21 DIAGNOSIS — Z23 ENCOUNTER FOR IMMUNIZATION: ICD-10-CM

## 2020-09-21 PROBLEM — R07.81 PLEURITIC CHEST PAIN: Status: RESOLVED | Noted: 2019-05-28 | Resolved: 2020-09-21

## 2020-09-21 PROCEDURE — G0009 ADMIN PNEUMOCOCCAL VACCINE: HCPCS

## 2020-09-21 PROCEDURE — 99214 OFFICE O/P EST MOD 30 MIN: CPT | Performed by: FAMILY MEDICINE

## 2020-09-21 PROCEDURE — 90732 PPSV23 VACC 2 YRS+ SUBQ/IM: CPT

## 2020-09-21 RX ORDER — AMLODIPINE BESYLATE 5 MG/1
5 TABLET ORAL DAILY
COMMUNITY
Start: 2020-07-28 | End: 2020-09-21 | Stop reason: ALTCHOICE

## 2020-09-21 RX ORDER — LOSARTAN POTASSIUM AND HYDROCHLOROTHIAZIDE 25; 100 MG/1; MG/1
1 TABLET ORAL DAILY
COMMUNITY
Start: 2020-07-17 | End: 2021-09-17 | Stop reason: SDUPTHER

## 2020-09-21 RX ORDER — METOPROLOL SUCCINATE 25 MG/1
25 TABLET, EXTENDED RELEASE ORAL DAILY
COMMUNITY
Start: 2020-08-13 | End: 2020-10-28 | Stop reason: SDUPTHER

## 2020-09-21 NOTE — ASSESSMENT & PLAN NOTE
Continued efforts at diet  We asked her to try to get in 45 minutes of walking 3 times a week  She is going to try to institute this regimen

## 2020-09-21 NOTE — ASSESSMENT & PLAN NOTE
It has been about 3 years since last surveillance ultrasound  We asked her to go for same  She agrees

## 2020-09-21 NOTE — PROGRESS NOTES
BMI Counseling: Body mass index is 34 95 kg/m²  The BMI is above normal  Nutrition recommendations include decreasing portion sizes, encouraging healthy choices of fruits and vegetables, consuming healthier snacks, moderation in carbohydrate intake and increasing intake of lean protein  Exercise recommendations include moderate physical activity 150 minutes/week and exercising 3-5 times per week  No pharmacotherapy was ordered  Assessment/Plan:  GERD without esophagitis  Continue with Nexium  Efforts at weaning have been unsuccessful  She has no anorexia, weight loss, or other worrisome symptoms  Nontoxic single thyroid nodule  It has been about 3 years since last surveillance ultrasound  We asked her to go for same  She agrees  Benign essential hypertension  Blood pressure control suboptimal   She is working with her cardiologist presently    Hyperlipoproteinemia  Continued efforts at diet  We asked her to try to get in 45 minutes of walking 3 times a week  She is going to try to institute this regimen  Obesity (BMI 30-39  9)  Continued efforts at diet and exercise for weight loss  She is due for Pneumovax today  We administered same  She will get influenza vaccine next month  She will consider getting Shingrix from her pharmacy  Diagnoses and all orders for this visit:    Nontoxic single thyroid nodule  -     US thyroid; Future    GERD without esophagitis    Benign essential hypertension    Hyperlipoproteinemia    Obesity (BMI 30-39  9)    Other orders  -     metoprolol succinate (TOPROL-XL) 25 mg 24 hr tablet; Take 25 mg by mouth daily  -     losartan-hydrochlorothiazide (HYZAAR) 100-25 MG per tablet; Take 1 tablet by mouth daily  -     Discontinue: amLODIPine (NORVASC) 5 mg tablet; Take 5 mg by mouth daily          Subjective:   Chief Complaint   Patient presents with    Follow-up     Med Check        Patient ID: Geoffery Phoenix is a 77 y o  female  I feel alright   My back hurts, I need to lose weight  HPI   The patient is a 67-year-old female presents today for routine follow-up of multiple medical problems  These include gastroesophageal reflux disease, asthma, history of thyroid nodule as well as hypertension being followed by her cardiologist, Dr Idalia Curling  She recently had metoprolol added at bedtime  She has no cardiovascular pulmonary complaint  No GI complaint  She does have some urinary urgency as well as nocturia  She also has some back pain and hip pain  She has not been getting any exercise  She specifically denies dysphagia, odynophagia, anorexia or weight loss  No dysuria  No headache diplopia or focal neurologic symptom  No cardiovascular pulmonary complaint  The following portions of the patient's history were reviewed and updated as appropriate: allergies, current medications, past family history, past medical history, past social history, past surgical history and problem list     Review of Systems   Cardiovascular: Negative for chest pain, irregular heartbeat, leg swelling, orthopnea and paroxysmal nocturnal dyspnea  Respiratory: Negative for cough, shortness of breath and wheezing  Endocrine: Negative for polydipsia, polyphagia and polyuria  Hematologic/Lymphatic: Negative for adenopathy and bleeding problem  Does not bruise/bleed easily  Musculoskeletal: Positive for joint pain  Hips hurt   Gastrointestinal: Positive for heartburn  Negative for constipation, diarrhea, dysphagia, hematochezia, melena, nausea and vomiting  Genitourinary: Positive for urgency  Negative for bladder incontinence, dysuria, frequency, hematuria and hesitancy  Nocturia x 2-3   Neurological: Negative for dizziness, headaches and sensory change  Psychiatric/Behavioral: Negative for depression and suicidal ideas  The patient does not have insomnia and is not nervous/anxious  Objective:    Physical Exam   Constitutional: She appears well-developed  Obese in no distress    Eyes: Right eye exhibits no discharge  Left eye exhibits no discharge  No scleral icterus  Neck: No JVD present  She has a lower pole nodule  It does not appear enlarged by physical examination  She is overdue for surveillance ultrasound  Cardiovascular: Normal rate, regular rhythm and normal heart sounds  Pulmonary/Chest: Effort normal and breath sounds normal  No respiratory distress  She has no wheezes  She has no rales  Musculoskeletal:      Comments: Trace pretibial edema   Lymphadenopathy:     She has no cervical adenopathy  Neurological: She is alert  Psychiatric: She has a normal mood and affect  Her behavior is normal  Thought content normal    Nursing note and vitals reviewed        Wt Readings from Last 12 Encounters:   09/21/20 95 3 kg (210 lb)   03/23/20 96 2 kg (212 lb)   02/27/20 95 3 kg (210 lb)   01/20/20 93 4 kg (206 lb)   09/23/19 95 3 kg (210 lb)   09/17/19 95 3 kg (210 lb)   05/28/19 95 3 kg (210 lb)   03/18/19 96 2 kg (212 lb)   10/23/18 93 4 kg (206 lb)   09/17/18 99 8 kg (220 lb)   03/27/18 91 6 kg (202 lb)   04/02/18 93 kg (205 lb)   ]

## 2020-09-21 NOTE — ASSESSMENT & PLAN NOTE
Continue with Nexium  Efforts at weaning have been unsuccessful  She has no anorexia, weight loss, or other worrisome symptoms

## 2020-09-22 DIAGNOSIS — K21.9 GERD WITHOUT ESOPHAGITIS: ICD-10-CM

## 2020-09-22 RX ORDER — ESOMEPRAZOLE MAGNESIUM 40 MG/1
40 CAPSULE, DELAYED RELEASE ORAL DAILY
Qty: 90 CAPSULE | Refills: 1 | Status: SHIPPED | OUTPATIENT
Start: 2020-09-22 | End: 2021-03-25 | Stop reason: SDUPTHER

## 2020-10-02 ENCOUNTER — HOSPITAL ENCOUNTER (OUTPATIENT)
Dept: ULTRASOUND IMAGING | Facility: HOSPITAL | Age: 65
Discharge: HOME | End: 2020-10-02
Payer: MEDICARE

## 2020-10-02 DIAGNOSIS — E04.1 NONTOXIC SINGLE THYROID NODULE: ICD-10-CM

## 2020-10-02 PROCEDURE — 76536 US EXAM OF HEAD AND NECK: CPT

## 2020-10-27 ENCOUNTER — HOSPITAL ENCOUNTER (OUTPATIENT)
Dept: RADIOLOGY | Facility: HOSPITAL | Age: 65
Discharge: HOME | End: 2020-10-27
Payer: MEDICARE

## 2020-10-27 VITALS — HEIGHT: 65 IN | WEIGHT: 205 LBS | BODY MASS INDEX: 34.16 KG/M2

## 2020-10-27 DIAGNOSIS — Z12.31 ENCOUNTER FOR SCREENING MAMMOGRAM FOR MALIGNANT NEOPLASM OF BREAST: ICD-10-CM

## 2020-10-27 PROCEDURE — 77067 SCR MAMMO BI INCL CAD: CPT

## 2020-10-27 PROCEDURE — 77063 BREAST TOMOSYNTHESIS BI: CPT

## 2020-10-28 DIAGNOSIS — I10 BENIGN ESSENTIAL HYPERTENSION: Primary | ICD-10-CM

## 2020-10-28 RX ORDER — METOPROLOL SUCCINATE 25 MG/1
25 TABLET, EXTENDED RELEASE ORAL DAILY
Qty: 90 TABLET | Refills: 0 | Status: SHIPPED | OUTPATIENT
Start: 2020-10-28 | End: 2021-03-25 | Stop reason: DRUGHIGH

## 2021-01-11 ENCOUNTER — TELEPHONE (OUTPATIENT)
Dept: DERMATOLOGY | Facility: CLINIC | Age: 66
End: 2021-01-11

## 2021-01-11 NOTE — TELEPHONE ENCOUNTER
Patient called stating she was told she needed Prior Auth for Azelaic Acid 15 % FOAM  She was told a message will be sent to the [facility]

## 2021-01-14 DIAGNOSIS — L71.9 ROSACEA: Primary | ICD-10-CM

## 2021-01-14 RX ORDER — DOXYCYCLINE HYCLATE 50 MG/1
CAPSULE ORAL
Qty: 42 CAPSULE | Refills: 1 | Status: SHIPPED | OUTPATIENT
Start: 2021-01-14 | End: 2021-02-14

## 2021-01-14 NOTE — TELEPHONE ENCOUNTER
Please let her know that I will send in a script for an oral antibiotic to take while she waits for the topical medication

## 2021-01-14 NOTE — TELEPHONE ENCOUNTER
Patient called asking if there is something else she can use for her face since she has to wait to get Prior Auth for the RX that was sent before  She states that her face is getting worst      Please advice

## 2021-01-14 NOTE — PROGRESS NOTES
Patient's seborrhea and rosacea are flaring  Has not received sulfacetamide cleanser or azelaic acid yet  Will start doxy 50 bid to control disease while she waits for topical meds

## 2021-01-22 ENCOUNTER — TELEPHONE (OUTPATIENT)
Dept: DERMATOLOGY | Facility: CLINIC | Age: 66
End: 2021-01-22

## 2021-02-10 ENCOUNTER — OFFICE VISIT (OUTPATIENT)
Dept: DERMATOLOGY | Age: 66
End: 2021-02-10
Payer: MEDICARE

## 2021-02-10 VITALS — WEIGHT: 214.6 LBS | HEIGHT: 65 IN | TEMPERATURE: 97 F | BODY MASS INDEX: 35.75 KG/M2

## 2021-02-10 DIAGNOSIS — L71.9 ROSACEA: Primary | ICD-10-CM

## 2021-02-10 PROCEDURE — 99213 OFFICE O/P EST LOW 20 MIN: CPT | Performed by: DERMATOLOGY

## 2021-02-10 RX ORDER — IVERMECTIN 10 MG/G
CREAM TOPICAL DAILY
Qty: 45 G | Refills: 2 | Status: SHIPPED | OUTPATIENT
Start: 2021-02-10 | End: 2022-04-20

## 2021-02-10 RX ORDER — DOXYCYCLINE 100 MG/1
100 TABLET ORAL 2 TIMES DAILY
Qty: 28 TABLET | Refills: 0 | Status: SHIPPED | OUTPATIENT
Start: 2021-02-10 | End: 2021-02-24

## 2021-02-10 NOTE — PATIENT INSTRUCTIONS
ROSACEA    Assessment and Plan:  Based on a thorough discussion of this condition and the management approach to it (including a comprehensive discussion of the known risks, side effects and potential benefits of treatment), the patient (family) agrees to implement the following specific plan:   Apply Ivermectin cream topically to face once daily   Continue using the CeraVe cream for moisturizing   Doxycycline 100 mg twice daily for 14 days   Follow up in 1 month    Rosacea is a chronic rash affecting the mid-face including the nose, cheeks, chin, forehead, and eyelids  The incidence is usually greatest between the ages of 30-60 years and is more common in people with fair skin  Common characteristics include redness, telangiectasias, papules and pustules over affected areas  Rosacea may look similar to acne, but there is a lack of comedones  Occasionally the eyes may also be involved in ocular rosacea  In advanced disease, enlargement of the sebaceous glands in the nose, termed rhinophyma, may be present  Rosacea results in red spots (papules) and sometimes pustules over the face, but unlike acne there are no blackheads, whiteheads, or cystic nodules  Patients often experience increased facial flushing with prominent blood vessels (erythematotelangiectatic rosacea) and dry, sensitive skin  These symptoms are exacerbated by sun exposure, hot or spicy foods, topical steroids and oil-based facial products  In ocular rosacea, eyelids may be red and sore due to conjunctivitis, keratitis, and episcleritis  If rhinophyma develops due to enlargement of sebaceous glands, the patient may have an enlarged and irregularly shaped nose with prominent pores  In rosacea that is refractory to treatment, patients can develop persistent redness and swelling of the face due to lymphatic obstruction (Morbihan disease)  Distribution around the cheeks may be confused with the malar or butterfly rash of lupus   However, the rash of lupus spares the nasal creases and lacks papules and pustules  If signs of photosensitivity, oral ulcers, arthritis, and kidney dysfunction are present then consider referral to a rheumatologist      There are many potential causes of rosacea including genetic, environmental, vascular, and inflammatory factors  These include, but are not limited to:   Chronic exposure to ultraviolet radiation    Increased immune responses in the form of cathelicidins that promote vessel dilation and infiltration with white blood cells (neutrophils) into the dermis   Increased matrix metalloproteinases such as collagen and elastase that remodel normal tissue may contribute to inflammation of the skin making it thicker and harder   There is some evidence to suggest that increased numbers of demodex mites on patient skin may contribute to rosacea papules     General Treatment Approach    Avoid exacerbating factors such as heat, spicy foods, and alcohol    Use daily SPF30+ sunscreen and other methods of coverage for sun protection   Use water-based make-up    Avoid applying topical steroids to affected areas as they can cause perioral dermatitis and exacerbate rosacea     Topical Treatment Approach   Metronidazole cream or gel by itself or in combination with oral antibiotics for more severe cases   Azelaic acid cream or lotion is effective for mild inflammatory rosacea when applied twice daily to affected areas   Brimonidine gel and oxymetazoline hydrochloride cream can reduce facial redness temporarily    Ivermectin cream can treat papulopustular rosacea by controlling demodex mites and inflammation    Pimecrolimus cream or tacrolimus ointment twice a day for 2-3 months can help reduce inflammation    Oral Treatment Approach   Antibiotics such as doxycycline, minocycline, or erythromycin for 1-3 months   Clonidine and carvedilol can help reduce facial flushing and are generally well tolerated   Common side effects include low blood pressure, gastrointestinal upset, dry eyes, blurred vision and low heart rate   Isotretinoin at low doses can be effective for long term treatment when antibiotics fail  Side effects may make it unsuitable for some patients   NSAIDs such as diclofenac can help reduce discomfort and redness in the skin       Procedural/Surgical Treatment Approach    Vascular lasers or intense pulsed light treatment may be used to treat persistent telangiectasia and papulopustular rosacea   Plastic surgery and carbon dioxide lasers may be used to treat rhinophyma

## 2021-02-10 NOTE — PROGRESS NOTES
[facility] Follow Up Note    Patient Name: Rashel Whaley  Encounter Date: 2/10/2021    Today's Chief Concerns:  Lizeth Ascencio Concern #1:  Follow up rosacea    Current Medications:    Current Outpatient Medications:     aspirin 81 MG tablet, Take 81 mg by mouth, Disp: , Rfl:     BIOTIN PO, Take by mouth, Disp: , Rfl:     esomeprazole (NexIUM) 40 MG capsule, Take 1 capsule (40 mg total) by mouth daily, Disp: 90 capsule, Rfl: 1    Ginger, Zingiber officinalis, (GINGER ROOT PO), Take by mouth, Disp: , Rfl:     latanoprost (XALATAN) 0 005 % ophthalmic solution, , Disp: , Rfl:     losartan-hydrochlorothiazide (HYZAAR) 100-25 MG per tablet, Take 1 tablet by mouth daily, Disp: , Rfl:     metoprolol succinate (TOPROL-XL) 25 mg 24 hr tablet, Take 1 tablet (25 mg total) by mouth daily, Disp: 90 tablet, Rfl: 0    Multiple Vitamins-Minerals (VITAMIN D3 COMPLETE PO), Take by mouth, Disp: , Rfl:     multivitamin (THERAGRAN) TABS, Take 1 tablet by mouth daily, Disp: , Rfl:     naproxen sodium (ALEVE) 220 MG tablet, Take 220 mg by mouth, Disp: , Rfl:     Sulfacetamide Sodium-Sulfur (Sulfacetamide Sod-Sulfur Wash) 9-4 5 % LIQD, Apply topically twice a day, Disp: 454 g, Rfl: 2    albuterol (PROVENTIL HFA,VENTOLIN HFA) 90 mcg/act inhaler, Inhale 2 puffs every 6 (six) hours as needed for wheezing or shortness of breath (Patient not taking: Reported on 1/4/2021), Disp: 1 Inhaler, Rfl: 5    Azelaic Acid 15 % FOAM, Apply topically twice a day (Patient not taking: Reported on 2/10/2021), Disp: 50 g, Rfl: 3    doxycycline hyclate (VIBRAMYCIN) 50 mg capsule, Take a capsule twice a day for 2 weeks and then take 1 capsule once a day for 2 weeks (Patient not taking: Reported on 2/10/2021), Disp: 42 capsule, Rfl: 1    CONSTITUTIONAL:   Vitals:    02/10/21 1356   Temp: (!) 97 °F (36 1 °C)   TempSrc: Temporal   Weight: 97 3 kg (214 lb 9 6 oz)   Height: 5' 5" (1 651 m)       Specific Alerts:    Have you been seen by a [facility] in the last 3 years? YES, virtually    Are you pregnant or planning to become pregnant? No    Are you currently or planning to be nursing or breast feeding? No    Allergies   Allergen Reactions    Lisinopril      Other reaction(s): swollen legs    Hydrocodone-Acetaminophen Dizziness    Irbesartan-Hydrochlorothiazide      Other reaction(s): Hypertension, Other (See Comments)  Pt had hypertension, arm heaviness and headache       May we call your Preferred Phone number to discuss your specific medical information? YES    May we leave a detailed message that includes your specific medical information? YES    Have you traveled outside of the [facility] in the past 3 months? No    Do you currently have a pacemaker or defibrillator? No    Do you have any artificial heart valves, joints, plates, screws, rods, stents, pins, etc? YES   - If Yes, were any placed within the last 2 years? Knee replacement almost 4 years ago    Do you require any medications prior to a surgical procedure? No    Are you taking any medications that cause you to bleed more easily ("blood thinners") No    Have you ever experienced a rapid heartbeat with epinephrine? No    [facility] can help with wrinkles, "laugh lines," facial volume loss, "double chin," "love handles," age spots, and more  Are you interested in learning today about some of the skin enhancement procedures that we offer? (If Yes, please provide more detail) No    Review of Systems:  Have you recently had or currently have any of the following?     · Fever or chills: No  · Night Sweats: No  · Headaches: YES  · Weight Gain: No  · Weight Loss: No  · Blurry Vision: No  · Nausea: No  · Vomiting: No  · Diarrhea: No  · Blood in Stool: No  · Abdominal Pain: No  · Itchy Skin: YES  · Painful Joints: No  · Swollen Joints: No  · Muscle Pain: No  · Irregular Mole: No  · Sun Burn: No  · Dry Skin: YES  · Skin Color Changes: No  · Scar or Keloid: No  · Cold Sores/Fever Blisters: No  · Bacterial Infections/MRSA: No  · Anxiety: No  · Depression: No  · Suicidal or Homicidal Thoughts: No      PSYCH: Normal mood and affect  EYES: Normal conjunctiva  ENT: Normal lips and oral mucosa  CARDIOVASCULAR: No edema  RESPIRATORY: Normal respirations  HEME/LYMPH/IMMUNO:  No regional lymphadenopathy except as noted below in ASSESSMENT AND PLAN BY DIAGNOSIS    FULL ORGAN SYSTEM SKIN EXAM (SKIN)  Hair, Scalp, Ears, Face Normal except as noted below in Assessment   Neck, Cervical Chain Nodes Normal except as noted below in Assessment   Right Arm/Hand/Fingers Normal except as noted below in Assessment   Left Arm/Hand/Fingers Normal except as noted below in Assessment   Chest/Breasts/Axillae Viewed areas Normal except as noted below in Assessment   Abdomen, Umbilicus Normal except as noted below in Assessment   Back/Spine Normal except as noted below in Assessment   Right Leg, Foot, Toes Normal except as noted below in Assessment   Left Leg, Foot, Toes Normal except as noted below in Assessment       1  ROSACEA    Physical Exam:   Anatomic Location Affected:  Cheeks, forehead   Morphological Description:  Erythema, many pink papules, some with pus    Pertinent Positives:   Pertinent Negatives: Additional History of Present Condition:  Patient has had redness on her face for over a year  Patient is currently using sulfacetamide sodium-sulfur solution  Patient is also using CeraVe cream for moisturizing  She finished doxycycline as directed  Patient has not seen much improvement  The only medication that was started at the time of the discoloration of the face was the eye drops for glaucoma  Patient has tried taking our specific foods      Assessment and Plan:  Based on a thorough discussion of this condition and the management approach to it (including a comprehensive discussion of the known risks, side effects and potential benefits of treatment), the patient (family) agrees to implement the following specific plan:   Apply Ivermectin cream topically to face once daily   Continue using the CeraVe cream for moisturizing   Doxycycline 100 mg twice daily for 14 days   Follow up in 1 month    Rosacea is a chronic rash affecting the mid-face including the nose, cheeks, chin, forehead, and eyelids  The incidence is usually greatest between the ages of 30-60 years and is more common in people with fair skin  Common characteristics include redness, telangiectasias, papules and pustules over affected areas  Rosacea may look similar to acne, but there is a lack of comedones  Occasionally the eyes may also be involved in ocular rosacea  In advanced disease, enlargement of the sebaceous glands in the nose, termed rhinophyma, may be present  Rosacea results in red spots (papules) and sometimes pustules over the face, but unlike acne there are no blackheads, whiteheads, or cystic nodules  Patients often experience increased facial flushing with prominent blood vessels (erythematotelangiectatic rosacea) and dry, sensitive skin  These symptoms are exacerbated by sun exposure, hot or spicy foods, topical steroids and oil-based facial products  In ocular rosacea, eyelids may be red and sore due to conjunctivitis, keratitis, and episcleritis  If rhinophyma develops due to enlargement of sebaceous glands, the patient may have an enlarged and irregularly shaped nose with prominent pores  In rosacea that is refractory to treatment, patients can develop persistent redness and swelling of the face due to lymphatic obstruction (Morbihan disease)  Distribution around the cheeks may be confused with the malar or butterfly rash of lupus  However, the rash of lupus spares the nasal creases and lacks papules and pustules   If signs of photosensitivity, oral ulcers, arthritis, and kidney dysfunction are present then consider referral to a rheumatologist      There are many potential causes of rosacea including genetic, environmental, vascular, and inflammatory factors  These include, but are not limited to:   Chronic exposure to ultraviolet radiation    Increased immune responses in the form of cathelicidins that promote vessel dilation and infiltration with white blood cells (neutrophils) into the dermis   Increased matrix metalloproteinases such as collagen and elastase that remodel normal tissue may contribute to inflammation of the skin making it thicker and harder   There is some evidence to suggest that increased numbers of demodex mites on patient skin may contribute to rosacea papules     General Treatment Approach    Avoid exacerbating factors such as heat, spicy foods, and alcohol    Use daily SPF30+ sunscreen and other methods of coverage for sun protection   Use water-based make-up    Avoid applying topical steroids to affected areas as they can cause perioral dermatitis and exacerbate rosacea     Topical Treatment Approach   Metronidazole cream or gel by itself or in combination with oral antibiotics for more severe cases   Azelaic acid cream or lotion is effective for mild inflammatory rosacea when applied twice daily to affected areas   Brimonidine gel and oxymetazoline hydrochloride cream can reduce facial redness temporarily    Ivermectin cream can treat papulopustular rosacea by controlling demodex mites and inflammation    Pimecrolimus cream or tacrolimus ointment twice a day for 2-3 months can help reduce inflammation    Oral Treatment Approach   Antibiotics such as doxycycline, minocycline, or erythromycin for 1-3 months   Clonidine and carvedilol can help reduce facial flushing and are generally well tolerated  Common side effects include low blood pressure, gastrointestinal upset, dry eyes, blurred vision and low heart rate   Isotretinoin at low doses can be effective for long term treatment when antibiotics fail   Side effects may make it unsuitable for some patients   NSAIDs such as diclofenac can help reduce discomfort and redness in the skin       Procedural/Surgical Treatment Approach    Vascular lasers or intense pulsed light treatment may be used to treat persistent telangiectasia and papulopustular rosacea   Plastic surgery and carbon dioxide lasers may be used to treat rhinophyma     Scribe Attestation    I,:  Greg Mccall am acting as a scribe while in the presence of the attending physician :       I,:  Sachin Chua MD personally performed the services described in this documentation    as scribed in my presence :

## 2021-02-16 ENCOUNTER — TELEPHONE (OUTPATIENT)
Dept: DERMATOLOGY | Facility: CLINIC | Age: 66
End: 2021-02-16

## 2021-02-16 NOTE — TELEPHONE ENCOUNTER
Patient called stating that the medication ivermectin that she was prescribed for her rosacea was not covered by her insurance and needed a prior auth  She was wondering if there was another medication she could get instead that was covered by her insurance  She would like a call back today at number [phone]  She did say she had some running around to do today so if she did not answer to please leave a voicemail

## 2021-02-16 NOTE — TELEPHONE ENCOUNTER
Please call her and let her know the only alternative is the azelaic acid, which her insurance wouldn't cover  She has already tried the other alternatives and they haven't helped

## 2021-02-17 ENCOUNTER — TELEPHONE (OUTPATIENT)
Dept: DERMATOLOGY | Facility: CLINIC | Age: 66
End: 2021-02-17

## 2021-02-17 DIAGNOSIS — L71.9 ROSACEA: Primary | ICD-10-CM

## 2021-02-17 RX ORDER — METRONIDAZOLE 10 MG/G
GEL TOPICAL DAILY
Qty: 45 G | Refills: 1 | Status: SHIPPED | OUTPATIENT
Start: 2021-02-17 | End: 2021-09-24 | Stop reason: ALTCHOICE

## 2021-02-17 NOTE — TELEPHONE ENCOUNTER
Left message for patient explaining that Dr Lluvia Barron sent the metronidazole to the pharmacy  Also the follow up appointment that was originally scheduled in March will need to be pushed to April so Dr Lluvia Barron can see the progression of the topical    Please call office back to reschedule that follow up appointment

## 2021-02-17 NOTE — TELEPHONE ENCOUNTER
Spoke to patient about possible changing medication due to a back order at the pharmacy  Patient was told metronidazole would be an alternative that is covered by insurance  The other alternative medication was not covered by the insurance

## 2021-02-17 NOTE — PROGRESS NOTES
Ivermectin cream on backorder  Given metronidazole temporarily til available  Azelaic acid not covered

## 2021-03-25 ENCOUNTER — OFFICE VISIT (OUTPATIENT)
Dept: FAMILY MEDICINE CLINIC | Facility: CLINIC | Age: 66
End: 2021-03-25
Payer: MEDICARE

## 2021-03-25 VITALS
BODY MASS INDEX: 35.65 KG/M2 | SYSTOLIC BLOOD PRESSURE: 138 MMHG | DIASTOLIC BLOOD PRESSURE: 88 MMHG | WEIGHT: 214 LBS | HEIGHT: 65 IN

## 2021-03-25 DIAGNOSIS — E66.9 OBESITY (BMI 30-39.9): ICD-10-CM

## 2021-03-25 DIAGNOSIS — E04.1 NONTOXIC SINGLE THYROID NODULE: ICD-10-CM

## 2021-03-25 DIAGNOSIS — K21.9 GERD WITHOUT ESOPHAGITIS: ICD-10-CM

## 2021-03-25 DIAGNOSIS — I10 BENIGN ESSENTIAL HYPERTENSION: ICD-10-CM

## 2021-03-25 DIAGNOSIS — E78.5 HYPERLIPOPROTEINEMIA: ICD-10-CM

## 2021-03-25 DIAGNOSIS — Z00.00 MEDICARE ANNUAL WELLNESS VISIT, SUBSEQUENT: Primary | ICD-10-CM

## 2021-03-25 DIAGNOSIS — E66.01 OBESITY, MORBID (HCC): ICD-10-CM

## 2021-03-25 PROCEDURE — 36415 COLL VENOUS BLD VENIPUNCTURE: CPT | Performed by: FAMILY MEDICINE

## 2021-03-25 PROCEDURE — G0439 PPPS, SUBSEQ VISIT: HCPCS | Performed by: FAMILY MEDICINE

## 2021-03-25 PROCEDURE — 1123F ACP DISCUSS/DSCN MKR DOCD: CPT | Performed by: FAMILY MEDICINE

## 2021-03-25 PROCEDURE — 99214 OFFICE O/P EST MOD 30 MIN: CPT | Performed by: FAMILY MEDICINE

## 2021-03-25 RX ORDER — METOPROLOL SUCCINATE 50 MG/1
TABLET, EXTENDED RELEASE ORAL
COMMUNITY
Start: 2021-03-05 | End: 2021-03-25 | Stop reason: SDUPTHER

## 2021-03-25 RX ORDER — ESOMEPRAZOLE MAGNESIUM 40 MG/1
40 CAPSULE, DELAYED RELEASE ORAL DAILY
Qty: 90 CAPSULE | Refills: 1 | Status: SHIPPED | OUTPATIENT
Start: 2021-03-25 | End: 2021-09-24 | Stop reason: SDUPTHER

## 2021-03-25 RX ORDER — METOPROLOL SUCCINATE 50 MG/1
50 TABLET, EXTENDED RELEASE ORAL DAILY
Qty: 90 TABLET | Refills: 1
Start: 2021-03-25 | End: 2021-09-17 | Stop reason: SDUPTHER

## 2021-03-25 NOTE — PROGRESS NOTES
Assessment/Plan:  GERD without esophagitis   Her symptoms well controlled with Nexium  She has no worrisome symptoms  We refilled her prescription today  DEXA scan in 2016 was normal   We should consider repeating this possibly in the fall  Nontoxic single thyroid nodule     Ultrasound was performed  October of 2020 and did not show evidence of suspicious nodule  Benign essential hypertension   Blood pressure control is borderline being followed by Cardiology  Hyperlipoproteinemia    Lipid profile and follow up when results are available  Obesity (BMI 30-39  9)    Continued efforts at diet exercise for weight loss  Diagnoses and all orders for this visit:    Benign essential hypertension  -     CBC  -     Comprehensive metabolic panel  -     metoprolol succinate (TOPROL-XL) 50 mg 24 hr tablet; Take 1 tablet (50 mg total) by mouth daily    Obesity, morbid (HCC)    GERD without esophagitis  -     esomeprazole (NexIUM) 40 MG capsule; Take 1 capsule (40 mg total) by mouth daily    Nontoxic single thyroid nodule    Hyperlipoproteinemia    Obesity (BMI 30-39  9)    Other orders  -     Discontinue: metoprolol succinate (TOPROL-XL) 50 mg 24 hr tablet          Subjective:   Chief Complaint   Patient presents with   [facility] Wellness Visit     Subsequent AWV/Med Check        Patient ID: Rusty Bustamante is a 77 y o  female  HPI    The patient is a 47-year-old female who presents today for a Medicare subsequent wellness visit in addition to routine follow-up of her chronic medical problems which include gastroesophageal reflux without esophagitis, allergic rhinitis, essential hypertension, hyperlipidemia in addition to obesity among other  She states that overall she is doing fairly well  She has not had her COVID vaccination and is unsure whether she would like to proceed with this  We did have a discussion of this today  She saw her cardiologist recently    She increased her dose of metoprolol XL to 50 mg daily  She continues with losartan /hydrochlorothiazide  She is compliant with her omeprazole  She denies dysphagia odynophagia X cetera  She has no headache or diplopia  She does note that she had 1 headache episode couple weeks ago but this was isolated and has not recurred  She has no cardiovascular pulmonary complaint  The following portions of the patient's history were reviewed and updated as appropriate: allergies, current medications, past family history, past medical history, past social history, past surgical history and problem list     Review of Systems   Constitution: Negative  Cardiovascular: Negative  Respiratory: Negative  Endocrine: Negative for polydipsia, polyphagia and polyuria  Hematologic/Lymphatic: Negative for adenopathy and bleeding problem  Does not bruise/bleed easily  Gastrointestinal: Positive for heartburn  Negative for constipation, diarrhea and dysphagia  Neurological: Positive for headaches  Negative for dizziness and light-headedness  Psychiatric/Behavioral: Negative for depression  The patient does not have insomnia and is not nervous/anxious  Objective:    Physical Exam   Constitutional: She is oriented to person, place, and time  She appears well-developed  Overweight and in no distress   Neck: No JVD present  No thyromegaly present  Cardiovascular: Normal rate and regular rhythm  Pulmonary/Chest: Effort normal and breath sounds normal    Musculoskeletal:         General: No edema  Lymphadenopathy:     She has no cervical adenopathy  Neurological: She is alert and oriented to person, place, and time  Psychiatric: She has a normal mood and affect  Judgment and thought content normal    Nursing note and vitals reviewed

## 2021-03-25 NOTE — PROGRESS NOTES
Assessment and Plan:     Problem List Items Addressed This Visit        Digestive    GERD without esophagitis      Her symptoms well controlled with Nexium  She has no worrisome symptoms  We refilled her prescription today  DEXA scan in 2016 was normal   We should consider repeating this possibly in the fall  Relevant Medications    esomeprazole (NexIUM) 40 MG capsule       Endocrine    Nontoxic single thyroid nodule        Ultrasound was performed  October of 2020 and did not show evidence of suspicious nodule  Relevant Medications    metoprolol succinate (TOPROL-XL) 50 mg 24 hr tablet       Cardiovascular and Mediastinum    Benign essential hypertension      Blood pressure control is borderline being followed by Cardiology  Relevant Medications    metoprolol succinate (TOPROL-XL) 50 mg 24 hr tablet    Other Relevant Orders    CBC    Comprehensive metabolic panel       Other    RESOLVED: Obesity, morbid (HCC)    Obesity (BMI 30-39  9)       Continued efforts at diet exercise for weight loss  Medicare annual wellness visit, subsequent - Primary       Patient presents today for subsequent wellness visit  All components of the visit were addressed  Hyperlipoproteinemia       Lipid profile and follow up when results are available  BMI Counseling: Body mass index is 35 61 kg/m²  The BMI is above normal  Nutrition recommendations include decreasing portion sizes, encouraging healthy choices of fruits and vegetables, consuming healthier snacks, limiting drinks that contain sugar and moderation in carbohydrate intake  Exercise recommendations include moderate physical activity 150 minutes/week and exercising 3-5 times per week  No pharmacotherapy was ordered  Preventive health issues were discussed with patient, and age appropriate screening tests were ordered as noted in patient's After Visit Summary    Personalized health advice and appropriate referrals for health education or preventive services given if needed, as noted in patient's After Visit Summary  History of Present Illness:     Patient presents for Medicare Annual Wellness visit    Patient Care Team:  Mortimer Boards, MD as PCP - CECI Chandler MD     Problem List:     Patient Active Problem List   Diagnosis    Allergic rhinitis    Benign essential hypertension    Dermatitis    GERD without esophagitis    Hyperlipoproteinemia    Nontoxic single thyroid nodule    Osteoarthritis of knee    Rectocele    Uterovaginal prolapse    Cystocele, lateral    Varicose veins of leg with edema, unspecified laterality    Family history of sudden cardiac death    Pure hypercholesterolemia    Obesity (BMI 30-39  9)    Chronic venous insufficiency    Actinic keratoses    Medicare annual wellness visit, subsequent      Past Medical and Surgical History:     Past Medical History:   Diagnosis Date    Breast lump     Disease of thyroid gland     nodule, benign    GERD (gastroesophageal reflux disease)     History of transfusion     at 9years old for bleeding during tonsillectomy, no reaction    Hypertension     Rectocele     Uterine leiomyoma     Varicose veins of both lower extremities      Past Surgical History:   Procedure Laterality Date    BLADDER SURGERY  08/2012    Bladder "lift"    BREAST BIOPSY Right     benign    BREAST CYST EXCISION Right     1999 =    BREAST LUMPECTOMY  2000    COLONOSCOPY      HYSTERECTOMY  1990    KNEE SURGERY Right     x 2    NOSE SURGERY      NE LARYNGOSCOPY,DIRCT,OP SCOPE,BIOPSY Left 4/2/2018    Procedure: MICROLARYNGOSCOPY AND EXCISION OF LEFT VOCAL FOLD POLYP;  Surgeon: Katelyn Cobb MD;  [facility];  Service: ENT    TONSILLECTOMY      TUBAL LIGATION      VARICOSE VEIN SURGERY Left 12/2009      Family History:     Family History   Problem Relation Age of Onset   Nelda Miles Breast cancer Mother 77    Hypertension Mother     Varicose Veins Mother         of lower extremities    Hypertension Father     Coronary artery disease Father     Coronary artery disease Sister     Heart disease Sister     Varicose Veins Sister         of lower extremities    No Known Problems Daughter     Breast cancer Maternal Aunt 72    No Known Problems Maternal Aunt     No Known Problems Sister     No Known Problems Sister       Social History:     E-Cigarette/Vaping    E-Cigarette Use Never User      E-Cigarette/Vaping Substances    Nicotine No     THC No     CBD No     Flavoring No     Other No     Unknown No      Social History     Socioeconomic History    Marital status: /Civil Union     Spouse name: None    Number of children: None    Years of education: None    Highest education level: None   Occupational History    None   Social Needs    Financial resource strain: None    Food insecurity     Worry: None     Inability: None    Transportation needs     Medical: None     Non-medical: None   Tobacco Use    Smoking status: Former Smoker     Quit date:      Years since quittin 2    Smokeless tobacco: Never Used   Substance and Sexual Activity    Alcohol use: Yes     Frequency: Never     Comment: rarely, twice per year / social drinker per allscript                 Drug use: No    Sexual activity: None   Lifestyle    Physical activity     Days per week: None     Minutes per session: None    Stress: None   Relationships    Social connections     Talks on phone: None     Gets together: None     Attends Gnosticism service: None     Active member of club or organization: None     Attends meetings of clubs or organizations: None     Relationship status: None    Intimate partner violence     Fear of current or ex partner: None     Emotionally abused: None     Physically abused: None     Forced sexual activity: None   Other Topics Concern    None   Social History Narrative    None      Medications and Allergies:     Current Outpatient Medications   Medication Sig Dispense Refill    albuterol (PROVENTIL HFA,VENTOLIN HFA) 90 mcg/act inhaler Inhale 2 puffs every 6 (six) hours as needed for wheezing or shortness of breath 1 Inhaler 5    aspirin 81 MG tablet Take 81 mg by mouth      BIOTIN PO Take by mouth      esomeprazole (NexIUM) 40 MG capsule Take 1 capsule (40 mg total) by mouth daily 90 capsule 1    Ginger, Zingiber officinalis, (GINGER ROOT PO) Take by mouth      latanoprost (XALATAN) 0 005 % ophthalmic solution       losartan-hydrochlorothiazide (HYZAAR) 100-25 MG per tablet Take 1 tablet by mouth daily      metoprolol succinate (TOPROL-XL) 50 mg 24 hr tablet Take 1 tablet (50 mg total) by mouth daily 90 tablet 1    metroNIDAZOLE (METROGEL) 1 % gel Apply topically daily 45 g 1    Multiple Vitamins-Minerals (VITAMIN D3 COMPLETE PO) Take by mouth      multivitamin (THERAGRAN) TABS Take 1 tablet by mouth daily      naproxen sodium (ALEVE) 220 MG tablet Take 220 mg by mouth      Sulfacetamide Sodium-Sulfur (Sulfacetamide Sod-Sulfur Wash) 9-4 5 % LIQD Apply topically twice a day 454 g 2    Ivermectin 1 % CREA Apply topically daily for 30 doses Apply topically to face once daily 45 g 2     No current facility-administered medications for this visit        Allergies   Allergen Reactions    Lisinopril      Other reaction(s): swollen legs    Hydrocodone-Acetaminophen Dizziness    Irbesartan-Hydrochlorothiazide      Other reaction(s): Hypertension, Other (See Comments)  Pt had hypertension, arm heaviness and headache      Immunizations:     Immunization History   Administered Date(s) Administered    Pneumococcal Conjugate 13-Valent 03/18/2019    Pneumococcal Polysaccharide PPV23 09/21/2020      Health Maintenance:         Topic Date Due    MAMMOGRAM  10/27/2021    Colorectal Cancer Screening  09/13/2023    Hepatitis C Screening  Completed         Topic Date Due    COVID-19 Vaccine (1) Never done    DTaP,Tdap,and Td Vaccines (1 - Tdap) Never done    Influenza Vaccine (1) Never done      Medicare Health Risk Assessment:     /88 (BP Location: Left arm, Patient Position: Sitting, Cuff Size: Large)   Ht 5' 5" (1 651 m)   Wt 97 1 kg (214 lb)   BMI 35 61 kg/m²      Tip Majano is here for her Subsequent Wellness visit  Health Risk Assessment:   Patient rates overall health as good  Patient feels that their physical health rating is same  Patient is satisfied with their life  Eyesight was rated as same  Hearing was rated as same  Patient feels that their emotional and mental health rating is same  Patients states they are sometimes angry  Patient states they are often unusually tired/fatigued  Pain experienced in the last 7 days has been some  Patient's pain rating has been 3/10  Patient states that she has experienced no weight loss or gain in last 6 months  Depression Screening:   PHQ-2 Score: 0      Fall Risk Screening: In the past year, patient has experienced: no history of falling in past year      Urinary Incontinence Screening:   Patient has leaked urine accidently in the last six months  Home Safety:  Patient does not have trouble with stairs inside or outside of their home  Patient has working smoke alarms and has working carbon monoxide detector  Home safety hazards include: none  Nutrition:   Current diet is Regular  Medications:   Patient is currently taking over-the-counter supplements  OTC medications include: see medication list  Patient is able to manage medications  Activities of Daily Living (ADLs)/Instrumental Activities of Daily Living (IADLs):   Walk and transfer into and out of bed and chair?: Yes  Dress and groom yourself?: Yes    Bathe or shower yourself?: Yes    Feed yourself?  Yes  Do your laundry/housekeeping?: Yes  Manage your money, pay your bills and track your expenses?: Yes  Make your own meals?: Yes    Do your own shopping?: Yes    Durable Medical Equipment Suppliers  No DME    Previous Hospitalizations:   Any hospitalizations or ED visits within the last 12 months?: No      Advance Care Planning:   Living will: No    Durable POA for healthcare: No    Advanced directive: No      Comments: Has 5 Wishes and did not complete yet   Re addressed with her today    Cognitive Screening:   Provider or family/friend/caregiver concerned regarding cognition?: No    PREVENTIVE SCREENINGS      Cardiovascular Screening:    General: Screening Not Indicated and History Lipid Disorder      Diabetes Screening:     General: Screening Current      Colorectal Cancer Screening:     General: Screening Current      Breast Cancer Screening:     General: Screening Current      Cervical Cancer Screening:    General: Screening Not Indicated      Osteoporosis Screening:    General: Screening Current      Abdominal Aortic Aneurysm (AAA) Screening:        General: Screening Not Indicated      Lung Cancer Screening:     General: Screening Not Indicated      Hepatitis C Screening:    General: Screening Current    Screening, Brief Intervention, and Referral to Treatment (SBIRT)    Screening      AUDIT-C Screenin) How often did you have a drink containing alcohol in the past year? never  2) How many drinks did you have on a typical day when you were drinking in the past year? never  3) How often did you have 6 or more drinks on one occasion in the past year? never    AUDIT-C Score: 0  Interpretation: Score 0-2 (female): Negative screen for alcohol misuse      Samantha Ram MD

## 2021-03-25 NOTE — PATIENT INSTRUCTIONS
Medicare Preventive Visit Patient Instructions  Thank you for completing your Welcome to Medicare Visit or Medicare Annual Wellness Visit today  Your next wellness visit will be due in one year (3/26/2022)  The screening/preventive services that you may require over the next 5-10 years are detailed below  Some tests may not apply to you based off risk factors and/or age  Screening tests ordered at today's visit but not completed yet may show as past due  Also, please note that scanned in results may not display below  Preventive Screenings:  Service Recommendations Previous Testing/Comments   Colorectal Cancer Screening  * Colonoscopy    * Fecal Occult Blood Test (FOBT)/Fecal Immunochemical Test (FIT)  * Fecal DNA/Cologuard Test  * Flexible Sigmoidoscopy Age: 54-65 years old   Colonoscopy: every 10 years (may be performed more frequently if at higher risk)  OR  FOBT/FIT: every 1 year  OR  Cologuard: every 3 years  OR  Sigmoidoscopy: every 5 years  Screening may be recommended earlier than age 48 if at higher risk for colorectal cancer  Also, an individualized decision between you and your healthcare provider will decide whether screening between the ages of 74-80 would be appropriate  Colonoscopy: 09/13/2013  FOBT/FIT: Not on file  Cologuard: Not on file  Sigmoidoscopy: Not on file    Screening Current     Breast Cancer Screening Age: 36 years old  Frequency: every 1-2 years  Not required if history of left and right mastectomy Mammogram: 10/27/2020    Screening Current   Cervical Cancer Screening Between the ages of 21-29, pap smear recommended once every 3 years  Between the ages of 33-67, can perform pap smear with HPV co-testing every 5 years     Recommendations may differ for women with a history of total hysterectomy, cervical cancer, or abnormal pap smears in past  Pap Smear: Not on file    Screening Not Indicated   Hepatitis C Screening Once for adults born between 1945 and 1965  More frequently in patients at high risk for Hepatitis C Hep C Antibody: 03/18/2019    Screening Current   Diabetes Screening 1-2 times per year if you're at risk for diabetes or have pre-diabetes Fasting glucose: No results in last 5 years   A1C: No results in last 5 years        Cholesterol Screening Once every 5 years if you don't have a lipid disorder  May order more often based on risk factors  Lipid panel: 02/25/2021    Screening Not Indicated  History Lipid Disorder     Other Preventive Screenings Covered by Medicare:  1  Abdominal Aortic Aneurysm (AAA) Screening: covered once if your at risk  You're considered to be at risk if you have a family history of AAA  2  Lung Cancer Screening: covers low dose CT scan once per year if you meet all of the following conditions: (1) Age 50-69; (2) No signs or symptoms of lung cancer; (3) Current smoker or have quit smoking within the last 15 years; (4) You have a tobacco smoking history of at least 30 pack years (packs per day multiplied by number of years you smoked); (5) You get a written order from a healthcare provider  3  Glaucoma Screening: covered annually if you're considered high risk: (1) You have diabetes OR (2) Family history of glaucoma OR (3)  aged 48 and older OR (3)  American aged 72 and older  3  Osteoporosis Screening: covered every 2 years if you meet one of the following conditions: (1) You're estrogen deficient and at risk for osteoporosis based off medical history and other findings; (2) Have a vertebral abnormality; (3) On glucocorticoid therapy for more than 3 months; (4) Have primary hyperparathyroidism; (5) On osteoporosis medications and need to assess response to drug therapy  · Last bone density test (DXA Scan): 07/07/2016   5  HIV Screening: covered annually if you're between the age of 15-65  Also covered annually if you are younger than 13 and older than 72 with risk factors for HIV infection   For pregnant patients, it is covered up to 3 times per pregnancy  Immunizations:  Immunization Recommendations   Influenza Vaccine Annual influenza vaccination during flu season is recommended for all persons aged >= 6 months who do not have contraindications   Pneumococcal Vaccine (Prevnar and Pneumovax)  * Prevnar = PCV13  * Pneumovax = PPSV23   Adults 25-60 years old: 1-3 doses may be recommended based on certain risk factors  Adults 72 years old: Prevnar (PCV13) vaccine recommended followed by Pneumovax (PPSV23) vaccine  If already received PPSV23 since turning 65, then PCV13 recommended at least one year after PPSV23 dose  Hepatitis B Vaccine 3 dose series if at intermediate or high risk (ex: diabetes, end stage renal disease, liver disease)   Tetanus (Td) Vaccine - COST NOT COVERED BY MEDICARE PART B Following completion of primary series, a booster dose should be given every 10 years to maintain immunity against tetanus  Td may also be given as tetanus wound prophylaxis  Tdap Vaccine - COST NOT COVERED BY MEDICARE PART B Recommended at least once for all adults  For pregnant patients, recommended with each pregnancy  Shingles Vaccine (Shingrix) - COST NOT COVERED BY MEDICARE PART B  2 shot series recommended in those aged 48 and above     Health Maintenance Due:      Topic Date Due    MAMMOGRAM  10/27/2021    Colorectal Cancer Screening  09/13/2023    Hepatitis C Screening  Completed     Immunizations Due:      Topic Date Due    COVID-19 Vaccine (1) Never done    DTaP,Tdap,and Td Vaccines (1 - Tdap) Never done    Influenza Vaccine (1) Never done     Advance Directives   What are advance directives? Advance directives are legal documents that state your wishes and plans for medical care  These plans are made ahead of time in case you lose your ability to make decisions for yourself  Advance directives can apply to any medical decision, such as the treatments you want, and if you want to donate organs     What are the types of advance directives? There are many types of advance directives, and each state has rules about how to use them  You may choose a combination of any of the following:  · Living will: This is a written record of the treatment you want  You can also choose which treatments you do not want, which to limit, and which to stop at a certain time  This includes surgery, medicine, IV fluid, and tube feedings  · Durable power of  for healthcare [facility]): This is a written record that states who you want to make healthcare choices for you when you are unable to make them for yourself  This person, called a proxy, is usually a family member or a friend  You may choose more than 1 proxy  · Do not resuscitate (DNR) order:  A DNR order is used in case your heart stops beating or you stop breathing  It is a request not to have certain forms of treatment, such as CPR  A DNR order may be included in other types of advance directives  · Medical directive: This covers the care that you want if you are in a coma, near death, or unable to make decisions for yourself  You can list the treatments you want for each condition  Treatment may include pain medicine, surgery, blood transfusions, dialysis, IV or tube feedings, and a ventilator (breathing machine)  · Values history: This document has questions about your views, beliefs, and how you feel and think about life  This information can help others choose the care that you would choose  Why are advance directives important? An advance directive helps you control your care  Although spoken wishes may be used, it is better to have your wishes written down  Spoken wishes can be misunderstood, or not followed  Treatments may be given even if you do not want them  An advance directive may make it easier for your family to make difficult choices about your care  Urinary Incontinence   Urinary incontinence (UI)  is when you lose control of your bladder   UI develops because your bladder cannot store or empty urine properly  The 3 most common types of UI are stress incontinence, urge incontinence, or both  Medicines:   · May be given to help strengthen your bladder control  Report any side effects of medication to your healthcare provider  Do pelvic muscle exercises often:  Your pelvic muscles help you stop urinating  Squeeze these muscles tight for 5 seconds, then relax for 5 seconds  Gradually work up to squeezing for 10 seconds  Do 3 sets of 15 repetitions a day, or as directed  This will help strengthen your pelvic muscles and improve bladder control  Train your bladder:  Go to the bathroom at set times, such as every 2 hours, even if you do not feel the urge to go  You can also try to hold your urine when you feel the urge to go  For example, hold your urine for 5 minutes when you feel the urge to go  As that becomes easier, hold your urine for 10 minutes  Self-care:   · Keep a UI record  Write down how often you leak urine and how much you leak  Make a note of what you were doing when you leaked urine  · Drink liquids as directed  You may need to limit the amount of liquid you drink to help control your urine leakage  Do not drink any liquid right before you go to bed  Limit or do not have drinks that contain caffeine or alcohol  · Prevent constipation  Eat a variety of high-fiber foods  Good examples are high-fiber cereals, beans, vegetables, and whole-grain breads  Walking is the best way to trigger your intestines to have a bowel movement  · Exercise regularly and maintain a healthy weight  Weight loss and exercise will decrease pressure on your bladder and help you control your leakage  · Use a catheter as directed  to help empty your bladder  A catheter is a tiny, plastic tube that is put into your bladder to drain your urine  · Go to behavior therapy as directed  Behavior therapy may be used to help you learn to control your urge to urinate      Weight Management   Why it is important to manage your weight:  Being overweight increases your risk of health conditions such as heart disease, high blood pressure, type 2 diabetes, and certain types of cancer  It can also increase your risk for osteoarthritis, sleep apnea, and other respiratory problems  Aim for a slow, steady weight loss  Even a small amount of weight loss can lower your risk of health problems  How to lose weight safely:  A safe and healthy way to lose weight is to eat fewer calories and get regular exercise  You can lose up about 1 pound a week by decreasing the number of calories you eat by 500 calories each day  Healthy meal plan for weight management:  A healthy meal plan includes a variety of foods, contains fewer calories, and helps you stay healthy  A healthy meal plan includes the following:  · Eat whole-grain foods more often  A healthy meal plan should contain fiber  Fiber is the part of grains, fruits, and vegetables that is not broken down by your body  Whole-grain foods are healthy and provide extra fiber in your diet  Some examples of whole-grain foods are whole-wheat breads and pastas, oatmeal, brown rice, and bulgur  · Eat a variety of vegetables every day  Include dark, leafy greens such as spinach, kale, belkis greens, and mustard greens  Eat yellow and orange vegetables such as carrots, sweet potatoes, and winter squash  · Eat a variety of fruits every day  Choose fresh or canned fruit (canned in its own juice or light syrup) instead of juice  Fruit juice has very little or no fiber  · Eat low-fat dairy foods  Drink fat-free (skim) milk or 1% milk  Eat fat-free yogurt and low-fat cottage cheese  Try low-fat cheeses such as mozzarella and other reduced-fat cheeses  · Choose meat and other protein foods that are low in fat  Choose beans or other legumes such as split peas or lentils   Choose fish, skinless poultry (chicken or turkey), or lean cuts of red meat (beef or pork)  Before you cook meat or poultry, cut off any visible fat  · Use less fat and oil  Try baking foods instead of frying them  Add less fat, such as margarine, sour cream, regular salad dressing and mayonnaise to foods  Eat fewer high-fat foods  Some examples of high-fat foods include french fries, doughnuts, ice cream, and cakes  · Eat fewer sweets  Limit foods and drinks that are high in sugar  This includes candy, cookies, regular soda, and sweetened drinks  Exercise:  Exercise at least 30 minutes per day on most days of the week  Some examples of exercise include walking, biking, dancing, and swimming  You can also fit in more physical activity by taking the stairs instead of the elevator or parking farther away from stores  Ask your healthcare provider about the best exercise plan for you  © Copyright [employer] 2018 Information is for End User's use only and may not be sold, redistributed or otherwise used for commercial purposes  All illustrations and images included in CareNotes® are the copyrighted property of [employer]  or [facility] Preventive Visit Patient Instructions  Thank you for completing your Welcome to Medicare Visit or Medicare Annual Wellness Visit today  Your next wellness visit will be due in one year (3/26/2022)  The screening/preventive services that you may require over the next 5-10 years are detailed below  Some tests may not apply to you based off risk factors and/or age  Screening tests ordered at today's visit but not completed yet may show as past due  Also, please note that scanned in results may not display below    Preventive Screenings:  Service Recommendations Previous Testing/Comments   Colorectal Cancer Screening  * Colonoscopy    * Fecal Occult Blood Test (FOBT)/Fecal Immunochemical Test (FIT)  * Fecal DNA/Cologuard Test  * Flexible Sigmoidoscopy Age: 54-65 years old   Colonoscopy: every 10 years (may be performed more frequently if at higher risk)  OR  FOBT/FIT: every 1 year  OR  Cologuard: every 3 years  OR  Sigmoidoscopy: every 5 years  Screening may be recommended earlier than age 48 if at higher risk for colorectal cancer  Also, an individualized decision between you and your healthcare provider will decide whether screening between the ages of 74-80 would be appropriate  Colonoscopy: 09/13/2013  FOBT/FIT: Not on file  Cologuard: Not on file  Sigmoidoscopy: Not on file    Screening Current     Breast Cancer Screening Age: 34 years old  Frequency: every 1-2 years  Not required if history of left and right mastectomy Mammogram: 10/27/2020    Screening Current   Cervical Cancer Screening Between the ages of 21-29, pap smear recommended once every 3 years  Between the ages of 33-67, can perform pap smear with HPV co-testing every 5 years  Recommendations may differ for women with a history of total hysterectomy, cervical cancer, or abnormal pap smears in past  Pap Smear: Not on file    Screening Not Indicated   Hepatitis C Screening Once for adults born between 1945 and 1965  More frequently in patients at high risk for Hepatitis C Hep C Antibody: 03/18/2019    Screening Current   Diabetes Screening 1-2 times per year if you're at risk for diabetes or have pre-diabetes Fasting glucose: No results in last 5 years   A1C: No results in last 5 years    Screening Current   Cholesterol Screening Once every 5 years if you don't have a lipid disorder  May order more often based on risk factors  Lipid panel: 02/25/2021    Screening Not Indicated  History Lipid Disorder     Other Preventive Screenings Covered by Medicare:  6  Abdominal Aortic Aneurysm (AAA) Screening: covered once if your at risk  You're considered to be at risk if you have a family history of AAA    7  Lung Cancer Screening: covers low dose CT scan once per year if you meet all of the following conditions: (1) Age 50-69; (2) No signs or symptoms of lung cancer; (3) Current smoker or have quit smoking within the last 15 years; (4) You have a tobacco smoking history of at least 30 pack years (packs per day multiplied by number of years you smoked); (5) You get a written order from a healthcare provider  8  Glaucoma Screening: covered annually if you're considered high risk: (1) You have diabetes OR (2) Family history of glaucoma OR (3)  aged 48 and older OR (3)  American aged 72 and older  5  Osteoporosis Screening: covered every 2 years if you meet one of the following conditions: (1) You're estrogen deficient and at risk for osteoporosis based off medical history and other findings; (2) Have a vertebral abnormality; (3) On glucocorticoid therapy for more than 3 months; (4) Have primary hyperparathyroidism; (5) On osteoporosis medications and need to assess response to drug therapy  · Last bone density test (DXA Scan): 07/07/2016   10  HIV Screening: covered annually if you're between the age of 15-65  Also covered annually if you are younger than 13 and older than 72 with risk factors for HIV infection  For pregnant patients, it is covered up to 3 times per pregnancy  Immunizations:  Immunization Recommendations   Influenza Vaccine Annual influenza vaccination during flu season is recommended for all persons aged >= 6 months who do not have contraindications   Pneumococcal Vaccine (Prevnar and Pneumovax)  * Prevnar = PCV13  * Pneumovax = PPSV23   Adults 25-60 years old: 1-3 doses may be recommended based on certain risk factors  Adults 72 years old: Prevnar (PCV13) vaccine recommended followed by Pneumovax (PPSV23) vaccine  If already received PPSV23 since turning 65, then PCV13 recommended at least one year after PPSV23 dose     Hepatitis B Vaccine 3 dose series if at intermediate or high risk (ex: diabetes, end stage renal disease, liver disease)   Tetanus (Td) Vaccine - COST NOT COVERED BY MEDICARE PART B Following completion of primary series, a booster dose should be given every 10 years to maintain immunity against tetanus  Td may also be given as tetanus wound prophylaxis  Tdap Vaccine - COST NOT COVERED BY MEDICARE PART B Recommended at least once for all adults  For pregnant patients, recommended with each pregnancy  Shingles Vaccine (Shingrix) - COST NOT COVERED BY MEDICARE PART B  2 shot series recommended in those aged 48 and above     Health Maintenance Due:      Topic Date Due    MAMMOGRAM  10/27/2021    Colorectal Cancer Screening  09/13/2023    Hepatitis C Screening  Completed     Immunizations Due:      Topic Date Due    COVID-19 Vaccine (1) Never done    DTaP,Tdap,and Td Vaccines (1 - Tdap) Never done    Influenza Vaccine (1) Never done     Advance Directives   What are advance directives? Advance directives are legal documents that state your wishes and plans for medical care  These plans are made ahead of time in case you lose your ability to make decisions for yourself  Advance directives can apply to any medical decision, such as the treatments you want, and if you want to donate organs  What are the types of advance directives? There are many types of advance directives, and each state has rules about how to use them  You may choose a combination of any of the following:  · Living will: This is a written record of the treatment you want  You can also choose which treatments you do not want, which to limit, and which to stop at a certain time  This includes surgery, medicine, IV fluid, and tube feedings  · Durable power of  for healthcare [facility]): This is a written record that states who you want to make healthcare choices for you when you are unable to make them for yourself  This person, called a proxy, is usually a family member or a friend  You may choose more than 1 proxy  · Do not resuscitate (DNR) order:  A DNR order is used in case your heart stops beating or you stop breathing   It is a request not to have certain forms of treatment, such as CPR  A DNR order may be included in other types of advance directives  · Medical directive: This covers the care that you want if you are in a coma, near death, or unable to make decisions for yourself  You can list the treatments you want for each condition  Treatment may include pain medicine, surgery, blood transfusions, dialysis, IV or tube feedings, and a ventilator (breathing machine)  · Values history: This document has questions about your views, beliefs, and how you feel and think about life  This information can help others choose the care that you would choose  Why are advance directives important? An advance directive helps you control your care  Although spoken wishes may be used, it is better to have your wishes written down  Spoken wishes can be misunderstood, or not followed  Treatments may be given even if you do not want them  An advance directive may make it easier for your family to make difficult choices about your care  Urinary Incontinence   Urinary incontinence (UI)  is when you lose control of your bladder  UI develops because your bladder cannot store or empty urine properly  The 3 most common types of UI are stress incontinence, urge incontinence, or both  Medicines:   · May be given to help strengthen your bladder control  Report any side effects of medication to your healthcare provider  Do pelvic muscle exercises often:  Your pelvic muscles help you stop urinating  Squeeze these muscles tight for 5 seconds, then relax for 5 seconds  Gradually work up to squeezing for 10 seconds  Do 3 sets of 15 repetitions a day, or as directed  This will help strengthen your pelvic muscles and improve bladder control  Train your bladder:  Go to the bathroom at set times, such as every 2 hours, even if you do not feel the urge to go  You can also try to hold your urine when you feel the urge to go   For example, hold your urine for 5 minutes when you feel the urge to go  As that becomes easier, hold your urine for 10 minutes  Self-care:   · Keep a UI record  Write down how often you leak urine and how much you leak  Make a note of what you were doing when you leaked urine  · Drink liquids as directed  You may need to limit the amount of liquid you drink to help control your urine leakage  Do not drink any liquid right before you go to bed  Limit or do not have drinks that contain caffeine or alcohol  · Prevent constipation  Eat a variety of high-fiber foods  Good examples are high-fiber cereals, beans, vegetables, and whole-grain breads  Walking is the best way to trigger your intestines to have a bowel movement  · Exercise regularly and maintain a healthy weight  Weight loss and exercise will decrease pressure on your bladder and help you control your leakage  · Use a catheter as directed  to help empty your bladder  A catheter is a tiny, plastic tube that is put into your bladder to drain your urine  · Go to behavior therapy as directed  Behavior therapy may be used to help you learn to control your urge to urinate  Weight Management   Why it is important to manage your weight:  Being overweight increases your risk of health conditions such as heart disease, high blood pressure, type 2 diabetes, and certain types of cancer  It can also increase your risk for osteoarthritis, sleep apnea, and other respiratory problems  Aim for a slow, steady weight loss  Even a small amount of weight loss can lower your risk of health problems  How to lose weight safely:  A safe and healthy way to lose weight is to eat fewer calories and get regular exercise  You can lose up about 1 pound a week by decreasing the number of calories you eat by 500 calories each day  Healthy meal plan for weight management:  A healthy meal plan includes a variety of foods, contains fewer calories, and helps you stay healthy   A healthy meal plan includes the following:  · Eat whole-grain foods more often  A healthy meal plan should contain fiber  Fiber is the part of grains, fruits, and vegetables that is not broken down by your body  Whole-grain foods are healthy and provide extra fiber in your diet  Some examples of whole-grain foods are whole-wheat breads and pastas, oatmeal, brown rice, and bulgur  · Eat a variety of vegetables every day  Include dark, leafy greens such as spinach, kale, belkis greens, and mustard greens  Eat yellow and orange vegetables such as carrots, sweet potatoes, and winter squash  · Eat a variety of fruits every day  Choose fresh or canned fruit (canned in its own juice or light syrup) instead of juice  Fruit juice has very little or no fiber  · Eat low-fat dairy foods  Drink fat-free (skim) milk or 1% milk  Eat fat-free yogurt and low-fat cottage cheese  Try low-fat cheeses such as mozzarella and other reduced-fat cheeses  · Choose meat and other protein foods that are low in fat  Choose beans or other legumes such as split peas or lentils  Choose fish, skinless poultry (chicken or turkey), or lean cuts of red meat (beef or pork)  Before you cook meat or poultry, cut off any visible fat  · Use less fat and oil  Try baking foods instead of frying them  Add less fat, such as margarine, sour cream, regular salad dressing and mayonnaise to foods  Eat fewer high-fat foods  Some examples of high-fat foods include french fries, doughnuts, ice cream, and cakes  · Eat fewer sweets  Limit foods and drinks that are high in sugar  This includes candy, cookies, regular soda, and sweetened drinks  Exercise:  Exercise at least 30 minutes per day on most days of the week  Some examples of exercise include walking, biking, dancing, and swimming  You can also fit in more physical activity by taking the stairs instead of the elevator or parking farther away from stores  Ask your healthcare provider about the best exercise plan for you        © Copyright [employer] 2018 Information is for End User's use only and may not be sold, redistributed or otherwise used for commercial purposes   All illustrations and images included in CareNotes® are the copyrighted property of A [employer]  or [facility] Umer Rice

## 2021-03-26 LAB
ALBUMIN SERPL-MCNC: 4.3 G/DL (ref 3.6–5.1)
ALBUMIN/GLOB SERPL: 2 (CALC) (ref 1–2.5)
ALP SERPL-CCNC: 66 U/L (ref 37–153)
ALT SERPL-CCNC: 25 U/L (ref 6–29)
AST SERPL-CCNC: 17 U/L (ref 10–35)
BILIRUB SERPL-MCNC: 0.5 MG/DL (ref 0.2–1.2)
BUN SERPL-MCNC: 19 MG/DL (ref 7–25)
BUN/CREAT SERPL: NORMAL (CALC) (ref 6–22)
CALCIUM SERPL-MCNC: 9.7 MG/DL (ref 8.6–10.4)
CHLORIDE SERPL-SCNC: 103 MMOL/L (ref 98–110)
CO2 SERPL-SCNC: 31 MMOL/L (ref 20–32)
CREAT SERPL-MCNC: 0.73 MG/DL (ref 0.5–0.99)
ERYTHROCYTE [DISTWIDTH] IN BLOOD BY AUTOMATED COUNT: 12.9 % (ref 11–15)
GLOBULIN SER CALC-MCNC: 2.2 G/DL (CALC) (ref 1.9–3.7)
GLUCOSE SERPL-MCNC: 94 MG/DL (ref 65–99)
HCT VFR BLD AUTO: 45.4 % (ref 35–45)
HGB BLD-MCNC: 14.6 G/DL (ref 11.7–15.5)
MCH RBC QN AUTO: 30 PG (ref 27–33)
MCHC RBC AUTO-ENTMCNC: 32.2 G/DL (ref 32–36)
MCV RBC AUTO: 93.4 FL (ref 80–100)
PLATELET # BLD AUTO: 192 THOUSAND/UL (ref 140–400)
PMV BLD REES-ECKER: 11.8 FL (ref 7.5–12.5)
POTASSIUM SERPL-SCNC: 4.6 MMOL/L (ref 3.5–5.3)
PROT SERPL-MCNC: 6.5 G/DL (ref 6.1–8.1)
RBC # BLD AUTO: 4.86 MILLION/UL (ref 3.8–5.1)
SL AMB EGFR AFRICAN AMERICAN: 99 ML/MIN/1.73M2
SL AMB EGFR NON AFRICAN AMERICAN: 85 ML/MIN/1.73M2
SODIUM SERPL-SCNC: 141 MMOL/L (ref 135–146)
WBC # BLD AUTO: 6.7 THOUSAND/UL (ref 3.8–10.8)

## 2021-04-05 ENCOUNTER — OFFICE VISIT (OUTPATIENT)
Dept: DERMATOLOGY | Facility: CLINIC | Age: 66
End: 2021-04-05
Payer: MEDICARE

## 2021-04-05 VITALS — HEIGHT: 65 IN | WEIGHT: 214.6 LBS | TEMPERATURE: 98.4 F | BODY MASS INDEX: 35.75 KG/M2

## 2021-04-05 DIAGNOSIS — L71.9 ROSACEA: Primary | ICD-10-CM

## 2021-04-05 DIAGNOSIS — L57.0 ACTINIC KERATOSIS: ICD-10-CM

## 2021-04-05 PROCEDURE — 17000 DESTRUCT PREMALG LESION: CPT | Performed by: DERMATOLOGY

## 2021-04-05 PROCEDURE — 17110 DESTRUCTION B9 LES UP TO 14: CPT | Performed by: DERMATOLOGY

## 2021-04-05 PROCEDURE — 17003 DESTRUCT PREMALG LES 2-14: CPT | Performed by: DERMATOLOGY

## 2021-04-05 PROCEDURE — 99213 OFFICE O/P EST LOW 20 MIN: CPT | Performed by: DERMATOLOGY

## 2021-04-05 NOTE — PATIENT INSTRUCTIONS
ROSACEA    Assessment and Plan:  Based on a thorough discussion of this condition and the management approach to it (including a comprehensive discussion of the known risks, side effects and potential benefits of treatment), the patient (family) agrees to implement the following specific plan:   Discontinue Metronidazole gel    Discussed prescribing combination of Metronidazole 1% Ivermectin 1% and Azeleic acid 1%    An Email will be sent to you from pharmacy to set up for delivery of medication     Rosacea is a chronic rash affecting the mid-face including the nose, cheeks, chin, forehead, and eyelids  The incidence is usually greatest between the ages of 30-60 years and is more common in people with fair skin  Common characteristics include redness, telangiectasias, papules and pustules over affected areas  Rosacea may look similar to acne, but there is a lack of comedones  Occasionally the eyes may also be involved in ocular rosacea  In advanced disease, enlargement of the sebaceous glands in the nose, termed rhinophyma, may be present  Rosacea results in red spots (papules) and sometimes pustules over the face, but unlike acne there are no blackheads, whiteheads, or cystic nodules  Patients often experience increased facial flushing with prominent blood vessels (erythematotelangiectatic rosacea) and dry, sensitive skin  These symptoms are exacerbated by sun exposure, hot or spicy foods, topical steroids and oil-based facial products  In ocular rosacea, eyelids may be red and sore due to conjunctivitis, keratitis, and episcleritis  If rhinophyma develops due to enlargement of sebaceous glands, the patient may have an enlarged and irregularly shaped nose with prominent pores  In rosacea that is refractory to treatment, patients can develop persistent redness and swelling of the face due to lymphatic obstruction (Morbihan disease)       Distribution around the cheeks may be confused with the malar or butterfly rash of lupus  However, the rash of lupus spares the nasal creases and lacks papules and pustules  If signs of photosensitivity, oral ulcers, arthritis, and kidney dysfunction are present then consider referral to a rheumatologist      There are many potential causes of rosacea including genetic, environmental, vascular, and inflammatory factors   These include, but are not limited to:   Chronic exposure to ultraviolet radiation    Increased immune responses in the form of cathelicidins that promote vessel dilation and infiltration with white blood cells (neutrophils) into the dermis   Increased matrix metalloproteinases such as collagen and elastase that remodel normal tissue may contribute to inflammation of the skin making it thicker and harder   There is some evidence to suggest that increased numbers of demodex mites on patient skin may contribute to rosacea papules     General Treatment Approach    Avoid exacerbating factors such as heat, spicy foods, and alcohol    Use daily SPF30+ sunscreen and other methods of coverage for sun protection   Use water-based make-up    Avoid applying topical steroids to affected areas as they can cause perioral dermatitis and exacerbate rosacea     Topical Treatment Approach   Metronidazole cream or gel by itself or in combination with oral antibiotics for more severe cases   Azelaic acid cream or lotion is effective for mild inflammatory rosacea when applied twice daily to affected areas   Brimonidine gel and oxymetazoline hydrochloride cream can reduce facial redness temporarily    Ivermectin cream can treat papulopustular rosacea by controlling demodex mites and inflammation    Pimecrolimus cream or tacrolimus ointment twice a day for 2-3 months can help reduce inflammation    Oral Treatment Approach   Antibiotics such as doxycycline, minocycline, or erythromycin for 1-3 months   Clonidine and carvedilol can help reduce facial flushing and are generally well tolerated  Common side effects include low blood pressure, gastrointestinal upset, dry eyes, blurred vision and low heart rate   Isotretinoin at low doses can be effective for long term treatment when antibiotics fail  Side effects may make it unsuitable for some patients   NSAIDs such as diclofenac can help reduce discomfort and redness in the skin  Procedural/Surgical Treatment Approach    Vascular lasers or intense pulsed light treatment may be used to treat persistent telangiectasia and papulopustular rosacea   Plastic surgery and carbon dioxide lasers may be used to treat rhinophyma     SEBORRHEIC KERATOSIS; INFLAMED    Assessment and Plan:  Based on a thorough discussion of this condition and the management approach to it (including a comprehensive discussion of the known risks, side effects and potential benefits of treatment), the patient (family) agrees to implement the following specific plan:  Discussed and recommended cryotherapy in office with signed consent     Seborrheic Keratosis  A seborrheic keratosis is a harmless warty spot that appears during adult life as a common sign of skin aging  Seborrheic keratoses can arise on any area of skin, covered or uncovered, with the usual exception of the palms and soles  They do not arise from mucous membranes  Seborrheic keratoses can have highly variable appearance  Seborrheic keratoses are extremely common  It has been estimated that over 90% of adults over the age of 61 years have one or more of them  They occur in males and females of all races, typically beginning to erupt in the 35s or 45s  They are uncommon under the age of 21 years  The precise cause of seborrhoeic keratoses is not known  Seborrhoeic keratoses are considered degenerative in nature  As time goes by, seborrheic keratoses tend to become more numerous   Some people inherit a tendency to develop a very large number of them; some people may have hundreds of them     The name "seborrheic keratosis" is misleading, because these lesions are not limited to a seborrhoeic distribution (scalp, mid-face, chest, upper back), nor are they formed from sebaceous glands, nor are they associated with sebum -- which is greasy  Seborrheic keratosis may also be called "SK," "Seb K," "basal cell papilloma," "senile wart," or "barnacle "      Researchers have noted:   Eruptive seborrhoeic keratoses can follow sunburn or dermatitis   Skin friction may be the reason they appear in body folds   Viral cause (e g , human papillomavirus) seems unlikely   Stable and clonal mutations or activation of FRFR3, PIK3CA, SARAVANAN, AKT1 and EGFR genes are found in seborrhoeic keratoses   Seborrhoeic keratosis can arise from solar lentigo   FRFR3 mutations also arise in solar lentigines  These mutations are associated with increased age and location on the head and neck, suggesting a role of ultraviolet radiation in these lesions   Seborrheic keratoses do not harbour tumour suppressor gene mutations   Epidermal growth factor receptor inhibitors, which are used to treat some cancers, often result in an increase in verrucal (warty) keratoses  There is no easy way to remove multiple lesions on a single occasion  Unless a specific lesion is "inflamed" and is causing pain or stinging/burning or is bleeding, most insurance companies do not authorize treatment      ACTINIC KERATOSIS    Assessment and Plan:  Based on a thorough discussion of this condition and the management approach to it (including a comprehensive discussion of the known risks, side effects and potential benefits of treatment), the patient (family) agrees to implement the following specific plan:     Discussed and recommended cryotherapy in office with signed consent     Actinic keratoses are very common on sites repeatedly exposed to the sun, especially the backs of the hands and the face, most often affecting the ears, nose, cheeks, upper lip, vermilion of the lower lip, temples, forehead and balding scalp  In severely chronically sun-damaged individuals, they may also be found on the upper trunk, upper and lower limbs, and dorsum of feet  We discussed the theoretical premalignant (pre-cancerous) nature and etiology of these growths  We discussed the prevailing notion that actinic keratoses are a reflection of abnormal skin cell development due to DNA damage by short wavelength UVB  They are more likely to appear if the immune function is poor, due to aging, recent sun exposure, predisposing disease or certain drugs  We discussed that the main concern is that actinic keratoses may predispose to squamous cell carcinoma  It is rare for a solitary actinic keratosis to evolve to squamous cell carcinoma (SCC), but the risk of SCC occurring at some stage in a patient with more than 10 actinic keratoses is thought to be about 10 to 15%  A tender, thickened, ulcerated or enlarging actinic keratosis is suspicious of SCC  Actinic keratoses may be prevented by strict sun protection  If already present, keratoses may improve with a very high sun protection factor (50+) broad-spectrum sunscreen applied at least daily to affected areas, year-round  We recommend that UPF-rated clothing and hats and sunglasses be worn whenever possible and that a sunscreen-moisturizer combination product such as Neutrogena Daily Defense be applied at least three times a day  We performed a thorough discussion of treatment options and specific risk/benefits/alternatives including but not limited to medical field treatment with medications such as the following:     Topical field area medications such as 5-fluorouracil or Aldara (specifically, the trouble with long-term compliance, blistering and local skin reaction versus the convenience of at-home therapy and that field therapy gets what is not yet seen)       Cryotherapy (specifically, local pain, scarring, dyspigmentation, blistering, possible superinfection, and treats only what we see versus directed treatment today)   Photodynamic therapy (specifically, local pain, scarring, dyspigmentation, blistering, possible superinfection, need to schedule for a later date, and time spent in the office versus field therapy that gets what is not yet seen)

## 2021-04-05 NOTE — PROGRESS NOTES
[facility] Follow Up Note    Patient Name: Adrien Brennan  Encounter Date: 04/05/2021    Today's Chief Concerns:  [employer] Concern #1:  Follow up rosacea   Concern #2: area by left ear    Current Medications:    Current Outpatient Medications:     albuterol (PROVENTIL HFA,VENTOLIN HFA) 90 mcg/act inhaler, Inhale 2 puffs every 6 (six) hours as needed for wheezing or shortness of breath, Disp: 1 Inhaler, Rfl: 5    aspirin 81 MG tablet, Take 81 mg by mouth, Disp: , Rfl:     BIOTIN PO, Take by mouth, Disp: , Rfl:     esomeprazole (NexIUM) 40 MG capsule, Take 1 capsule (40 mg total) by mouth daily, Disp: 90 capsule, Rfl: 1    Ginger, Zingiber officinalis, (GINGER ROOT PO), Take by mouth, Disp: , Rfl:     Ivermectin 1 % CREA, Apply topically daily for 30 doses Apply topically to face once daily, Disp: 45 g, Rfl: 2    latanoprost (XALATAN) 0 005 % ophthalmic solution, , Disp: , Rfl:     losartan-hydrochlorothiazide (HYZAAR) 100-25 MG per tablet, Take 1 tablet by mouth daily, Disp: , Rfl:     metoprolol succinate (TOPROL-XL) 50 mg 24 hr tablet, Take 1 tablet (50 mg total) by mouth daily, Disp: 90 tablet, Rfl: 1    metroNIDAZOLE (METROGEL) 1 % gel, Apply topically daily, Disp: 45 g, Rfl: 1    Multiple Vitamins-Minerals (VITAMIN D3 COMPLETE PO), Take by mouth, Disp: , Rfl:     multivitamin (THERAGRAN) TABS, Take 1 tablet by mouth daily, Disp: , Rfl:     naproxen sodium (ALEVE) 220 MG tablet, Take 220 mg by mouth, Disp: , Rfl:     Sulfacetamide Sodium-Sulfur (Sulfacetamide Sod-Sulfur Wash) 9-4 5 % LIQD, Apply topically twice a day, Disp: 454 g, Rfl: 2    CONSTITUTIONAL:   There were no vitals filed for this visit  Specific Alerts:    Have you been seen by a [facility] Dermatologist in the last 3 years? YES    Are you pregnant or planning to become pregnant? No    Are you currently or planning to be nursing or breast feeding?  No    Allergies   Allergen Reactions    Lisinopril      Other reaction(s): swollen legs    Hydrocodone-Acetaminophen Dizziness    Irbesartan-Hydrochlorothiazide      Other reaction(s): Hypertension, Other (See Comments)  Pt had hypertension, arm heaviness and headache       May we call your Preferred Phone number to discuss your specific medical information? YES    May we leave a detailed message that includes your specific medical information? YES    Have you traveled outside of the [facility] [place] in the past 3 months? No    Do you currently have a pacemaker or defibrillator? No    Do you have any artificial heart valves, joints, plates, screws, rods, stents, pins, etc? No   - If Yes, were any placed within the last 2 years? Do you require any medications prior to a surgical procedure? No   - If Yes, for which procedure? n/a   - If Yes, what medications to you require? n/a    Are you taking any medications that cause you to bleed more easily ("blood thinners") No    Have you ever experienced a rapid heartbeat with epinephrine? No    Have you ever been treated with "gold" (gold sodium thiomalate) therapy? No    [facility] can help with wrinkles, "laugh lines," facial volume loss, "double chin," "love handles," age spots, and more  Are you interested in learning today about some of the skin enhancement procedures that we offer? (If Yes, please provide more detail) No    Review of Systems:  Have you recently had or currently have any of the following?     · Fever or chills: No  · Night Sweats: No  · Headaches: YES allergies   · Weight Gain: YES  · Weight Loss: No  · Blurry Vision: No  · Nausea: No  · Vomiting: No  · Diarrhea: No  · Blood in Stool: No  · Abdominal Pain: No  · Itchy Skin: No  · Painful Joints: No  · Swollen Joints: No  · Muscle Pain: No  · Irregular Mole: YES  · Sun Burn: No  · Dry Skin: No  · Skin Color Changes: No  · Scar or Keloid: No  · Cold Sores/Fever Blisters: YES  A few weeks ago  · Bacterial Infections/MRSA: No  · Anxiety: No  · Depression: No  · Suicidal or Homicidal Thoughts: No      PSYCH: Normal mood and affect  EYES: Normal conjunctiva  ENT: Normal lips and oral mucosa  CARDIOVASCULAR: No edema  RESPIRATORY: Normal respirations  HEME/LYMPH/IMMUNO:  No regional lymphadenopathy except as noted below in ASSESSMENT AND PLAN BY DIAGNOSIS    FULL ORGAN SYSTEM SKIN EXAM (SKIN)   Hair, Scalp, Ears, Face Normal except as noted below in Assessment                                           ROSACEA    Physical Exam:   Anatomic Location Affected:  Cheeks and forehead, around nose   Morphological Description:  Erythema, many pink papules, some with pus    Pertinent Positives:   Pertinent Negatives: Additional History of Present Condition:  Started metronidazole gel     Assessment and Plan:  Based on a thorough discussion of this condition and the management approach to it (including a comprehensive discussion of the known risks, side effects and potential benefits of treatment), the patient (family) agrees to implement the following specific plan:   Discontinue Metronidazole gel    Discussed prescribing combination of Metronidazole 1% Ivermectin 1% and Azeleic acid 1%    An Email will be sent to you from pharmacy to set up for delivery of medication     Rosacea is a chronic rash affecting the mid-face including the nose, cheeks, chin, forehead, and eyelids  The incidence is usually greatest between the ages of 30-60 years and is more common in people with fair skin  Common characteristics include redness, telangiectasias, papules and pustules over affected areas  Rosacea may look similar to acne, but there is a lack of comedones  Occasionally the eyes may also be involved in ocular rosacea  In advanced disease, enlargement of the sebaceous glands in the nose, termed rhinophyma, may be present       Rosacea results in red spots (papules) and sometimes pustules over the face, but unlike acne there are no blackheads, whiteheads, or cystic nodules  Patients often experience increased facial flushing with prominent blood vessels (erythematotelangiectatic rosacea) and dry, sensitive skin  These symptoms are exacerbated by sun exposure, hot or spicy foods, topical steroids and oil-based facial products  In ocular rosacea, eyelids may be red and sore due to conjunctivitis, keratitis, and episcleritis  If rhinophyma develops due to enlargement of sebaceous glands, the patient may have an enlarged and irregularly shaped nose with prominent pores  In rosacea that is refractory to treatment, patients can develop persistent redness and swelling of the face due to lymphatic obstruction (Morbihan disease)  Distribution around the cheeks may be confused with the malar or butterfly rash of lupus  However, the rash of lupus spares the nasal creases and lacks papules and pustules  If signs of photosensitivity, oral ulcers, arthritis, and kidney dysfunction are present then consider referral to a rheumatologist      There are many potential causes of rosacea including genetic, environmental, vascular, and inflammatory factors   These include, but are not limited to:   Chronic exposure to ultraviolet radiation    Increased immune responses in the form of cathelicidins that promote vessel dilation and infiltration with white blood cells (neutrophils) into the dermis   Increased matrix metalloproteinases such as collagen and elastase that remodel normal tissue may contribute to inflammation of the skin making it thicker and harder   There is some evidence to suggest that increased numbers of demodex mites on patient skin may contribute to rosacea papules     General Treatment Approach    Avoid exacerbating factors such as heat, spicy foods, and alcohol    Use daily SPF30+ sunscreen and other methods of coverage for sun protection   Use water-based make-up    Avoid applying topical steroids to affected areas as they can cause perioral dermatitis and exacerbate rosacea     Topical Treatment Approach   Metronidazole cream or gel by itself or in combination with oral antibiotics for more severe cases   Azelaic acid cream or lotion is effective for mild inflammatory rosacea when applied twice daily to affected areas   Brimonidine gel and oxymetazoline hydrochloride cream can reduce facial redness temporarily    Ivermectin cream can treat papulopustular rosacea by controlling demodex mites and inflammation    Pimecrolimus cream or tacrolimus ointment twice a day for 2-3 months can help reduce inflammation    Oral Treatment Approach   Antibiotics such as doxycycline, minocycline, or erythromycin for 1-3 months   Clonidine and carvedilol can help reduce facial flushing and are generally well tolerated  Common side effects include low blood pressure, gastrointestinal upset, dry eyes, blurred vision and low heart rate   Isotretinoin at low doses can be effective for long term treatment when antibiotics fail  Side effects may make it unsuitable for some patients   NSAIDs such as diclofenac can help reduce discomfort and redness in the skin  Procedural/Surgical Treatment Approach    Vascular lasers or intense pulsed light treatment may be used to treat persistent telangiectasia and papulopustular rosacea   Plastic surgery and carbon dioxide lasers may be used to treat rhinophyma     SEBORRHEIC KERATOSIS; INFLAMED    Physical Exam:   Anatomic Location Affected:  Left pre auricular area    Morphological Description:  Flat and raised, waxy, smooth to warty textured, yellow to brownish-grey to dark brown to blackish, discrete, "stuck-on" appearing papules   Pertinent Positives:   Pertinent Negatives: Additional History of Present Condition:  Patient reports new bumps on the skin  Denies itch, burn, pain, bleeding or ulceration  Present constantly; nothing seems to make it worse or better  No prior treatment        Assessment and Plan:  Based on a thorough discussion of this condition and the management approach to it (including a comprehensive discussion of the known risks, side effects and potential benefits of treatment), the patient (family) agrees to implement the following specific plan:  Discussed and recommended cryotherapy in office with signed consent     Seborrheic Keratosis  A seborrheic keratosis is a harmless warty spot that appears during adult life as a common sign of skin aging  Seborrheic keratoses can arise on any area of skin, covered or uncovered, with the usual exception of the palms and soles  They do not arise from mucous membranes  Seborrheic keratoses can have highly variable appearance  Seborrheic keratoses are extremely common  It has been estimated that over 90% of adults over the age of 61 years have one or more of them  They occur in males and females of all races, typically beginning to erupt in the 35s or 45s  They are uncommon under the age of 21 years  The precise cause of seborrhoeic keratoses is not known  Seborrhoeic keratoses are considered degenerative in nature  As time goes by, seborrheic keratoses tend to become more numerous  Some people inherit a tendency to develop a very large number of them; some people may have hundreds of them  The name "seborrheic keratosis" is misleading, because these lesions are not limited to a seborrhoeic distribution (scalp, mid-face, chest, upper back), nor are they formed from sebaceous glands, nor are they associated with sebum -- which is greasy    Seborrheic keratosis may also be called "SK," "Seb K," "basal cell papilloma," "senile wart," or "barnacle "      Researchers have noted:   Eruptive seborrhoeic keratoses can follow sunburn or dermatitis   Skin friction may be the reason they appear in body folds   Viral cause (e g , human papillomavirus) seems unlikely   Stable and clonal mutations or activation of FRFR3, PIK3CA, SARAVANAN, AKT1 and EGFR genes are found in seborrhoeic keratoses   Seborrhoeic keratosis can arise from solar lentigo   FRFR3 mutations also arise in solar lentigines  These mutations are associated with increased age and location on the head and neck, suggesting a role of ultraviolet radiation in these lesions   Seborrheic keratoses do not harbour tumour suppressor gene mutations   Epidermal growth factor receptor inhibitors, which are used to treat some cancers, often result in an increase in verrucal (warty) keratoses  There is no easy way to remove multiple lesions on a single occasion  Unless a specific lesion is "inflamed" and is causing pain or stinging/burning or is bleeding, most insurance companies do not authorize treatment  ACTINIC KERATOSIS    Physical Exam:   Anatomic Location Affected:  Upper cutaneous lip    Morphological Description:  erythematous scaly papules     Additional History of Present Condition:  Noted on exam    Assessment and Plan:  Based on a thorough discussion of this condition and the management approach to it (including a comprehensive discussion of the known risks, side effects and potential benefits of treatment), the patient (family) agrees to implement the following specific plan:     Discussed and recommended cryotherapy in office with signed consent     Actinic keratoses are very common on sites repeatedly exposed to the sun, especially the backs of the hands and the face, most often affecting the ears, nose, cheeks, upper lip, vermilion of the lower lip, temples, forehead and balding scalp  In severely chronically sun-damaged individuals, they may also be found on the upper trunk, upper and lower limbs, and dorsum of feet  We discussed the theoretical premalignant (pre-cancerous) nature and etiology of these growths  We discussed the prevailing notion that actinic keratoses are a reflection of abnormal skin cell development due to DNA damage by short wavelength UVB    They are more likely to appear if the immune function is poor, due to aging, recent sun exposure, predisposing disease or certain drugs  We discussed that the main concern is that actinic keratoses may predispose to squamous cell carcinoma  It is rare for a solitary actinic keratosis to evolve to squamous cell carcinoma (SCC), but the risk of SCC occurring at some stage in a patient with more than 10 actinic keratoses is thought to be about 10 to 15%  A tender, thickened, ulcerated or enlarging actinic keratosis is suspicious of SCC  Actinic keratoses may be prevented by strict sun protection  If already present, keratoses may improve with a very high sun protection factor (50+) broad-spectrum sunscreen applied at least daily to affected areas, year-round  We recommend that UPF-rated clothing and hats and sunglasses be worn whenever possible and that a sunscreen-moisturizer combination product such as Neutrogena Daily Defense be applied at least three times a day  We performed a thorough discussion of treatment options and specific risk/benefits/alternatives including but not limited to medical field treatment with medications such as the following:     Topical field area medications such as 5-fluorouracil or Aldara (specifically, the trouble with long-term compliance, blistering and local skin reaction versus the convenience of at-home therapy and that field therapy gets what is not yet seen)   Cryotherapy (specifically, local pain, scarring, dyspigmentation, blistering, possible superinfection, and treats only what we see versus directed treatment today)   Photodynamic therapy (specifically, local pain, scarring, dyspigmentation, blistering, possible superinfection, need to schedule for a later date, and time spent in the office versus field therapy that gets what is not yet seen)        PROCEDURE:  DESTRUCTION OF PRE-MALIGNANT LESIONS  After a thorough discussion of treatment options and risk/benefits/alternatives (including but not limited to local pain, scarring, dyspigmentation, blistering, and possible superinfection), verbal and written consent were obtained and the aforementioned lesions were treated on with cryotherapy using liquid nitrogen x 1 cycle for 5-10 seconds   TOTAL NUMBER of 3 pre-malignant lesions were treated today on the ANATOMIC LOCATION: Upper cutaneous lip  The patient tolerated the procedure well, and after-care instructions were provided  PROCEDURE:  DESTRUCTION OF BENIGN LESIONS  After a thorough discussion of treatment options and risk/benefits/alternatives (including but not limited to local pain, scarring, dyspigmentation, blistering, and possible superinfection), verbal and written consent were obtained and the aforementioned lesions were treated on with cryotherapy using liquid nitrogen x 1 cycle for 5-10 seconds   TOTAL NUMBER of 1 lesions were treated today on the ANATOMIC LOCATION: Left pre auricular area   The patient tolerated the procedure well, and after-care instructions were provided      Scribe Attestation    I,:  Barbara Flores am acting as a scribe while in the presence of the attending physician :       I,:  Pancho Candelaria MD personally performed the services described in this documentation    as scribed in my presence :

## 2021-04-08 ENCOUNTER — TELEMEDICINE (OUTPATIENT)
Dept: FAMILY MEDICINE CLINIC | Facility: CLINIC | Age: 66
End: 2021-04-08
Payer: MEDICARE

## 2021-04-08 VITALS — TEMPERATURE: 97.8 F | SYSTOLIC BLOOD PRESSURE: 122 MMHG | DIASTOLIC BLOOD PRESSURE: 82 MMHG

## 2021-04-08 DIAGNOSIS — Z03.818 ENCOUNTER FOR OBSERVATION FOR SUSPECTED EXPOSURE TO OTHER BIOLOGICAL AGENTS RULED OUT: Primary | ICD-10-CM

## 2021-04-08 DIAGNOSIS — U07.1 COVID-19: ICD-10-CM

## 2021-04-08 DIAGNOSIS — Z03.818 ENCOUNTER FOR OBSERVATION FOR SUSPECTED EXPOSURE TO OTHER BIOLOGICAL AGENTS RULED OUT: ICD-10-CM

## 2021-04-08 PROCEDURE — 99213 OFFICE O/P EST LOW 20 MIN: CPT | Performed by: FAMILY MEDICINE

## 2021-04-08 PROCEDURE — U0003 INFECTIOUS AGENT DETECTION BY NUCLEIC ACID (DNA OR RNA); SEVERE ACUTE RESPIRATORY SYNDROME CORONAVIRUS 2 (SARS-COV-2) (CORONAVIRUS DISEASE [COVID-19]), AMPLIFIED PROBE TECHNIQUE, MAKING USE OF HIGH THROUGHPUT TECHNOLOGIES AS DESCRIBED BY CMS-2020-01-R: HCPCS | Performed by: FAMILY MEDICINE

## 2021-04-08 PROCEDURE — U0005 INFEC AGEN DETEC AMPLI PROBE: HCPCS | Performed by: FAMILY MEDICINE

## 2021-04-08 NOTE — PROGRESS NOTES
COVID-19 Outpatient Progress Note    Assessment/Plan:    Problem List Items Addressed This Visit        Other    Encounter for observation for suspected exposure to other biological agents ruled out - Primary       The patient is a 49-year-old female with history of hypertension hyperlipidemia as well as obesity who is followed by Cardiology and presents today with concerns over a low-grade fever, chest tightness with pleuritic chest discomfort, anorexia headache and myalgias  She has had multiple interactions with other individuals recently though she is aware of no COVID-19 exposure definitively  I told her that it was difficult to determine the etiology of her symptoms presently  It certainly could represent COVID-19 though I could not rule out myocardial ischemia  She does not believe that this is the issue and declines emergency room evaluation presently  I did agree to send her for COVID-19 test this afternoon will follow up with her tomorrow when her results are available  In the meantime she will push fluids use antipyretics and rest   She also agrees that if her symptoms are progressive or change in any way that she will seek emergency room attention  She agrees with this plan  Relevant Orders    Novel Coronavirus (Covid-19),PCR [facility] - Collected at Mobile Vans or Care Now (Completed)    COVID-19         Disposition:     I recommended self-quarantine for 10 days and to watch for symptoms until 14 days after exposure  If patient were to develop symptoms, they should self isolate and call our office for further guidance  Patient meets criteria for Bamlanivimab/Etesevimab infusion  They were counseled in regards to risks, benefits, and side effects of this infusion      Bamlanivimab and etesevimab are investigational medicines used to treat mild to moderate symptoms of COVID-19 in non-hospitalized adults and adolescents (15years of age and older who weigh at least 88 pounds (40 kg)), and who are at high risk for developing severe COVID-19 symptoms or the need for hospitalization  Bamlanivimab and etesevimab are investigational because they are still being studied  There is limited information known about the safety and effectiveness of using bamlanivimab and etesevimab to treat people with COVID-19  The FDA has authorized the emergency use of bamlanivimab and etesevimab for the treatment of COVID-19 under an Emergency Use Authorization (EUA)  How will I receive bamlanivimab and etesevimab? Bamlanivimab and etesevimab are given at the same time through a vein (intravenous or IV)    You will receive one dose of bamlanivimab and etesevimab by IV infusion  The infusion will take 21-60 minutes or longer  Possible side effects of bamlanivimab and etesevimab: Allergic reactions can happen during and after infusion with bamlanivimab and etesevimab  Possible reactions include: fever, chills, nausea, headache, shortness of breath, low or high blood pressure, rapid or slow heart rate, chest discomfort or pain, weakness, confusion, feeling tired, wheezing, swelling of your lips, face, or throat, rash including hives, itching, muscle aches, dizziness, and sweating  These reactions may be severe or life threatening  Worsening symptoms after treatment: You may experience new or worsening symptoms after infusion, including fever, difficulty breathing, rapid or slow heart rate, tiredness, weakness or confusion  If these occur, contact your healthcare provider or seek immediate medical attention as some of these events have required hospitalization  It is unknown if these events are related to treatment or are due to the progression of COVID19  The side effects of getting any medicine by vein may include brief pain, bleeding, bruising of the skin, soreness, swelling, and possible infection at the infusion site  These are not all the possible side effects of bamlanivimab and etesevimab  Not a lot of people have been given bamlanivimab and etesevimab  Serious and unexpected side effects may happen  Bamlanivimab and etesevimab are still being studied so it is possible that all of the risks are not known at this time  It is possible that bamlanivimab and etesevimab could interfere with your body's own ability to fight off a future infection of SARS-CoV-2  Similarly, bamlanivimab and etesevimab may reduce your bodys immune response to a vaccine for SARS-CoV-2  Specific studies have not been conducted to address these possible risks  Talk to your healthcare provider if you have any questions  Emergency Use Authorization:    The [facility] FDA has made bamlanivimab and etesevimab available under an emergency access mechanism called an EUA  The EUA is supported by a  of Health and Human Service (HHS) declaration that circumstances exist to justify the emergency use of drugs and biological products during the COVID-19 pandemic  Bamlanivimab and etesevimab have not undergone the same type of review as an FDA-approved or cleared product  The FDA may issue an EUA when certain criteria are met, which includes that there are no adequate, approved, and available alternatives  In addition, the FDA decision is based on the totality of scientific evidence available showing that it is reasonable to believe that the product meets certain criteria for safety, performance, and labeling and may be effective in treatment of patients during the COVID-19 pandemic  All of these criteria must be met to allow for the product to be used in the treatment of patients during the COVID-19 pandemic  The EUA for bamlanivimab and etesevimab together is in effect for the duration of the COVID-19 declaration justifying emergency use of these products, unless terminated or revoked (after which the product may no longer be used)  What if I am pregnant or breastfeeding?     There is limited experience treating pregnant women or breastfeeding mothers with bamlanivimab and etesevimab  For a mother and unborn baby, the benefit of receiving bamlanivimab and etesevimab may be greater than the risk from the treatment  If you are pregnant or breastfeeding, discuss your options and specific situation with your healthcare provider  Regarding COVID-19 Vaccination:    Currently there is no data or safety or efficacy of COVID-19 vaccination in persons who received monoclonal antibodies as part of COVID-19 treatment  Treatment should be deferred for at least 90 days to avoid interference of the treatment with vaccine-induced immune responses (this is based on estimated half-life of therapies and evidence suggesting reinfection is uncommon within 90 days of initial infection)  Full fact sheet document for patients can be found at: [employer]    The patient consents to proceed with bamlanivimab and etesevimab infusion  I have spent 15 minutes directly with the patient  Greater than 50% of this time was spent in counseling/coordination of care regarding: prognosis, instructions for management and impressions  Encounter provider Beryl Cherry MD    Provider located at [address] [id number]    Recent Visits  Date Type Provider Dept   04/08/21 Telemedicine Beryl Cherry MD [facility]   Showing recent visits within past 7 days and meeting all other requirements     Future Appointments  No visits were found meeting these conditions  Showing future appointments within next 150 days and meeting all other requirements      This virtual check-in was done via [employer] and patient was informed that this is a secure, HIPAA-compliant platform  She agrees to proceed      Patient agrees to participate in a virtual check in via telephone or video visit instead of presenting to the office to address urgent/immediate medical needs  Patient is aware this is a billable service  After connecting through [facility], the patient was identified by name and date of birth  Norma Joyner was informed that this was a telemedicine visit and that the exam was being conducted confidentially over secure lines  My office door was closed  No one else was in the room  Norma Joyner acknowledged consent and understanding of privacy and security of the telemedicine visit  I informed the patient that I have reviewed her record in Epic and presented the opportunity for her to ask any questions regarding the visit today  The patient agreed to participate  Subjective:   Norma Joyner is a 77 y o  female who is concerned about COVID-19  Patient's symptoms include fever, chills, fatigue, shortness of breath, chest tightness and headache  Patient denies malaise, congestion, rhinorrhea, sore throat, anosmia, loss of taste, cough, abdominal pain, nausea, vomiting, diarrhea and myalgias  Date of symptom onset: 4/6/2021    Exposure:   Contact with a person who is under investigation (PUI) for or who is positive for COVID-19 within the last 14 days?: No    Hospitalized recently for fever and/or lower respiratory symptoms?: No      Currently a healthcare worker that is involved in direct patient care?: No      Works in a special setting where the risk of COVID-19 transmission may be high? (this may include long-term care, correctional and FPC facilities; homeless shelters; assisted-living facilities and group homes ): No      Resident in a special setting where the risk of COVID-19 transmission may be high? (this may include long-term care, correctional and FPC facilities; homeless shelters; assisted-living facilities and group homes ): No        The patient is a 53-year-old female who calls today with concerns over possible COVID-19 illness  She states that yesterday I had a temp of 100 3    On Tuesday she had noted some chest tightness, discomfort with taking a deep breath  She has had some abdominal bloating and nausea associated with diminished appetite  She has felt like she has had headache and fatigue though no myalgias  She has had no vomiting or diarrhea  She has had no anosmia or dysgeusia  She has no cough rhinorrhea nasal congestion X cetera  She was at her sister's house for [facility] though no one was ill that she is aware of  She was at the dermatologist office on Monday though again unaware of any illness and finally she had lunch with 3 friends on Tuesday and again no one was ill  She notes that her blood pressures had been up recently though today they are fine  She did see her cardiologist within the last month      Lab Results   Component Value Date    SARSCOV2 Positive (A) 04/08/2021     Past Medical History:   Diagnosis Date    Breast lump     Disease of thyroid gland     nodule, benign    GERD (gastroesophageal reflux disease)     History of transfusion     at 9years old for bleeding during tonsillectomy, no reaction    Hypertension     Rectocele     Uterine leiomyoma     Varicose veins of both lower extremities      Past Surgical History:   Procedure Laterality Date    BLADDER SURGERY  08/2012    Bladder "lift"    BREAST BIOPSY Right     benign    BREAST CYST EXCISION Right     1999 =    BREAST LUMPECTOMY  2000    COLONOSCOPY      HYSTERECTOMY  1990    KNEE SURGERY Right     x 2    NOSE SURGERY      UT LARYNGOSCOPY,DIRCT,OP SCOPE,BIOPSY Left 4/2/2018    Procedure: MICROLARYNGOSCOPY AND EXCISION OF LEFT VOCAL FOLD POLYP;  Surgeon: Tabby Alegre MD;  [facility];  Service: ENT    TONSILLECTOMY      TUBAL LIGATION      VARICOSE VEIN SURGERY Left 12/2009     Current Outpatient Medications   Medication Sig Dispense Refill    albuterol (PROVENTIL HFA,VENTOLIN HFA) 90 mcg/act inhaler Inhale 2 puffs every 6 (six) hours as needed for wheezing or shortness of breath 1 Inhaler 5    aspirin 81 MG tablet Take 81 mg by mouth      BIOTIN PO Take by mouth      esomeprazole (NexIUM) 40 MG capsule Take 1 capsule (40 mg total) by mouth daily 90 capsule 1    Ting, Zingiber officinalis, (TING ROOT PO) Take by mouth      latanoprost (XALATAN) 0 005 % ophthalmic solution       losartan-hydrochlorothiazide (HYZAAR) 100-25 MG per tablet Take 1 tablet by mouth daily      metoprolol succinate (TOPROL-XL) 50 mg 24 hr tablet Take 1 tablet (50 mg total) by mouth daily 90 tablet 1    metroNIDAZOLE (METROGEL) 1 % gel Apply topically daily 45 g 1    Multiple Vitamins-Minerals (VITAMIN D3 COMPLETE PO) Take by mouth      multivitamin (THERAGRAN) TABS Take 1 tablet by mouth daily      naproxen sodium (ALEVE) 220 MG tablet Take 220 mg by mouth      Sulfacetamide Sodium-Sulfur (Sulfacetamide Sod-Sulfur Wash) 9-4 5 % LIQD Apply topically twice a day 454 g 2    Ivermectin 1 % CREA Apply topically daily for 30 doses Apply topically to face once daily 45 g 2     No current facility-administered medications for this visit  Allergies   Allergen Reactions    Lisinopril      Other reaction(s): swollen legs    Hydrocodone-Acetaminophen Dizziness    Irbesartan-Hydrochlorothiazide      Other reaction(s): Hypertension, Other (See Comments)  Pt had hypertension, arm heaviness and headache       Review of Systems   Constitutional: Positive for chills, fatigue and fever  HENT: Negative for congestion, rhinorrhea and sore throat  Respiratory: Positive for chest tightness and shortness of breath  Negative for cough  Gastrointestinal: Negative for abdominal pain, diarrhea, nausea and vomiting  Musculoskeletal: Negative for myalgias  Neurological: Positive for headaches  Objective:    Vitals:    04/08/21 1239   BP: 122/82   Temp: 97 8 °F (36 6 °C)       Physical Exam  Constitutional:       General: She is not in acute distress  Appearance: She is obese  She is not ill-appearing     Eyes: Conjunctiva/sclera: Conjunctivae normal    Pulmonary:      Effort: Pulmonary effort is normal  No respiratory distress  Neurological:      Mental Status: She is alert and oriented to person, place, and time  Psychiatric:         Mood and Affect: Mood normal          Thought Content: Thought content normal          Judgment: Judgment normal        VIRTUAL VISIT DISCLAIMER    Rocío Frost acknowledges that she has consented to an online visit or consultation  She understands that the online visit is based solely on information provided by her, and that, in the absence of a face-to-face physical evaluation by the physician, the diagnosis she receives is both limited and provisional in terms of accuracy and completeness  This is not intended to replace a full medical face-to-face evaluation by the physician  Rocío Frost understands and accepts these terms

## 2021-04-08 NOTE — ASSESSMENT & PLAN NOTE
The patient is a 58-year-old female with history of hypertension hyperlipidemia as well as obesity who is followed by Cardiology and presents today with concerns over a low-grade fever, chest tightness with pleuritic chest discomfort, anorexia headache and myalgias  She has had multiple interactions with other individuals recently though she is aware of no COVID-19 exposure definitively  I told her that it was difficult to determine the etiology of her symptoms presently  It certainly could represent COVID-19 though I could not rule out myocardial ischemia  She does not believe that this is the issue and declines emergency room evaluation presently  I did agree to send her for COVID-19 test this afternoon will follow up with her tomorrow when her results are available  In the meantime she will push fluids use antipyretics and rest   She also agrees that if her symptoms are progressive or change in any way that she will seek emergency room attention  She agrees with this plan

## 2021-04-09 PROBLEM — U07.1 COVID-19: Status: ACTIVE | Noted: 2021-04-09

## 2021-04-09 LAB — SARS-COV-2 RNA RESP QL NAA+PROBE: POSITIVE

## 2021-04-09 RX ORDER — ACETAMINOPHEN 325 MG/1
650 TABLET ORAL ONCE AS NEEDED
Status: CANCELLED | OUTPATIENT
Start: 2021-04-09

## 2021-04-09 RX ORDER — ALBUTEROL SULFATE 90 UG/1
3 AEROSOL, METERED RESPIRATORY (INHALATION) ONCE AS NEEDED
Status: CANCELLED | OUTPATIENT
Start: 2021-04-09

## 2021-04-09 RX ORDER — SODIUM CHLORIDE 9 MG/ML
20 INJECTION, SOLUTION INTRAVENOUS ONCE
Status: CANCELLED | OUTPATIENT
Start: 2021-04-09

## 2021-04-10 ENCOUNTER — HOSPITAL ENCOUNTER (OUTPATIENT)
Dept: INFUSION CENTER | Facility: HOSPITAL | Age: 66
Discharge: HOME | End: 2021-04-10
Payer: MEDICARE

## 2021-04-10 VITALS
DIASTOLIC BLOOD PRESSURE: 61 MMHG | SYSTOLIC BLOOD PRESSURE: 124 MMHG | TEMPERATURE: 98.8 F | OXYGEN SATURATION: 94 % | HEART RATE: 78 BPM | RESPIRATION RATE: 18 BRPM

## 2021-04-10 DIAGNOSIS — U07.1 COVID-19: Primary | ICD-10-CM

## 2021-04-10 PROCEDURE — M0245 HB BAMLAN AND ETESEV INFUSION ADMIN: HCPCS | Performed by: FAMILY MEDICINE

## 2021-04-10 RX ORDER — SODIUM CHLORIDE 9 MG/ML
20 INJECTION, SOLUTION INTRAVENOUS ONCE
Status: COMPLETED | OUTPATIENT
Start: 2021-04-10 | End: 2021-04-10

## 2021-04-10 RX ORDER — SODIUM CHLORIDE 9 MG/ML
20 INJECTION, SOLUTION INTRAVENOUS ONCE
Status: CANCELLED | OUTPATIENT
Start: 2021-04-10

## 2021-04-10 RX ORDER — ALBUTEROL SULFATE 90 UG/1
3 AEROSOL, METERED RESPIRATORY (INHALATION) ONCE AS NEEDED
Status: DISCONTINUED | OUTPATIENT
Start: 2021-04-10 | End: 2021-04-13 | Stop reason: HOSPADM

## 2021-04-10 RX ORDER — ACETAMINOPHEN 325 MG/1
650 TABLET ORAL ONCE AS NEEDED
Status: CANCELLED | OUTPATIENT
Start: 2021-04-10

## 2021-04-10 RX ORDER — ACETAMINOPHEN 325 MG/1
650 TABLET ORAL ONCE AS NEEDED
Status: DISCONTINUED | OUTPATIENT
Start: 2021-04-10 | End: 2021-04-13 | Stop reason: HOSPADM

## 2021-04-10 RX ORDER — ALBUTEROL SULFATE 90 UG/1
3 AEROSOL, METERED RESPIRATORY (INHALATION) ONCE AS NEEDED
Status: CANCELLED | OUTPATIENT
Start: 2021-04-10

## 2021-04-10 RX ADMIN — SODIUM CHLORIDE: 9 INJECTION, SOLUTION INTRAVENOUS at 13:33

## 2021-04-10 RX ADMIN — SODIUM CHLORIDE 20 ML/HR: 0.9 INJECTION, SOLUTION INTRAVENOUS at 13:32

## 2021-04-10 NOTE — NURSING NOTE
Patient tolerated Bamlanivimab and Etesevimab infusion and 1 hour post observation without incident  IV discontinued, catheter intact  Gauze applied to site  Discharge instructions reviewed with patient verbally  Copy of discharge instructions given to patient  Patient verbalized understanding of all discharge instructions  Patient discharged without incident

## 2021-04-12 ENCOUNTER — TELEMEDICINE (OUTPATIENT)
Dept: FAMILY MEDICINE CLINIC | Facility: CLINIC | Age: 66
End: 2021-04-12
Payer: MEDICARE

## 2021-04-12 VITALS — WEIGHT: 214 LBS | TEMPERATURE: 97.8 F | BODY MASS INDEX: 35.65 KG/M2 | HEIGHT: 65 IN

## 2021-04-12 DIAGNOSIS — U07.1 COVID-19: Primary | ICD-10-CM

## 2021-04-12 PROCEDURE — 99213 OFFICE O/P EST LOW 20 MIN: CPT | Performed by: FAMILY MEDICINE

## 2021-04-12 NOTE — PROGRESS NOTES
COVID-19 Outpatient Progress Note    Assessment/Plan:    Problem List Items Addressed This Visit        Other    COVID-19 - Primary     The patient is a 56-year-old female on day 6 of her COVID-19 illness  She received antibody infusion on day 4  She feels like she is somewhat better though not resolved  She does have a nonproductive cough with some mild chest tightness but no real shortness of breath no fever  She is going to continue to push fluids and use antipyretics  She is asked call in the next [phone] hours if she does not feel continued improvement and she is asked call sooner or seek more urgent medical attention should her condition worsen  She agrees with this plan  We also discussed her period of isolation will end on Friday assuming that she is fever free and continues to feel that her condition is improving  Disposition:     I have spent 15 minutes directly with the patient  Greater than 50% of this time was spent in counseling/coordination of care regarding: diagnostic results, prognosis, instructions for management, patient and family education, importance of treatment compliance and impressions  Encounter provider Usha Kaminski MD    Provider located at [address] [address]    Recent Visits  Date Type Provider Dept   04/08/21 Telemedicine MD Unruly Miller   Showing recent visits within past 7 days and meeting all other requirements     Today's Visits  Date Type Provider Dept   04/12/21 Telemedicine MD [facility]ruly [facility]   Showing today's visits and meeting all other requirements     Future Appointments  No visits were found meeting these conditions  Showing future appointments within next 150 days and meeting all other requirements      This virtual check-in was done via [employer] and patient was informed that this is a secure, HIPAA-compliant platform   She agrees to proceed  Patient agrees to participate in a virtual check in via telephone or video visit instead of presenting to the office to address urgent/immediate medical needs  Patient is aware this is a billable service  After connecting through [facility], the patient was identified by name and date of birth  Elba Bates was informed that this was a telemedicine visit and that the exam was being conducted confidentially over secure lines  My office door was closed  No one else was in the room  Elba Bates acknowledged consent and understanding of privacy and security of the telemedicine visit  I informed the patient that I have reviewed her record in Epic and presented the opportunity for her to ask any questions regarding the visit today  The patient agreed to participate  Subjective:   Elba Bates is a 77 y o  female who has been screened for COVID-19  Patient's symptoms include fatigue, nasal congestion, rhinorrhea, cough, chest tightness, diarrhea, myalgias and headache  Patient denies fever, chills, malaise, sore throat, anosmia, loss of taste, shortness of breath, abdominal pain, nausea and vomiting  Gwenerenetta First has been staying home and has isolated themselves in her home  Taking care not to share personal items?: is not      Cleaning all surfaces that are touched often?: is not  She is wearing a mask when she leaves her room  Date of symptom onset: 4/6/2021  Date of positive COVID-19 PCR: 4/8/2021    Monoclonal Antibody Follow-up Symptom Questionnaire  I feel overall: somewhat better  My breathing is: similar  My fever is: better  My fatigue is: somewhat better      The patient is a 69-year-old female who contracted COVID-19   She 1st became symptomatic on April 6th and had a positive test date of April 8th  She went Saturday the 10th from monoclonal antibody infusion  She states that it went well and she did not have side effects    She states that overall she feels somewhat better since her infusion  Her breathing is similar with some persistent mild chest tightness  Her fever has resolved and her fatigue is somewhat better  She continues to have some headaches though they are improving  She continues to feel tired and diminished appetite  She has had no nausea vomiting but she has continued to have some intermittent diarrhea  She has no anosmia or dysgeusia  She has a dry cough which is of recent development  It is not productive and she does not feel short of breath other than fleetingly with her chest tightness      Lab Results   Component Value Date    SARSCOV2 Positive (A) 04/08/2021     Past Medical History:   Diagnosis Date    Breast lump     Disease of thyroid gland     nodule, benign    GERD (gastroesophageal reflux disease)     History of transfusion     at 9years old for bleeding during tonsillectomy, no reaction    Hypertension     Rectocele     Uterine leiomyoma     Varicose veins of both lower extremities      Past Surgical History:   Procedure Laterality Date    BLADDER SURGERY  08/2012    Bladder "lift"    BREAST BIOPSY Right     benign    BREAST CYST EXCISION Right     1999 =    BREAST LUMPECTOMY  2000    COLONOSCOPY      HYSTERECTOMY  1990    KNEE SURGERY Right     x 2    NOSE SURGERY      [place] LARYNGOSCOPY,DIRCT,OP SCOPE,BIOPSY Left 4/2/2018    Procedure: MICROLARYNGOSCOPY AND EXCISION OF LEFT VOCAL FOLD POLYP;  Surgeon: Ruthie Murillo MD;  [facility];  Service: ENT    TONSILLECTOMY      TUBAL LIGATION      VARICOSE VEIN SURGERY Left 12/2009     Current Outpatient Medications   Medication Sig Dispense Refill    albuterol (PROVENTIL HFA,VENTOLIN HFA) 90 mcg/act inhaler Inhale 2 puffs every 6 (six) hours as needed for wheezing or shortness of breath 1 Inhaler 5    aspirin 81 MG tablet Take 81 mg by mouth      BIOTIN PO Take by mouth      esomeprazole (NexIUM) 40 MG capsule Take 1 capsule (40 mg total) by mouth daily 90 capsule 1    Ginger, Zingiber officinalis, (JORJE ROOT PO) Take by mouth      Ivermectin 1 % CREA Apply topically daily for 30 doses Apply topically to face once daily 45 g 2    latanoprost (XALATAN) 0 005 % ophthalmic solution       losartan-hydrochlorothiazide (HYZAAR) 100-25 MG per tablet Take 1 tablet by mouth daily      metoprolol succinate (TOPROL-XL) 50 mg 24 hr tablet Take 1 tablet (50 mg total) by mouth daily 90 tablet 1    Multiple Vitamins-Minerals (VITAMIN D3 COMPLETE PO) Take by mouth      multivitamin (THERAGRAN) TABS Take 1 tablet by mouth daily      naproxen sodium (ALEVE) 220 MG tablet Take 220 mg by mouth      Sulfacetamide Sodium-Sulfur (Sulfacetamide Sod-Sulfur Wash) 9-4 5 % LIQD Apply topically twice a day 454 g 2    metroNIDAZOLE (METROGEL) 1 % gel Apply topically daily (Patient not taking: Reported on 4/12/2021) 45 g 1     No current facility-administered medications for this visit  Facility-Administered Medications Ordered in Other Visits   Medication Dose Route Frequency Provider Last Rate Last Admin    acetaminophen (TYLENOL) tablet 650 mg  650 mg Oral Once PRN Joshua Coles MD        albuterol (PROVENTIL HFA,VENTOLIN HFA) inhaler 3 puff  3 puff Inhalation Once PRN Joshua Coles MD         Allergies   Allergen Reactions    Lisinopril Other (See Comments)     Other reaction(s): swollen legs    Hydrocodone-Acetaminophen Dizziness    Irbesartan-Hydrochlorothiazide Other (See Comments)      Hypertension  Pt had hypertension, arm heaviness and headache       Review of Systems   Constitutional: Positive for fatigue  Negative for chills and fever  HENT: Positive for congestion and rhinorrhea  Negative for sore throat  Respiratory: Positive for cough and chest tightness  Negative for shortness of breath  Gastrointestinal: Positive for diarrhea  Negative for abdominal pain, nausea and vomiting  Musculoskeletal: Positive for myalgias  Neurological: Positive for headaches  Objective:    Vitals:    04/12/21 1446   Temp: 97 8 °F (36 6 °C)   Weight: 97 1 kg (214 lb)   Height: 5' 5" (1 651 m)       Physical Exam  Constitutional:       General: She is not in acute distress  Appearance: She is not ill-appearing  Eyes:      Conjunctiva/sclera: Conjunctivae normal    Pulmonary:      Effort: Pulmonary effort is normal  No respiratory distress  Neurological:      Mental Status: She is alert  Psychiatric:         Mood and Affect: Mood normal          Thought Content: Thought content normal          Judgment: Judgment normal        VIRTUAL VISIT DISCLAIMER    Mateo Porterufman acknowledges that she has consented to an online visit or consultation  She understands that the online visit is based solely on information provided by her, and that, in the absence of a face-to-face physical evaluation by the physician, the diagnosis she receives is both limited and provisional in terms of accuracy and completeness  This is not intended to replace a full medical face-to-face evaluation by the physician  Mateo Lopez understands and accepts these terms

## 2021-04-12 NOTE — ASSESSMENT & PLAN NOTE
The patient is a 49-year-old female on day 6 of her COVID-19 illness  She received antibody infusion on day 4  She feels like she is somewhat better though not resolved  She does have a nonproductive cough with some mild chest tightness but no real shortness of breath no fever  She is going to continue to push fluids and use antipyretics  She is asked call in the next [phone] hours if she does not feel continued improvement and she is asked call sooner or seek more urgent medical attention should her condition worsen  She agrees with this plan  We also discussed her period of isolation will end on Friday assuming that she is fever free and continues to feel that her condition is improving

## 2021-05-21 ENCOUNTER — APPOINTMENT (EMERGENCY)
Dept: RADIOLOGY | Facility: HOSPITAL | Age: 66
DRG: 287 | End: 2021-05-21
Payer: MEDICARE

## 2021-05-21 ENCOUNTER — HOSPITAL ENCOUNTER (INPATIENT)
Facility: HOSPITAL | Age: 66
LOS: 3 days | Discharge: HOME | DRG: 287 | End: 2021-05-24
Attending: EMERGENCY MEDICINE | Admitting: INTERNAL MEDICINE
Payer: MEDICARE

## 2021-05-21 DIAGNOSIS — I24.9 ACUTE CORONARY SYNDROME (HCC): Primary | ICD-10-CM

## 2021-05-21 DIAGNOSIS — I16.0 HYPERTENSIVE URGENCY: ICD-10-CM

## 2021-05-21 PROBLEM — R77.8 ELEVATED TROPONIN I LEVEL: Status: ACTIVE | Noted: 2021-05-21

## 2021-05-21 PROBLEM — R79.89 ELEVATED TROPONIN I LEVEL: Status: ACTIVE | Noted: 2021-05-21

## 2021-05-21 PROBLEM — R07.9 CHEST PAIN: Status: ACTIVE | Noted: 2019-05-28

## 2021-05-21 LAB
ALBUMIN SERPL BCP-MCNC: 4 G/DL (ref 3.5–5)
ALP SERPL-CCNC: 75 U/L (ref 46–116)
ALT SERPL W P-5'-P-CCNC: 48 U/L (ref 12–78)
ANION GAP SERPL CALCULATED.3IONS-SCNC: 9 MMOL/L (ref 4–13)
APTT PPP: 28 SECONDS (ref 23–37)
AST SERPL W P-5'-P-CCNC: 42 U/L (ref 5–45)
ATRIAL RATE: 76 BPM
ATRIAL RATE: 79 BPM
BASOPHILS # BLD AUTO: 0.02 THOUSANDS/ΜL (ref 0–0.1)
BASOPHILS NFR BLD AUTO: 0 % (ref 0–1)
BILIRUB SERPL-MCNC: 0.38 MG/DL (ref 0.2–1)
BUN SERPL-MCNC: 20 MG/DL (ref 5–25)
CALCIUM SERPL-MCNC: 9.4 MG/DL (ref 8.3–10.1)
CHLORIDE SERPL-SCNC: 106 MMOL/L (ref 100–108)
CO2 SERPL-SCNC: 28 MMOL/L (ref 21–32)
CREAT SERPL-MCNC: 0.92 MG/DL (ref 0.6–1.3)
D DIMER PPP FEU-MCNC: 0.36 UG/ML FEU
EOSINOPHIL # BLD AUTO: 0.1 THOUSAND/ΜL (ref 0–0.61)
EOSINOPHIL NFR BLD AUTO: 1 % (ref 0–6)
ERYTHROCYTE [DISTWIDTH] IN BLOOD BY AUTOMATED COUNT: 15.1 % (ref 11.6–15.1)
GFR SERPL CREATININE-BSD FRML MDRD: 65 ML/MIN/1.73SQ M
GLUCOSE SERPL-MCNC: 109 MG/DL (ref 65–140)
HCT VFR BLD AUTO: 43.1 % (ref 34.8–46.1)
HGB BLD-MCNC: 13.8 G/DL (ref 11.5–15.4)
IMM GRANULOCYTES # BLD AUTO: 0.03 THOUSAND/UL (ref 0–0.2)
IMM GRANULOCYTES NFR BLD AUTO: 0 % (ref 0–2)
INR PPP: 1.06 (ref 0.84–1.19)
LYMPHOCYTES # BLD AUTO: 1.55 THOUSANDS/ΜL (ref 0.6–4.47)
LYMPHOCYTES NFR BLD AUTO: 18 % (ref 14–44)
MCH RBC QN AUTO: 29.9 PG (ref 26.8–34.3)
MCHC RBC AUTO-ENTMCNC: 32 G/DL (ref 31.4–37.4)
MCV RBC AUTO: 94 FL (ref 82–98)
MONOCYTES # BLD AUTO: 0.39 THOUSAND/ΜL (ref 0.17–1.22)
MONOCYTES NFR BLD AUTO: 5 % (ref 4–12)
NEUTROPHILS # BLD AUTO: 6.4 THOUSANDS/ΜL (ref 1.85–7.62)
NEUTS SEG NFR BLD AUTO: 76 % (ref 43–75)
NRBC BLD AUTO-RTO: 0 /100 WBCS
NT-PROBNP SERPL-MCNC: 95 PG/ML
P AXIS: 47 DEGREES
P AXIS: 52 DEGREES
PLATELET # BLD AUTO: 177 THOUSANDS/UL (ref 149–390)
PMV BLD AUTO: 10.7 FL (ref 8.9–12.7)
POTASSIUM SERPL-SCNC: 4.8 MMOL/L (ref 3.5–5.3)
PR INTERVAL: 166 MS
PR INTERVAL: 170 MS
PROT SERPL-MCNC: 7.6 G/DL (ref 6.4–8.2)
PROTHROMBIN TIME: 13.9 SECONDS (ref 11.6–14.5)
QRS AXIS: -2 DEGREES
QRS AXIS: 12 DEGREES
QRSD INTERVAL: 86 MS
QRSD INTERVAL: 88 MS
QT INTERVAL: 356 MS
QT INTERVAL: 390 MS
QTC INTERVAL: 400 MS
QTC INTERVAL: 447 MS
RBC # BLD AUTO: 4.61 MILLION/UL (ref 3.81–5.12)
SODIUM SERPL-SCNC: 143 MMOL/L (ref 136–145)
T WAVE AXIS: 44 DEGREES
T WAVE AXIS: 45 DEGREES
TROPONIN I SERPL-MCNC: 13.16 NG/ML
TROPONIN I SERPL-MCNC: 2.66 NG/ML
TROPONIN I SERPL-MCNC: 9.48 NG/ML
TSH SERPL DL<=0.05 MIU/L-ACNC: 1.99 UIU/ML (ref 0.36–3.74)
VENTRICULAR RATE: 76 BPM
VENTRICULAR RATE: 79 BPM
WBC # BLD AUTO: 8.49 THOUSAND/UL (ref 4.31–10.16)

## 2021-05-21 PROCEDURE — 84484 ASSAY OF TROPONIN QUANT: CPT | Performed by: INTERNAL MEDICINE

## 2021-05-21 PROCEDURE — 84443 ASSAY THYROID STIM HORMONE: CPT | Performed by: EMERGENCY MEDICINE

## 2021-05-21 PROCEDURE — 84484 ASSAY OF TROPONIN QUANT: CPT | Performed by: EMERGENCY MEDICINE

## 2021-05-21 PROCEDURE — 96374 THER/PROPH/DIAG INJ IV PUSH: CPT

## 2021-05-21 PROCEDURE — 93005 ELECTROCARDIOGRAM TRACING: CPT

## 2021-05-21 PROCEDURE — 85610 PROTHROMBIN TIME: CPT | Performed by: EMERGENCY MEDICINE

## 2021-05-21 PROCEDURE — 84484 ASSAY OF TROPONIN QUANT: CPT | Performed by: FAMILY MEDICINE

## 2021-05-21 PROCEDURE — 71045 X-RAY EXAM CHEST 1 VIEW: CPT

## 2021-05-21 PROCEDURE — 85379 FIBRIN DEGRADATION QUANT: CPT | Performed by: INTERNAL MEDICINE

## 2021-05-21 PROCEDURE — 80053 COMPREHEN METABOLIC PANEL: CPT | Performed by: EMERGENCY MEDICINE

## 2021-05-21 PROCEDURE — 85730 THROMBOPLASTIN TIME PARTIAL: CPT | Performed by: INTERNAL MEDICINE

## 2021-05-21 PROCEDURE — 36415 COLL VENOUS BLD VENIPUNCTURE: CPT | Performed by: EMERGENCY MEDICINE

## 2021-05-21 PROCEDURE — 99222 1ST HOSP IP/OBS MODERATE 55: CPT | Performed by: INTERNAL MEDICINE

## 2021-05-21 PROCEDURE — 83880 ASSAY OF NATRIURETIC PEPTIDE: CPT | Performed by: EMERGENCY MEDICINE

## 2021-05-21 PROCEDURE — 93010 ELECTROCARDIOGRAM REPORT: CPT | Performed by: INTERNAL MEDICINE

## 2021-05-21 PROCEDURE — 99223 1ST HOSP IP/OBS HIGH 75: CPT | Performed by: INTERNAL MEDICINE

## 2021-05-21 PROCEDURE — 85025 COMPLETE CBC W/AUTO DIFF WBC: CPT | Performed by: EMERGENCY MEDICINE

## 2021-05-21 PROCEDURE — 99291 CRITICAL CARE FIRST HOUR: CPT | Performed by: EMERGENCY MEDICINE

## 2021-05-21 PROCEDURE — 85730 THROMBOPLASTIN TIME PARTIAL: CPT | Performed by: EMERGENCY MEDICINE

## 2021-05-21 PROCEDURE — 99285 EMERGENCY DEPT VISIT HI MDM: CPT

## 2021-05-21 RX ORDER — HEPARIN SODIUM 1000 [USP'U]/ML
4000 INJECTION, SOLUTION INTRAVENOUS; SUBCUTANEOUS
Status: DISCONTINUED | OUTPATIENT
Start: 2021-05-21 | End: 2021-05-24 | Stop reason: HOSPADM

## 2021-05-21 RX ORDER — METOPROLOL SUCCINATE 50 MG/1
50 TABLET, EXTENDED RELEASE ORAL DAILY
Status: DISCONTINUED | OUTPATIENT
Start: 2021-05-21 | End: 2021-05-22

## 2021-05-21 RX ORDER — HEPARIN SODIUM 1000 [USP'U]/ML
4000 INJECTION, SOLUTION INTRAVENOUS; SUBCUTANEOUS ONCE
Status: COMPLETED | OUTPATIENT
Start: 2021-05-21 | End: 2021-05-21

## 2021-05-21 RX ORDER — HYDROCHLOROTHIAZIDE 25 MG/1
25 TABLET ORAL DAILY
Status: DISCONTINUED | OUTPATIENT
Start: 2021-05-22 | End: 2021-05-24 | Stop reason: HOSPADM

## 2021-05-21 RX ORDER — HEPARIN SODIUM 1000 [USP'U]/ML
2000 INJECTION, SOLUTION INTRAVENOUS; SUBCUTANEOUS
Status: DISCONTINUED | OUTPATIENT
Start: 2021-05-21 | End: 2021-05-24 | Stop reason: HOSPADM

## 2021-05-21 RX ORDER — AMLODIPINE BESYLATE 5 MG/1
5 TABLET ORAL DAILY
Status: DISCONTINUED | OUTPATIENT
Start: 2021-05-21 | End: 2021-05-24 | Stop reason: HOSPADM

## 2021-05-21 RX ORDER — ASPIRIN 81 MG/1
81 TABLET, CHEWABLE ORAL DAILY
Status: DISCONTINUED | OUTPATIENT
Start: 2021-05-22 | End: 2021-05-23

## 2021-05-21 RX ORDER — ASPIRIN 81 MG/1
324 TABLET, CHEWABLE ORAL ONCE
Status: COMPLETED | OUTPATIENT
Start: 2021-05-21 | End: 2021-05-21

## 2021-05-21 RX ORDER — ATORVASTATIN CALCIUM 40 MG/1
40 TABLET, FILM COATED ORAL
Status: DISCONTINUED | OUTPATIENT
Start: 2021-05-21 | End: 2021-05-24 | Stop reason: HOSPADM

## 2021-05-21 RX ORDER — LATANOPROST 50 UG/ML
1 SOLUTION/ DROPS OPHTHALMIC
Status: DISCONTINUED | OUTPATIENT
Start: 2021-05-21 | End: 2021-05-24 | Stop reason: HOSPADM

## 2021-05-21 RX ORDER — PANTOPRAZOLE SODIUM 40 MG/1
40 TABLET, DELAYED RELEASE ORAL
Status: DISCONTINUED | OUTPATIENT
Start: 2021-05-22 | End: 2021-05-24 | Stop reason: HOSPADM

## 2021-05-21 RX ORDER — HYDRALAZINE HYDROCHLORIDE 20 MG/ML
5 INJECTION INTRAMUSCULAR; INTRAVENOUS EVERY 6 HOURS PRN
Status: DISCONTINUED | OUTPATIENT
Start: 2021-05-21 | End: 2021-05-24 | Stop reason: HOSPADM

## 2021-05-21 RX ORDER — LABETALOL 20 MG/4 ML (5 MG/ML) INTRAVENOUS SYRINGE
10 ONCE
Status: COMPLETED | OUTPATIENT
Start: 2021-05-21 | End: 2021-05-21

## 2021-05-21 RX ORDER — ALBUTEROL SULFATE 90 UG/1
2 AEROSOL, METERED RESPIRATORY (INHALATION) EVERY 6 HOURS PRN
Status: DISCONTINUED | OUTPATIENT
Start: 2021-05-21 | End: 2021-05-24 | Stop reason: HOSPADM

## 2021-05-21 RX ORDER — LOSARTAN POTASSIUM 50 MG/1
100 TABLET ORAL DAILY
Status: DISCONTINUED | OUTPATIENT
Start: 2021-05-21 | End: 2021-05-24 | Stop reason: HOSPADM

## 2021-05-21 RX ORDER — ACETAMINOPHEN 325 MG/1
650 TABLET ORAL EVERY 6 HOURS PRN
Status: DISCONTINUED | OUTPATIENT
Start: 2021-05-21 | End: 2021-05-24 | Stop reason: HOSPADM

## 2021-05-21 RX ORDER — HEPARIN SODIUM 10000 [USP'U]/100ML
3-20 INJECTION, SOLUTION INTRAVENOUS
Status: DISCONTINUED | OUTPATIENT
Start: 2021-05-21 | End: 2021-05-24 | Stop reason: HOSPADM

## 2021-05-21 RX ADMIN — ACETAMINOPHEN 650 MG: 325 TABLET, FILM COATED ORAL at 23:27

## 2021-05-21 RX ADMIN — METOPROLOL SUCCINATE 50 MG: 50 TABLET, EXTENDED RELEASE ORAL at 17:13

## 2021-05-21 RX ADMIN — LABETALOL 20 MG/4 ML (5 MG/ML) INTRAVENOUS SYRINGE 10 MG: at 15:07

## 2021-05-21 RX ADMIN — HEPARIN SODIUM 4000 UNITS: 1000 INJECTION INTRAVENOUS; SUBCUTANEOUS at 16:29

## 2021-05-21 RX ADMIN — LOSARTAN POTASSIUM 100 MG: 50 TABLET, FILM COATED ORAL at 17:13

## 2021-05-21 RX ADMIN — LATANOPROST 1 DROP: 50 SOLUTION OPHTHALMIC at 21:12

## 2021-05-21 RX ADMIN — ATORVASTATIN CALCIUM 40 MG: 40 TABLET, FILM COATED ORAL at 17:13

## 2021-05-21 RX ADMIN — HEPARIN SODIUM 11.1 UNITS/KG/HR: 10000 INJECTION, SOLUTION INTRAVENOUS at 16:30

## 2021-05-21 RX ADMIN — AMLODIPINE BESYLATE 5 MG: 5 TABLET ORAL at 17:13

## 2021-05-21 RX ADMIN — ASPIRIN 324 MG: 81 TABLET, CHEWABLE ORAL at 16:10

## 2021-05-21 RX ADMIN — NITROGLYCERIN 1 INCH: 20 OINTMENT TOPICAL at 21:12

## 2021-05-21 RX ADMIN — NITROGLYCERIN 1 INCH: 20 OINTMENT TOPICAL at 16:10

## 2021-05-21 NOTE — ED NOTES
SLIM at bedside      Guera Hussein, [address] [facility]  05/21/21 1361

## 2021-05-21 NOTE — CONSULTS
Consultation - Cardiology Team One  Felisha Contreras 77 y o  female MRN: [id number]  Unit/Bed#: ED 27 Encounter: [id number]    Inpatient consult to Cardiology  Consult performed by: WILLIAMS Wilkins  Consult ordered by: Max Bueno DO      Physician Requesting Consult: Max Bueno DO  Reason for Consult / Principal Problem: elevated troponin    Assessment/ Plan    1  Elevated troponin of unclear clinical significance- NICM vs  Myocarditis following COVID 19 infection vs  ACS  Presented with nonexertional CP with radiation into both arms and neck, mild SOB  Hypertensive on arrival, symptoms somewhat better with improvement in BP  Troponin 2 66, continue to trend to peak  ECG- NSR, nonischemic  Check echocardiogram   If normal or regional wall motion present then will most likely proceed with cardiac catheterization  Continue IV heparin, ASA and beta blocker  Add statin  2  Hypertensive urgency- 231/102 on arrival, modestly improved with nitro paste and labetalol  Continue home losartan 100 mg daily, Toprol XL 50 mg daily  Add nitro paste q6 hours, amlodipine 5 mg daily  History of Present Illness   HPI: Felisha Contreras is a 77y o  year old female with HTN and family history of sudden cardiac death (sister Hannah Lazaro  at age 47, had presumed viral NICM)  She follows with cardiologist Dr Tariq Crooks of [facility]  At one point, she had an ECG suggesting prolonged QT and was referred to EP Dr Kia Sanchez but he did not feel she had prolonged QT  She was offered genetic testing but declined  She last saw Dr Tariq Crooks in March  Her last echocardiogram was in 2019 showing LVEF 70% with normal RV size and function and no significant valvular disease  She presented to the ED today with complaints of CP  Troponin 2 66, NT proBNP 95  ECG nonischemic  CMP and CBC unremarkable  TSH WNL  She was positive for COVID 19 on 21 after calling her PCP with c/o chest tightness, anorexia, headache, and myalgias  She received antibody infusion on 4/10/21  /102 on admission  Cardiology consulted for further evaluation of CP with elevated troponin  She described her symptoms as chest heaviness with radiation into both arms and neck  Associated with SOB but no diaphoresis, nausea, or vomiting  She had some dizziness when bending over and standing up yesterday while working out in the heat and sun  None today  She has not had symptoms like this before and denies recent exertional symptoms  She has been more fatigued and achy since her COVID infection  EKG reviewed personally: NSR    Telemetry reviewed personally: NSR    Review of Systems   Constitution: Positive for malaise/fatigue  Negative for chills, decreased appetite, fever and weight gain  Cardiovascular: Positive for chest pain  Negative for dyspnea on exertion, leg swelling, orthopnea, palpitations and syncope  Respiratory: Negative for cough, shortness of breath, sleep disturbances due to breathing and sputum production  Gastrointestinal: Negative for bloating, abdominal pain, nausea and vomiting  Neurological: Negative for dizziness, headaches, light-headedness and weakness  Psychiatric/Behavioral: Negative for altered mental status  All other systems reviewed and are negative      Historical Information   Past Medical History:   Diagnosis Date    Breast lump     Disease of thyroid gland     nodule, benign    GERD (gastroesophageal reflux disease)     History of transfusion     at 9years old for bleeding during tonsillectomy, no reaction    Hypertension     Rectocele     Uterine leiomyoma     Varicose veins of both lower extremities      Past Surgical History:   Procedure Laterality Date    BLADDER SURGERY  08/2012    Bladder "lift"    BREAST BIOPSY Right     benign    BREAST CYST EXCISION Right     1999 =    BREAST LUMPECTOMY  2000    COLONOSCOPY      HYSTERECTOMY  1990    KNEE SURGERY Right     x 2    NOSE SURGERY      [place] LARYNGOSCOPY,DIRCT,OP SCOPE,BIOPSY Left 2018    Procedure: MICROLARYNGOSCOPY AND EXCISION OF LEFT VOCAL FOLD POLYP;  Surgeon: Rodger Rios MD;  [facility];  Service: ENT   [address]      VARICOSE VEIN SURGERY Left 2009     Social History     Substance and Sexual Activity   Alcohol Use Yes    Frequency: Never    Comment: rarely, twice per year / social drinker per [employer]                  Social History     Substance and Sexual Activity   Drug Use No     Social History     Tobacco Use   Smoking Status Former Smoker    Quit date:     Years since quittin 4   Smokeless Tobacco Never Used     Family History:   Family History   Problem Relation Age of Onset   [facility] Breast cancer Mother 77    Hypertension Mother     Varicose Veins Mother         of lower extremities    Hypertension Father     Coronary artery disease Father     Coronary artery disease Sister     Heart disease Sister     Varicose Veins Sister         of lower extremities    No Known Problems Daughter     Breast cancer Maternal Aunt 72    No Known Problems Maternal Aunt     No Known Problems Sister     No Known Problems Sister        Meds/Allergies   all current active meds have been reviewed and current meds:   Current Facility-Administered Medications   Medication Dose Route Frequency    heparin (porcine) 25,000 units in 0 45% NaCl 250 mL infusion (premix)  3-20 Units/kg/hr (Order-Specific) Intravenous Titrated    heparin (porcine) injection 2,000 Units  2,000 Units Intravenous Q1H PRN    heparin (porcine) injection 4,000 Units  4,000 Units Intravenous Once    heparin (porcine) injection 4,000 Units  4,000 Units Intravenous Q1H PRN     heparin (porcine), 3-20 Units/kg/hr (Order-Specific)      Allergies   Allergen Reactions    Lisinopril Other (See Comments)     Other reaction(s): swollen legs    Hydrocodone-Acetaminophen Dizziness    Irbesartan-Hydrochlorothiazide Other (See Comments) Hypertension  Pt had hypertension, arm heaviness and headache     Objective   Vitals: Blood pressure 163/72, pulse 74, temperature 98 5 °F (36 9 °C), temperature source Oral, resp  rate 17, height 5' 5" (1 651 m), weight 97 6 kg (215 lb 2 7 oz), SpO2 97 %  , Body mass index is 35 81 kg/m²  ,     Systolic (03WSN), ODH:362 , Min:163 , ULM:425     Diastolic (96ODZ), ALZ:14, Min:72, Max:102    No intake or output data in the 24 hours ending 05/21/21 1611  Wt Readings from Last 3 Encounters:   05/21/21 97 6 kg (215 lb 2 7 oz)   04/12/21 97 1 kg (214 lb)   04/05/21 97 3 kg (214 lb 9 6 oz)     Invasive Devices     Peripheral Intravenous Line            Peripheral IV 05/21/21 Left Antecubital less than 1 day              Physical Exam  Vitals signs reviewed  Constitutional:       General: She is not in acute distress  Appearance: She is obese  Neck:      Musculoskeletal: Neck supple  Vascular: No hepatojugular reflux or JVD  Cardiovascular:      Rate and Rhythm: Normal rate and regular rhythm  Pulses: Normal pulses  Heart sounds: Normal heart sounds  No murmur  No friction rub  No gallop  Pulmonary:      Effort: Pulmonary effort is normal  No respiratory distress  Breath sounds: Normal breath sounds  No rales  Comments: Room air  Abdominal:      General: Bowel sounds are normal  There is no distension  Palpations: Abdomen is soft  Tenderness: There is no abdominal tenderness  Musculoskeletal: Normal range of motion  General: No tenderness  Right lower leg: No edema  Left lower leg: No edema  Skin:     General: Skin is warm and dry  Capillary Refill: Capillary refill takes less than 2 seconds  Findings: No erythema  Neurological:      Mental Status: She is alert and oriented to person, place, and time     Psychiatric:         Mood and Affect: Mood normal      LABORATORY RESULTS:  Results from last 7 days   Lab Units 05/21/21  1502   TROPONIN I ng/mL 2 66*     CBC with diff:   Results from last 7 days   Lab Units 05/21/21  1502   WBC Thousand/uL 8 49   HEMOGLOBIN g/dL 13 8   HEMATOCRIT % 43 1   MCV fL 94   PLATELETS Thousands/uL 177   MCH pg 29 9   MCHC g/dL 32 0   RDW % 15 1   MPV fL 10 7   NRBC AUTO /100 WBCs 0     CMP:  Results from last 7 days   Lab Units 05/21/21  1502   POTASSIUM mmol/L 4 8   CHLORIDE mmol/L 106   CO2 mmol/L 28   BUN mg/dL 20   CREATININE mg/dL 0 92   CALCIUM mg/dL 9 4   AST U/L 42   ALT U/L 48   ALK PHOS U/L 75   EGFR ml/min/1 73sq m 65     BMP:  Results from last 7 days   Lab Units 05/21/21  1502   POTASSIUM mmol/L 4 8   CHLORIDE mmol/L 106   CO2 mmol/L 28   BUN mg/dL 20   CREATININE mg/dL 0 92   CALCIUM mg/dL 9 4     Lab Results   Component Value Date    CREATININE 0 92 05/21/2021    CREATININE 0 73 03/25/2021    CREATININE 0 73 03/23/2020       Lab Results   Component Value Date    NTBNP 95 05/21/2021      Results from last 7 days   Lab Units 05/21/21  1502   TSH 3RD GENERATON uIU/mL 1 994       Lipid Profile:   Lab Results   Component Value Date    CHOL 211 (H) 01/18/2017    CHOL 212 (H) 06/27/2016     Lab Results   Component Value Date    HDL 58 03/23/2020    HDL 61 03/18/2019    HDL 50 01/18/2017     Lab Results   Component Value Date    LDLCALC 149 (H) 03/23/2020    LDLCALC 133 (H) 03/18/2019     Lab Results   Component Value Date    TRIG 110 03/23/2020    TRIG 89 03/18/2019    TRIG 94 01/18/2017     Thank you for allowing us to participate in this patient's care  This pt will follow up with Dr Charis Light once discharged  Counseling / Coordination of Care  Total floor / unit time spent today 45 minutes  Greater than 50% of total time was spent with the patient and / or family counseling and / or coordination of care  A description of the counseling / coordination of care: Review of history, current assessment, development of a plan      Code Status: No Order    ** Please Note: Dragon 360 Dictation voice to text software may have been used in the creation of this document   **

## 2021-05-21 NOTE — ASSESSMENT & PLAN NOTE
 POA, BP of 231/102 with chest pain and troponin elevation  On losartan-hctz 100mg-25mg, metoprolol 50mg  BP still elevated in outpatient setting  Good compliance per patient  Assessment: Poorly controlled BP in spite of 3 medications  Hypertensive urgency with symptoms of end organ damage (Troponin elevation with chest pain)  Essential HTN vs secondary HTN - however in the context of relatively normal lab work various causes of secondary HTN are unlikely   Can consider REGI +/- OHS with pulmonary HTN in obese patient    PLAN  · Continue losartan 100mg, HCTZ 25mg, and metoprolol 50mg daily  · Cardiology started amlodipine 5mg daily  · ECHO ordered for tomorrow  · If BP still not controlled, consider hydralazine PRN

## 2021-05-21 NOTE — ED PROVIDER NOTES
History  Chief Complaint   Patient presents with    Chest Pain     C/o heaviness in chest and BL UE starting around 1230 today  History provided by:  Patient and spouse  Chest Pain  Pain location:  Substernal area, L chest and R chest  Pain quality comment:  "heaviness"  Pain radiates to:  Neck, L arm and R arm  Pain radiates to the back: no    Pain severity:  Moderate  Onset quality:  Gradual  Duration:  2 hours  Timing:  Constant  Progression:  Unchanged  Chronicity:  New  Context comment:  Patient was in normal state of health, had gradual onset of chest discomfort that is been constant for the past 2 hours  No associated shortness of breath or diaphoresis  No previous history of this  Not worse with exertion  Relieved by:  None tried  Worsened by:  Nothing tried (Specifically not worse with movement surge exertion)  Ineffective treatments:  None tried  Associated symptoms: no abdominal pain, no cough, no diaphoresis, no dizziness, no fever, no headache, no nausea, no numbness, no palpitations, no shortness of breath and not vomiting        Prior to Admission Medications   Prescriptions Last Dose Informant Patient Reported? Taking?    BIOTIN PO 5/20/2021 at Unknown time Self Yes Yes   Sig: Take by mouth   Ginger, Zingiber officinalis, (GINGER ROOT PO) 5/21/2021 at Unknown time  Yes Yes   Sig: Take by mouth   Ivermectin 1 % CREA   No No   Sig: Apply topically daily for 30 doses Apply topically to face once daily   Multiple Vitamins-Minerals (VITAMIN D3 COMPLETE PO) 5/21/2021 at Unknown time Self Yes Yes   Sig: Take by mouth   Sulfacetamide Sodium-Sulfur (Sulfacetamide Sod-Sulfur Wash) 9-4 5 % LIQD 5/21/2021 at Unknown time  No Yes   Sig: Apply topically twice a day   albuterol (PROVENTIL HFA,VENTOLIN HFA) 90 mcg/act inhaler More than a month at Unknown time  No No   Sig: Inhale 2 puffs every 6 (six) hours as needed for wheezing or shortness of breath   aspirin 81 MG tablet 5/21/2021 at Unknown time Self Yes Yes   Sig: Take 81 mg by mouth   esomeprazole (NexIUM) 40 MG capsule 5/21/2021 at Unknown time  No Yes   Sig: Take 1 capsule (40 mg total) by mouth daily   latanoprost (XALATAN) 0 005 % ophthalmic solution 5/21/2021 at Unknown time  Yes Yes   losartan-hydrochlorothiazide (HYZAAR) 100-25 MG per tablet 5/21/2021 at Unknown time  Yes Yes   Sig: Take 1 tablet by mouth daily   metoprolol succinate (TOPROL-XL) 50 mg 24 hr tablet 5/20/2021 at Unknown time  No Yes   Sig: Take 1 tablet (50 mg total) by mouth daily   metroNIDAZOLE (METROGEL) 1 % gel Past Month at Unknown time  No Yes   Sig: Apply topically daily   multivitamin (THERAGRAN) TABS 5/21/2021 at Unknown time Self Yes Yes   Sig: Take 1 tablet by mouth daily      Facility-Administered Medications: None       Past Medical History:   Diagnosis Date    Breast lump     Disease of thyroid gland     nodule, benign    GERD (gastroesophageal reflux disease)     History of transfusion     at 9years old for bleeding during tonsillectomy, no reaction    Hypertension     Rectocele     Uterine leiomyoma     Varicose veins of both lower extremities        Past Surgical History:   Procedure Laterality Date    BLADDER SURGERY  08/2012    Bladder "lift"    BREAST BIOPSY Right     benign    BREAST CYST EXCISION Right     1999 =    BREAST LUMPECTOMY  2000    COLONOSCOPY      HYSTERECTOMY  1990    KNEE SURGERY Right     x 2    NOSE SURGERY      MO LARYNGOSCOPY,DIRCT,OP SCOPE,BIOPSY Left 4/2/2018    Procedure: MICROLARYNGOSCOPY AND EXCISION OF LEFT VOCAL FOLD POLYP;  Surgeon: Jevon Kramer MD;  [facility];  Service: ENT    TONSILLECTOMY      TUBAL LIGATION      VARICOSE VEIN SURGERY Left 12/2009       Family History   Problem Relation Age of Onset   Gaby Pool Breast cancer Mother 77    Hypertension Mother     Varicose Veins Mother         of lower extremities    Hypertension Father     Coronary artery disease Father     Coronary artery disease Sister    Gaby Pool Heart disease Sister     Varicose Veins Sister         of lower extremities    No Known Problems Daughter     Breast cancer Maternal Aunt 72    No Known Problems Maternal Aunt     No Known Problems Sister     No Known Problems Sister      I have reviewed and agree with the history as documented  E-Cigarette/Vaping    E-Cigarette Use Never User      E-Cigarette/Vaping Substances    Nicotine No     THC No     CBD No     Flavoring No     Other No     Unknown No      Social History     Tobacco Use    Smoking status: Former Smoker     Quit date:      Years since quittin 4    Smokeless tobacco: Never Used   Substance Use Topics    Alcohol use: Yes     Frequency: Never     Comment: rarely, twice per year / social drinker per [employer]                 Drug use: No       Review of Systems   Constitutional: Negative for activity change, chills, diaphoresis and fever  HENT: Negative for congestion, sinus pressure and sore throat  Eyes: Negative for pain and visual disturbance  Respiratory: Negative for cough, chest tightness, shortness of breath, wheezing and stridor  Cardiovascular: Positive for chest pain  Negative for palpitations  Gastrointestinal: Negative for abdominal distention, abdominal pain, constipation, diarrhea, nausea and vomiting  Genitourinary: Negative for dysuria and frequency  Musculoskeletal: Negative for neck pain and neck stiffness  Skin: Negative for rash  Neurological: Negative for dizziness, speech difficulty, light-headedness, numbness and headaches  Physical Exam  Physical Exam  Vitals signs reviewed  Constitutional:       Appearance: She is well-developed  She is not diaphoretic  HENT:      Head: Normocephalic and atraumatic  Right Ear: External ear normal       Left Ear: External ear normal       Nose: Nose normal    Eyes:      General:         Right eye: No discharge  Left eye: No discharge        Pupils: Pupils are equal, round, and reactive to light  Neck:      Musculoskeletal: Normal range of motion and neck supple  Trachea: No tracheal deviation  Cardiovascular:      Rate and Rhythm: Normal rate and regular rhythm  Heart sounds: Normal heart sounds  No murmur  Pulmonary:      Effort: Pulmonary effort is normal  No respiratory distress  Breath sounds: Normal breath sounds  No stridor  Abdominal:      General: There is no distension  Palpations: Abdomen is soft  Tenderness: There is no abdominal tenderness  There is no guarding or rebound  Musculoskeletal: Normal range of motion  Skin:     General: Skin is warm and dry  Coloration: Skin is not pale  Findings: No erythema  Neurological:      General: No focal deficit present  Mental Status: She is alert and oriented to person, place, and time           Vital Signs  ED Triage Vitals [05/21/21 1437]   Temperature Pulse Respirations Blood Pressure SpO2   98 5 °F (36 9 °C) 83 18 (!) 231/102 98 %      Temp Source Heart Rate Source Patient Position - Orthostatic VS BP Location FiO2 (%)   Oral Monitor Sitting Right arm --      Pain Score       No Pain           Vitals:    05/21/21 1503 05/21/21 1515 05/21/21 1530 05/21/21 1630   BP: (!) 202/94 (!) 173/72 163/72 (!) 174/96   Pulse: 79 76 74 84   Patient Position - Orthostatic VS: Sitting Sitting Sitting Sitting         Visual Acuity      ED Medications  Medications   heparin (porcine) 25,000 units in 0 45% NaCl 250 mL infusion (premix) (11 1 Units/kg/hr × 90 kg (Order-Specific) Intravenous New Bag 5/21/21 1630)   heparin (porcine) injection 4,000 Units (has no administration in time range)   heparin (porcine) injection 2,000 Units (has no administration in time range)   metoprolol succinate (TOPROL-XL) 24 hr tablet 50 mg (has no administration in time range)   losartan (COZAAR) tablet 100 mg (has no administration in time range)   amLODIPine (NORVASC) tablet 5 mg (has no administration in time range)   nitroglycerin (NITRO-BID) 2 % TD ointment 1 inch (has no administration in time range)   aspirin chewable tablet 81 mg (has no administration in time range)   atorvastatin (LIPITOR) tablet 40 mg (has no administration in time range)   Labetalol HCl (NORMODYNE) injection 10 mg (10 mg Intravenous Given 5/21/21 1507)   aspirin chewable tablet 324 mg (324 mg Oral Given 5/21/21 1610)   heparin (porcine) injection 4,000 Units (4,000 Units Intravenous Given 5/21/21 1629)   nitroglycerin (NITRO-BID) 2 % TD ointment 1 inch (1 inch Topical Given 5/21/21 1610)       Diagnostic Studies  Results Reviewed     Procedure Component Value Units Date/Time    APTT [[id number]]  (Normal) Collected: 05/21/21 1604    Lab Status: Final result Specimen: Blood from Arm, Left Updated: 05/21/21 1632     PTT 28 seconds     Protime-INR [[id number]] Collected: 05/21/21 1604    Lab Status: No result Specimen: Blood from Arm, Left     APTT six (6) hours after Heparin bolus/drip initiation or dosing change [[id number]]     Lab Status: No result Specimen: Blood     TSH [[id number]]  (Normal) Collected: 05/21/21 1502    Lab Status: Final result Specimen: Blood from Arm, Left Updated: 05/21/21 1546     TSH 3RD GENERATON 1 994 uIU/mL     Narrative:      Patients undergoing fluorescein dye angiography may retain small amounts of fluorescein in the body for 48-72 hours post procedure  Samples containing fluorescein can produce falsely depressed TSH values  If the patient had this procedure,a specimen should be resubmitted post fluorescein clearance        NT-BNP PRO [[id number]]  (Normal) Collected: 05/21/21 1502    Lab Status: Final result Specimen: Blood from Arm, Left Updated: 05/21/21 1546     NT-proBNP 95 pg/mL     Troponin I [[id number]]  (Abnormal) Collected: 05/21/21 1502    Lab Status: Final result Specimen: Blood from Arm, Left Updated: 05/21/21 1538     Troponin I 2 66 ng/mL     Comprehensive metabolic panel [[id number]] Collected: 05/21/21 1502    Lab Status: Final result Specimen: Blood from Arm, Left Updated: 05/21/21 1537     Sodium 143 mmol/L      Potassium 4 8 mmol/L      Chloride 106 mmol/L      CO2 28 mmol/L      ANION GAP 9 mmol/L      BUN 20 mg/dL      Creatinine 0 92 mg/dL      Glucose 109 mg/dL      Calcium 9 4 mg/dL      AST 42 U/L      ALT 48 U/L      Alkaline Phosphatase 75 U/L      Total Protein 7 6 g/dL      Albumin 4 0 g/dL      Total Bilirubin 0 38 mg/dL      eGFR 65 ml/min/1 73sq m     Narrative:      [place] guidelines for Chronic Kidney Disease (CKD):     Stage 1 with normal or high GFR (GFR > 90 mL/min/1 73 square meters)    Stage 2 Mild CKD (GFR = 60-89 mL/min/1 73 square meters)    Stage 3A Moderate CKD (GFR = 45-59 mL/min/1 73 square meters)    Stage 3B Moderate CKD (GFR = 30-44 mL/min/1 73 square meters)    Stage 4 Severe CKD (GFR = 15-29 mL/min/1 73 square meters)    Stage 5 End Stage CKD (GFR <15 mL/min/1 73 square meters)  Note: GFR calculation is accurate only with a steady state creatinine    CBC and differential [[id number]]  (Abnormal) Collected: 05/21/21 1502    Lab Status: Final result Specimen: Blood from Arm, Left Updated: 05/21/21 1513     WBC 8 49 Thousand/uL      RBC 4 61 Million/uL      Hemoglobin 13 8 g/dL      Hematocrit 43 1 %      MCV 94 fL      MCH 29 9 pg      MCHC 32 0 g/dL      RDW 15 1 %      MPV 10 7 fL      Platelets 246 Thousands/uL      nRBC 0 /100 WBCs      Neutrophils Relative 76 %      Immat GRANS % 0 %      Lymphocytes Relative 18 %      Monocytes Relative 5 %      Eosinophils Relative 1 %      Basophils Relative 0 %      Neutrophils Absolute 6 40 Thousands/µL      Immature Grans Absolute 0 03 Thousand/uL      Lymphocytes Absolute 1 55 Thousands/µL      Monocytes Absolute 0 39 Thousand/µL      Eosinophils Absolute 0 10 Thousand/µL      Basophils Absolute 0 02 Thousands/µL                  XR chest 1 view portable   ED Interpretation by Dustin Benitez,  (05/21 1501)   No acute pathology                 Procedures  ECG 12 Lead Documentation Only    Date/Time: 5/21/2021 3:03 PM  Performed by: Humphrey Bhardwaj DO  Authorized by: Humphrey Bhardwaj DO     ECG reviewed by me, the ED Provider: yes    Patient location:  ED  Previous ECG:     Previous ECG:  Compared to current    Comparison ECG info:  3 27 2018    Similarity:  No change  Interpretation:     Interpretation: non-specific    Rate:     ECG rate:  79    ECG rate assessment: normal    Rhythm:     Rhythm: sinus rhythm    Ectopy:     Ectopy: none    QRS:     QRS axis:  Left    QRS intervals:  Normal  Conduction:     Conduction: normal    ST segments:     ST segments:  Non-specific  T waves:     T waves: non-specific    ECG 12 Lead Documentation Only    Date/Time: 5/21/2021 3:59 PM  Performed by: Humphrey Bhardwaj DO  Authorized by: Humphrey Bhardwaj DO     ECG reviewed by me, the ED Provider: yes    Patient location:  ED  Interpretation:     Interpretation: non-specific    Rate:     ECG rate:  76    ECG rate assessment: normal    Rhythm:     Rhythm: sinus rhythm    Ectopy:     Ectopy: none    QRS:     QRS axis:  Left    QRS intervals:  Normal  Conduction:     Conduction: normal    ST segments:     ST segments:  Non-specific  T waves:     T waves: non-specific    CriticalCare Time  Performed by: Humphrey Bhardwaj DO  Authorized by: Humphrey Bhardwaj DO     Critical care provider statement:     Critical care time (minutes):  35    Critical care time was exclusive of:  Separately billable procedures and treating other patients    Critical care was necessary to treat or prevent imminent or life-threatening deterioration of the following conditions: Acute coronary syndrome      Critical care was time spent personally by me on the following activities:  Obtaining history from patient or surrogate, development of treatment plan with patient or surrogate, discussions with consultants, evaluation of patient's response to treatment, examination of patient, ordering and performing treatments and interventions, ordering and review of laboratory studies, ordering and review of radiographic studies, re-evaluation of patient's condition and review of old charts             ED Course  ED Course as of May 21 1708   Fri May 21, 2021   1614 Found to have significantly elevated troponin, 2 6, added aspirin, add nitroglycerin, consult cardiology and started on heparin drip                HEART Risk Score      Most Recent Value   Heart Score Risk Calculator   History  1 Filed at: 05/21/2021 1615   ECG  1 Filed at: 05/21/2021 1615   Age  2 Filed at: 05/21/2021 1615   Risk Factors  1 Filed at: 05/21/2021 1615   Troponin  2 Filed at: 05/21/2021 1615   HEART Score  7 Filed at: 05/21/2021 1615                      SBIRT 20yo+      Most Recent Value   SBIRT (23 yo +)   In order to provide better care to our patients, we are screening all of our patients for alcohol and drug use  Would it be okay to ask you these screening questions? Unable to answer at this time Filed at: 05/21/2021 1507                    MDM  Number of Diagnoses or Management Options  Acute coronary syndrome [facility]): new and requires workup  Diagnosis management comments:       Initial ED assessment:  43-year-old female, central chest pain, constant for 2 hours  No diaphoresis no shortness of breath    Initial DDx includes but is not limited to:   Cardiac etiology, noncardiac etiology such as GI, musculoskeletal    Initial ED plan:   X-ray blood work EKG, disposition pending ED workup likely delta 3 hour troponins verses admission        Final ED summary/disposition:   After evaluation and workup in the emergency department, found to have elevated troponin, admitted to hospital for acute coronary syndrome, started on heparin drip, Cardiology consult           Amount and/or Complexity of Data Reviewed  Clinical lab tests: ordered and reviewed  Tests in the radiology section of CPT®: ordered and reviewed  Decide to obtain previous medical records or to obtain history from someone other than the patient: yes  Obtain history from someone other than the patient: yes  Review and summarize past medical records: yes  Discuss the patient with other providers: yes  Independent visualization of images, tracings, or specimens: yes        Disposition  Final diagnoses:   Acute coronary syndrome ([facility] [address] )     Time reflects when diagnosis was documented in both MDM as applicable and the Disposition within this note     Time User Action Codes Description Comment    5/21/2021  4:15 PM Pawel Mckee Add [I24 9] Acute coronary syndrome [facility])       ED Disposition     ED Disposition Condition Date/Time Comment    Admit Stable Fri May 21, 2021  4:15 PM Case was discussed with Dr Dex Martinez and the patient's admission status was agreed to be Admission Status: inpatient status to the service of Dr Dex Martinez   Follow-up Information    None         Patient's Medications   Discharge Prescriptions    No medications on file     No discharge procedures on file      PDMP Review     None          ED Provider  Electronically Signed by           Elaine Nicole DO  05/21/21 4210

## 2021-05-21 NOTE — ASSESSMENT & PLAN NOTE
· POA, presented with elevated troponin of 2 66 associated with chest pain  EKG without ischemic changes  BP elevated on admission at 231/102    Assessment: Consider non-MI troponin elevation secondary to hypertensive urgency vs ACS vs cardiomyopathy (consider hypertensive CM vs nonischemic vs secondary to COVID PNA)  PLAN  · Continue heparin gtt  · Continue ASA, beta blocker, and statin  · Trend troponins  · Cardiology consulted - appreciate input  Will get ECHO tomorrow

## 2021-05-21 NOTE — H&P
[address] 1955, 77 y o  female MRN: [id number]  Unit/Bed#: ED 27 Encounter: [id number]  Primary Care Provider: Alivia Glez MD   Date and time admitted to hospital: 5/21/2021  2:33 PM    * Elevated troponin I level  Assessment & Plan  · POA, presented with elevated troponin of 2 66 associated with chest pain  EKG without ischemic changes  BP elevated on admission at 231/102    Assessment: Consider non-MI troponin elevation secondary to hypertensive urgency vs ACS vs cardiomyopathy (consider hypertensive CM vs nonischemic vs secondary to COVID PNA)  PLAN  · Continue heparin gtt  · Continue ASA, beta blocker, and statin  · Trend troponins  · Cardiology consulted - appreciate input  Will get ECHO tomorrow  Hypertensive urgency  Assessment & Plan   POA, BP of 231/102 with chest pain and troponin elevation  On losartan-hctz 100mg-25mg, metoprolol 50mg  BP still elevated in outpatient setting  Good compliance per patient  Assessment: Poorly controlled BP in spite of 3 medications  Hypertensive urgency with symptoms of end organ damage (Troponin elevation with chest pain)  Essential HTN vs secondary HTN - however in the context of relatively normal lab work various causes of secondary HTN are unlikely  Can consider REGI +/- OHS with pulmonary HTN in obese patient    PLAN  · Continue losartan 100mg, HCTZ 25mg, and metoprolol 50mg daily  · Cardiology started amlodipine 5mg daily  · ECHO ordered for tomorrow  · If BP still not controlled, consider hydralazine PRN      Chest pain  Assessment & Plan   POA, heavy chest pressure sensation that radiated to neck and arms, 2x hours prior to admission    Assessment: Likely due to hypertensive urgency vs ACS   Improving with nitro paste and treatment of BP    PLAN  · Continue nitro paste  · Will add tylenol for pain       COVID-19  Assessment & Plan  H/o COVID-19 pneumonia in April 2021 - she did not require hospitalization but did get COVID antibody transfusion  GERD without esophagitis  Assessment & Plan  Stable    PLAN  · Continue PPI on formulary    VTE Prophylaxis: Heparin Drip  / sequential compression device   Code Status: level 1  POLST: POLST form is not discussed and not completed at this time  Anticipated Length of Stay:  Patient will be admitted on an Inpatient basis with an anticipated length of stay of  > 2 midnights  Justification for Hospital Stay: chest pain with concern for ACS    Chief Complaint:   Chest pain    History of Present Illness:    Reagan Dutton is a 77 y o  female with HTN, obesity, GERD, h/o COVID in 2021, who presents with acute onset chest pain  Patient said she was in her normal state of health until about 12:15 p m  On   Her and her  were about to go to the grocery store and she felt a sudden onset chest pressure  She felt like her chest and arms were very heavy, and also had some neck discomfort  She fell also felt incredibly fatigued  When she went home she called her primary care physician who recommended she be evaluated at the emergency department  Reports good compliance to her medications  She does say that her blood pressure has been persistently elevated often in the 180s in spite of medical management  She does not check her blood pressure at home  She says she has never been hospitalized for to high blood pressures  She follows a cardiologist outpatient, Dr Aleah Martin, since her sister  in her sleep from an arrhythmia in her 46s  On admission to the emergency department, blood pressure was significantly elevated in the 071 systolic  EKG did not show any ischemic changes  Troponin was elevated at 2 66  She was started on heparin drip and given aspirin and nitropaste for chest pressure  Cardiology evaluated at the bedside as well  Right now patient feels chest pressure is not as intense      Patient being admitted for ACS rule out     Review of Systems:    Review of Systems   Constitutional: Negative for chills and fever  HENT: Negative for congestion and rhinorrhea  Eyes: Negative for pain and visual disturbance  Respiratory: Positive for chest tightness  Negative for cough, shortness of breath and wheezing  Cardiovascular: Positive for chest pain  Negative for palpitations and leg swelling  Gastrointestinal: Negative for abdominal pain, constipation, diarrhea, nausea and vomiting  Endocrine: Negative for polydipsia and polyuria  Genitourinary: Negative for dysuria  Musculoskeletal: Positive for neck pain  Negative for back pain and neck stiffness  Skin: Negative for rash and wound  Allergic/Immunologic: Negative for environmental allergies and food allergies  Neurological: Negative for speech difficulty, weakness, light-headedness, numbness and headaches  Psychiatric/Behavioral: Negative for dysphoric mood  The patient is not nervous/anxious          Past Medical and Surgical History:     Past Medical History:   Diagnosis Date    Breast lump     Disease of thyroid gland     nodule, benign    GERD (gastroesophageal reflux disease)     History of transfusion     at 9years old for bleeding during tonsillectomy, no reaction    Hypertension     Rectocele     Uterine leiomyoma     Varicose veins of both lower extremities        Past Surgical History:   Procedure Laterality Date    BLADDER SURGERY  08/2012    Bladder "lift"    BREAST BIOPSY Right     benign    BREAST CYST EXCISION Right     1999 =    BREAST LUMPECTOMY  2000    COLONOSCOPY      HYSTERECTOMY  1990    KNEE SURGERY Right     x 2    NOSE SURGERY      MS LARYNGOSCOPY,DIRCT,OP SCOPE,BIOPSY Left 4/2/2018    Procedure: MICROLARYNGOSCOPY AND EXCISION OF LEFT VOCAL FOLD POLYP;  Surgeon: Mignon Batista MD;  Location: [facility];  Service: ENT    TONSILLECTOMY      TUBAL LIGATION      VARICOSE VEIN SURGERY Left 12/2009 Meds/Allergies:    Prior to Admission medications    Medication Sig Start Date End Date Taking? Authorizing Provider   aspirin 81 MG tablet Take 81 mg by mouth 7/10/12  Yes Historical Provider, MD   BIOTIN PO Take by mouth   Yes Historical Provider, MD   esomeprazole (NexIUM) 40 MG capsule Take 1 capsule (40 mg total) by mouth daily 3/25/21  Yes Uma Perdomo MD   Ginger, Zingiber officinalis, (GINGER ROOT PO) Take by mouth   Yes Historical Provider, MD   latanoprost (XALATAN) 0 005 % ophthalmic solution  1/13/20  Yes Historical Provider, MD   losartan-hydrochlorothiazide (HYZAAR) 100-25 MG per tablet Take 1 tablet by mouth daily 7/17/20  Yes Historical Provider, MD   metoprolol succinate (TOPROL-XL) 50 mg 24 hr tablet Take 1 tablet (50 mg total) by mouth daily 3/25/21  Yes Uma Perdomo MD   metroNIDAZOLE (METROGEL) 1 % gel Apply topically daily 2/17/21  Yes Amena Serrano MD   Multiple Vitamins-Minerals (VITAMIN D3 COMPLETE PO) Take by mouth   Yes Historical Provider, MD   multivitamin (THERAGRAN) TABS Take 1 tablet by mouth daily   Yes Historical Provider, MD   Sulfacetamide Sodium-Sulfur (Sulfacetamide Sod-Sulfur Wash) 9-4 5 % LIQD Apply topically twice a day 1/5/21  Yes Amena Serrano MD   albuterol (PROVENTIL HFA,VENTOLIN HFA) 90 mcg/act inhaler Inhale 2 puffs every 6 (six) hours as needed for wheezing or shortness of breath 1/20/20   Uma Perdomo MD   Ivermectin 1 % CREA Apply topically daily for 30 doses Apply topically to face once daily 2/10/21 4/12/21  Amena Serrano MD   naproxen sodium (ALEVE) 220 MG tablet Take 220 mg by mouth  5/21/21  Historical Provider, MD     I have reviewed home medications with patient personally  Allergies:    Allergies   Allergen Reactions    Lisinopril Other (See Comments)     Other reaction(s): swollen legs    Hydrocodone-Acetaminophen Dizziness    Irbesartan-Hydrochlorothiazide Other (See Comments)      Hypertension  Pt had hypertension, arm heaviness and headache       Social History:     Marital Status: /Civil Union   Occupation: not working currently  Patient Pre-hospital Living Situation: lives in a home with her   Patient Pre-hospital Level of Mobility: ambulates without assistance  Patient Pre-hospital Diet Restrictions: none  Substance Use History:   Social History     Substance and Sexual Activity   Alcohol Use Yes    Frequency: Never    Comment: rarely, twice per year / social drinker per allscript                  Social History     Tobacco Use   Smoking Status Former Smoker    Quit date:     Years since quittin 4   Smokeless Tobacco Never Used     Social History     Substance and Sexual Activity   Drug Use No       Family History:    Patient reports her sister  13 years ago in her 46s suddenly in her sleep  Autopsy suggested she  from an arrhythmia  Family History   Problem Relation Age of Onset   Gabi Sears Breast cancer Mother 77    Hypertension Mother     Varicose Veins Mother         of lower extremities    Hypertension Father     Coronary artery disease Father     Coronary artery disease Sister     Heart disease Sister     Varicose Veins Sister         of lower extremities    No Known Problems Daughter     Breast cancer Maternal Aunt 72    No Known Problems Maternal Aunt     No Known Problems Sister     No Known Problems Sister        Physical Exam:     Vitals:   Blood Pressure: (!) 181/81 (21 1710)  Pulse: 86 (21 1710)  Temperature: 98 5 °F (36 9 °C) (21 1437)  Temp Source: Oral (21 1437)  Respirations: 17 (21 1710)  Height: 5' 5" (165 1 cm) (21 1503)  Weight - Scale: 97 6 kg (215 lb 2 7 oz) (21 1503)  SpO2: 97 % (21 1710)    Physical Exam  Vitals signs and nursing note reviewed  Constitutional:       General: She is not in acute distress  Appearance: Normal appearance  She is well-developed   She is not ill-appearing, toxic-appearing or diaphoretic  HENT:      Head: Normocephalic and atraumatic  Right Ear: External ear normal       Left Ear: External ear normal       Nose: Nose normal    Eyes:      General: No scleral icterus  Right eye: No discharge  Left eye: No discharge  Conjunctiva/sclera: Conjunctivae normal    Neck:      Musculoskeletal: Normal range of motion and neck supple  Trachea: No tracheal deviation  Cardiovascular:      Rate and Rhythm: Normal rate and regular rhythm  Pulses: Normal pulses  Heart sounds: Normal heart sounds  No murmur  Pulmonary:      Effort: Pulmonary effort is normal  No respiratory distress  Breath sounds: Normal breath sounds  No stridor  No wheezing, rhonchi or rales  Abdominal:      General: Bowel sounds are normal  There is no distension  Palpations: Abdomen is soft  Tenderness: There is no abdominal tenderness  There is no guarding or rebound  Musculoskeletal:      Right lower leg: No edema  Left lower leg: No edema  Skin:     General: Skin is warm  Capillary Refill: Capillary refill takes less than 2 seconds  Findings: No rash  Neurological:      Mental Status: She is alert  Psychiatric:         Mood and Affect: Mood normal          Behavior: Behavior normal            Additional Data:     Lab Results: I have personally reviewed pertinent reports  Results from last 7 days   Lab Units 05/21/21  1502   WBC Thousand/uL 8 49   HEMOGLOBIN g/dL 13 8   HEMATOCRIT % 43 1   PLATELETS Thousands/uL 177   NEUTROS PCT % 76*   LYMPHS PCT % 18   MONOS PCT % 5   EOS PCT % 1     Results from last 7 days   Lab Units 05/21/21  1502   POTASSIUM mmol/L 4 8   CHLORIDE mmol/L 106   CO2 mmol/L 28   BUN mg/dL 20   CREATININE mg/dL 0 92   CALCIUM mg/dL 9 4   ALK PHOS U/L 75   ALT U/L 48   AST U/L 42           Imaging: I have personally reviewed pertinent reports  No results found      EKG, Pathology, and Other Studies Reviewed on Admission:   · EKG: NSR, no ischemic changes, 85 bpm    Epic / Care Everywhere Records Reviewed: Yes     ** Please Note: This note has been constructed using a voice recognition system   **

## 2021-05-21 NOTE — ASSESSMENT & PLAN NOTE
H/o COVID-19 pneumonia in April 2021 - she did not require hospitalization but did get COVID antibody transfusion

## 2021-05-21 NOTE — ASSESSMENT & PLAN NOTE
 POA, heavy chest pressure sensation that radiated to neck and arms, 2x hours prior to admission    Assessment: Likely due to hypertensive urgency vs ACS   Improving with nitro paste and treatment of BP    PLAN  · Continue nitro paste  · Will add tylenol for pain

## 2021-05-22 ENCOUNTER — APPOINTMENT (INPATIENT)
Dept: NON INVASIVE DIAGNOSTICS | Facility: HOSPITAL | Age: 66
DRG: 287 | End: 2021-05-22
Payer: MEDICARE

## 2021-05-22 LAB
ANION GAP SERPL CALCULATED.3IONS-SCNC: 12 MMOL/L (ref 4–13)
APTT PPP: 54 SECONDS (ref 23–37)
APTT PPP: 54 SECONDS (ref 23–37)
APTT PPP: 56 SECONDS (ref 23–37)
APTT PPP: 94 SECONDS (ref 23–37)
BASOPHILS # BLD AUTO: 0.02 THOUSANDS/ΜL (ref 0–0.1)
BASOPHILS NFR BLD AUTO: 0 % (ref 0–1)
BUN SERPL-MCNC: 18 MG/DL (ref 5–25)
CALCIUM SERPL-MCNC: 9.3 MG/DL (ref 8.3–10.1)
CHLORIDE SERPL-SCNC: 106 MMOL/L (ref 100–108)
CHOLEST SERPL-MCNC: 245 MG/DL (ref 50–200)
CO2 SERPL-SCNC: 25 MMOL/L (ref 21–32)
CREAT SERPL-MCNC: 0.76 MG/DL (ref 0.6–1.3)
EOSINOPHIL # BLD AUTO: 0.16 THOUSAND/ΜL (ref 0–0.61)
EOSINOPHIL NFR BLD AUTO: 2 % (ref 0–6)
ERYTHROCYTE [DISTWIDTH] IN BLOOD BY AUTOMATED COUNT: 15.2 % (ref 11.6–15.1)
EST. AVERAGE GLUCOSE BLD GHB EST-MCNC: 114 MG/DL
GFR SERPL CREATININE-BSD FRML MDRD: 81 ML/MIN/1.73SQ M
GLUCOSE SERPL-MCNC: 110 MG/DL (ref 65–140)
HBA1C MFR BLD: 5.6 %
HCT VFR BLD AUTO: 41.4 % (ref 34.8–46.1)
HDLC SERPL-MCNC: 54 MG/DL
HGB BLD-MCNC: 13.1 G/DL (ref 11.5–15.4)
IMM GRANULOCYTES # BLD AUTO: 0.02 THOUSAND/UL (ref 0–0.2)
IMM GRANULOCYTES NFR BLD AUTO: 0 % (ref 0–2)
LDLC SERPL CALC-MCNC: 167 MG/DL (ref 0–100)
LYMPHOCYTES # BLD AUTO: 2.71 THOUSANDS/ΜL (ref 0.6–4.47)
LYMPHOCYTES NFR BLD AUTO: 37 % (ref 14–44)
MCH RBC QN AUTO: 29.8 PG (ref 26.8–34.3)
MCHC RBC AUTO-ENTMCNC: 31.6 G/DL (ref 31.4–37.4)
MCV RBC AUTO: 94 FL (ref 82–98)
MONOCYTES # BLD AUTO: 0.35 THOUSAND/ΜL (ref 0.17–1.22)
MONOCYTES NFR BLD AUTO: 5 % (ref 4–12)
NEUTROPHILS # BLD AUTO: 4.14 THOUSANDS/ΜL (ref 1.85–7.62)
NEUTS SEG NFR BLD AUTO: 56 % (ref 43–75)
NRBC BLD AUTO-RTO: 0 /100 WBCS
PLATELET # BLD AUTO: 169 THOUSANDS/UL (ref 149–390)
PMV BLD AUTO: 11.1 FL (ref 8.9–12.7)
POTASSIUM SERPL-SCNC: 3.5 MMOL/L (ref 3.5–5.3)
RBC # BLD AUTO: 4.39 MILLION/UL (ref 3.81–5.12)
SODIUM SERPL-SCNC: 143 MMOL/L (ref 136–145)
TRIGL SERPL-MCNC: 121 MG/DL
TROPONIN I SERPL-MCNC: 3.39 NG/ML
WBC # BLD AUTO: 7.4 THOUSAND/UL (ref 4.31–10.16)

## 2021-05-22 PROCEDURE — 80048 BASIC METABOLIC PNL TOTAL CA: CPT | Performed by: FAMILY MEDICINE

## 2021-05-22 PROCEDURE — 99232 SBSQ HOSP IP/OBS MODERATE 35: CPT | Performed by: HOSPITALIST

## 2021-05-22 PROCEDURE — 93306 TTE W/DOPPLER COMPLETE: CPT

## 2021-05-22 PROCEDURE — 85730 THROMBOPLASTIN TIME PARTIAL: CPT | Performed by: INTERNAL MEDICINE

## 2021-05-22 PROCEDURE — 83036 HEMOGLOBIN GLYCOSYLATED A1C: CPT | Performed by: FAMILY MEDICINE

## 2021-05-22 PROCEDURE — 84484 ASSAY OF TROPONIN QUANT: CPT | Performed by: FAMILY MEDICINE

## 2021-05-22 PROCEDURE — 80061 LIPID PANEL: CPT | Performed by: FAMILY MEDICINE

## 2021-05-22 PROCEDURE — 85025 COMPLETE CBC W/AUTO DIFF WBC: CPT | Performed by: FAMILY MEDICINE

## 2021-05-22 PROCEDURE — 85730 THROMBOPLASTIN TIME PARTIAL: CPT | Performed by: HOSPITALIST

## 2021-05-22 PROCEDURE — 99232 SBSQ HOSP IP/OBS MODERATE 35: CPT | Performed by: INTERNAL MEDICINE

## 2021-05-22 RX ORDER — LANOLIN ALCOHOL/MO/W.PET/CERES
3 CREAM (GRAM) TOPICAL
Status: DISCONTINUED | OUTPATIENT
Start: 2021-05-22 | End: 2021-05-24 | Stop reason: HOSPADM

## 2021-05-22 RX ORDER — METOPROLOL SUCCINATE 100 MG/1
100 TABLET, EXTENDED RELEASE ORAL DAILY
Status: DISCONTINUED | OUTPATIENT
Start: 2021-05-22 | End: 2021-05-24 | Stop reason: HOSPADM

## 2021-05-22 RX ADMIN — AMLODIPINE BESYLATE 5 MG: 5 TABLET ORAL at 08:10

## 2021-05-22 RX ADMIN — ASPIRIN 81 MG: 81 TABLET, CHEWABLE ORAL at 08:10

## 2021-05-22 RX ADMIN — PANTOPRAZOLE SODIUM 40 MG: 40 TABLET, DELAYED RELEASE ORAL at 04:38

## 2021-05-22 RX ADMIN — ACETAMINOPHEN 650 MG: 325 TABLET, FILM COATED ORAL at 08:10

## 2021-05-22 RX ADMIN — ATORVASTATIN CALCIUM 40 MG: 40 TABLET, FILM COATED ORAL at 17:09

## 2021-05-22 RX ADMIN — HEPARIN SODIUM 2000 UNITS: 1000 INJECTION INTRAVENOUS; SUBCUTANEOUS at 14:41

## 2021-05-22 RX ADMIN — HEPARIN SODIUM 13.1 UNITS/KG/HR: 10000 INJECTION, SOLUTION INTRAVENOUS at 17:09

## 2021-05-22 RX ADMIN — ACETAMINOPHEN 650 MG: 325 TABLET, FILM COATED ORAL at 21:39

## 2021-05-22 RX ADMIN — HYDROCHLOROTHIAZIDE 25 MG: 25 TABLET ORAL at 08:10

## 2021-05-22 RX ADMIN — NITROGLYCERIN 1 INCH: 20 OINTMENT TOPICAL at 04:39

## 2021-05-22 RX ADMIN — HEPARIN SODIUM 2000 UNITS: 1000 INJECTION INTRAVENOUS; SUBCUTANEOUS at 01:39

## 2021-05-22 RX ADMIN — METOPROLOL SUCCINATE 100 MG: 100 TABLET, EXTENDED RELEASE ORAL at 08:10

## 2021-05-22 RX ADMIN — NITROGLYCERIN 1 INCH: 20 OINTMENT TOPICAL at 21:36

## 2021-05-22 RX ADMIN — LATANOPROST 1 DROP: 50 SOLUTION OPHTHALMIC at 21:40

## 2021-05-22 RX ADMIN — Medication 3 MG: at 21:39

## 2021-05-22 RX ADMIN — HEPARIN SODIUM 2000 UNITS: 1000 INJECTION INTRAVENOUS; SUBCUTANEOUS at 19:40

## 2021-05-22 RX ADMIN — LOSARTAN POTASSIUM 100 MG: 50 TABLET, FILM COATED ORAL at 08:10

## 2021-05-22 NOTE — PROGRESS NOTES
Cardiology Service Progress Note - Elba Bates 77 y o  female MRN: [id number]    Unit/Bed#: S -01 Encounter: [id number]      Assessment:    Principal Problem:    Elevated troponin I level  Active Problems:    GERD without esophagitis    Chest pain    COVID-19    Hypertensive urgency      Plan:  Troponin elevation, likely nonischemic  Awaiting results of echocardiogram, EKG does not suggest ischemia  Possibly related to severe hypertension and or COVID-19 cardiomyopathy     Improved blood pressure control, continue current medications we will increase the beta-blocker  Continue heparin for the time being  Consideration for catheterization on Monday pending the results of the echocardiogram   Favor no dual antiplatelet therapy at the present time      Subjective:   Patient seen and examined  No significant events overnight   negative  Ambulatory in the room  No symptoms    Objective:     Vitals: Blood pressure 147/76, pulse 80, temperature 97 6 °F (36 4 °C), temperature source Oral, resp  rate 18, height 5' 5" (1 651 m), weight 97 6 kg (215 lb 2 7 oz), SpO2 97 %  , Body mass index is 35 81 kg/m² , No intake/output data recorded  No intake/output data recorded  Wt Readings from Last 3 Encounters:   05/21/21 97 6 kg (215 lb 2 7 oz)   04/12/21 97 1 kg (214 lb)   04/05/21 97 3 kg (214 lb 9 6 oz)     No intake or output data in the 24 hours ending 05/22/21 0742  No intake/output data recorded  No significant arrhythmias seen on telemetry review  Normal sinus rhythm, no tachyarrhythmias or bradyarrhythmias        Physical Exam:    General appearance: alert and oriented, in no acute distress  Neck: no carotid bruit and no JVD  Lungs: clear to auscultation bilaterally  Heart: regular rate and rhythm, S1, S2 normal, no murmur, click, rub or gallop  Extremities: extremities normal, warm and well-perfused; no cyanosis, clubbing, or edema  Pulses: 2+ and symmetric      Current Facility-Administered Medications:     acetaminophen (TYLENOL) tablet 650 mg, 650 mg, Oral, Q6H PRN, Phyliss Peals, DO, 650 mg at 05/21/21 2327    albuterol (PROVENTIL HFA,VENTOLIN HFA) inhaler 2 puff, 2 puff, Inhalation, Q6H PRN, Phyliss Peals, DO    amLODIPine (NORVASC) tablet 5 mg, 5 mg, Oral, Daily, Phyliss Peals, DO, 5 mg at 05/21/21 1713    aspirin chewable tablet 81 mg, 81 mg, Oral, Daily, Phyliss Peals, DO    atorvastatin (LIPITOR) tablet 40 mg, 40 mg, Oral, Daily With Myranahid Cash, DO, 40 mg at 05/21/21 1713    heparin (porcine) 25,000 units in 0 45% NaCl 250 mL infusion (premix), 3-20 Units/kg/hr (Order-Specific), Intravenous, Titrated, Phyliss Peals, DO, Last Rate: 11 8 mL/hr at 05/22/21 0139, 13 1 Units/kg/hr at 05/22/21 0139    heparin (porcine) injection 2,000 Units, 2,000 Units, Intravenous, Q1H PRN, Phyliss Peals, DO, 2,000 Units at 05/22/21 0139    heparin (porcine) injection 4,000 Units, 4,000 Units, Intravenous, Q1H PRN, Phyliss Peals, DO    hydrALAZINE (APRESOLINE) injection 5 mg, 5 mg, Intravenous, Q6H PRN, Bentley Muller MD    hydrochlorothiazide (HYDRODIURIL) tablet 25 mg, 25 mg, Oral, Daily, Phyliss Peals, DO    latanoprost (XALATAN) 0 005 % ophthalmic solution 1 drop, 1 drop, Both Eyes, HS, Phyliss Peals, DO, 1 drop at 05/21/21 2112    losartan (COZAAR) tablet 100 mg, 100 mg, Oral, Daily, Phyliss Peals, DO, 100 mg at 05/21/21 1713    metoprolol succinate (TOPROL-XL) 24 hr tablet 50 mg, 50 mg, Oral, Daily, Phyliss Peals, DO, 50 mg at 05/21/21 1713    nitroglycerin (NITRO-BID) 2 % TD ointment 1 inch, 1 inch, Topical, Q6H, Phyliss Peals, DO, 1 inch at 05/22/21 0439    pantoprazole (PROTONIX) EC tablet 40 mg, 40 mg, Oral, Early Morning, Phyliss Peals, DO, 40 mg at 05/22/21 0438    Labs & Results:    Results from last 7 days   Lab Units 05/22/21  0448 05/21/21  2322 05/21/21 2001   TROPONIN I ng/mL 3 39* 9 48* 13 16*     Results from last 7 days   Lab Units 05/22/21  0448 05/21/21  1502   WBC Thousand/uL 7 40 8 49   HEMOGLOBIN g/dL 13 1 13 8   HEMATOCRIT % 41 4 43 1   PLATELETS Thousands/uL 169 177         Results from last 7 days   Lab Units 05/22/21  0448 05/21/21  1502   POTASSIUM mmol/L 3 5 4 8   CHLORIDE mmol/L 106 106   CO2 mmol/L 25 28   BUN mg/dL 18 20   CREATININE mg/dL 0 76 0 92   CALCIUM mg/dL 9 3 9 4   ALK PHOS U/L  --  75   ALT U/L  --  48   AST U/L  --  42     Results from last 7 days   Lab Units 05/21/21  1604   INR  1 06       Chest X-Ray is obtained; result -    Echo        EKG personally reviewed by Jenelle Jones MD      Counseling / Coordination of Care  Total floor / unit time spent today 15 minutes  Greater than 50% of total time was spent with the patient and / or family counseling and / or coordination of care  A description of the counseling / coordination of care: Chencho Chavez Thank you for the opportunity to participate in the care of this patient  L   [address] [facility] Kristy Eng MD   05/22/21   7:42 AM

## 2021-05-22 NOTE — PLAN OF CARE
Problem: Potential for Falls  Goal: Patient will remain free of falls  Description: INTERVENTIONS:  - Assess patient frequently for physical needs  -  Identify cognitive and physical deficits and behaviors that affect risk of falls    -  [place] fall precautions as indicated by assessment   - Educate patient/family on patient safety including physical limitations  - Instruct patient to call for assistance with activity based on assessment  - Modify environment to reduce risk of injury  - Consider OT/PT consult to assist with strengthening/mobility  Outcome: Progressing     Problem: CARDIOVASCULAR - ADULT  Goal: Maintains optimal cardiac output and hemodynamic stability  Description: INTERVENTIONS:  - Monitor I/O, vital signs and rhythm  - Monitor for S/S and trends of decreased cardiac output  - Administer and titrate ordered vasoactive medications to optimize hemodynamic stability  - Assess quality of pulses, skin color and temperature  - Assess for signs of decreased coronary artery perfusion  - Instruct patient to report change in severity of symptoms  Outcome: Progressing  Goal: Absence of cardiac dysrhythmias or at baseline rhythm  Description: INTERVENTIONS:  - Continuous cardiac monitoring, vital signs, obtain 12 lead EKG if ordered  - Administer antiarrhythmic and heart rate control medications as ordered  - Monitor electrolytes and administer replacement therapy as ordered  Outcome: Progressing     Problem: METABOLIC, FLUID AND ELECTROLYTES - ADULT  Goal: Electrolytes maintained within normal limits  Description: INTERVENTIONS:  - Monitor labs and assess patient for signs and symptoms of electrolyte imbalances  - Administer electrolyte replacement as ordered  - Monitor response to electrolyte replacements, including repeat lab results as appropriate  - Instruct patient on fluid and nutrition as appropriate  Outcome: Progressing     Problem: HEMATOLOGIC - ADULT  Goal: Maintains hematologic stability  Description: INTERVENTIONS  - Assess for signs and symptoms of bleeding or hemorrhage  - Monitor labs  - Administer supportive blood products/factors as ordered and appropriate  Outcome: Progressing     Problem: PAIN - ADULT  Goal: Verbalizes/displays adequate comfort level or baseline comfort level  Description: Interventions:  - Encourage patient to monitor pain and request assistance  - Assess pain using appropriate pain scale  - Administer analgesics based on type and severity of pain and evaluate response  - Implement non-pharmacological measures as appropriate and evaluate response  - Consider cultural and social influences on pain and pain management  - Notify physician/advanced practitioner if interventions unsuccessful or patient reports new pain  Outcome: Progressing

## 2021-05-22 NOTE — PROGRESS NOTES
[facility]  Progress Note - Cayla Kathleenr 1955, 77 y o  female MRN: [id number]  Unit/Bed#: S -01 Encounter: [id number]  Primary Care Provider: Zachariah Hendrickson MD   Date and time admitted to hospital: 5/21/2021  2:33 PM    * Elevated troponin I level  Assessment & Plan  · POA, presented with elevated troponin of 2 66 associated with chest pain  EKG without ischemic changes  BP elevated on admission at 231/102  Troponin peaked on 5/21 @ 8PM at 13  16  Assessment: Consider non-MI troponin elevation secondary to hypertensive urgency vs ACS vs cardiomyopathy (consider hypertensive CM vs nonischemic vs myocarditis secondary to COVID PNA)  Clinically stable and no longer having chest pain  BP much better controlled     PLAN  · Continue heparin gtt  · Continue ASA, beta blocker, and statin  · Will stop trending troponins  · Cardiology consulted - appreciate input  · F/u Results of ECHO  · Cath lab planned for Monday - make NPO at midnight on Sunday      Hypertensive urgency  Assessment & Plan   POA, BP of 231/102 with chest pain and troponin elevation  On losartan-hctz 100mg-25mg, metoprolol 50mg  BP still elevated in outpatient setting  Good compliance per patient  Assessment: Poorly controlled BP in spite of 3 medications  Hypertensive urgency with symptoms of end organ damage (Troponin elevation with chest pain)  Essential HTN vs secondary HTN - however in the context of relatively normal lab work various causes of secondary HTN are unlikely   Can consider REGI +/- OHS with pulmonary HTN in obese patient  On 5/22- BP much better controlled, ranging from 114-378 systolic    PLAN  · Continue losartan 100mg, HCTZ 25mg, and metoprolol 50mg daily  · Cardiology started amlodipine 5mg daily - continue  · ECHO ordered for today  · If BP still not controlled, consider hydralazine PRN      Chest pain  Assessment & Plan   POA, heavy chest pressure sensation that radiated to neck and arms, 2x hours prior to admission    Assessment: Likely due to hypertensive urgency vs ACS  Resolved with nitro paste and treatment of BP    PLAN  · D/c nitro paste - having a headache  · Continue tylenol for pain       COVID-19  Assessment & Plan  H/o COVID-19 pneumonia in 2021 - she did not require hospitalization but did get COVID antibody transfusion  GERD without esophagitis  Assessment & Plan  Stable    PLAN  · Continue PPI on formulary    VTE Pharmacologic Prophylaxis:   Pharmacologic: Heparin Drip  Mechanical VTE Prophylaxis in Place: No    Discussions with Specialists or Other Care Team Provider: Discussed with the attending, nursing, cardiology  Discussed with cardiology that she will remain inpatient at least through Monday for cath lab  Will continue heparin drip    Education and Discussions with Family / Patient: discussed plan with patient and the bedside and with  over the phone  Discussed that troponin was elevated very high yesterday evening but is going down now, which is reassuring  We are controlling her BP with medications  We will get ECHO today and she will remain inpatient at least until CATH on Monday  They understand and agree with the plan, all questions answered  Current Length of Stay: 1 day(s)    Current Patient Status: Inpatient     Discharge Plan / Estimated Discharge Date: TBD - needs to remain inpatient until cath lab on Monday    Code Status: Level 1 - Full Code      Subjective:   Feels well  Denies any further chest pain  Denies shortness of breath  Denies leg swelling  Denies fevers, chills, abdominal pain, nausea, vomiting, diarrhea, constipation  Objective:     Vitals:   Temp (24hrs), Av 9 °F (36 6 °C), Min:97 6 °F (36 4 °C), Max:98 5 °F (36 9 °C)    Temp:  [97 6 °F (36 4 °C)-98 5 °F (36 9 °C)] 97 6 °F (36 4 °C)  HR:  [74-86] 80  Resp:  [17-18] 18  BP: (128-231)/() 147/76  SpO2:  [93 %-98 %] 97 %  Body mass index is 34 81 kg/m²       Input and Output Summary (last 24 hours):     No intake or output data in the 24 hours ending 05/22/21 1057    Physical Exam:     Physical Exam  Vitals signs and nursing note reviewed  Constitutional:       General: She is not in acute distress  Appearance: She is well-developed  She is not ill-appearing, toxic-appearing or diaphoretic  HENT:      Head: Normocephalic and atraumatic  Right Ear: External ear normal       Left Ear: External ear normal       Nose: Nose normal    Eyes:      General: No scleral icterus  Right eye: No discharge  Left eye: No discharge  Conjunctiva/sclera: Conjunctivae normal    Neck:      Musculoskeletal: Normal range of motion and neck supple  Trachea: No tracheal deviation  Cardiovascular:      Rate and Rhythm: Normal rate and regular rhythm  Pulses: Normal pulses  Heart sounds: Normal heart sounds  No murmur  Pulmonary:      Effort: Pulmonary effort is normal  No respiratory distress  Breath sounds: Normal breath sounds  No stridor  No wheezing or rales  Chest:      Chest wall: No tenderness  Abdominal:      General: Bowel sounds are normal  There is no distension  Palpations: Abdomen is soft  Tenderness: There is no abdominal tenderness  There is no guarding or rebound  Musculoskeletal:      Right lower leg: No edema  Left lower leg: No edema  Skin:     General: Skin is warm  Capillary Refill: Capillary refill takes less than 2 seconds  Findings: No rash  Neurological:      Mental Status: She is alert     Psychiatric:         Mood and Affect: Mood normal          Behavior: Behavior normal            Additional Data:     Labs:    Results from last 7 days   Lab Units 05/22/21  0448   WBC Thousand/uL 7 40   HEMOGLOBIN g/dL 13 1   HEMATOCRIT % 41 4   PLATELETS Thousands/uL 169   NEUTROS PCT % 56   LYMPHS PCT % 37   MONOS PCT % 5   EOS PCT % 2     Results from last 7 days   Lab Units 05/22/21 0446 05/21/21  1502   POTASSIUM mmol/L 3 5 4 8   CHLORIDE mmol/L 106 106   CO2 mmol/L 25 28   BUN mg/dL 18 20   CREATININE mg/dL 0 76 0 92   CALCIUM mg/dL 9 3 9 4   ALK PHOS U/L  --  75   ALT U/L  --  48   AST U/L  --  42     Results from last 7 days   Lab Units 05/21/21  1604   INR  1 06       * I Have Reviewed All Lab Data Listed Above  * Additional Pertinent Lab Tests Reviewed: All [place] Admission Reviewed    Imaging:    Imaging Reports Reviewed Today Include: CXR  Imaging Personally Reviewed by Myself Includes:  CXR - no pleural effusions  Cardiac silhouette normal      Recent Cultures (last 7 days):           Last 24 Hours Medication List:   Current Facility-Administered Medications   Medication Dose Route Frequency Provider Last Rate    acetaminophen  650 mg Oral Q6H PRN Karly Awe, DO      albuterol  2 puff Inhalation Q6H PRN Karly Awe, DO      amLODIPine  5 mg Oral Daily Karly Awe, DO      aspirin  81 mg Oral Daily Karly Awe, DO      atorvastatin  40 mg Oral Daily With [employer], DO      heparin (porcine)  3-20 Units/kg/hr (Order-Specific) Intravenous Titrated Karly Awadela, DO 11 1 Units/kg/hr (05/22/21 0759)    heparin (porcine)  2,000 Units Intravenous Q1H PRN Karly Awe, DO      heparin (porcine)  4,000 Units Intravenous Q1H PRN Karly Awe, DO      hydrALAZINE  5 mg Intravenous Q6H PRN Kylah Quevedo MD      hydrochlorothiazide  25 mg Oral Daily Karly Awe, DO      latanoprost  1 drop Both Eyes HS Karly Awe, DO      losartan  100 mg Oral Daily Karly Awe, DO      metoprolol succinate  100 mg Oral Daily Tristan Galloway MD      nitroglycerin  1 inch Topical Q6H Karly Awe, DO      pantoprazole  40 mg Oral Early Morning Karlypeace Chou, DO          Today, Patient Was Seen By: Karly Chou DO    ** Please Note: This note has been constructed using a voice recognition system   **

## 2021-05-22 NOTE — ASSESSMENT & PLAN NOTE
 POA, BP of 231/102 with chest pain and troponin elevation  On losartan-hctz 100mg-25mg, metoprolol 50mg  BP still elevated in outpatient setting  Good compliance per patient  Assessment: Poorly controlled BP in spite of 3 medications  Hypertensive urgency with symptoms of end organ damage (Troponin elevation with chest pain)  Essential HTN vs secondary HTN - however in the context of relatively normal lab work various causes of secondary HTN are unlikely   Can consider REGI +/- OHS with pulmonary HTN in obese patient  On 5/22- BP much better controlled, ranging from 315-126 systolic    PLAN  · Continue losartan 100mg, HCTZ 25mg, and metoprolol 50mg daily  · Cardiology started amlodipine 5mg daily - continue  · ECHO ordered for today  · If BP still not controlled, consider hydralazine PRN

## 2021-05-22 NOTE — ASSESSMENT & PLAN NOTE
· POA, presented with elevated troponin of 2 66 associated with chest pain  EKG without ischemic changes  BP elevated on admission at 231/102  Troponin peaked on 5/21 @ 8PM at 13  16  Assessment: Consider non-MI troponin elevation secondary to hypertensive urgency vs ACS vs cardiomyopathy (consider hypertensive CM vs nonischemic vs myocarditis secondary to COVID PNA)  Clinically stable and no longer having chest pain   BP much better controlled     PLAN  · Continue heparin gtt  · Continue ASA, beta blocker, and statin  · Will stop trending troponins  · Cardiology consulted - appreciate input  · F/u Results of ECHO  · Cath lab planned for Monday - make NPO at midnight on Sunday

## 2021-05-22 NOTE — ASSESSMENT & PLAN NOTE
 POA, heavy chest pressure sensation that radiated to neck and arms, 2x hours prior to admission    Assessment: Likely due to hypertensive urgency vs ACS   Resolved with nitro paste and treatment of BP    PLAN  · D/c nitro paste - having a headache  · Continue tylenol for pain

## 2021-05-23 LAB
ANION GAP SERPL CALCULATED.3IONS-SCNC: 11 MMOL/L (ref 4–13)
APTT PPP: 57 SECONDS (ref 23–37)
APTT PPP: 85 SECONDS (ref 23–37)
APTT PPP: 95 SECONDS (ref 23–37)
ATRIAL RATE: 83 BPM
BUN SERPL-MCNC: 24 MG/DL (ref 5–25)
CALCIUM SERPL-MCNC: 9.1 MG/DL (ref 8.3–10.1)
CHLORIDE SERPL-SCNC: 106 MMOL/L (ref 100–108)
CO2 SERPL-SCNC: 22 MMOL/L (ref 21–32)
CREAT SERPL-MCNC: 0.77 MG/DL (ref 0.6–1.3)
GFR SERPL CREATININE-BSD FRML MDRD: 80 ML/MIN/1.73SQ M
GLUCOSE SERPL-MCNC: 145 MG/DL (ref 65–140)
P AXIS: 42 DEGREES
POTASSIUM SERPL-SCNC: 3.4 MMOL/L (ref 3.5–5.3)
PR INTERVAL: 162 MS
QRS AXIS: 5 DEGREES
QRSD INTERVAL: 86 MS
QT INTERVAL: 402 MS
QTC INTERVAL: 472 MS
SODIUM SERPL-SCNC: 139 MMOL/L (ref 136–145)
T WAVE AXIS: 27 DEGREES
VENTRICULAR RATE: 83 BPM

## 2021-05-23 PROCEDURE — 85730 THROMBOPLASTIN TIME PARTIAL: CPT | Performed by: HOSPITALIST

## 2021-05-23 PROCEDURE — 80048 BASIC METABOLIC PNL TOTAL CA: CPT | Performed by: HOSPITALIST

## 2021-05-23 PROCEDURE — 99232 SBSQ HOSP IP/OBS MODERATE 35: CPT | Performed by: INTERNAL MEDICINE

## 2021-05-23 PROCEDURE — 99232 SBSQ HOSP IP/OBS MODERATE 35: CPT | Performed by: HOSPITALIST

## 2021-05-23 PROCEDURE — 93306 TTE W/DOPPLER COMPLETE: CPT | Performed by: INTERNAL MEDICINE

## 2021-05-23 PROCEDURE — 93010 ELECTROCARDIOGRAM REPORT: CPT | Performed by: INTERNAL MEDICINE

## 2021-05-23 RX ORDER — POTASSIUM CHLORIDE 20 MEQ/1
20 TABLET, EXTENDED RELEASE ORAL ONCE
Status: COMPLETED | OUTPATIENT
Start: 2021-05-23 | End: 2021-05-23

## 2021-05-23 RX ORDER — ASPIRIN 81 MG/1
81 TABLET, CHEWABLE ORAL DAILY
Status: DISCONTINUED | OUTPATIENT
Start: 2021-05-25 | End: 2021-05-24 | Stop reason: HOSPADM

## 2021-05-23 RX ADMIN — Medication 3 MG: at 22:57

## 2021-05-23 RX ADMIN — HYDROCHLOROTHIAZIDE 25 MG: 25 TABLET ORAL at 09:01

## 2021-05-23 RX ADMIN — ATORVASTATIN CALCIUM 40 MG: 40 TABLET, FILM COATED ORAL at 17:22

## 2021-05-23 RX ADMIN — LOSARTAN POTASSIUM 100 MG: 50 TABLET, FILM COATED ORAL at 09:01

## 2021-05-23 RX ADMIN — PANTOPRAZOLE SODIUM 40 MG: 40 TABLET, DELAYED RELEASE ORAL at 07:08

## 2021-05-23 RX ADMIN — POTASSIUM CHLORIDE 20 MEQ: 1500 TABLET, EXTENDED RELEASE ORAL at 09:01

## 2021-05-23 RX ADMIN — ASPIRIN 81 MG: 81 TABLET, CHEWABLE ORAL at 09:01

## 2021-05-23 RX ADMIN — HEPARIN SODIUM 15.1 UNITS/KG/HR: 10000 INJECTION, SOLUTION INTRAVENOUS at 13:41

## 2021-05-23 RX ADMIN — HEPARIN SODIUM 2000 UNITS: 1000 INJECTION INTRAVENOUS; SUBCUTANEOUS at 11:27

## 2021-05-23 RX ADMIN — ACETAMINOPHEN 650 MG: 325 TABLET, FILM COATED ORAL at 07:08

## 2021-05-23 RX ADMIN — METOPROLOL SUCCINATE 100 MG: 100 TABLET, EXTENDED RELEASE ORAL at 09:01

## 2021-05-23 RX ADMIN — AMLODIPINE BESYLATE 5 MG: 5 TABLET ORAL at 09:01

## 2021-05-23 RX ADMIN — LATANOPROST 1 DROP: 50 SOLUTION OPHTHALMIC at 22:58

## 2021-05-23 NOTE — PROGRESS NOTES
[facility]  Progress Note - Claire Peralta 1955, 77 y o  female MRN: [id number]  Unit/Bed#: S -01 Encounter: [id number]  Primary Care Provider: Eulogio Chaudhary MD   Date and time admitted to hospital: 5/21/2021  2:33 PM    * Elevated troponin I level  Assessment & Plan  · POA, presented with elevated troponin of 2 66 associated with chest pain  EKG without ischemic changes  BP elevated on admission at 231/102  Troponin peaked on 5/21 @ 8PM at 13  16  Assessment: Consider non-MI troponin elevation secondary to hypertensive urgency vs ACS vs cardiomyopathy (consider hypertensive CM vs nonischemic vs myocarditis secondary to COVID PNA)  Clinically stable and no longer having chest pain  BP much better controlled     PLAN  · Continue heparin gtt  · Continue ASA, beta blocker, and statin  · Will stop trending troponins  · Cardiology consulted - appreciate input  · F/u Results of ECHO, inconclusive evidence of any wall motion abnormalities  · Cath lab planned for Monday - make NPO at midnight on Sunday      Hypertensive urgency  Assessment & Plan   POA, BP of 231/102 with chest pain and troponin elevation  On losartan-hctz 100mg-25mg, metoprolol 50mg  BP still elevated in outpatient setting  Good compliance per patient  Assessment: Poorly controlled BP in spite of 3 medications  Hypertensive urgency with symptoms of end organ damage (Troponin elevation with chest pain)  Essential HTN vs secondary HTN - however in the context of relatively normal lab work various causes of secondary HTN are unlikely   Can consider REGI +/- OHS with pulmonary HTN in obese patient  BP continues to improve now on current regimen    PLAN  · Continue losartan 100mg, HCTZ 25mg, and metoprolol 50mg daily  · Cardiology started amlodipine 5mg daily - continue  · If BP still not controlled, consider hydralazine PRN    COVID-19  Assessment & Plan  H/o COVID-19 pneumonia in April 2021 - she did not require hospitalization but did get COVID antibody transfusion  Chest pain  Assessment & Plan   POA, heavy chest pressure sensation that radiated to neck and arms, 2x hours prior to admission    Assessment: Likely due to hypertensive urgency vs ACS  Resolved with nitro paste and treatment of BP    PLAN  · Continue tylenol for pain       GERD without esophagitis  Assessment & Plan  Stable    PLAN  · Continue PPI on formulary        VTE Pharmacologic Prophylaxis:   Pharmacologic: Heparin Drip  Mechanical VTE Prophylaxis in Place: Yes    Discussions with Specialists or Other Care Team Provider: Cardiology, nursing    Education and Discussions with Family / Patient: Plan of care discussed with patient  Offered to update plan with patient's family, but patient declines    Current Length of Stay: 2 day(s)    Current Patient Status: Inpatient     Discharge Plan / Estimated Discharge Date: 24-48 hours pending cardiac catheterization    Code Status: Level 1 - Full Code      Subjective:   No events overnight  Patient states she still has a slight headache but is improving with Tylenol  She denies any chest pain or shortness of breath  She denies any palpitations or abdominal pain  Objective:     Vitals:   Temp (24hrs), Av 7 °F (36 5 °C), Min:97 6 °F (36 4 °C), Max:97 8 °F (36 6 °C)    Temp:  [97 6 °F (36 4 °C)-97 8 °F (36 6 °C)] 97 8 °F (36 6 °C)  HR:  [76-86] 86  Resp:  [16-18] 18  BP: (133-154)/(67-77) 133/68  SpO2:  [97 %-98 %] 98 %  Body mass index is 34 81 kg/m²  Input and Output Summary (last 24 hours): Intake/Output Summary (Last 24 hours) at 2021 1015  Last data filed at 2021 1100  Gross per 24 hour   Intake 480 ml   Output --   Net 480 ml       Physical Exam:     Physical Exam  Vitals signs and nursing note reviewed  Constitutional:       General: She is not in acute distress  Appearance: She is well-developed  She is not ill-appearing, toxic-appearing or diaphoretic     HENT: Head: Normocephalic and atraumatic  Right Ear: External ear normal       Left Ear: External ear normal       Nose: Nose normal    Eyes:      General: No scleral icterus  Right eye: No discharge  Left eye: No discharge  Conjunctiva/sclera: Conjunctivae normal    Neck:      Musculoskeletal: Normal range of motion and neck supple  Trachea: No tracheal deviation  Cardiovascular:      Rate and Rhythm: Normal rate and regular rhythm  Pulses: Normal pulses  Heart sounds: Normal heart sounds  No murmur  Pulmonary:      Effort: Pulmonary effort is normal  No respiratory distress  Breath sounds: Normal breath sounds  No stridor  No wheezing or rales  Chest:      Chest wall: No tenderness  Abdominal:      General: Bowel sounds are normal  There is no distension  Palpations: Abdomen is soft  Tenderness: There is no abdominal tenderness  There is no guarding or rebound  Musculoskeletal:      Right lower leg: No edema  Left lower leg: No edema  Skin:     General: Skin is warm  Capillary Refill: Capillary refill takes less than 2 seconds  Findings: No rash  Neurological:      Mental Status: She is alert  Psychiatric:         Mood and Affect: Mood normal          Behavior: Behavior normal          Additional Data:     Labs:    Results from last 7 days   Lab Units 05/22/21  0448   WBC Thousand/uL 7 40   HEMOGLOBIN g/dL 13 1   HEMATOCRIT % 41 4   PLATELETS Thousands/uL 169   NEUTROS PCT % 56   LYMPHS PCT % 37   MONOS PCT % 5   EOS PCT % 2     Results from last 7 days   Lab Units 05/23/21  0321  05/21/21  1502   POTASSIUM mmol/L 3 4*   < > 4 8   CHLORIDE mmol/L 106   < > 106   CO2 mmol/L 22   < > 28   BUN mg/dL 24   < > 20   CREATININE mg/dL 0 77   < > 0 92   CALCIUM mg/dL 9 1   < > 9 4   ALK PHOS U/L  --   --  75   ALT U/L  --   --  48   AST U/L  --   --  42    < > = values in this interval not displayed       Results from last 7 days   Lab Units 05/21/21  1604   INR  1 06       * I Have Reviewed All Lab Data Listed Above  * Additional Pertinent Lab Tests Reviewed: All Labs Within Last 24 Hours Reviewed    Imaging:    Imaging Reports Reviewed Today Include:  None  Imaging Personally Reviewed by Myself Includes:  None    Recent Cultures (last 7 days):           Last 24 Hours Medication List:   Current Facility-Administered Medications   Medication Dose Route Frequency Provider Last Rate    acetaminophen  650 mg Oral Q6H PRN Justin Greensburg, DO      albuterol  2 puff Inhalation Q6H PRN Justin Greensburg, DO      amLODIPine  5 mg Oral Daily Justin Greensburg, DO      aspirin  81 mg Oral Daily Justin Greensburg, DO      atorvastatin  40 mg Oral Daily With [employer],       heparin (porcine)  3-20 Units/kg/hr (Order-Specific) Intravenous Titrated Justin Greensburg, DO 13 1 Units/kg/hr (05/23/21 0436)    heparin (porcine)  2,000 Units Intravenous Q1H PRN Justin Greensburg, DO      heparin (porcine)  4,000 Units Intravenous Q1H PRN Justin Greensburg, DO      hydrALAZINE  5 mg Intravenous Q6H PRN Bonnie May MD      hydrochlorothiazide  25 mg Oral Daily Justin Greensburg, DO      latanoprost  1 drop Both Eyes HS Justin Greensburg, DO      losartan  100 mg Oral Daily Justin Greensburg, DO      melatonin  3 mg Oral HS Oziel Barney PA-C      metoprolol succinate  100 mg Oral Daily Gracie Villela MD      nitroglycerin  1 inch Topical Q6H Justin Greensburg, DO      pantoprazole  40 mg Oral Early Morning Justin Greensburg, DO          Today, Patient Was Seen By: Connie Fonseca MD    ** Please Note: This note has been constructed using a voice recognition system   **

## 2021-05-23 NOTE — ASSESSMENT & PLAN NOTE
· POA, presented with elevated troponin of 2 66 associated with chest pain  EKG without ischemic changes  BP elevated on admission at 231/102  Troponin peaked on 5/21 @ 8PM at 13  16  Assessment: Consider non-MI troponin elevation secondary to hypertensive urgency vs ACS vs cardiomyopathy (consider hypertensive CM vs nonischemic vs myocarditis secondary to COVID PNA)  Clinically stable and no longer having chest pain   BP much better controlled     PLAN  · Continue heparin gtt  · Continue ASA, beta blocker, and statin  · Will stop trending troponins  · Cardiology consulted - appreciate input  · F/u Results of ECHO, inconclusive evidence of any wall motion abnormalities  · Cath lab planned for Monday - make NPO at midnight on Sunday

## 2021-05-23 NOTE — PLAN OF CARE
Problem: Potential for Falls  Goal: Patient will remain free of falls  Description: INTERVENTIONS:  - Assess patient frequently for physical needs  -  Identify cognitive and physical deficits and behaviors that affect risk of falls    -  [place] fall precautions as indicated by assessment   - Educate patient/family on patient safety including physical limitations  - Instruct patient to call for assistance with activity based on assessment  - Modify environment to reduce risk of injury  - Consider OT/PT consult to assist with strengthening/mobility  Outcome: Progressing     Problem: CARDIOVASCULAR - ADULT  Goal: Maintains optimal cardiac output and hemodynamic stability  Description: INTERVENTIONS:  - Monitor I/O, vital signs and rhythm  - Monitor for S/S and trends of decreased cardiac output  - Administer and titrate ordered vasoactive medications to optimize hemodynamic stability  - Assess quality of pulses, skin color and temperature  - Assess for signs of decreased coronary artery perfusion  - Instruct patient to report change in severity of symptoms  Outcome: Progressing  Goal: Absence of cardiac dysrhythmias or at baseline rhythm  Description: INTERVENTIONS:  - Continuous cardiac monitoring, vital signs, obtain 12 lead EKG if ordered  - Administer antiarrhythmic and heart rate control medications as ordered  - Monitor electrolytes and administer replacement therapy as ordered  Outcome: Progressing     Problem: METABOLIC, FLUID AND ELECTROLYTES - ADULT  Goal: Electrolytes maintained within normal limits  Description: INTERVENTIONS:  - Monitor labs and assess patient for signs and symptoms of electrolyte imbalances  - Administer electrolyte replacement as ordered  - Monitor response to electrolyte replacements, including repeat lab results as appropriate  - Instruct patient on fluid and nutrition as appropriate  Outcome: Progressing     Problem: HEMATOLOGIC - ADULT  Goal: Maintains hematologic stability  Description: INTERVENTIONS  - Assess for signs and symptoms of bleeding or hemorrhage  - Monitor labs  - Administer supportive blood products/factors as ordered and appropriate  Outcome: Progressing     Problem: PAIN - ADULT  Goal: Verbalizes/displays adequate comfort level or baseline comfort level  Description: Interventions:  - Encourage patient to monitor pain and request assistance  - Assess pain using appropriate pain scale  - Administer analgesics based on type and severity of pain and evaluate response  - Implement non-pharmacological measures as appropriate and evaluate response  - Consider cultural and social influences on pain and pain management  - Notify physician/advanced practitioner if interventions unsuccessful or patient reports new pain  Outcome: Progressing

## 2021-05-23 NOTE — ASSESSMENT & PLAN NOTE
 POA, BP of 231/102 with chest pain and troponin elevation  On losartan-hctz 100mg-25mg, metoprolol 50mg  BP still elevated in outpatient setting  Good compliance per patient  Assessment: Poorly controlled BP in spite of 3 medications  Hypertensive urgency with symptoms of end organ damage (Troponin elevation with chest pain)  Essential HTN vs secondary HTN - however in the context of relatively normal lab work various causes of secondary HTN are unlikely  Can consider REGI +/- OHS with pulmonary HTN in obese patient    BP much better controlled, ranging from 194-458 systolic    PLAN  · Continue losartan 100mg, HCTZ 25mg, and metoprolol 50mg daily  · Cardiology started amlodipine 5mg daily - continue

## 2021-05-23 NOTE — ASSESSMENT & PLAN NOTE
 POA, heavy chest pressure sensation that radiated to neck and arms, 2x hours prior to admission    Assessment: Likely due to hypertensive urgency vs ACS   Resolved with nitro paste and treatment of BP    PLAN  · Continue tylenol for pain

## 2021-05-23 NOTE — PROGRESS NOTES
Cardiology Service Progress Note - Radha Gutierrez 77 y o  female MRN: [id number]    Unit/Bed#: S -01 Encounter: [id number]      Assessment:    Principal Problem:    Elevated troponin I level  Active Problems:    GERD without esophagitis    Chest pain    COVID-19    Hypertensive urgency      Plan:  Troponin elevation, recent echocardiogram suggested no obvious wall motion abnormalities and overall normal left ventricular systolic function  Favor continuation heparin, cardiac catheterization was recommended  Patient agreeable      Subjective:   Patient seen and examined  No significant events overnight   ; pertinent negatives - chest pain, dyspnea, lower extremity edema and palpitations  Objective:     Vitals: Blood pressure 133/68, pulse 86, temperature 97 8 °F (36 6 °C), temperature source Oral, resp  rate 18, height 5' 5" (1 651 m), weight 94 9 kg (209 lb 3 2 oz), SpO2 98 %  , Body mass index is 34 81 kg/m² , I/O last 3 completed shifts: In: 720 [P O :720]  Out: -   No intake/output data recorded  Wt Readings from Last 3 Encounters:   05/22/21 94 9 kg (209 lb 3 2 oz)   04/12/21 97 1 kg (214 lb)   04/05/21 97 3 kg (214 lb 9 6 oz)     No intake or output data in the 24 hours ending 05/23/21 1100  I/O last 3 completed shifts: In: 5 [P O :720]  Out: -     No significant arrhythmias seen on telemetry review         Physical Exam:    General appearance: alert and oriented, in no acute distress  Lungs: clear to auscultation bilaterally  Heart: regular rate and rhythm, S1, S2 normal, no murmur, click, rub or gallop  Extremities: extremities normal, warm and well-perfused; no cyanosis, clubbing, or edema  Pulses: 2+ and symmetric      Current Facility-Administered Medications:     acetaminophen (TYLENOL) tablet 650 mg, 650 mg, Oral, Q6H PRN, [place] Ang, DO, 650 mg at 05/23/21 0708    albuterol (PROVENTIL HFA,VENTOLIN HFA) inhaler 2 puff, 2 puff, Inhalation, Q6H PRN, [place] Ang, DO    amLODIPine (NORVASC) tablet 5 mg, 5 mg, Oral, Daily, Monae Parishs, DO, 5 mg at 05/23/21 0901    aspirin chewable tablet 81 mg, 81 mg, Oral, Daily, Troy Hughs, DO, 81 mg at 05/23/21 0901    atorvastatin (LIPITOR) tablet 40 mg, 40 mg, Oral, Daily With Darrin Gutierrez, DO, 40 mg at 05/22/21 1709    heparin (porcine) 25,000 units in 0 45% NaCl 250 mL infusion (premix), 3-20 Units/kg/hr (Order-Specific), Intravenous, Titrated, Monae Parishs, DO, Last Rate: 11 8 mL/hr at 05/23/21 0436, 13 1 Units/kg/hr at 05/23/21 0436    heparin (porcine) injection 2,000 Units, 2,000 Units, Intravenous, Q1H PRN, Monae Parishs, DO, 2,000 Units at 05/22/21 1940    heparin (porcine) injection 4,000 Units, 4,000 Units, Intravenous, Q1H PRN, Troy Parishs, DO    hydrALAZINE (APRESOLINE) injection 5 mg, 5 mg, Intravenous, Q6H PRN, Diana Combs MD    hydrochlorothiazide (HYDRODIURIL) tablet 25 mg, 25 mg, Oral, Daily, Troy Parishs, DO, 25 mg at 05/23/21 0901    latanoprost (XALATAN) 0 005 % ophthalmic solution 1 drop, 1 drop, Both Eyes, HS, Monae Huff DO, 1 drop at 05/22/21 2140    losartan (COZAAR) tablet 100 mg, 100 mg, Oral, Daily, Monae Refugio DO, 100 mg at 05/23/21 0901    melatonin tablet 3 mg, 3 mg, Oral, HS, Oziel Mcgraw PA-C, 3 mg at 05/22/21 2139    metoprolol succinate (TOPROL-XL) 24 hr tablet 100 mg, 100 mg, Oral, Daily, Hi Valdez MD, 100 mg at 05/23/21 0901    nitroglycerin (NITRO-BID) 2 % TD ointment 1 inch, 1 inch, Topical, Q6H, Monae Huff DO, 1 inch at 05/22/21 2136    pantoprazole (PROTONIX) EC tablet 40 mg, 40 mg, Oral, Early Morning, Troy Refugio, , 40 mg at 05/23/21 0708    Labs & Results:    Results from last 7 days   Lab Units 05/22/21  0448 05/21/21  2322 05/21/21 2001   TROPONIN I ng/mL 3 39* 9 48* 13 16*     Results from last 7 days   Lab Units 05/22/21  0448 05/21/21  1502   WBC Thousand/uL 7 40 8 49   HEMOGLOBIN g/dL 13 1 13 8   HEMATOCRIT % 41 4 43 1 PLATELETS Thousands/uL 169 177         Results from last 7 days   Lab Units 05/23/21  0321 05/22/21  0448 05/21/21  1502   POTASSIUM mmol/L 3 4* 3 5 4 8   CHLORIDE mmol/L 106 106 106   CO2 mmol/L 22 25 28   BUN mg/dL 24 18 20   CREATININE mg/dL 0 77 0 76 0 92   CALCIUM mg/dL 9 1 9 3 9 4   ALK PHOS U/L  --   --  75   ALT U/L  --   --  48   AST U/L  --   --  42     Results from last 7 days   Lab Units 05/21/21  1604   INR  1 06       Chest X-Ray is obtained; result -    Echo normal left systolic function with mild left hypertrophy  Stage I diastolic dysfunction  No significant valvular abnormalities  EKG personally reviewed by Roseanne Glez MD      Counseling / Coordination of Care  Total floor / unit time spent today 15 minutes  Greater than 50% of total time was spent with the patient and / or family counseling and / or coordination of care  A description of the counseling / coordination of care: Wilfrid Emanuel Thank you for the opportunity to participate in the care of this patient  FAHEEM Self MD   05/23/21   11:01 AM

## 2021-05-23 NOTE — ASSESSMENT & PLAN NOTE
· POA, presented with elevated troponin of 2 66 associated with chest pain  EKG without ischemic changes  BP elevated on admission at 231/102  Troponin peaked on 5/21 @ 8PM at 13  16  Assessment: Consider non-MI troponin elevation secondary to hypertensive urgency vs ACS vs cardiomyopathy (consider hypertensive CM vs nonischemic vs myocarditis secondary to COVID PNA)  Clinically stable and no longer having chest pain  BP much better controlled  She has very minor nonobstructive CAD  Echo showed no segmental wall motion abnormalities and no significant valvular heart disease  PLAN  · Continue ASA and plavix (for 1 year - to reassess in 6/2022) beta blocker, and statin  · Cardiology consulted - appreciate input   Follow up outpatient in 4-6 weeks

## 2021-05-23 NOTE — ASSESSMENT & PLAN NOTE
 POA, BP of 231/102 with chest pain and troponin elevation  On losartan-hctz 100mg-25mg, metoprolol 50mg  BP still elevated in outpatient setting  Good compliance per patient  Assessment: Poorly controlled BP in spite of 3 medications  Hypertensive urgency with symptoms of end organ damage (Troponin elevation with chest pain)  Essential HTN vs secondary HTN - however in the context of relatively normal lab work various causes of secondary HTN are unlikely   Can consider REGI +/- OHS with pulmonary HTN in obese patient  BP continues to improve now on current regimen    PLAN  · Continue losartan 100mg, HCTZ 25mg, and metoprolol 50mg daily  · Cardiology started amlodipine 5mg daily - continue  · If BP still not controlled, consider hydralazine PRN

## 2021-05-24 ENCOUNTER — APPOINTMENT (INPATIENT)
Dept: NON INVASIVE DIAGNOSTICS | Facility: HOSPITAL | Age: 66
DRG: 287 | End: 2021-05-24
Payer: MEDICARE

## 2021-05-24 VITALS
HEART RATE: 75 BPM | WEIGHT: 208.56 LBS | DIASTOLIC BLOOD PRESSURE: 60 MMHG | HEIGHT: 65 IN | TEMPERATURE: 97.6 F | SYSTOLIC BLOOD PRESSURE: 136 MMHG | RESPIRATION RATE: 18 BRPM | BODY MASS INDEX: 34.75 KG/M2 | OXYGEN SATURATION: 95 %

## 2021-05-24 LAB
ANION GAP SERPL CALCULATED.3IONS-SCNC: 12 MMOL/L (ref 4–13)
APTT PPP: 93 SECONDS (ref 23–37)
ATRIAL RATE: 0 BPM
ATRIAL RATE: 81 BPM
BUN SERPL-MCNC: 22 MG/DL (ref 5–25)
CALCIUM SERPL-MCNC: 9.4 MG/DL (ref 8.3–10.1)
CHLORIDE SERPL-SCNC: 105 MMOL/L (ref 100–108)
CO2 SERPL-SCNC: 23 MMOL/L (ref 21–32)
CREAT SERPL-MCNC: 0.72 MG/DL (ref 0.6–1.3)
ERYTHROCYTE [DISTWIDTH] IN BLOOD BY AUTOMATED COUNT: 15.4 % (ref 11.6–15.1)
GFR SERPL CREATININE-BSD FRML MDRD: 87 ML/MIN/1.73SQ M
GLUCOSE SERPL-MCNC: 107 MG/DL (ref 65–140)
HCT VFR BLD AUTO: 46.1 % (ref 34.8–46.1)
HGB BLD-MCNC: 14.5 G/DL (ref 11.5–15.4)
MCH RBC QN AUTO: 29.7 PG (ref 26.8–34.3)
MCHC RBC AUTO-ENTMCNC: 31.5 G/DL (ref 31.4–37.4)
MCV RBC AUTO: 95 FL (ref 82–98)
P AXIS: 40 DEGREES
PLATELET # BLD AUTO: 208 THOUSANDS/UL (ref 149–390)
PMV BLD AUTO: 11.4 FL (ref 8.9–12.7)
POTASSIUM SERPL-SCNC: 4.1 MMOL/L (ref 3.5–5.3)
PR INTERVAL: 164 MS
QRS AXIS: -6 DEGREES
QRS AXIS: 0 DEGREES
QRSD INTERVAL: 0 MS
QRSD INTERVAL: 84 MS
QT INTERVAL: 0 MS
QT INTERVAL: 378 MS
QTC INTERVAL: 0 MS
QTC INTERVAL: 439 MS
RBC # BLD AUTO: 4.88 MILLION/UL (ref 3.81–5.12)
SODIUM SERPL-SCNC: 140 MMOL/L (ref 136–145)
T WAVE AXIS: 0 DEGREES
T WAVE AXIS: 44 DEGREES
VENTRICULAR RATE: 0 BPM
VENTRICULAR RATE: 81 BPM
WBC # BLD AUTO: 7.17 THOUSAND/UL (ref 4.31–10.16)

## 2021-05-24 PROCEDURE — 93454 CORONARY ARTERY ANGIO S&I: CPT | Performed by: NURSE PRACTITIONER

## 2021-05-24 PROCEDURE — 80048 BASIC METABOLIC PNL TOTAL CA: CPT | Performed by: INTERNAL MEDICINE

## 2021-05-24 PROCEDURE — 99152 MOD SED SAME PHYS/QHP 5/>YRS: CPT | Performed by: INTERNAL MEDICINE

## 2021-05-24 PROCEDURE — 93005 ELECTROCARDIOGRAM TRACING: CPT

## 2021-05-24 PROCEDURE — 93010 ELECTROCARDIOGRAM REPORT: CPT | Performed by: INTERNAL MEDICINE

## 2021-05-24 PROCEDURE — C1769 GUIDE WIRE: HCPCS | Performed by: NURSE PRACTITIONER

## 2021-05-24 PROCEDURE — 93454 CORONARY ARTERY ANGIO S&I: CPT | Performed by: INTERNAL MEDICINE

## 2021-05-24 PROCEDURE — 99152 MOD SED SAME PHYS/QHP 5/>YRS: CPT | Performed by: NURSE PRACTITIONER

## 2021-05-24 PROCEDURE — C1894 INTRO/SHEATH, NON-LASER: HCPCS | Performed by: NURSE PRACTITIONER

## 2021-05-24 PROCEDURE — 85027 COMPLETE CBC AUTOMATED: CPT | Performed by: NURSE PRACTITIONER

## 2021-05-24 PROCEDURE — B2111ZZ FLUOROSCOPY OF MULTIPLE CORONARY ARTERIES USING LOW OSMOLAR CONTRAST: ICD-10-PCS | Performed by: INTERNAL MEDICINE

## 2021-05-24 PROCEDURE — 85730 THROMBOPLASTIN TIME PARTIAL: CPT | Performed by: HOSPITALIST

## 2021-05-24 RX ORDER — ATORVASTATIN CALCIUM 40 MG/1
40 TABLET, FILM COATED ORAL
Qty: 30 TABLET | Refills: 0 | Status: SHIPPED | OUTPATIENT
Start: 2021-05-25 | End: 2021-06-21 | Stop reason: SDUPTHER

## 2021-05-24 RX ORDER — SODIUM CHLORIDE 9 MG/ML
125 INJECTION, SOLUTION INTRAVENOUS CONTINUOUS
Status: DISPENSED | OUTPATIENT
Start: 2021-05-24 | End: 2021-05-24

## 2021-05-24 RX ORDER — VERAPAMIL HYDROCHLORIDE 2.5 MG/ML
INJECTION, SOLUTION INTRAVENOUS CODE/TRAUMA/SEDATION MEDICATION
Status: COMPLETED | OUTPATIENT
Start: 2021-05-24 | End: 2021-05-24

## 2021-05-24 RX ORDER — MIDAZOLAM HYDROCHLORIDE 2 MG/2ML
INJECTION, SOLUTION INTRAMUSCULAR; INTRAVENOUS CODE/TRAUMA/SEDATION MEDICATION
Status: COMPLETED | OUTPATIENT
Start: 2021-05-24 | End: 2021-05-24

## 2021-05-24 RX ORDER — HEPARIN SODIUM 1000 [USP'U]/ML
INJECTION, SOLUTION INTRAVENOUS; SUBCUTANEOUS CODE/TRAUMA/SEDATION MEDICATION
Status: COMPLETED | OUTPATIENT
Start: 2021-05-24 | End: 2021-05-24

## 2021-05-24 RX ORDER — CLOPIDOGREL BISULFATE 75 MG/1
75 TABLET ORAL DAILY
Qty: 30 TABLET | Refills: 0 | Status: SHIPPED | OUTPATIENT
Start: 2021-05-25 | End: 2021-06-21 | Stop reason: SDUPTHER

## 2021-05-24 RX ORDER — AMLODIPINE BESYLATE 5 MG/1
5 TABLET ORAL DAILY
Qty: 30 TABLET | Refills: 0 | Status: SHIPPED | OUTPATIENT
Start: 2021-05-25 | End: 2021-06-21 | Stop reason: SDUPTHER

## 2021-05-24 RX ORDER — FENTANYL CITRATE 50 UG/ML
INJECTION, SOLUTION INTRAMUSCULAR; INTRAVENOUS CODE/TRAUMA/SEDATION MEDICATION
Status: COMPLETED | OUTPATIENT
Start: 2021-05-24 | End: 2021-05-24

## 2021-05-24 RX ORDER — CLOPIDOGREL BISULFATE 75 MG/1
75 TABLET ORAL DAILY
Status: DISCONTINUED | OUTPATIENT
Start: 2021-05-24 | End: 2021-05-24 | Stop reason: HOSPADM

## 2021-05-24 RX ORDER — NITROGLYCERIN 20 MG/100ML
INJECTION INTRAVENOUS CODE/TRAUMA/SEDATION MEDICATION
Status: COMPLETED | OUTPATIENT
Start: 2021-05-24 | End: 2021-05-24

## 2021-05-24 RX ORDER — LIDOCAINE HYDROCHLORIDE 10 MG/ML
INJECTION, SOLUTION EPIDURAL; INFILTRATION; INTRACAUDAL; PERINEURAL CODE/TRAUMA/SEDATION MEDICATION
Status: COMPLETED | OUTPATIENT
Start: 2021-05-24 | End: 2021-05-24

## 2021-05-24 RX ORDER — ASPIRIN 81 MG/1
324 TABLET, CHEWABLE ORAL ONCE
Status: COMPLETED | OUTPATIENT
Start: 2021-05-24 | End: 2021-05-24

## 2021-05-24 RX ORDER — ASPIRIN 81 MG/1
81 TABLET, CHEWABLE ORAL DAILY
Qty: 30 TABLET | Refills: 0 | Status: SHIPPED | OUTPATIENT
Start: 2021-05-25

## 2021-05-24 RX ADMIN — MIDAZOLAM HYDROCHLORIDE 2 MG: 1 INJECTION, SOLUTION INTRAMUSCULAR; INTRAVENOUS at 11:16

## 2021-05-24 RX ADMIN — FENTANYL CITRATE 50 MCG: 50 INJECTION, SOLUTION INTRAMUSCULAR; INTRAVENOUS at 11:16

## 2021-05-24 RX ADMIN — IOHEXOL 60 ML: 350 INJECTION, SOLUTION INTRAVENOUS at 11:37

## 2021-05-24 RX ADMIN — SODIUM CHLORIDE 125 ML/HR: 0.9 INJECTION, SOLUTION INTRAVENOUS at 12:24

## 2021-05-24 RX ADMIN — NITROGLYCERIN 200 MCG: 20 INJECTION INTRAVENOUS at 11:24

## 2021-05-24 RX ADMIN — LIDOCAINE HYDROCHLORIDE 1 ML: 10 INJECTION, SOLUTION EPIDURAL; INFILTRATION; INTRACAUDAL; PERINEURAL at 11:18

## 2021-05-24 RX ADMIN — AMLODIPINE BESYLATE 5 MG: 5 TABLET ORAL at 08:03

## 2021-05-24 RX ADMIN — ASPIRIN 324 MG: 81 TABLET, CHEWABLE ORAL at 05:56

## 2021-05-24 RX ADMIN — HYDROCHLOROTHIAZIDE 25 MG: 25 TABLET ORAL at 08:03

## 2021-05-24 RX ADMIN — NITROGLYCERIN 200 MCG: 20 INJECTION INTRAVENOUS at 11:31

## 2021-05-24 RX ADMIN — CLOPIDOGREL BISULFATE 75 MG: 75 TABLET ORAL at 15:49

## 2021-05-24 RX ADMIN — HEPARIN SODIUM 4000 UNITS: 1000 INJECTION INTRAVENOUS; SUBCUTANEOUS at 11:24

## 2021-05-24 RX ADMIN — HEPARIN SODIUM 13.1 UNITS/KG/HR: 10000 INJECTION, SOLUTION INTRAVENOUS at 05:54

## 2021-05-24 RX ADMIN — NITROGLYCERIN 200 MCG: 20 INJECTION INTRAVENOUS at 11:32

## 2021-05-24 RX ADMIN — PANTOPRAZOLE SODIUM 40 MG: 40 TABLET, DELAYED RELEASE ORAL at 05:56

## 2021-05-24 RX ADMIN — ATORVASTATIN CALCIUM 40 MG: 40 TABLET, FILM COATED ORAL at 15:49

## 2021-05-24 RX ADMIN — VERAPAMIL HYDROCHLORIDE 2.5 MG: 2.5 INJECTION, SOLUTION INTRAVENOUS at 11:24

## 2021-05-24 RX ADMIN — METOPROLOL SUCCINATE 100 MG: 100 TABLET, EXTENDED RELEASE ORAL at 08:04

## 2021-05-24 RX ADMIN — LOSARTAN POTASSIUM 100 MG: 50 TABLET, FILM COATED ORAL at 08:03

## 2021-05-24 NOTE — QUICK NOTE
57-year-old female admitted with type 2 myocardial infarction, secondary to hypertensive urgency  Cardiac catheterization images reviewed and results discussed with the patient's  She has very minor nonobstructive CAD  Echo showed no segmental wall motion abnormalities and no significant valvular heart disease  Her right radial access site is stable  We will plan medical therapy including low-dose aspirin, clopidogrel for 1 year, statins, beta-blockers and blood pressure medicines  Outpatient cardiology clinic follow-up in 4-6 weeks  Should be stable for discharge later this evening if her right radial access site remains okay  Plan of care discussed with the patient and   All questions answered

## 2021-05-24 NOTE — PLAN OF CARE
Problem: Potential for Falls  Goal: Patient will remain free of falls  Description: INTERVENTIONS:  - Assess patient frequently for physical needs  -  Identify cognitive and physical deficits and behaviors that affect risk of falls    -  [place] fall precautions as indicated by assessment   - Educate patient/family on patient safety including physical limitations  - Instruct patient to call for assistance with activity based on assessment  - Modify environment to reduce risk of injury  - Consider OT/PT consult to assist with strengthening/mobility  Outcome: Progressing     Problem: CARDIOVASCULAR - ADULT  Goal: Maintains optimal cardiac output and hemodynamic stability  Description: INTERVENTIONS:  - Monitor I/O, vital signs and rhythm  - Monitor for S/S and trends of decreased cardiac output  - Administer and titrate ordered vasoactive medications to optimize hemodynamic stability  - Assess quality of pulses, skin color and temperature  - Assess for signs of decreased coronary artery perfusion  - Instruct patient to report change in severity of symptoms  Outcome: Progressing  Goal: Absence of cardiac dysrhythmias or at baseline rhythm  Description: INTERVENTIONS:  - Continuous cardiac monitoring, vital signs, obtain 12 lead EKG if ordered  - Administer antiarrhythmic and heart rate control medications as ordered  - Monitor electrolytes and administer replacement therapy as ordered  Outcome: Progressing     Problem: METABOLIC, FLUID AND ELECTROLYTES - ADULT  Goal: Electrolytes maintained within normal limits  Description: INTERVENTIONS:  - Monitor labs and assess patient for signs and symptoms of electrolyte imbalances  - Administer electrolyte replacement as ordered  - Monitor response to electrolyte replacements, including repeat lab results as appropriate  - Instruct patient on fluid and nutrition as appropriate  Outcome: Progressing     Problem: HEMATOLOGIC - ADULT  Goal: Maintains hematologic stability  Description: INTERVENTIONS  - Assess for signs and symptoms of bleeding or hemorrhage  - Monitor labs  - Administer supportive blood products/factors as ordered and appropriate  Outcome: Progressing     Problem: PAIN - ADULT  Goal: Verbalizes/displays adequate comfort level or baseline comfort level  Description: Interventions:  - Encourage patient to monitor pain and request assistance  - Assess pain using appropriate pain scale  - Administer analgesics based on type and severity of pain and evaluate response  - Implement non-pharmacological measures as appropriate and evaluate response  - Consider cultural and social influences on pain and pain management  - Notify physician/advanced practitioner if interventions unsuccessful or patient reports new pain  Outcome: Progressing

## 2021-05-24 NOTE — DISCHARGE INSTR - AVS FIRST PAGE
Dear Felisha Contreras,     It was our pleasure to care for you here at [facility]  It is our hope that we were always able to exceed the expected standards for your care during your stay  You were hospitalized due to chest pain  You were cared for on the third floor by Maritza Lane DO under the service of Alysa Billy MD with the [facility] who covers for your primary care physician (PCP), Avril Christopher MD, while you were hospitalized  If you have any questions or concerns related to this hospitalization, you may contact us at [phone]  For follow up as well as any medication refills, we recommend that you follow up with your primary care physician  A registered nurse will reach out to you by phone within a few days after your discharge to answer any additional questions that you may have after going home  However, at this time we provide for you here, the most important instructions / recommendations at discharge:     · Notable Medication Adjustments -   · Start low dose aspirin and plavix - take daily for 1 year and then reassess with cardiology (June 2022)  · Start taking a cholesterol lowering medication, atorvastatin, daily  · Start taking amlodipine 5mg daily  · Continue all of your other medications as prescribed  · Testing Required after Discharge -   · none  · Important follow up information -   · Follow up with your primary care doctor in 1 week  · Follow-up with Cardiology in 4-6 weeks    If you do not hear from them within the next 1-2 weeks, please call the number listed to schedule an appointment  · Other Instructions -   · Return to the emergency department for recurrent chest pain   · Please review this entire after visit summary as additional general instructions including medication list, appointments, activity, diet, any pertinent wound care, and other additional recommendations from your care team that may be provided for you       Sincerely,     Sheryl Fanning, DO

## 2021-05-24 NOTE — DISCHARGE SUMMARY
[facility]  Discharge- Dana Dubin 1955, 77 y o  female MRN: [id number]  Unit/Bed#: S -01 Encounter: [id number]  Primary Care Provider: Miki Sherman MD   Date and time admitted to hospital: 5/21/2021  2:33 PM    * Elevated troponin I level  Assessment & Plan  · POA, presented with elevated troponin of 2 66 associated with chest pain  EKG without ischemic changes  BP elevated on admission at 231/102  Troponin peaked on 5/21 @ 8PM at 13  16  Assessment: Consider non-MI troponin elevation secondary to hypertensive urgency vs ACS vs cardiomyopathy (consider hypertensive CM vs nonischemic vs myocarditis secondary to COVID PNA)  Clinically stable and no longer having chest pain  BP much better controlled  She has very minor nonobstructive CAD  Echo showed no segmental wall motion abnormalities and no significant valvular heart disease  PLAN  · Continue ASA and plavix (for 1 year - to reassess in 6/2022) beta blocker, and statin  · Cardiology consulted - appreciate input  Follow up outpatient in 4-6 weeks    Hypertensive urgency  Assessment & Plan   POA, BP of 231/102 with chest pain and troponin elevation  On losartan-hctz 100mg-25mg, metoprolol 50mg  BP still elevated in outpatient setting  Good compliance per patient  Assessment: Poorly controlled BP in spite of 3 medications  Hypertensive urgency with symptoms of end organ damage (Troponin elevation with chest pain)  Essential HTN vs secondary HTN - however in the context of relatively normal lab work various causes of secondary HTN are unlikely  Can consider REGI +/- OHS with pulmonary HTN in obese patient    BP much better controlled, ranging from 994-450 systolic    PLAN  · Continue losartan 100mg, HCTZ 25mg, and metoprolol 50mg daily  · Cardiology started amlodipine 5mg daily - continue      Chest pain  Assessment & Plan   POA, heavy chest pressure sensation that radiated to neck and arms, 2x hours prior to admission    Assessment: Likely due to hypertensive urgency vs ACS  Resolved with nitro paste and treatment of BP    PLAN  · Continue tylenol for pain       COVID-19  Assessment & Plan  H/o COVID-19 pneumonia in April 2021 - she did not require hospitalization but did get COVID antibody transfusion  GERD without esophagitis  Assessment & Plan  Stable    PLAN  · Continue PPI on formulary    Discharging Resident Physician: Andrey Lennon DO  Attending: Jenelle Cee MD  PCP: Marisol Werner MD  Admission Date: 5/21/2021  Discharge Date: 05/24/21    Disposition:     Home    Reason for Admission:  Chest pain    Consultations During Hospital Stay:  · Cardiology    Procedures Performed:     · Cardiac catheterization on 05/24    Significant Findings / Test Results:     · 5/21/21: Troponin 13 16, /102    Incidental Findings:   · none     Test Results Pending at Discharge (will require follow up):   · none     Outpatient Tests Requested:  · none    Complications:  none    Hospital Course:     Radha Gutierrez is a 77 y o  female patient with h/o COVID-19 PNA, HTN  who originally presented to the hospital on 5/21/2021 due to acute onset chest pain  On admission to the hospital she was found to have significantly elevated pressure of 231/102 mmHg, and initial troponin elevation of 2 6  EKG was unremarkable for any ischemic changes or arrhythmias  She was started empirically on IV heparin drip and given nitropaste for ACS  Pain had largely resolved by admission  Cardiology was consulted  Troponin peaked later on the evening of admission to 13 16, and then began to downtrend  There were no arrhythmic events seen on telemetry, and chest pain did not return during remainder of hospitalization  Blood pressure was controlled with home medications and with addition of amlodipine 5 mg daily    Echo was performed on 5/22 which was largely unremarkable, demonstrating preserved EF 55-60%, without regional wall motion abnormalities or significant valvular abnormalities  She was continued on heparin over the weekend, and on Monday, 5/24, catheterization was performed which only revealed mild non-obstructive coronary artery disease  Later that day, right radial access was stable  She was recommended medical therapy with low-dose aspirin and clopidogrel for 1 year  Recommended to continue on statins, beta-blockers and blood pressure medications  Recommended outpatient follow-up with Cardiology in 4-6 weeks  All impressions and plans were discussed with patient and her  at bedside  Patient discharged in stable condition  Condition at Discharge: stable     Discharge Day Visit / Exam:     Subjective:  Elpidio Cuba feels well  Denies any chest pain, shortness of breath, headache, vision changes, no abdominal pain, leg swelling  She is ready to go home  Vitals: Blood Pressure: 136/60 (05/24/21 1545)  Pulse: 75 (05/24/21 1545)  Temperature: 97 6 °F (36 4 °C) (05/24/21 1545)  Temp Source: Oral (05/24/21 1545)  Respirations: 18 (05/24/21 1545)  Height: 5' 5" (165 1 cm) (05/21/21 1503)  Weight - Scale: 94 6 kg (208 lb 8 9 oz) (05/24/21 0600)  SpO2: 95 % (05/24/21 1545)  Exam:   Physical Exam  Vitals signs and nursing note reviewed  Constitutional:       General: She is not in acute distress  Appearance: She is well-developed  She is not diaphoretic  HENT:      Head: Normocephalic and atraumatic  Right Ear: External ear normal       Left Ear: External ear normal       Nose: Nose normal    Eyes:      General: No scleral icterus  Right eye: No discharge  Left eye: No discharge  Conjunctiva/sclera: Conjunctivae normal    Neck:      Musculoskeletal: Normal range of motion and neck supple  Trachea: No tracheal deviation  Cardiovascular:      Rate and Rhythm: Normal rate and regular rhythm  Heart sounds: Normal heart sounds  No murmur     Pulmonary:      Effort: Pulmonary effort is normal  No respiratory distress  Breath sounds: Normal breath sounds  No stridor  No wheezing or rales  Abdominal:      General: Bowel sounds are normal       Palpations: Abdomen is soft  Tenderness: There is no abdominal tenderness  There is no guarding or rebound  Skin:     General: Skin is warm  Capillary Refill: Capillary refill takes less than 2 seconds  Findings: No rash  Neurological:      Mental Status: She is alert  Psychiatric:         Behavior: Behavior normal          Discussion with Family: discussed plan with patient and family at the bedside    Discharge instructions/Information to patient and family:   See after visit summary for information provided to patient and family  Provisions for Follow-Up Care:  See after visit summary for information related to follow-up care and any pertinent home health orders  Planned Readmission: none     Discharge Medications:  See after visit summary for reconciled discharge medications provided to patient and family        ** Please Note: This note has been constructed using a voice recognition system **

## 2021-05-24 NOTE — DISCHARGE INSTRUCTIONS

## 2021-05-24 NOTE — PLAN OF CARE
Problem: Potential for Falls  Goal: Patient will remain free of falls  Description: INTERVENTIONS:  - Assess patient frequently for physical needs  -  Identify cognitive and physical deficits and behaviors that affect risk of falls    -  [place] fall precautions as indicated by assessment   - Educate patient/family on patient safety including physical limitations  - Instruct patient to call for assistance with activity based on assessment  - Modify environment to reduce risk of injury  - Consider OT/PT consult to assist with strengthening/mobility  Outcome: Progressing     Problem: CARDIOVASCULAR - ADULT  Goal: Maintains optimal cardiac output and hemodynamic stability  Description: INTERVENTIONS:  - Monitor I/O, vital signs and rhythm  - Monitor for S/S and trends of decreased cardiac output  - Administer and titrate ordered vasoactive medications to optimize hemodynamic stability  - Assess quality of pulses, skin color and temperature  - Assess for signs of decreased coronary artery perfusion  - Instruct patient to report change in severity of symptoms  Outcome: Progressing  Goal: Absence of cardiac dysrhythmias or at baseline rhythm  Description: INTERVENTIONS:  - Continuous cardiac monitoring, vital signs, obtain 12 lead EKG if ordered  - Administer antiarrhythmic and heart rate control medications as ordered  - Monitor electrolytes and administer replacement therapy as ordered  Outcome: Progressing     Problem: METABOLIC, FLUID AND ELECTROLYTES - ADULT  Goal: Electrolytes maintained within normal limits  Description: INTERVENTIONS:  - Monitor labs and assess patient for signs and symptoms of electrolyte imbalances  - Administer electrolyte replacement as ordered  - Monitor response to electrolyte replacements, including repeat lab results as appropriate  - Instruct patient on fluid and nutrition as appropriate  Outcome: Progressing     Problem: HEMATOLOGIC - ADULT  Goal: Maintains hematologic stability  Description: INTERVENTIONS  - Assess for signs and symptoms of bleeding or hemorrhage  - Monitor labs  - Administer supportive blood products/factors as ordered and appropriate  Outcome: Progressing     Problem: PAIN - ADULT  Goal: Verbalizes/displays adequate comfort level or baseline comfort level  Description: Interventions:  - Encourage patient to monitor pain and request assistance  - Assess pain using appropriate pain scale  - Administer analgesics based on type and severity of pain and evaluate response  - Implement non-pharmacological measures as appropriate and evaluate response  - Consider cultural and social influences on pain and pain management  - Notify physician/advanced practitioner if interventions unsuccessful or patient reports new pain  Outcome: Progressing

## 2021-05-25 ENCOUNTER — TRANSITIONAL CARE MANAGEMENT (OUTPATIENT)
Dept: FAMILY MEDICINE CLINIC | Facility: CLINIC | Age: 66
End: 2021-05-25

## 2021-05-28 ENCOUNTER — OFFICE VISIT (OUTPATIENT)
Dept: FAMILY MEDICINE CLINIC | Facility: CLINIC | Age: 66
End: 2021-05-28
Payer: MEDICARE

## 2021-05-28 VITALS
HEIGHT: 65 IN | DIASTOLIC BLOOD PRESSURE: 80 MMHG | BODY MASS INDEX: 34.16 KG/M2 | HEART RATE: 84 BPM | WEIGHT: 205 LBS | OXYGEN SATURATION: 98 % | TEMPERATURE: 98.1 F | SYSTOLIC BLOOD PRESSURE: 138 MMHG

## 2021-05-28 DIAGNOSIS — E78.5 HYPERLIPOPROTEINEMIA: ICD-10-CM

## 2021-05-28 DIAGNOSIS — I10 BENIGN ESSENTIAL HYPERTENSION: Primary | ICD-10-CM

## 2021-05-28 DIAGNOSIS — Z82.41 FAMILY HISTORY OF SUDDEN CARDIAC DEATH: ICD-10-CM

## 2021-05-28 PROCEDURE — 99496 TRANSJ CARE MGMT HIGH F2F 7D: CPT | Performed by: FAMILY MEDICINE

## 2021-05-28 RX ORDER — MELATONIN
1000 DAILY
COMMUNITY

## 2021-05-28 NOTE — ASSESSMENT & PLAN NOTE
Patient admitted with acute coronary syndrome  No occlusive coronary disease noted by catheterization  Echocardiogram revealed normal ejection fraction  Continue to observe    She was seeing Dr Iesha Osorio from [facility] but is going to transition her care to [address] [place] [address] and has an appointment with Mis Montero in the near future

## 2021-05-28 NOTE — PROGRESS NOTES
Assessment/Plan:     Benign essential hypertension  She had admission for acute coronary syndrome  She had significant elevation of troponin  Cardiac catheterization revealed nonocclusive disease  Echocardiogram was essentially normal   Her symptoms and troponin elevation were felt to be most likely related to hypertensive urgency verses possible COVID myocarditis  Presently she is asymptomatic  Her blood pressure is under much better control  She will continue her current regimen of losartan/hydrochlorothiazide, metoprolol as well as amlodipine  She will also continue with Plavix and aspirin  She is asked to continue monitoring her home blood pressures  Walking for exercise and calling with any recurrence of her symptoms in addition to any new symptomatology  Otherwise will see her back routinely  She agrees with this plan  Hyperlipoproteinemia  Continue statin  Family history of sudden cardiac death  Patient admitted with acute coronary syndrome  No occlusive coronary disease noted by catheterization  Echocardiogram revealed normal ejection fraction  Continue to observe  She was seeing Dr Carlos Weaver from [facility] but is going to transition her care to [facility] and has an appointment with Lala Boyle in the near future       Diagnoses and all orders for this visit:    Benign essential hypertension    Hyperlipoproteinemia    Family history of sudden cardiac death    Other orders  -     cholecalciferol (VITAMIN D3) 1,000 units tablet; Take 1,000 Units by mouth daily         Subjective:     Patient ID: Reagan Dutton is a 77 y o  female  Admitted with CP, neck pain and bilateral upper arm pain  Troponins were very elevated  Echo was good, Cath showed no obstructing lesions  Had Covid, Plavix, no bleeding, no diarrhea,  c/o  No fever    HPI  The patient is a 75-year-old female who was admitted to [facility] on 05/21 and discharged on 05/24    She was admitted with chest tightness, bilateral upper arm discomfort and neck pain  She was admitted as rule out acute coronary syndrome  She had a significant elevation in her troponin and the concern was for acute MI  she underwent cardiac catheterization which revealed no significant coronary disease  Echocardiogram revealed no wall motion abnormality, no valvular disease and normal EF  It was felt that her troponin elevation was due to hypertensive urgency versus possible COVID myocarditis  She was started on Plavix, told to continue her aspirin  Her outpatient blood pressure medications including Arb, beta-blocker and diuretic were continued  Cardiology added 5 mg of amlodipine  Since discharge she has been feeling fatigued but had no recurrence of her chest tightness neck pain or arm pain  She has had no bleeding  She has had no GI complaints such as diarrhea  She has had no fever  She has had no calf pain or edema  No genitourinary complaints  She has been compliant with all of her medications  Review of Systems    Pertinent review of systems as per the HPI  Objective:     Physical Exam  Vitals signs and nursing note reviewed  Constitutional:       Appearance: She is obese  She is not ill-appearing  Comments: Appearing somewhat fatigued   Eyes:      Conjunctiva/sclera: Conjunctivae normal    Neck:      Musculoskeletal: Neck supple  Comments: No JVD  Cardiovascular:      Rate and Rhythm: Normal rate and regular rhythm  Heart sounds: Normal heart sounds  No murmur  Pulmonary:      Effort: Pulmonary effort is normal       Breath sounds: Normal breath sounds  No wheezing, rhonchi or rales  Musculoskeletal:      Right lower leg: No edema  Left lower leg: No edema  Lymphadenopathy:      Cervical: No cervical adenopathy  Neurological:      General: No focal deficit present  Mental Status: She is alert and oriented to person, place, and time     Psychiatric:         Mood and Affect: Mood normal          Thought Content: Thought content normal          Judgment: Judgment normal             Vitals:    05/28/21 1050   BP: 138/80   Pulse: 84   Temp: 98 1 °F (36 7 °C)   SpO2: 98%   Weight: 93 kg (205 lb)   Height: 5' 5" (1 651 m)       Transitional Care Management Review:  Wiliam Delcid is a 77 y o  female here for TCM follow up  During the TCM phone call patient stated:    TCM Call (since 4/27/2021)     Date and time call was made  5/25/2021 11:04 AM    Hospital care reviewed  Records reviewed    Patient was hospitialized at  [address] [place] [address]        Date of Admission  05/21/21    Date of discharge  05/24/21    Diagnosis  Elevated troponin level    Disposition  Home    Current Symptoms  None      TCM Call (since 4/27/2021)     Post hospital issues  None    Scheduled for follow up?   Yes    Did you obtain your prescribed medications  Yes    Do you need help managing your prescriptions or medications  No    Is transportation to your appointment needed  No    I have advised the patient to call PCP with any new or worsening symptoms  FERNANDO De León MD

## 2021-05-28 NOTE — ASSESSMENT & PLAN NOTE
She had admission for acute coronary syndrome  She had significant elevation of troponin  Cardiac catheterization revealed nonocclusive disease  Echocardiogram was essentially normal   Her symptoms and troponin elevation were felt to be most likely related to hypertensive urgency verses possible COVID myocarditis  Presently she is asymptomatic  Her blood pressure is under much better control  She will continue her current regimen of losartan/hydrochlorothiazide, metoprolol as well as amlodipine  She will also continue with Plavix and aspirin  She is asked to continue monitoring her home blood pressures  Walking for exercise and calling with any recurrence of her symptoms in addition to any new symptomatology  Otherwise will see her back routinely  She agrees with this plan

## 2021-06-16 NOTE — PROGRESS NOTES
Cardiology  Hospital Follow Up   Office Visit Note  Pato Fofana   77 y o    female   MRN: [id number]  [address]  [address] [place] [id number] [phone]    PCP: Joshua Coles MD  Cardiologist: will be Dr Shannon Carrasco  Prior: Dr Whitley Bearden            Summary of recommendations  Heart healthy diet  Educational information provided  Continue to monitor the blood pressure at home, record,bring the blood pressure monitor the log in  Fasting lipid profile 2 months  Follow up will be scheduled with Dr Shannon Carrasco 3 months        Assessment/plan  Fatigue  This may be residual from COVID  Chest pain>> Type 2 MI 2/2 accelerated HTN Recent hospital admission 5/21-5/24/21  Underwent left heart catheterization 5/24/21: Nonobstructive CAD  likely related to accelerated hypertension  Amlodipine was added  Recommend she avoid NSAIDs  She was using them for arthritis of her knees  On guideline directed medical therapy:  Aspirin and clopidogrel, x1 year, statin, beta-blocker  COVID-19 + 4/18/21  Hypertension, essential   /76  on amlodipine 5 mg daily, Hyzaar 100/25 daily, metoprolol succinate 50 mg daily  In the hospital, when she presented her blood pressure was 231/102  --  Allergy to lisinopril  Hyperlipidemia, on atorvastatin 40 mg daily  5/22/21:   , total cholesterol 245, triglyceride 121  Heart healthy diet, reassess 8 weeks  A slip was provided  Obesity, BMI 34  Cardiac testing   TTE 5/22/21 EF 55-60%  No RWMA  Mild LVH  Grade 1 DD  RV normal   Atria normal    Cardiac catheterization 5/24/21  The coronary circulation is right dominant  Left main: Normal   LAD: The vessel was small to medium sized  Angiography showed no evidence of disease  Circumflex: The vessel was medium sized  1st obtuse marginal: The vessel was medium sized  Angiography showed mild atherosclerosis  Ramus intermedius: The vessel was medium to large sized  Angiography showed no evidence of disease  RCA: The vessel was medium to large sized  There was ostial spasm with catheter engagement relieved with intracoronary nitroglycerin  Ostial RCA: There was a 20 % stenosis  TONE Green is a 77 y o  female with hypertension and family history of sudden cardiac death  Her sister  suddenly at 47years old due to a presumed viral, nonischemic cardiomyopathy)  She has followed with Dr Marley Song from [facility]  She was offered genetic testing but declined  She had an echocardiogram 2019 that showed preserved LV function normal RV size and function no significant valve disease  21  COVID-19 positive  She called her PCP with chest tightness, anorexia, headache and myalgias  She received an antibody infusion 4/10/21  Adm 21 to [facility] with acute onset chest pain  Found have accelerated hypertension, 231/102  Troponin 2 6  Her EKG showed no acute ischemic changes  She was followed by Cardiology  Her troponin increased further, to 13  Her blood pressure was optimized amlodipine was added  An echocardiogram showed preserved LV function without regional motion abnormalities  Cardiac catheterization was pursued  This demonstrated mild nonobstructive disease  She was placed on medical therapy with aspirin and clopidogrel   She was continued on statin, beta-blocker and optimal BP control  21  Hospital follow-up  Her chief complaint is fatigue  This is still pronounced ever since she had COVID in the spring  She has been monitoring her blood pressures-similar to today's  Today 136/76 in the office  Recommended adherence to a low-salt diet, avoid NSAIDs, recommended regular exercise  In the past she had been using some NSAIDs for arthritic knees  She may need a knee replacement in the future  She asked if she needed to continue on the new medications    I suggested she continue, they are a statin, amlodipine and clopidogrel  We reviewed her cardiac catheterization echocardiogram reports          I have spent 40 minutes with Patient  today in which greater than 50% of this time was spent in counseling/coordination of care regarding Intructions for management, Patient and family education, Importance of tx compliance and Risk factor reductions  Assessment  Diagnoses and all orders for this visit:    Hospital discharge follow-up    Pure hypercholesterolemia  -     Lipid panel; Future    Benign essential hypertension    Obesity (BMI 30-39  9)    Hypertensive urgency  -     amLODIPine (NORVASC) 5 mg tablet; Take 1 tablet (5 mg total) by mouth daily    Acute coronary syndrome (HCC)  -     atorvastatin (LIPITOR) 40 mg tablet; Take 1 tablet (40 mg total) by mouth daily with dinner  -     clopidogrel (PLAVIX) 75 mg tablet; Take 1 tablet (75 mg total) by mouth daily          Past Medical History:   Diagnosis Date    Breast lump     Disease of thyroid gland     nodule, benign    GERD (gastroesophageal reflux disease)     History of transfusion     at 9years old for bleeding during tonsillectomy, no reaction    Hypertension     Rectocele     Uterine leiomyoma     Varicose veins of both lower extremities        Review of Systems   Constitutional: Positive for malaise/fatigue  Negative for chills  Cardiovascular: Negative for chest pain, claudication, cyanosis, dyspnea on exertion, irregular heartbeat, leg swelling, near-syncope, orthopnea, palpitations, paroxysmal nocturnal dyspnea and syncope  Respiratory: Negative for cough and shortness of breath  Gastrointestinal: Negative for heartburn and nausea  Neurological: Negative for dizziness, focal weakness, headaches, light-headedness and weakness  All other systems reviewed and are negative        Allergies   Allergen Reactions    Lisinopril Other (See Comments)     Other reaction(s): swollen legs    Hydrocodone-Acetaminophen Dizziness    Irbesartan-Hydrochlorothiazide Other (See Comments)      Hypertension  Pt had hypertension, arm heaviness and headache           Current Outpatient Medications:     amLODIPine (NORVASC) 5 mg tablet, Take 1 tablet (5 mg total) by mouth daily, Disp: 30 tablet, Rfl: 5    aspirin 81 mg chewable tablet, Chew 1 tablet (81 mg total) daily, Disp: 30 tablet, Rfl: 0    atorvastatin (LIPITOR) 40 mg tablet, Take 1 tablet (40 mg total) by mouth daily with dinner, Disp: 30 tablet, Rfl: 5    BIOTIN PO, Take by mouth, Disp: , Rfl:     cholecalciferol (VITAMIN D3) 1,000 units tablet, Take 1,000 Units by mouth daily, Disp: , Rfl:     clopidogrel (PLAVIX) 75 mg tablet, Take 1 tablet (75 mg total) by mouth daily, Disp: 30 tablet, Rfl: 5    esomeprazole (NexIUM) 40 MG capsule, Take 1 capsule (40 mg total) by mouth daily, Disp: 90 capsule, Rfl: 1    Ginger, Zingiber officinalis, (GINGER ROOT PO), Take by mouth, Disp: , Rfl:     Ivermectin 1 % CREA, Apply topically daily for 30 doses Apply topically to face once daily, Disp: 45 g, Rfl: 2    latanoprost (XALATAN) 0 005 % ophthalmic solution, , Disp: , Rfl:     losartan-hydrochlorothiazide (HYZAAR) 100-25 MG per tablet, Take 1 tablet by mouth daily, Disp: , Rfl:     metoprolol succinate (TOPROL-XL) 50 mg 24 hr tablet, Take 1 tablet (50 mg total) by mouth daily, Disp: 90 tablet, Rfl: 1    multivitamin (THERAGRAN) TABS, Take 1 tablet by mouth daily, Disp: , Rfl:     Sulfacetamide Sodium-Sulfur (Sulfacetamide Sod-Sulfur Wash) 9-4 5 % LIQD, Apply topically twice a day, Disp: 454 g, Rfl: 2    albuterol (PROVENTIL HFA,VENTOLIN HFA) 90 mcg/act inhaler, Inhale 2 puffs every 6 (six) hours as needed for wheezing or shortness of breath (Patient not taking: Reported on 6/21/2021), Disp: 1 Inhaler, Rfl: 5    metroNIDAZOLE (METROGEL) 1 % gel, Apply topically daily (Patient not taking: Reported on 5/28/2021), Disp: 45 g, Rfl: 1    Multiple Vitamins-Minerals (VITAMIN D3 COMPLETE PO), Take by mouth (Patient not taking: Reported on 2021), Disp: , Rfl:         Social History     Socioeconomic History    Marital status: /Civil Union     Spouse name: Not on file    Number of children: Not on file    Years of education: Not on file    Highest education level: Not on file   Occupational History    Not on file   Tobacco Use    Smoking status: Former Smoker     Quit date:      Years since quittin 5    Smokeless tobacco: Never Used   Vaping Use    Vaping Use: Never used   Substance and Sexual Activity    Alcohol use: Not Currently     Comment: rarely, twice per year / social drinker per [employer]script                 Drug use: No    Sexual activity: Not on file   Other Topics Concern    Not on file   Social History Narrative    Not on file     Social Determinants of Health     Financial Resource Strain:     Difficulty of Paying Living Expenses:    Food Insecurity:     Worried About [address] in the Last Year:     [address] [facility] in the Last Year:    Transportation Needs:     Lack of Transportation (Medical):      Lack of Transportation (Non-Medical):    Physical Activity:     Days of Exercise per Week:     Minutes of Exercise per Session:    Stress:     Feeling of Stress :    Social Connections:     Frequency of Communication with Friends and Family:     Frequency of Social Gatherings with Friends and Family:     Attends Orthodoxy Services:     Active Member of Clubs or Organizations:     Attends Club or Organization Meetings:     Marital Status:    Intimate Partner Violence:     Fear of Current or Ex-Partner:     Emotionally Abused:     Physically Abused:     Sexually Abused:        Family History   Problem Relation Age of Onset    Breast cancer Mother 77    Hypertension Mother     Varicose Veins Mother         of lower extremities    Hypertension Father     Coronary artery disease Father     Coronary artery disease Sister     Heart disease Sister  Varicose Veins Sister         of lower extremities    No Known Problems Daughter     Breast cancer Maternal Aunt 72    No Known Problems Maternal Aunt     No Known Problems Sister     No Known Problems Sister        Physical Exam  Vitals and nursing note reviewed  Constitutional:       General: She is not in acute distress  Appearance: She is not diaphoretic  HENT:      Head: Normocephalic and atraumatic  Eyes:      Conjunctiva/sclera: Conjunctivae normal    Cardiovascular:      Rate and Rhythm: Normal rate and regular rhythm  Pulses: Intact distal pulses  Heart sounds: Normal heart sounds  Pulmonary:      Effort: Pulmonary effort is normal       Breath sounds: Normal breath sounds  Abdominal:      General: Bowel sounds are normal       Palpations: Abdomen is soft  Musculoskeletal:         General: Normal range of motion  Cervical back: Normal range of motion and neck supple  Skin:     General: Skin is warm and dry  Neurological:      Mental Status: She is alert and oriented to person, place, and time  Vitals: Blood pressure 136/76, pulse 83, height 5' 5" (1 651 m), weight 94 8 kg (209 lb), SpO2 99 %     Wt Readings from Last 3 Encounters:   06/21/21 94 8 kg (209 lb)   05/28/21 93 kg (205 lb)   05/24/21 94 6 kg (208 lb 8 9 oz)         Labs & Results:  Lab Results   Component Value Date    WBC 7 17 05/24/2021    HGB 14 5 05/24/2021    HCT 46 1 05/24/2021    MCV 95 05/24/2021     05/24/2021     No results found for: BNP  No components found for: CHEM  Troponin I   Date Value Ref Range Status   05/22/2021 3 39 (H) <=0 04 ng/mL Final     Comment:     [employer] Chemistry analyzer 99% cutoff is > 0 04 ng/mL in network labs     o cTnI 99% cutoff is useful only when applied to patients in the clinical setting of myocardial ischemia   o cTnI 99% cutoff should be interpreted in the context of clinical history, ECG findings and possibly cardiac imaging to establish correct diagnosis  o cTnI 99% cutoff may be suggestive but clearly not indicative of a coronary event without the clinical setting of myocardial ischemia      2021 9 48 (H) <=0 04 ng/mL Final     Comment:     [employer] Chemistry analyzer 99% cutoff is > 0 04 ng/mL in network labs     o cTnI 99% cutoff is useful only when applied to patients in the clinical setting of myocardial ischemia   o cTnI 99% cutoff should be interpreted in the context of clinical history, ECG findings and possibly cardiac imaging to establish correct diagnosis  o cTnI 99% cutoff may be suggestive but clearly not indicative of a coronary event without the clinical setting of myocardial ischemia      2021 13 16 (H) <=0 04 ng/mL Final     Comment:     [employer] Chemistry analyzer 99% cutoff is > 0 04 ng/mL in network labs     o cTnI 99% cutoff is useful only when applied to patients in the clinical setting of myocardial ischemia   o cTnI 99% cutoff should be interpreted in the context of clinical history, ECG findings and possibly cardiac imaging to establish correct diagnosis  o cTnI 99% cutoff may be suggestive but clearly not indicative of a coronary event without the clinical setting of myocardial ischemia         Results for orders placed during the hospital encounter of 21    Echo complete with contrast if indicated    Narrative  [place] [address] [address] [place] [address]  [phone]    Transthoracic Echocardiogram  2D, M-mode, Doppler, and Color Doppler    Study date:  22-May-2021    Patient: Cady Jade  MR number: [id number]  Account number: [de-identified]  : 1955  Age: 77 years  Gender: Female  Status: Inpatient  Location: Bedside  Height: 65 in  Weight: 209 lb  BP: 147/ 76 mmHg    Indications: Chest Pain    Diagnoses: R07 9 - Chest pain, unspecified    Sonographer:  Donna Dawson RDCS  Primary Physician:  Daun Spatz, MD  Referring Physician:  WILLIAMS Lee  Group:    [facility]  Interpreting Physician:  Chelsie Hodges MD    SUMMARY    LEFT VENTRICLE:  Systolic function was normal by visual assessment  Ejection fraction was estimated in the range of 55 % to 60 %  Although no diagnostic regional wall motion abnormality was identified, this possibility cannot be completely excluded on the basis of this study  Wall thickness was mildly increased  There was mild concentric hypertrophy  Doppler parameters were consistent with abnormal left ventricular relaxation (grade 1 diastolic dysfunction)  MITRAL VALVE:  There was mild annular calcification  There was trace regurgitation  HISTORY: PRIOR HISTORY: GERD, CP, COVID 19, Elevated Troponin, Hypertension    PROCEDURE: The procedure was performed at the bedside  This was a routine study  The transthoracic approach was used  The study included complete 2D imaging, M-mode, complete spectral Doppler, and color Doppler  The heart rate was 77 bpm,  at the start of the study  Images were obtained from the parasternal, apical, subcostal, and suprasternal notch acoustic windows  Echocardiographic views were limited due to decreased penetration and lung interference  This was a  technically difficult study  LEFT VENTRICLE: Size was normal  Systolic function was normal by visual assessment  Ejection fraction was estimated in the range of 55 % to 60 %  Although no diagnostic regional wall motion abnormality was identified, this possibility  cannot be completely excluded on the basis of this study  Wall thickness was mildly increased  There was mild concentric hypertrophy  DOPPLER: Doppler parameters were consistent with abnormal left ventricular relaxation (grade 1 diastolic  dysfunction)  RIGHT VENTRICLE: The size was normal  Systolic function was normal  Wall thickness was normal     LEFT ATRIUM: Size was normal     RIGHT ATRIUM: Size was normal     MITRAL VALVE: There was mild annular calcification   There was mild thickening  DOPPLER: There was no evidence for stenosis  There was trace regurgitation  AORTIC VALVE: The valve was trileaflet  Leaflets exhibited normal thickness, normal cuspal separation, and sclerosis  DOPPLER: Transaortic velocity was within the normal range  There was no evidence for stenosis  There was no  regurgitation  TRICUSPID VALVE: The valve structure was normal  There was normal leaflet separation  DOPPLER: The transtricuspid velocity was within the normal range  There was no evidence for stenosis  There was no regurgitation  PULMONIC VALVE: Leaflets exhibited normal thickness, no calcification, and normal cuspal separation  DOPPLER: The transpulmonic velocity was within the normal range  There was no regurgitation  PERICARDIUM: There was no pericardial effusion  The pericardium was normal in appearance  AORTA: The root exhibited normal size  SYSTEMIC VEINS: IVC: The inferior vena cava was normal in size and course  Respirophasic changes were normal     SYSTEM MEASUREMENT TABLES    2D  %FS: 25 05 %  Ao Diam: 2 8 cm  Ao asc: 2 9 cm  EDV(Teich): 61 72 ml  EF(Teich): 50 26 %  ESV(Teich): 30 7 ml  IVSd: 1 21 cm  LA Area: 20 4 cm2  LA Diam: 3 52 cm  LVEDV MOD A4C: 39 76 ml  LVEF MOD A4C: 64 34 %  LVESV MOD A4C: 14 18 ml  LVIDd: 3 79 cm  LVIDs: 2 84 cm  LVLd A4C: 6 65 cm  LVLs A4C: 5 76 cm  LVPWd: 1 23 cm  RA Area: 11 01 cm2  RVIDd: 2 84 cm  SV MOD A4C: 25 58 ml  SV(Teich): 31 02 ml    MM  TAPSE: 2 74 cm    PW  E' Sept: 0 08 m/s  E/E' Sept: 10 92  MV A Jagjit: 0 91 m/s  MV Dec [place]: 3 87 m/s2  MV DecT: 219 75 ms  MV E Jagjit: 0 85 m/s  MV E/A Ratio: 0 93  MV PHT: 63 73 ms  MVA By PHT: 3 45 cm2    [employer] Accredited [facility]ocardi[facility]    Prepared and electronically signed by    Arbie Saint, MD  Signed [id number] 09:33:14    No results found for this or any previous visit  This note was completed in part utilizing m-modal fluency direct voice recognition software  Grammatical errors, random word insertion, spelling mistakes, and incomplete sentences may be an occasional consequence of the system secondary to software limitations, ambient noise and hardware issues  At the time of dictation, efforts were made to edit, clarify and /or correct errors  Please read the chart carefully and recognize, using context, where substitutions have occurred    If you have any questions or concerns about the context, text or information contained within the body of this dictation, please contact myself, the provider, for further clarification

## 2021-06-21 ENCOUNTER — OFFICE VISIT (OUTPATIENT)
Dept: CARDIOLOGY CLINIC | Facility: CLINIC | Age: 66
End: 2021-06-21
Payer: MEDICARE

## 2021-06-21 VITALS
DIASTOLIC BLOOD PRESSURE: 76 MMHG | SYSTOLIC BLOOD PRESSURE: 136 MMHG | HEART RATE: 83 BPM | OXYGEN SATURATION: 99 % | HEIGHT: 65 IN | WEIGHT: 209 LBS | BODY MASS INDEX: 34.82 KG/M2

## 2021-06-21 DIAGNOSIS — I16.0 HYPERTENSIVE URGENCY: ICD-10-CM

## 2021-06-21 DIAGNOSIS — I24.9 ACUTE CORONARY SYNDROME (HCC): ICD-10-CM

## 2021-06-21 DIAGNOSIS — I10 BENIGN ESSENTIAL HYPERTENSION: ICD-10-CM

## 2021-06-21 DIAGNOSIS — E78.00 PURE HYPERCHOLESTEROLEMIA: ICD-10-CM

## 2021-06-21 DIAGNOSIS — E66.9 OBESITY (BMI 30-39.9): ICD-10-CM

## 2021-06-21 DIAGNOSIS — Z09 HOSPITAL DISCHARGE FOLLOW-UP: Primary | ICD-10-CM

## 2021-06-21 PROCEDURE — 99215 OFFICE O/P EST HI 40 MIN: CPT | Performed by: NURSE PRACTITIONER

## 2021-06-21 RX ORDER — AMLODIPINE BESYLATE 5 MG/1
5 TABLET ORAL DAILY
Qty: 30 TABLET | Refills: 5 | Status: SHIPPED | OUTPATIENT
Start: 2021-06-21 | End: 2021-06-22 | Stop reason: SDUPTHER

## 2021-06-21 RX ORDER — CLOPIDOGREL BISULFATE 75 MG/1
75 TABLET ORAL DAILY
Qty: 30 TABLET | Refills: 5 | Status: SHIPPED | OUTPATIENT
Start: 2021-06-21 | End: 2021-06-22 | Stop reason: SDUPTHER

## 2021-06-21 RX ORDER — ATORVASTATIN CALCIUM 40 MG/1
40 TABLET, FILM COATED ORAL
Qty: 30 TABLET | Refills: 5 | Status: SHIPPED | OUTPATIENT
Start: 2021-06-21 | End: 2021-06-22 | Stop reason: SDUPTHER

## 2021-06-21 NOTE — PATIENT INSTRUCTIONS
DASH Eating Plan   WHAT YOU NEED TO KNOW:   The DASH (Dietary Approaches to Stop Hypertension) Eating Plan is designed to help prevent or lower high blood pressure  It can also help to lower LDL (bad) cholesterol and decrease your risk of heart disease  The plan is low in sodium, sugar, unhealthy fats, and total fat  It is high in potassium, calcium, magnesium, and fiber  These nutrients are added when you eat more fruits, vegetables, and whole grains  DISCHARGE INSTRUCTIONS:   Your sodium limit each day: Your dietitian will tell you how much sodium is safe for you to have each day  People with high blood pressure should have no more than 1,500 to 2,300 mg of sodium in a day  A teaspoon (tsp) of salt has 2,300 mg of sodium  This may seem like a difficult goal, but small changes to the foods you eat can make a big difference  Your healthcare provider or dietitian can help you create a meal plan that follows your sodium limit  How to limit sodium:   · Read food labels  Food labels can help you choose foods that are low in sodium  The amount of sodium is listed in milligrams (mg)  The % Daily Value (DV) column tells you how much of your daily needs are met by 1 serving of the food for each nutrient listed  Choose foods that have less than 5% of the DV of sodium  These foods are considered low in sodium  Foods that have 20% or more of the DV of sodium are considered high in sodium  Avoid foods that have more than 300 mg of sodium in each serving  Choose foods that say low-sodium, reduced-sodium, or no salt added on the food label  · Avoid salt  Do not salt food at the table, and add very little salt to foods during cooking  Use herbs and spices, such as onions, garlic, and salt-free seasonings to add flavor to foods  Try lemon or lime juice or vinegar to give foods a tart flavor  Use hot peppers or a small amount of hot pepper sauce to add a spicy flavor to foods  · Ask about salt substitutes    Ask your healthcare provider if you may use salt substitutes  Some salt substitutes have ingredients that can be harmful if you have certain health conditions  · Choose foods carefully at restaurants  Meals from restaurants, especially fast food restaurants, are often high in sodium  Some restaurants have nutrition information that tells you the amount of sodium in their foods  Ask to have your food prepared with less, or no salt  What you need to know about fats:   · Include healthy fats  Examples are unsaturated fats and omega-3 fatty acids  Unsaturated fats are found in soybean, canola, olive, or sunflower oil, and liquid and soft tub margarines  Omega-3 fatty acids are found in fatty fish, such as salmon, tuna, mackerel, and sardines  It is also found in flaxseed oil and ground flaxseed  · Avoid unhealthy fats  Do not eat unhealthy fats, such as saturated fats and trans fats  Saturated fats are found in foods that contain fat from animals  Examples are fatty meats, whole milk, butter, cream, and other dairy foods  It is also found in shortening, stick margarine, palm oil, and coconut oil  Trans fats are found in fried foods, crackers, chips, and baked goods made with margarine or shortening  Foods to include: With the DASH eating plan, you need to eat a certain number of servings from each food group  This will help you get enough of certain nutrients and limit others  The amount of servings you should eat depends on how many calories you need  Your dietitian can tell you how many calories you need  The number of servings listed next to the food groups below are for people who need about 2,000 calories each day  · Grains:  6 to 8 servings (3 of these servings should be whole-grain foods)    ? 1 slice of whole-grain bread     ? 1 ounce of dry cereal    ? ½ cup of cooked cereal, pasta, or brown rice    · Vegetables and fruits:  4 to 5 servings of fruits and 4 to 5 servings of vegetables    ?  1 medium fruit    ? ½ cup of frozen, canned (no added salt), or chopped fresh vegetables     ? ½ cup of fresh, frozen, dried, or canned fruit (canned in light syrup or fruit juice)    ? ½ cup of vegetable or fruit juice    · Dairy:  2 to 3 servings    ? 1 cup of nonfat (skim) or 1% milk    ? 1½ ounces of fat-free or low-fat cheese    ? 6 ounces of nonfat or low-fat yogurt    · Lean meat, poultry, and fish:  6 ounces or less    ? Poultry (chicken, turkey) with no skin    ? Fish (especially fatty fish, such as salmon, fresh tuna, or mackerel)    ? Lean beef and pork (loin, round, extra lean hamburger)    ? Egg whites and egg substitutes    · Nuts, seeds, and legumes:  4 to 5 servings each week    ? ½ cup of cooked beans and peas    ? 1½ ounces of unsalted nuts    ? 2 tablespoons of peanut butter or seeds    · Sweets and added sugars:  5 or less each week    ? 1 tablespoon of sugar, jelly, or jam    ? ½ cup of sorbet or gelatin    ? 1 cup of lemonade    · Fats:  2 to 3 servings each week    ? 1 teaspoon of soft margarine or vegetable oil    ? 1 tablespoon of mayonnaise    ? 2 tablespoons of salad dressing    Foods to avoid:   · Grains:      ? Baked goods, such as doughnuts, pastries, cookies, and biscuits (high in fat and sugar)    ? Mixes for cornbread and biscuits, packaged foods, such as bread stuffing, rice and pasta mixes, macaroni and cheese, and instant cereals (high in sodium)    · Fruits and vegetables:      ? Regular, canned vegetables (high in sodium)    ? Sauerkraut, pickled vegetables, and other foods prepared in brine (high in sodium)    ? Fried vegetables or vegetables in butter or high-fat sauces    ? Fruit in cream or butter sauce (high in fat)    · Dairy:      ? Whole milk, 2% milk, and cream (high in fat)    ?  Regular cheese and processed cheese (high in fat and sodium)    · Meats and protein foods:      ? Smoked or cured meat, such as corned beef, green, ham, hot dogs, and sausage (high in fat and sodium)    ? Canned beans and canned meats or spreads, such as potted meats, sardines, anchovies, and imitation seafood (high in sodium)    ? Deli or lunch meats, such as bologna, ham, turkey, and roast beef (high in sodium)    ? High-fat meat (T-bone steak, regular hamburger, and ribs)    ? Whole eggs and egg yolks (high in fat)    · Other:      ? Seasonings made with salt, such as garlic salt, celery salt, onion salt, seasoned salt, meat tenderizers, and monosodium glutamate (MSG)    ? Miso soup and canned or dried soup mixes (high in sodium)    ? Regular soy sauce, barbecue sauce, teriyaki sauce, steak sauce, Worcestershire sauce, and most flavored vinegars (high in sodium)    ? Regular condiments, such as mustard, ketchup, and salad dressings (high in sodium)    ? Gravy and sauces, such as Kem or cheese sauces (high in sodium and fat)    ? Drinks high in sugar, such as soda or fruit drinks    ? Snack foods, such as salted chips, popcorn, pretzels, pork rinds, salted crackers, and salted nuts    ? Frozen foods, such as dinners, entrees, vegetables with sauces, and breaded meats (high in sodium)    Other guidelines to follow:   · Maintain a healthy weight  Your risk for heart disease is higher if you are overweight  Your healthcare provider may suggest that you lose weight if you are overweight  You can lose weight by eating fewer calories and foods that have added sugars and fat  The DASH meal plan can help you do this  Decrease calories by eating smaller portions at each meal and fewer snacks  Ask your healthcare provider for more information about how to lose weight  · Exercise regularly  Regular exercise can help you reach or maintain a healthy weight  Regular exercise can also help decrease your blood pressure and improve your cholesterol levels  Get 30 minutes or more of moderate exercise each day of the week  To lose weight, get at least 60 minutes of exercise   Talk to your healthcare provider about the best exercise program for you  · Limit alcohol  Women should limit alcohol to 1 drink a day  Men should limit alcohol to 2 drinks a day  A drink of alcohol is 12 ounces of beer, 5 ounces of wine, or 1½ ounces of liquor  © Copyright [address] Information is for End User's use only and may not be sold, redistributed or otherwise used for commercial purposes  All illustrations and images included in CareNotes® are the copyrighted property of [employer]  or [facility] [employer]   The above information is an  only  It is not intended as medical advice for individual conditions or treatments  Talk to your doctor, nurse or pharmacist before following any medical regimen to see if it is safe and effective for you

## 2021-06-21 NOTE — LETTER
June 21, 2021     Usha Kaminski MD  [address] [address]    Patient: Natalie Alfredo   YOB: 1955   Date of Visit: 6/21/2021       Dear Dr Keenan Dubon:    Thank you for referring Heroan Sender to me for evaluation  Below are my notes for this consultation  If you have questions, please do not hesitate to call me  I look forward to following your patient along with you  Sincerely,        WILLIAMS Pride        CC: MD Dangelo Camara, [facility]  6/21/2021 11:58 AM  Sign when [facility] Visit  Cardiology  Hospital Follow Up   Office Visit Note  Natalie Alfredo   77 y o    female   MRN: [id number]  [address]  [address]  [address] [place] [address] Attila Denise King [address]  [phone]    PCP: Usha Kaminski MD  Cardiologist: will be Dr Ann Marie Johnson  Prior: Dr Nancy Marques            Summary of recommendations  Heart healthy diet  Educational information provided  Continue to monitor the blood pressure at home, record,bring the blood pressure monitor the log in  Fasting lipid profile 2 months  Follow up will be scheduled with Dr Ann Marie Johnson 3 months        Assessment/plan  Fatigue  This may be residual from COVID  Chest pain>> Type 2 MI 2/2 accelerated HTN Recent hospital admission 5/21-5/24/21  Underwent left heart catheterization 5/24/21: Nonobstructive CAD  likely related to accelerated hypertension  Amlodipine was added  Recommend she avoid NSAIDs  She was using them for arthritis of her knees  On guideline directed medical therapy:  Aspirin and clopidogrel, x1 year, statin, beta-blocker  COVID-19 + 4/18/21  Hypertension, essential   /76  on amlodipine 5 mg daily, Hyzaar 100/25 daily, metoprolol succinate 50 mg daily  In the hospital, when she presented her blood pressure was 231/102  --  Allergy to lisinopril  Hyperlipidemia, on atorvastatin 40 mg daily  5/22/21:   , total cholesterol 245, triglyceride 121    Heart healthy diet, reassess 8 weeks  A slip was provided  Obesity, BMI 34  Cardiac testing   TTE 21 EF 55-60%  No RWMA  Mild LVH  Grade 1 DD  RV normal   Atria normal    Cardiac catheterization 21  The coronary circulation is right dominant  Left main: Normal   LAD: The vessel was small to medium sized  Angiography showed no evidence of disease  Circumflex: The vessel was medium sized  1st obtuse marginal: The vessel was medium sized  Angiography showed mild atherosclerosis  Ramus intermedius: The vessel was medium to large sized  Angiography showed no evidence of disease  RCA: The vessel was medium to large sized  There was ostial spasm with catheter engagement relieved with intracoronary nitroglycerin  Ostial RCA: There was a 20 % stenosis  HPI  Radha Gutierrez is a 77 y o  female with hypertension and family history of sudden cardiac death  Her sister  suddenly at 47years old due to a presumed viral, nonischemic cardiomyopathy)  She has followed with Dr Rio aRi from [facility]  She was offered genetic testing but declined  She had an echocardiogram 2019 that showed preserved LV function normal RV size and function no significant valve disease  21  COVID-19 positive  She called her PCP with chest tightness, anorexia, headache and myalgias  She received an antibody infusion 4/10/21  Adm [facility] to [facility] with acute onset chest pain  Found have accelerated hypertension, 231/102  Troponin 2 6  Her EKG showed no acute ischemic changes  She was followed by Cardiology  Her troponin increased further, to 13  Her blood pressure was optimized amlodipine was added  An echocardiogram showed preserved LV function without regional motion abnormalities  Cardiac catheterization was pursued  This demonstrated mild nonobstructive disease  She was placed on medical therapy with aspirin and clopidogrel    She was continued on statin, beta-blocker and optimal BP control  6/21/21  Hospital follow-up  Her chief complaint is fatigue  This is still pronounced ever since she had COVID in the spring  She has been monitoring her blood pressures-similar to today's  Today 136/76 in the office  Recommended adherence to a low-salt diet, avoid NSAIDs, recommended regular exercise  In the past she had been using some NSAIDs for arthritic knees  She may need a knee replacement in the future  She asked if she needed to continue on the new medications  I suggested she continue, they are a statin, amlodipine and clopidogrel  We reviewed her cardiac catheterization echocardiogram reports          I have spent 40 minutes with Patient  today in which greater than 50% of this time was spent in counseling/coordination of care regarding Intructions for management, Patient and family education, Importance of tx compliance and Risk factor reductions  Assessment  Diagnoses and all orders for this visit:    Hospital discharge follow-up    Pure hypercholesterolemia  -     Lipid panel; Future    Benign essential hypertension    Obesity (BMI 30-39  9)    Hypertensive urgency  -     amLODIPine (NORVASC) 5 mg tablet; Take 1 tablet (5 mg total) by mouth daily    Acute coronary syndrome (HCC)  -     atorvastatin (LIPITOR) 40 mg tablet; Take 1 tablet (40 mg total) by mouth daily with dinner  -     clopidogrel (PLAVIX) 75 mg tablet; Take 1 tablet (75 mg total) by mouth daily          Past Medical History:   Diagnosis Date    Breast lump     Disease of thyroid gland     nodule, benign    GERD (gastroesophageal reflux disease)     History of transfusion     at 9years old for bleeding during tonsillectomy, no reaction    Hypertension     Rectocele     Uterine leiomyoma     Varicose veins of both lower extremities        Review of Systems   Constitutional: Positive for malaise/fatigue  Negative for chills     Cardiovascular: Negative for chest pain, claudication, cyanosis, dyspnea on exertion, irregular heartbeat, leg swelling, near-syncope, orthopnea, palpitations, paroxysmal nocturnal dyspnea and syncope  Respiratory: Negative for cough and shortness of breath  Gastrointestinal: Negative for heartburn and nausea  Neurological: Negative for dizziness, focal weakness, headaches, light-headedness and weakness  All other systems reviewed and are negative  Allergies   Allergen Reactions    Lisinopril Other (See Comments)     Other reaction(s): swollen legs    Hydrocodone-Acetaminophen Dizziness    Irbesartan-Hydrochlorothiazide Other (See Comments)      Hypertension  Pt had hypertension, arm heaviness and headache           Current Outpatient Medications:     amLODIPine (NORVASC) 5 mg tablet, Take 1 tablet (5 mg total) by mouth daily, Disp: 30 tablet, Rfl: 5    aspirin 81 mg chewable tablet, Chew 1 tablet (81 mg total) daily, Disp: 30 tablet, Rfl: 0    atorvastatin (LIPITOR) 40 mg tablet, Take 1 tablet (40 mg total) by mouth daily with dinner, Disp: 30 tablet, Rfl: 5    BIOTIN PO, Take by mouth, Disp: , Rfl:     cholecalciferol (VITAMIN D3) 1,000 units tablet, Take 1,000 Units by mouth daily, Disp: , Rfl:     clopidogrel (PLAVIX) 75 mg tablet, Take 1 tablet (75 mg total) by mouth daily, Disp: 30 tablet, Rfl: 5    esomeprazole (NexIUM) 40 MG capsule, Take 1 capsule (40 mg total) by mouth daily, Disp: 90 capsule, Rfl: 1    Ting, Zingiber officinalis, (TING ROOT PO), Take by mouth, Disp: , Rfl:     Ivermectin 1 % CREA, Apply topically daily for 30 doses Apply topically to face once daily, Disp: 45 g, Rfl: 2    latanoprost (XALATAN) 0 005 % ophthalmic solution, , Disp: , Rfl:     losartan-hydrochlorothiazide (HYZAAR) 100-25 MG per tablet, Take 1 tablet by mouth daily, Disp: , Rfl:     metoprolol succinate (TOPROL-XL) 50 mg 24 hr tablet, Take 1 tablet (50 mg total) by mouth daily, Disp: 90 tablet, Rfl: 1    multivitamin (THERAGRAN) TABS, Take 1 tablet by mouth daily, Disp: , Rfl:     Sulfacetamide Sodium-Sulfur (Sulfacetamide Sod-Sulfur Wash) 9-4 5 % LIQD, Apply topically twice a day, Disp: 454 g, Rfl: 2    albuterol (PROVENTIL HFA,VENTOLIN HFA) 90 mcg/act inhaler, Inhale 2 puffs every 6 (six) hours as needed for wheezing or shortness of breath (Patient not taking: Reported on 2021), Disp: 1 Inhaler, Rfl: 5    metroNIDAZOLE (METROGEL) 1 % gel, Apply topically daily (Patient not taking: Reported on 2021), Disp: 45 g, Rfl: 1    Multiple Vitamins-Minerals (VITAMIN D3 COMPLETE PO), Take by mouth (Patient not taking: Reported on 2021), Disp: , Rfl:         Social History     Socioeconomic History    Marital status: /Civil Union     Spouse name: Not on file    Number of children: Not on file    Years of education: Not on file    Highest education level: Not on file   Occupational History    Not on file   Tobacco Use    Smoking status: Former Smoker     Quit date:      Years since quittin 5    Smokeless tobacco: Never Used   Vaping Use    Vaping Use: Never used   Substance and Sexual Activity    Alcohol use: Not Currently     Comment: rarely, twice per year / social drinker per allscript                 Drug use: No    Sexual activity: Not on file   Other Topics Concern    Not on file   Social History Narrative    Not on file     Social Determinants of Health     Financial Resource Strain:     Difficulty of Paying Living Expenses:    Food Insecurity:     Worried About [address] in the Last Year:     [address]  in the Last Year:    Transportation Needs:     Lack of Transportation (Medical):      Lack of Transportation (Non-Medical):    Physical Activity:     Days of Exercise per Week:     Minutes of Exercise per Session:    Stress:     Feeling of Stress :    Social Connections:     Frequency of Communication with Friends and Family:     Frequency of Social Gatherings with Friends and Family:     Attends Synagogue Services:     Active Member of Clubs or Organizations:     Attends Club or Organization Meetings:     Marital Status:    Intimate Partner Violence:     Fear of Current or Ex-Partner:     Emotionally Abused:     Physically Abused:     Sexually Abused:        Family History   Problem Relation Age of Onset    Breast cancer Mother 77    Hypertension Mother     Varicose Veins Mother         of lower extremities    Hypertension Father     Coronary artery disease Father     Coronary artery disease Sister     Heart disease Sister     Varicose Veins Sister         of lower extremities    No Known Problems Daughter     Breast cancer Maternal Aunt 72    No Known Problems Maternal Aunt     No Known Problems Sister     No Known Problems Sister        Physical Exam  Vitals and nursing note reviewed  Constitutional:       General: She is not in acute distress  Appearance: She is not diaphoretic  HENT:      Head: Normocephalic and atraumatic  Eyes:      Conjunctiva/sclera: Conjunctivae normal    Cardiovascular:      Rate and Rhythm: Normal rate and regular rhythm  Pulses: Intact distal pulses  Heart sounds: Normal heart sounds  Pulmonary:      Effort: Pulmonary effort is normal       Breath sounds: Normal breath sounds  Abdominal:      General: Bowel sounds are normal       Palpations: Abdomen is soft  Musculoskeletal:         General: Normal range of motion  Cervical back: Normal range of motion and neck supple  Skin:     General: Skin is warm and dry  Neurological:      Mental Status: She is alert and oriented to person, place, and time  Vitals: Blood pressure 136/76, pulse 83, height 5' 5" (1 651 m), weight 94 8 kg (209 lb), SpO2 99 %     Wt Readings from Last 3 Encounters:   06/21/21 94 8 kg (209 lb)   05/28/21 93 kg (205 lb)   05/24/21 94 6 kg (208 lb 8 9 oz)         Labs & Results:  Lab Results   Component Value Date    WBC 7 17 05/24/2021    HGB 14 5 05/24/2021 HCT 46 1 05/24/2021    MCV 95 05/24/2021     05/24/2021     No results found for: BNP  No components found for: CHEM  Troponin I   Date Value Ref Range Status   05/22/2021 3 39 (H) <=0 04 ng/mL Final     Comment:     Siemens Chemistry analyzer 99% cutoff is > 0 04 ng/mL in network labs     o cTnI 99% cutoff is useful only when applied to patients in the clinical setting of myocardial ischemia   o cTnI 99% cutoff should be interpreted in the context of clinical history, ECG findings and possibly cardiac imaging to establish correct diagnosis  o cTnI 99% cutoff may be suggestive but clearly not indicative of a coronary event without the clinical setting of myocardial ischemia      05/21/2021 9 48 (H) <=0 04 ng/mL Final     Comment:     Siemens Chemistry analyzer 99% cutoff is > 0 04 ng/mL in network labs     o cTnI 99% cutoff is useful only when applied to patients in the clinical setting of myocardial ischemia   o cTnI 99% cutoff should be interpreted in the context of clinical history, ECG findings and possibly cardiac imaging to establish correct diagnosis  o cTnI 99% cutoff may be suggestive but clearly not indicative of a coronary event without the clinical setting of myocardial ischemia      05/21/2021 13 16 (H) <=0 04 ng/mL Final     Comment:     Siemens Chemistry analyzer 99% cutoff is > 0 04 ng/mL in network labs     o cTnI 99% cutoff is useful only when applied to patients in the clinical setting of myocardial ischemia   o cTnI 99% cutoff should be interpreted in the context of clinical history, ECG findings and possibly cardiac imaging to establish correct diagnosis  o cTnI 99% cutoff may be suggestive but clearly not indicative of a coronary event without the clinical setting of myocardial ischemia         Results for orders placed during the hospital encounter of 05/21/21    Echo complete with contrast if indicated    Narrative  [address] [address]  [phone]    Transthoracic Echocardiogram  2D, M-mode, Doppler, and Color Doppler    Study date:  22-May-2021    Patient: Gurmeet Ambriz  MR number: [id number]  Account number: [de-identified]  : 1955  Age: 77 years  Gender: Female  Status: Inpatient  Location: Bedside  Height: 65 in  Weight: 209 lb  BP: 147/ 76 mmHg    Indications: Chest Pain    Diagnoses: R07 9 - Chest pain, unspecified    Sonographer:  Claire Gitelman, RDCS  Primary Physician:  Usha Kaminski MD  Referring Physician:  WILLIAMS Allen  Group:  [facility]  Interpreting Physician:  Flor Durand MD    SUMMARY    LEFT VENTRICLE:  Systolic function was normal by visual assessment  Ejection fraction was estimated in the range of 55 % to 60 %  Although no diagnostic regional wall motion abnormality was identified, this possibility cannot be completely excluded on the basis of this study  Wall thickness was mildly increased  There was mild concentric hypertrophy  Doppler parameters were consistent with abnormal left ventricular relaxation (grade 1 diastolic dysfunction)  MITRAL VALVE:  There was mild annular calcification  There was trace regurgitation  HISTORY: PRIOR HISTORY: GERD, CP, COVID 19, Elevated Troponin, Hypertension    PROCEDURE: The procedure was performed at the bedside  This was a routine study  The transthoracic approach was used  The study included complete 2D imaging, M-mode, complete spectral Doppler, and color Doppler  The heart rate was 77 bpm,  at the start of the study  Images were obtained from the parasternal, apical, subcostal, and suprasternal notch acoustic windows  Echocardiographic views were limited due to decreased penetration and lung interference  This was a  technically difficult study  LEFT VENTRICLE: Size was normal  Systolic function was normal by visual assessment  Ejection fraction was estimated in the range of 55 % to 60 %   Although no diagnostic regional wall motion abnormality was identified, this possibility  cannot be completely excluded on the basis of this study  Wall thickness was mildly increased  There was mild concentric hypertrophy  DOPPLER: Doppler parameters were consistent with abnormal left ventricular relaxation (grade 1 diastolic  dysfunction)  RIGHT VENTRICLE: The size was normal  Systolic function was normal  Wall thickness was normal     LEFT ATRIUM: Size was normal     RIGHT ATRIUM: Size was normal     MITRAL VALVE: There was mild annular calcification  There was mild thickening  DOPPLER: There was no evidence for stenosis  There was trace regurgitation  AORTIC VALVE: The valve was trileaflet  Leaflets exhibited normal thickness, normal cuspal separation, and sclerosis  DOPPLER: Transaortic velocity was within the normal range  There was no evidence for stenosis  There was no  regurgitation  TRICUSPID VALVE: The valve structure was normal  There was normal leaflet separation  DOPPLER: The transtricuspid velocity was within the normal range  There was no evidence for stenosis  There was no regurgitation  PULMONIC VALVE: Leaflets exhibited normal thickness, no calcification, and normal cuspal separation  DOPPLER: The transpulmonic velocity was within the normal range  There was no regurgitation  PERICARDIUM: There was no pericardial effusion  The pericardium was normal in appearance  AORTA: The root exhibited normal size  SYSTEMIC VEINS: IVC: The inferior vena cava was normal in size and course   Respirophasic changes were normal     SYSTEM MEASUREMENT TABLES    2D  %FS: 25 05 %  Ao Diam: 2 8 cm  Ao asc: 2 9 cm  EDV(Teich): 61 72 ml  EF(Teich): 50 26 %  ESV(Teich): 30 7 ml  IVSd: 1 21 cm  LA Area: 20 4 cm2  LA Diam: 3 52 cm  LVEDV MOD A4C: 39 76 ml  LVEF MOD A4C: 64 34 %  LVESV MOD A4C: 14 18 ml  LVIDd: 3 79 cm  LVIDs: 2 84 cm  LVLd A4C: 6 65 cm  LVLs A4C: 5 76 cm  LVPWd: 1 23 cm  RA Area: 11 01 cm2  RVIDd: 2 84 cm  SV MOD A4C: 25 58 ml  SV(Teich): 31 02 ml    MM  TAPSE: 2 74 cm    PW  E' Sept: 0 08 m/s  E/E' Sept: 10 92  MV A Jagjit: 0 91 m/s  MV Dec [place]: 3 87 m/s2  MV DecT: 219 75 ms  MV E Jagjit: 0 85 m/s  MV E/A Ratio: 0 93  MV PHT: 63 73 ms  MVA By PHT: 3 45 cm2    [address] [facility]redited [facility]    Prepared and electronically signed by    Norina Kanner, MD  Signed [id number] 09:33:14    No results found for this or any previous visit  This note was completed in part utilizing m-modal fluency direct voice recognition software  Grammatical errors, random word insertion, spelling mistakes, and incomplete sentences may be an occasional consequence of the system secondary to software limitations, ambient noise and hardware issues  At the time of dictation, efforts were made to edit, clarify and /or correct errors  Please read the chart carefully and recognize, using context, where substitutions have occurred    If you have any questions or concerns about the context, text or information contained within the body of this dictation, please contact myself, the provider, for further clarification

## 2021-06-22 DIAGNOSIS — I16.0 HYPERTENSIVE URGENCY: ICD-10-CM

## 2021-06-22 DIAGNOSIS — I24.9 ACUTE CORONARY SYNDROME (HCC): ICD-10-CM

## 2021-06-22 RX ORDER — ATORVASTATIN CALCIUM 40 MG/1
40 TABLET, FILM COATED ORAL
Qty: 30 TABLET | Refills: 2 | Status: SHIPPED | OUTPATIENT
Start: 2021-06-22 | End: 2022-04-20

## 2021-06-22 RX ORDER — AMLODIPINE BESYLATE 5 MG/1
5 TABLET ORAL DAILY
Qty: 30 TABLET | Refills: 2 | Status: SHIPPED | OUTPATIENT
Start: 2021-06-22 | End: 2021-09-24 | Stop reason: SDUPTHER

## 2021-06-22 RX ORDER — CLOPIDOGREL BISULFATE 75 MG/1
75 TABLET ORAL DAILY
Qty: 30 TABLET | Refills: 2 | Status: SHIPPED | OUTPATIENT
Start: 2021-06-22 | End: 2021-09-24 | Stop reason: SDUPTHER

## 2021-08-09 ENCOUNTER — OFFICE VISIT (OUTPATIENT)
Dept: DERMATOLOGY | Facility: CLINIC | Age: 66
End: 2021-08-09
Payer: MEDICARE

## 2021-08-09 VITALS — HEIGHT: 65 IN | TEMPERATURE: 97.8 F | BODY MASS INDEX: 34.44 KG/M2 | WEIGHT: 206.7 LBS

## 2021-08-09 DIAGNOSIS — L71.9 ROSACEA: ICD-10-CM

## 2021-08-09 DIAGNOSIS — D48.9 NEOPLASM OF UNCERTAIN BEHAVIOR: Primary | ICD-10-CM

## 2021-08-09 DIAGNOSIS — L72.0 MILIUM: ICD-10-CM

## 2021-08-09 DIAGNOSIS — I87.2 STASIS DERMATITIS OF BOTH LEGS: ICD-10-CM

## 2021-08-09 PROCEDURE — 88305 TISSUE EXAM BY PATHOLOGIST: CPT | Performed by: PATHOLOGY

## 2021-08-09 PROCEDURE — 99214 OFFICE O/P EST MOD 30 MIN: CPT | Performed by: DERMATOLOGY

## 2021-08-09 PROCEDURE — 11102 TANGNTL BX SKIN SINGLE LES: CPT | Performed by: DERMATOLOGY

## 2021-08-09 RX ORDER — TRIAMCINOLONE ACETONIDE 1 MG/G
CREAM TOPICAL 2 TIMES DAILY
Qty: 30 G | Refills: 0 | Status: SHIPPED | OUTPATIENT
Start: 2021-08-09 | End: 2022-04-20

## 2021-08-09 NOTE — PATIENT INSTRUCTIONS
ROSACEA    Assessment and Plan:  Based on a thorough discussion of this condition and the management approach to it (including a comprehensive discussion of the known risks, side effects and potential benefits of treatment), the patient (family) agrees to implement the following specific plan:   Continue with sulfacetamide lotion as directed by physician  Rosacea is a chronic rash affecting the mid-face including the nose, cheeks, chin, forehead, and eyelids  The incidence is usually greatest between the ages of 30-60 years and is more common in people with fair skin  Common characteristics include redness, telangiectasias, papules and pustules over affected areas  Rosacea may look similar to acne, but there is a lack of comedones  Occasionally the eyes may also be involved in ocular rosacea  In advanced disease, enlargement of the sebaceous glands in the nose, termed rhinophyma, may be present  Rosacea results in red spots (papules) and sometimes pustules over the face, but unlike acne there are no blackheads, whiteheads, or cystic nodules  Patients often experience increased facial flushing with prominent blood vessels (erythematotelangiectatic rosacea) and dry, sensitive skin  These symptoms are exacerbated by sun exposure, hot or spicy foods, topical steroids and oil-based facial products  In ocular rosacea, eyelids may be red and sore due to conjunctivitis, keratitis, and episcleritis  If rhinophyma develops due to enlargement of sebaceous glands, the patient may have an enlarged and irregularly shaped nose with prominent pores  In rosacea that is refractory to treatment, patients can develop persistent redness and swelling of the face due to lymphatic obstruction (Morbihan disease)  Distribution around the cheeks may be confused with the malar or butterfly rash of lupus  However, the rash of lupus spares the nasal creases and lacks papules and pustules   If signs of photosensitivity, oral ulcers, arthritis, and kidney dysfunction are present then consider referral to a rheumatologist      There are many potential causes of rosacea including genetic, environmental, vascular, and inflammatory factors  These include, but are not limited to:   Chronic exposure to ultraviolet radiation    Increased immune responses in the form of cathelicidins that promote vessel dilation and infiltration with white blood cells (neutrophils) into the dermis   Increased matrix metalloproteinases such as collagen and elastase that remodel normal tissue may contribute to inflammation of the skin making it thicker and harder   There is some evidence to suggest that increased numbers of demodex mites on patient skin may contribute to rosacea papules     General Treatment Approach    Avoid exacerbating factors such as heat, spicy foods, and alcohol    Use daily SPF30+ sunscreen and other methods of coverage for sun protection   Use water-based make-up    Avoid applying topical steroids to affected areas as they can cause perioral dermatitis and exacerbate rosacea     Topical Treatment Approach   Metronidazole cream or gel by itself or in combination with oral antibiotics for more severe cases   Azelaic acid cream or lotion is effective for mild inflammatory rosacea when applied twice daily to affected areas   Brimonidine gel and oxymetazoline hydrochloride cream can reduce facial redness temporarily    Ivermectin cream can treat papulopustular rosacea by controlling demodex mites and inflammation    Pimecrolimus cream or tacrolimus ointment twice a day for 2-3 months can help reduce inflammation    Oral Treatment Approach   Antibiotics such as doxycycline, minocycline, or erythromycin for 1-3 months   Clonidine and carvedilol can help reduce facial flushing and are generally well tolerated  Common side effects include low blood pressure, gastrointestinal upset, dry eyes, blurred vision and low heart rate   Isotretinoin at low doses can be effective for long term treatment when antibiotics fail  Side effects may make it unsuitable for some patients   NSAIDs such as diclofenac can help reduce discomfort and redness in the skin  Procedural/Surgical Treatment Approach    Vascular lasers or intense pulsed light treatment may be used to treat persistent telangiectasia and papulopustular rosacea   Plastic surgery and carbon dioxide lasers may be used to treat rhinophyma       MILIUM     Assessment and Plan:  Based on a thorough discussion of this condition and the management approach to it (including a comprehensive discussion of the known risks, side effects and potential benefits of treatment), the patient (family) agrees to implement the following specific plan:   Reassured benign    Assessment and Plan  A milium is a small cyst containing keratin (the skin protein); they are usually multiple and are then known as milia  These harmless cysts present as tiny pearly-white bumps just under the surface of the skin  Milia are common in all ages and both sexes  They most often arise on the face and are particularly prominent on the eyelids and cheeks, but they may occur elsewhere  There are various kinds of milia    milia: Affect 40-50% of  babies, few to numerous lesions, often seen on the nose, but may also arise inside the mouth on the mucosa (Ciera pearls) or palate (Yessenia nodules) or more widely on the scalp, face and upper trunk, Heal spontaneously within a few weeks of birth   Primary milia in children and adults: found around eyelids, cheeks, forehead and genitalia, in young children, a row of milia may appear along the nasal crease, may clear in a few weeks or persist for months or longer       Juvenile milia: associated with Rombo syndrome, basal cell naevus syndrome, Axkzx-Ilruv-Vsszgsmh syndrome, pachyonychia congenita, Tello syndrome and other genetic disorders, may be congenital (present at birth) or appear later in life   Milia en plaque: multiple milia appear on within an inflamed plaque up to several centimeters in diameter, usually found on an eyelid, behind the ear, on a cheek or jaw  3  Affect children and adults, especially middle-aged women  4  Sometimes associated with another skin disease including pseudoxanthoma elasticum, discoid lupus erythematosus, lichen planus   Multiple eruptive milia: crops of numerous milia appear over a few weeks to months, lesions may be asymptomatic or itchy, most often affect the face, upper arms and upper trunk   Traumatic milia: occur at the site of injury as skin heals, arise from eccrine sweat ducts, examples include thermal burns, dermabrasion, blistering rashes such as bullous pemphigoid, often seen on the back of hands and fingers in porphyria cutanea tarda, a milia-like calcified nodule may develop after  heel stick blood test     Milia associated with drugs: may rarely follow the use of topical medication, such as phenols, hydroquinone, 5-fluorouracil cream, and a corticosteroid  Milia have a characteristic appearance  However, on occasion, a skin biopsy may be performed  This shows a small epidermoid cyst coming from a vellus hair follicle  Milia should be distinguished from other types of cyst, comedones, xanthelasma and syringomas  Colloid milia are castle coloured bumps on cheeks and temples associated with excessive exposure to sunlight  They should also be distinguished from milia-like cysts noted on dermoscopy in seborrhoeic keratoses, papillomatous moles and some basal cell carcinomas  Milia do not need to be treated unless they are a cause for concern for the patient  They often clear up by themselves within a few months  Where possible, further trauma should be minimised to reduce the development of new lesions     The lesion may be de-roofed using a sterile needle or blade and the contents squeezed or pricked out   They may be destroyed using diathermy and curettage, or cryotherapy   For widespread lesions, topical retinoids may be helpful   Chemical peels, dermabrasion and laser ablation have been reported to be effective when used for very extensive milia   Milia en plaque may improve with minocycline (a tetracycline antibiotic)  STATIS DERMATITIS    Assessment and Plan:  Diagnosis:  Contact dermatitis  Based on a thorough discussion of this condition and the management approach to it (including a comprehensive discussion of the known risks, side effects and potential benefits of treatment), the patient (family) agrees to implement the following specific plan:      Triamcinolone 0 1% cream apply to the lower legs 2 times daily for 2 weeks straight  Then as needed for flare  What is contact dermatitis? Contact dermatitis is a type of eczema that results from something coming in contact with the skin  There are 2 types:  irritant and allergic  The majority of cases are from irritation  Usually that is from contact with strong soaps, repeated exposure to water, contact with cleaning agents or food, or friction  The minority are an actual allergy  In these cases something is coming in contact with the skin and causing an allergic reaction, similar to what happens with poison ivy  This usually occurs unexpectedly after using something for many years  Even very tiny amounts of the substance on the skin can cause a reaction  Some common causes are fragrances, preservatives and metals  Sometimes this can occur when an allergic substance is eaten, as well, but most often it is from direct contact with the skin  To determine the cause of allergy a patch test is often done      Some general rules to follow for both types of contact dermatitis are:   Wear gloves when using strong cleansers or before prolonged contact with water (like washing dishes)   Use gentle cleansers and avoid strong soaps   Apply moisturizer to entire body after bathing    Avoid products with fragrance    Most often contact dermatitis is treated with topical medicines like topical steroids, eucrisa, pimecrolimus or tacrolimus     Some times oral steroids, ie prednisone, methylprednisone and prednisolone, are needed  In chronic cases, treatment options include:  light therapy, methotrexate, cyclosporin  NEOPLASM OF UNCERTAIN BEHAVIOR OF SKIN    Assessment and Plan:   I have discussed with the patient that a sample of skin via a "skin biopsy would be potentially helpful to further make a specific diagnosis under the microscope   Based on a thorough discussion of this condition and the management approach to it (including a comprehensive discussion of the known risks, side effects and potential benefits of treatment), the patient (family) agrees to implement the following specific plan:    o Procedure:  Skin Biopsy  After a thorough discussion of treatment options and risk/benefits/alternatives (including but not limited to local pain, scarring, dyspigmentation, blistering, possible superinfection, and inability to confirm a diagnosis via histopathology), verbal and written consent were obtained and portion of the rash was biopsied for tissue sample  See below for consent that was obtained from patient and subsequent Procedure Note  A: PROCEDURE SHAVE BIOPSY NOTE:          After obtaining informed consent  at which time there was a discussion about the purpose of biopsy  and low risks of infection and bleeding  The area was prepped and draped in the usual fashion  Anesthesia was obtained with 1% lidocaine with epinephrine  A shave biopsy to an appropriate sampling depth was obtained with a sterile blade (such as a 15-blade or DermaBlade)  The resulting wound was covered with surgical ointment and bandaged appropriately       The patient tolerated the procedure well without complications and was without signs of functional compromise  Specimen has been sent for review by Dermatopathology  Standard post-procedure care has been explained and has been included in written form within the patient's copy of Informed Consent  INFORMED CONSENT DISCUSSION AND POST-OPERATIVE INSTRUCTIONS FOR PATIENT    I   RATIONALE FOR PROCEDURE  I understand that a skin biopsy allows the Dermatologist to test a lesion or rash under the microscope to obtain a diagnosis  It usually involves numbing the area with numbing medication and removing a small piece of skin; sometimes the area will be closed with sutures  In this specific procedure, sutures are not usually needed  If any sutures are placed, then they are usually need to be removed in 2 weeks or less  I understand that my Dermatologist recommends that a skin "shave" biopsy be performed today  A local anesthetic, similar to the kind that a dentist uses when filling a cavity, will be injected with a very small needle into the skin area to be sampled  The injected skin and tissue underneath "will go to sleep and become numb so no pain should be felt afterwards  An instrument shaped like a tiny "razor blade" (shave biopsy instrument) will be used to cut a small piece of tissue and skin from the area so that a sample of tissue can be taken and examined more closely under the microscope  A slight amount of bleeding will occur, but it will be stopped with direct pressure and a pressure bandage and any other appropriate methods  I understands that a scar will form where the wound was created  Surgical ointment will be applied to help protect the wound  Sutures are not usually needed      II   RISKS AND POTENTIAL COMPLICATIONS   I understand the risks and potential complications of a skin biopsy include but are not limited to the following:   Bleeding   Infection   Pain   Scar/keloid   Skin discoloration   Incomplete Removal   Recurrence   Nerve Damage/Numbness/Loss of Function   Allergic Reaction to Anesthesia   Biopsies are diagnostic procedures and based on findings additional treatment or evaluation may be required   Loss or destruction of specimen resulting in no additional findings    My Dermatologist has explained to me the nature of the condition, the nature of the procedure, and the benefits to be reasonably expected compared with alternative approaches  My Dermatologist has discussed the likelihood of major risks or complications of this procedure including the specific risks listed above, such as bleeding, infection, and scarring/keloid  I understand that a scar is expected after this procedure  I understand that my physician cannot predict if the scar will form a "keloid," which extends beyond the borders of the wound that is created  A keloid is a thick, painful, and bumpy scar  A keloid can be difficult to treat, as it does not always respond well to therapy, which includes injecting cortisone directly into the keloid every few weeks  While this usually reduces the pain and size of the scar, it does not eliminate it  I understand that photographs may be taken before and after the procedure  These will be maintained as part of the medical providers confidential records and may not be made available to me  I further authorize the medical provider to use the photographs for teaching purposes or to illustrate scientific papers, books, or lectures if in his/her judgment, medical research, education, or science may benefit from its use  I have had an opportunity to fully inquire about the risks and benefits of this procedure and its alternatives  I have been given ample time and opportunity to ask questions and to seek a second opinion if I wished to do so  I acknowledge that there have specifically been no guarantees as to the cosmetic results from the procedure    I am aware that with any procedure there is always the possibility of an unexpected complication  III  POST-PROCEDURAL CARE (WHAT YOU WILL NEED TO DO "AFTER THE BIOPSY" TO OPTIMIZE HEALING)     Keep the area clean and dry  Try NOT to remove the bandage or get it wet for the first 24 hours   Gently clean the area and apply surgical ointment (such as Vaseline petrolatum ointment, which is available "over the counter" and not a prescription) to the biopsy site for up to 2 weeks straight  This acts to protect the wound from the outside world   Sutures are not usually placed in this procedure  If any sutures were placed, return for suture removal as instructed (generally 1 week for the face, 2 weeks for the body)   Take Acetaminophen (Tylenol) for discomfort, if no contraindications  Ibuprofen or aspirin could make bleeding worse   Call our office immediately for signs of infection: fever, chills, increased redness, warmth, tenderness, discomfort/pain, or pus or foul smell coming from the wound  WHAT TO DO IF THERE IS ANY BLEEDING? If a small amount of bleeding is noticed, place a clean cloth over the area and apply firm pressure for ten minutes  Check the wound after 10 minutes of direct pressure  If bleeding persists, try one more time for an additional 10 minutes of direct pressure on the area  If the bleeding becomes heavier or does not stop after the second attempt, or if you have any other questions about this procedure, then please call your [facility] by calling [phone] (SKIN)  I hereby acknowledge that I have reviewed and verified the site with my Dermatologist and have requested and authorized my Dermatologist to proceed with the procedure

## 2021-08-09 NOTE — PROGRESS NOTES
[facility] Follow Up Note    Patient Name: Ivan Pulido  Encounter Date: 8 9 21    Today's Chief Concerns:   Concern #1:  Rosacea f/u   Concern #2:     Concern #3:      Current Medications:    Current Outpatient Medications:     amLODIPine (NORVASC) 5 mg tablet, Take 1 tablet (5 mg total) by mouth daily, Disp: 30 tablet, Rfl: 2    aspirin 81 mg chewable tablet, Chew 1 tablet (81 mg total) daily, Disp: 30 tablet, Rfl: 0    atorvastatin (LIPITOR) 40 mg tablet, Take 1 tablet (40 mg total) by mouth daily with dinner, Disp: 30 tablet, Rfl: 2    BIOTIN PO, Take by mouth, Disp: , Rfl:     cholecalciferol (VITAMIN D3) 1,000 units tablet, Take 1,000 Units by mouth daily, Disp: , Rfl:     clopidogrel (PLAVIX) 75 mg tablet, Take 1 tablet (75 mg total) by mouth daily, Disp: 30 tablet, Rfl: 2    esomeprazole (NexIUM) 40 MG capsule, Take 1 capsule (40 mg total) by mouth daily, Disp: 90 capsule, Rfl: 1    Ginger, Zingiber officinalis, (GINGER ROOT PO), Take by mouth, Disp: , Rfl:     latanoprost (XALATAN) 0 005 % ophthalmic solution, , Disp: , Rfl:     losartan-hydrochlorothiazide (HYZAAR) 100-25 MG per tablet, Take 1 tablet by mouth daily, Disp: , Rfl:     metoprolol succinate (TOPROL-XL) 50 mg 24 hr tablet, Take 1 tablet (50 mg total) by mouth daily, Disp: 90 tablet, Rfl: 1    multivitamin (THERAGRAN) TABS, Take 1 tablet by mouth daily, Disp: , Rfl:     Sulfacetamide Sodium-Sulfur (Sulfacetamide Sod-Sulfur Wash) 9-4 5 % LIQD, Apply topically twice a day, Disp: 454 g, Rfl: 2    albuterol (PROVENTIL HFA,VENTOLIN HFA) 90 mcg/act inhaler, Inhale 2 puffs every 6 (six) hours as needed for wheezing or shortness of breath (Patient not taking: Reported on 6/21/2021), Disp: 1 Inhaler, Rfl: 5    Ivermectin 1 % CREA, Apply topically daily for 30 doses Apply topically to face once daily, Disp: 45 g, Rfl: 2    metroNIDAZOLE (METROGEL) 1 % gel, Apply topically daily (Patient not taking: Reported on 5/28/2021), Disp: 45 g, Rfl: 1    Multiple Vitamins-Minerals (VITAMIN D3 COMPLETE PO), Take by mouth (Patient not taking: Reported on 6/21/2021), Disp: , Rfl:     CONSTITUTIONAL:   Vitals:    08/09/21 1039   Temp: 97 8 °F (36 6 °C)   TempSrc: Temporal   Weight: 93 8 kg (206 lb 11 2 oz)   Height: 5' 5" (1 651 m)             Specific Alerts:    Have you been seen by a [facility] Dermatologist in the last 3 years? YES    Are you pregnant or planning to become pregnant? No    Are you currently or planning to be nursing or breast feeding? No    Allergies   Allergen Reactions    Lisinopril Other (See Comments)     Other reaction(s): swollen legs    Hydrocodone-Acetaminophen Dizziness    Irbesartan-Hydrochlorothiazide Other (See Comments)      Hypertension  Pt had hypertension, arm heaviness and headache       May we call your Preferred Phone number to discuss your specific medical information? YES    May we leave a detailed message that includes your specific medical information? YES    Have you traveled outside of the [facility] in the past 3 months? No    Do you currently have a pacemaker or defibrillator? No    Do you have any artificial heart valves, joints, plates, screws, rods, stents, pins, etc? No   - If Yes, were any placed within the last 2 years? Do you require any medications prior to a surgical procedure? No   - If Yes, for which procedure? - If Yes, what medications to you require? Are you taking any medications that cause you to bleed more easily ("blood thinners") YES, aspirin 81    Have you ever experienced a rapid heartbeat with epinephrine? No    Have you ever been treated with "gold" (gold sodium thiomalate) therapy? No    [facility] can help with wrinkles, "laugh lines," facial volume loss, "double chin," "love handles," age spots, and more  Are you interested in learning today about some of the skin enhancement procedures that we offer?  (If Yes, please provide more detail) No    Review of Systems:  Have you recently had or currently have any of the following? · Fever or chills: No  · Night Sweats: No  · Headaches: No  · Weight Gain: No  · Weight Loss: No  · Blurry Vision: No  · Nausea: No  · Vomiting: No  · Diarrhea: No  · Blood in Stool: No  · Abdominal Pain: No  · Itchy Skin: YES  · Painful Joints: YES  · Swollen Joints: YES  · Muscle Pain: No  · Irregular Mole: No  · Sun Burn: No  · Dry Skin: No  · Skin Color Changes: No  · Scar or Keloid: No  · Cold Sores/Fever Blisters: No  · Bacterial Infections/MRSA: No  · Anxiety: No  · Depression: No  · Suicidal or Homicidal Thoughts: No  PSYCH: Normal mood and affect  EYES: Normal conjunctiva  ENT: Normal lips and oral mucosa  CARDIOVASCULAR: No edema  RESPIRATORY: Normal respirations  HEME/LYMPH/IMMUNO:  No regional lymphadenopathy except as noted below in ASSESSMENT AND PLAN BY DIAGNOSIS    FULL ORGAN SYSTEM SKIN EXAM (SKIN)  Hair, Scalp, Ears, Face Normal except as noted below in Assessment   Neck, Cervical Chain Nodes Normal except as noted below in Assessment   Right Arm/Hand/Fingers Normal except as noted below in Assessment   Left Arm/Hand/Fingers Normal except as noted below in Assessment   Chest/Breasts/Axillae Viewed areas Normal except as noted below in Assessment   Abdomen, Umbilicus Normal except as noted below in Assessment   Back/Spine Normal except as noted below in Assessment   Groin/Genitalia/Buttocks Viewed areas Normal except as noted below in Assessment   Right Leg, Foot, Toes Normal except as noted below in Assessment   Left Leg, Foot, Toes Normal except as noted below in Assessment       ROSACEA    Physical Exam:   Anatomic Location Affected:  cheeks   Morphological Description:  Mild erythema   Pertinent Positives:   Pertinent Negatives: Additional History of Present Condition:  Using Sulfacetamide lotion 2 times daily      Assessment and Plan:  Based on a thorough discussion of this condition and the management approach to it (including a comprehensive discussion of the known risks, side effects and potential benefits of treatment), the patient (family) agrees to implement the following specific plan:   Continue with sulfacetamide lotion as directed by physician  Rosacea is a chronic rash affecting the mid-face including the nose, cheeks, chin, forehead, and eyelids  The incidence is usually greatest between the ages of 30-60 years and is more common in people with fair skin  Common characteristics include redness, telangiectasias, papules and pustules over affected areas  Rosacea may look similar to acne, but there is a lack of comedones  Occasionally the eyes may also be involved in ocular rosacea  In advanced disease, enlargement of the sebaceous glands in the nose, termed rhinophyma, may be present  Rosacea results in red spots (papules) and sometimes pustules over the face, but unlike acne there are no blackheads, whiteheads, or cystic nodules  Patients often experience increased facial flushing with prominent blood vessels (erythematotelangiectatic rosacea) and dry, sensitive skin  These symptoms are exacerbated by sun exposure, hot or spicy foods, topical steroids and oil-based facial products  In ocular rosacea, eyelids may be red and sore due to conjunctivitis, keratitis, and episcleritis  If rhinophyma develops due to enlargement of sebaceous glands, the patient may have an enlarged and irregularly shaped nose with prominent pores  In rosacea that is refractory to treatment, patients can develop persistent redness and swelling of the face due to lymphatic obstruction (Morbihan disease)  Distribution around the cheeks may be confused with the malar or butterfly rash of lupus  However, the rash of lupus spares the nasal creases and lacks papules and pustules   If signs of photosensitivity, oral ulcers, arthritis, and kidney dysfunction are present then consider referral to a rheumatologist      There are many potential causes of rosacea including genetic, environmental, vascular, and inflammatory factors  These include, but are not limited to:   Chronic exposure to ultraviolet radiation    Increased immune responses in the form of cathelicidins that promote vessel dilation and infiltration with white blood cells (neutrophils) into the dermis   Increased matrix metalloproteinases such as collagen and elastase that remodel normal tissue may contribute to inflammation of the skin making it thicker and harder   There is some evidence to suggest that increased numbers of demodex mites on patient skin may contribute to rosacea papules     General Treatment Approach    Avoid exacerbating factors such as heat, spicy foods, and alcohol    Use daily SPF30+ sunscreen and other methods of coverage for sun protection   Use water-based make-up    Avoid applying topical steroids to affected areas as they can cause perioral dermatitis and exacerbate rosacea     Topical Treatment Approach   Metronidazole cream or gel by itself or in combination with oral antibiotics for more severe cases   Azelaic acid cream or lotion is effective for mild inflammatory rosacea when applied twice daily to affected areas   Brimonidine gel and oxymetazoline hydrochloride cream can reduce facial redness temporarily    Ivermectin cream can treat papulopustular rosacea by controlling demodex mites and inflammation    Pimecrolimus cream or tacrolimus ointment twice a day for 2-3 months can help reduce inflammation    Oral Treatment Approach   Antibiotics such as doxycycline, minocycline, or erythromycin for 1-3 months   Clonidine and carvedilol can help reduce facial flushing and are generally well tolerated  Common side effects include low blood pressure, gastrointestinal upset, dry eyes, blurred vision and low heart rate      Isotretinoin at low doses can be effective for long term treatment when antibiotics fail  Side effects may make it unsuitable for some patients   NSAIDs such as diclofenac can help reduce discomfort and redness in the skin  Procedural/Surgical Treatment Approach    Vascular lasers or intense pulsed light treatment may be used to treat persistent telangiectasia and papulopustular rosacea   Plastic surgery and carbon dioxide lasers may be used to treat rhinophyma       MILIUM     Physical Exam:   Anatomic Location Affected:  face   Morphological Description:  Pearly cystic papules   Pertinent Positives:   Pertinent Negatives: Additional History of Present Condition:  recently    Assessment and Plan:  Based on a thorough discussion of this condition and the management approach to it (including a comprehensive discussion of the known risks, side effects and potential benefits of treatment), the patient (family) agrees to implement the following specific plan:   Reassured benign    Assessment and Plan  A milium is a small cyst containing keratin (the skin protein); they are usually multiple and are then known as milia  These harmless cysts present as tiny pearly-white bumps just under the surface of the skin  Milia are common in all ages and both sexes  They most often arise on the face and are particularly prominent on the eyelids and cheeks, but they may occur elsewhere  There are various kinds of milia    milia: Affect 40-50% of  babies, few to numerous lesions, often seen on the nose, but may also arise inside the mouth on the mucosa (Ciera pearls) or palate (Yessenia nodules) or more widely on the scalp, face and upper trunk, Heal spontaneously within a few weeks of birth   Primary milia in children and adults: found around eyelids, cheeks, forehead and genitalia, in young children, a row of milia may appear along the nasal crease, may clear in a few weeks or persist for months or longer       Juvenile milia: associated with Rombo syndrome, basal cell naevus syndrome, Opvny-Jtlrv-Besjbniq syndrome, pachyonychia congenita, Tello syndrome and other genetic disorders, may be congenital (present at birth) or appear later in life   Milia en plaque: multiple milia appear on within an inflamed plaque up to several centimeters in diameter, usually found on an eyelid, behind the ear, on a cheek or jaw  3  Affect children and adults, especially middle-aged women  4  Sometimes associated with another skin disease including pseudoxanthoma elasticum, discoid lupus erythematosus, lichen planus   Multiple eruptive milia: crops of numerous milia appear over a few weeks to months, lesions may be asymptomatic or itchy, most often affect the face, upper arms and upper trunk   Traumatic milia: occur at the site of injury as skin heals, arise from eccrine sweat ducts, examples include thermal burns, dermabrasion, blistering rashes such as bullous pemphigoid, often seen on the back of hands and fingers in porphyria cutanea tarda, a milia-like calcified nodule may develop after  heel stick blood test     Milia associated with drugs: may rarely follow the use of topical medication, such as phenols, hydroquinone, 5-fluorouracil cream, and a corticosteroid  Milia have a characteristic appearance  However, on occasion, a skin biopsy may be performed  This shows a small epidermoid cyst coming from a vellus hair follicle  Milia should be distinguished from other types of cyst, comedones, xanthelasma and syringomas  Colloid milia are castle coloured bumps on cheeks and temples associated with excessive exposure to sunlight  They should also be distinguished from milia-like cysts noted on dermoscopy in seborrhoeic keratoses, papillomatous moles and some basal cell carcinomas  Milia do not need to be treated unless they are a cause for concern for the patient  They often clear up by themselves within a few months   Where possible, further trauma should be minimised to reduce the development of new lesions   The lesion may be de-roofed using a sterile needle or blade and the contents squeezed or pricked out   They may be destroyed using diathermy and curettage, or cryotherapy   For widespread lesions, topical retinoids may be helpful   Chemical peels, dermabrasion and laser ablation have been reported to be effective when used for very extensive milia   Milia en plaque may improve with minocycline (a tetracycline antibiotic)  STATIS DERMATITIS    Physical Exam:   Anatomic Location Affected:  Bilateral lower legs   Morphological Description:  Scattered pink slightly scaly papule   Pertinent Positives:   Pertinent Negatives: Additional History of Present Condition:  Present for years  Assessment and Plan:  Diagnosis:  Contact dermatitis  Based on a thorough discussion of this condition and the management approach to it (including a comprehensive discussion of the known risks, side effects and potential benefits of treatment), the patient (family) agrees to implement the following specific plan:   *   *Triamcinolone 0 1% cream apply to the lower legs 2 times daily for 2 weeks straight  Then as needed for flare  What is contact dermatitis? Contact dermatitis is a type of eczema that results from something coming in contact with the skin  There are 2 types:  irritant and allergic  The majority of cases are from irritation  Usually that is from contact with strong soaps, repeated exposure to water, contact with cleaning agents or food, or friction  The minority are an actual allergy  In these cases something is coming in contact with the skin and causing an allergic reaction, similar to what happens with poison ivy  This usually occurs unexpectedly after using something for many years  Even very tiny amounts of the substance on the skin can cause a reaction  Some common causes are fragrances, preservatives and metals    Sometimes this can occur when an allergic substance is eaten, as well, but most often it is from direct contact with the skin  To determine the cause of allergy a patch test is often done  Some general rules to follow for both types of contact dermatitis are:   Wear gloves when using strong cleansers or before prolonged contact with water (like washing dishes)   Use gentle cleansers and avoid strong soaps   Apply moisturizer to entire body after bathing    Avoid products with fragrance    Most often contact dermatitis is treated with topical medicines like topical steroids, eucrisa, pimecrolimus or tacrolimus     Some times oral steroids, ie prednisone, methylprednisone and prednisolone, are needed  In chronic cases, treatment options include:  light therapy, methotrexate, cyclosporin  NEOPLASM OF UNCERTAIN BEHAVIOR OF SKIN    Physical Exam:   (Anatomic Location); (Size and Morphological Description); (Differential Diagnosis):  o Specimen A: Nape of the Neck; 6 mm pink scaly slightly crusted plaque; (DX): ISK versus Squamous cell carcinoma   Pertinent Positives:   Pertinent Negatives: Additional History of Present Condition:  Report itching and scratching    Assessment and Plan:   I have discussed with the patient that a sample of skin via a "skin biopsy would be potentially helpful to further make a specific diagnosis under the microscope   Based on a thorough discussion of this condition and the management approach to it (including a comprehensive discussion of the known risks, side effects and potential benefits of treatment), the patient (family) agrees to implement the following specific plan:    o Procedure:  Skin Biopsy    After a thorough discussion of treatment options and risk/benefits/alternatives (including but not limited to local pain, scarring, dyspigmentation, blistering, possible superinfection, and inability to confirm a diagnosis via histopathology), verbal and written consent were obtained and portion of the rash was biopsied for tissue sample  See below for consent that was obtained from patient and subsequent Procedure Note  A: PROCEDURE SHAVE BIOPSY NOTE:     Performing Physician: Neyda Rodriguez Anatomic Location; Clinical Description with size (cm); Pre-Op Diagnosis:   o Specimen A: Nape of the Neck; 6 mm pink scaly slightly crusted plaque; (DX): ISK versus Squamous cell carcinoma  o    Post-op diagnosis: Same      Local anesthesia: 1% xylocaine with epi       Topical anesthesia: None     Hemostasis: Aluminum chloride       After obtaining informed consent  at which time there was a discussion about the purpose of biopsy  and low risks of infection and bleeding  The area was prepped and draped in the usual fashion  Anesthesia was obtained with 1% lidocaine with epinephrine  A shave biopsy to an appropriate sampling depth was obtained with a sterile blade (such as a 15-blade or DermaBlade)  The resulting wound was covered with surgical ointment and bandaged appropriately  The patient tolerated the procedure well without complications and was without signs of functional compromise  Specimen has been sent for review by Dermatopathology  Standard post-procedure care has been explained and has been included in written form within the patient's copy of Informed Consent  INFORMED CONSENT DISCUSSION AND POST-OPERATIVE INSTRUCTIONS FOR PATIENT    I   RATIONALE FOR PROCEDURE  I understand that a skin biopsy allows the Dermatologist to test a lesion or rash under the microscope to obtain a diagnosis  It usually involves numbing the area with numbing medication and removing a small piece of skin; sometimes the area will be closed with sutures  In this specific procedure, sutures are not usually needed  If any sutures are placed, then they are usually need to be removed in 2 weeks or less  I understand that my Dermatologist recommends that a skin "shave" biopsy be performed today    A local anesthetic, similar to the kind that a dentist uses when filling a cavity, will be injected with a very small needle into the skin area to be sampled  The injected skin and tissue underneath "will go to sleep and become numb so no pain should be felt afterwards  An instrument shaped like a tiny "razor blade" (shave biopsy instrument) will be used to cut a small piece of tissue and skin from the area so that a sample of tissue can be taken and examined more closely under the microscope  A slight amount of bleeding will occur, but it will be stopped with direct pressure and a pressure bandage and any other appropriate methods  I understands that a scar will form where the wound was created  Surgical ointment will be applied to help protect the wound  Sutures are not usually needed  II   RISKS AND POTENTIAL COMPLICATIONS   I understand the risks and potential complications of a skin biopsy include but are not limited to the following:   Bleeding   Infection   Pain   Scar/keloid   Skin discoloration   Incomplete Removal   Recurrence   Nerve Damage/Numbness/Loss of Function   Allergic Reaction to Anesthesia   Biopsies are diagnostic procedures and based on findings additional treatment or evaluation may be required   Loss or destruction of specimen resulting in no additional findings    My Dermatologist has explained to me the nature of the condition, the nature of the procedure, and the benefits to be reasonably expected compared with alternative approaches  My Dermatologist has discussed the likelihood of major risks or complications of this procedure including the specific risks listed above, such as bleeding, infection, and scarring/keloid  I understand that a scar is expected after this procedure  I understand that my physician cannot predict if the scar will form a "keloid," which extends beyond the borders of the wound that is created  A keloid is a thick, painful, and bumpy scar    A keloid can be difficult to treat, as it does not always respond well to therapy, which includes injecting cortisone directly into the keloid every few weeks  While this usually reduces the pain and size of the scar, it does not eliminate it  I understand that photographs may be taken before and after the procedure  These will be maintained as part of the medical providers confidential records and may not be made available to me  I further authorize the medical provider to use the photographs for teaching purposes or to illustrate scientific papers, books, or lectures if in his/her judgment, medical research, education, or science may benefit from its use  I have had an opportunity to fully inquire about the risks and benefits of this procedure and its alternatives  I have been given ample time and opportunity to ask questions and to seek a second opinion if I wished to do so  I acknowledge that there have specifically been no guarantees as to the cosmetic results from the procedure  I am aware that with any procedure there is always the possibility of an unexpected complication  III  POST-PROCEDURAL CARE (WHAT YOU WILL NEED TO DO "AFTER THE BIOPSY" TO OPTIMIZE HEALING)     Keep the area clean and dry  Try NOT to remove the bandage or get it wet for the first 24 hours   Gently clean the area and apply surgical ointment (such as Vaseline petrolatum ointment, which is available "over the counter" and not a prescription) to the biopsy site for up to 2 weeks straight  This acts to protect the wound from the outside world   Sutures are not usually placed in this procedure  If any sutures were placed, return for suture removal as instructed (generally 1 week for the face, 2 weeks for the body)   Take Acetaminophen (Tylenol) for discomfort, if no contraindications  Ibuprofen or aspirin could make bleeding worse       Call our office immediately for signs of infection: fever, chills, increased redness, warmth, tenderness, discomfort/pain, or pus or foul smell coming from the wound  WHAT TO DO IF THERE IS ANY BLEEDING? If a small amount of bleeding is noticed, place a clean cloth over the area and apply firm pressure for ten minutes  Check the wound after 10 minutes of direct pressure  If bleeding persists, try one more time for an additional 10 minutes of direct pressure on the area  If the bleeding becomes heavier or does not stop after the second attempt, or if you have any other questions about this procedure, then please call your [facility] Dermatologist by calling [phone] (SKIN)  I hereby acknowledge that I have reviewed and verified the site with my Dermatologist and have requested and authorized my Dermatologist to proceed with the procedure        Scribe Attestation    I,:  Reginaldo Johnston am acting as a scribe while in the presence of the attending physician :       I,:  Greer Bateman MD personally performed the services described in this documentation    as scribed in my presence :

## 2021-08-12 NOTE — RESULT ENCOUNTER NOTE
DERMATOPATHOLOGY RESULT NOTE    Results reviewed by ordering physician  Called patient to personally discuss results  Discussed results with patient  Instructions for Clinical Derm Team:   (remember to route Result Note to appropriate staff):    None    Result & Plan by Specimen:    Specimen A: benign  Plan: reassured, benign    Tissue Exam: [id number]  Order: [id number]  Status:  Final result   Visible to patient:  Yes ([facility] MyChart) Next appt:  09/15/2021 at 02:00 PM in Vascular Surgery ([address] Leal MD) Dx:  Neoplasm of uncertain behavior  0 Result Notes  Component   Case Report  Surgical Pathology Report                         Case: [id number]                                   Authorizing Provider: Kyler Macedo MD            Collected:           08/09/2021 1128              Ordering Location:     [facility]      Received:            08/09/2021 [address] [place] [address]                                                                       Pathologist:           Tennille Kumari MD                                                           Specimen:    Skin, Other, Specimen A: Nape of neck                                                    Final Diagnosis  A  Skin, Nape of Neck, Shave Biopsy:  - Prurigo nodularis     Electronically signed by Tennille Kumari MD on 8/12/2021 at  3:48 PM  Microscopic Description   The epidermis has irregular acanthosis and mild spongiosis, without significant atypia  The dermis has fibrosis and lymphocytic infiltrates  Deeper levels were examined    Additional Information   All reported additional testing was performed with appropriately reactive controls   These tests were developed and their performance characteristics determined by [facility] or appropriate performing facility, though some tests may be performed on tissues which have not been validated for performance characteristics (such as staining performed on alcohol exposed cell blocks and decalcified tissues)   Results should be interpreted with caution and in the context of the patients' clinical condition  These tests may not be cleared or approved by the [place]  Food and Drug Administration, though the FDA has determined that such clearance or approval is not necessary  These tests are used for clinical purposes and they should not be regarded as investigational or for research  This laboratory has been approved by [facility], designated as a high-complexity laboratory and is qualified to perform these tests  Vadim Daniels Description     A  The specimen is received in formalin, labeled with the patient's name and hospital number, and is designated "nape of neck"  The specimen consists of a 0 8 x 0 6 cm shave of tan skin excised to depth of 0 1 cm  The epithelial surface is hair-bearing and scaly  The skin surface is inked red and the margin of resection is inked green  The specimen is bisected  Entirely submitted  One cassette  Between sponges     Note: The estimated total formalin fixation time based upon information provided by the submitting clinician and the standard processing schedule is under 72 hours  [facility]unter       Clinical Information   Specimen A: Nape of the Neck; skin; shave; 74year old female with 6 mm pink scaly slightly crusted plaque; (DX): Inflamed seborrheic keratosis versus Squamous cell carcinoma  ATTN: Derm Path    Resulting Agency [facility]      Specimen Collected: 08/09/21 11:28 AM Last Resulted: 08/12/21  3:48 PM

## 2021-08-30 LAB
CHOLEST SERPL-MCNC: 140 MG/DL
CHOLEST/HDLC SERPL: 2.9 (CALC)
HDLC SERPL-MCNC: 49 MG/DL
LDLC SERPL CALC-MCNC: 73 MG/DL (CALC)
NONHDLC SERPL-MCNC: 91 MG/DL (CALC)
TRIGL SERPL-MCNC: 92 MG/DL

## 2021-09-02 ENCOUNTER — OFFICE VISIT (OUTPATIENT)
Dept: FAMILY MEDICINE CLINIC | Facility: CLINIC | Age: 66
End: 2021-09-02
Payer: MEDICARE

## 2021-09-02 DIAGNOSIS — R39.9 SYMPTOMS OF URINARY TRACT INFECTION: ICD-10-CM

## 2021-09-02 DIAGNOSIS — M54.50 ACUTE LEFT-SIDED LOW BACK PAIN WITHOUT SCIATICA: Primary | ICD-10-CM

## 2021-09-02 PROCEDURE — 99214 OFFICE O/P EST MOD 30 MIN: CPT | Performed by: FAMILY MEDICINE

## 2021-09-03 VITALS — DIASTOLIC BLOOD PRESSURE: 82 MMHG | SYSTOLIC BLOOD PRESSURE: 138 MMHG | WEIGHT: 202 LBS | BODY MASS INDEX: 33.61 KG/M2

## 2021-09-03 PROBLEM — M54.50 ACUTE LEFT-SIDED LOW BACK PAIN WITHOUT SCIATICA: Status: ACTIVE | Noted: 2021-09-03

## 2021-09-03 NOTE — PROGRESS NOTES
Assessment/Plan:  Acute left-sided low back pain without sciatica   The patient has some left lower back pain  Believe this is a paralumbar strain  It is in the region of the CVA on the left  She did have trace leuks on her urine depth though no blood today  I do not believe it represents renal stone disease  Unlikely to represent pyelo  She has no fever nausea vomiting X cetera  We are going to give her a prescription for Cipro to hold on to and culture her urine  Will follow up when results are available  We are going to give her some Robaxin 750 to use q i d     We did give her warnings as to sedation  She will use ice and we gave her low back exercise program to try  She is going to call with a follow-up report the next day or 2  She agrees with this plan  Diagnoses and all orders for this visit:    Acute left-sided low back pain without sciatica          Subjective:   No chief complaint on file  Patient ID: Ivan Pulido is a 74 y o  female  HPI    The patient is a 41-year-old female who presents today for a complaint of acute low back pain  She states it began about 2 weeks ago  She believes it is likely related to lifting up a new puppy that she has  It is gaining weight and she can barely lift it  She started to develop some left lower back pain  She does have some symptoms at night time  It radiates bilaterally to her pelvis  She has no distal radicular symptoms  She has no incontinence of bowel or bladder  No weakness of her lower extremities  She has had no constitutional symptoms such as fever night sweats anorexia weight loss X cetera  She has noticed some increased urinary frequency  She has had no dysuria  No fever nausea vomiting flank pain X cetera  She did begin atorvastatin in May wonders if she needs to continue this  She did have significant improvement in her lipids    The following portions of the patient's history were reviewed and updated as appropriate: allergies, current medications, past family history, past medical history, past social history, past surgical history and problem list     Review of Systems   Constitutional: Negative  Cardiovascular: Negative  Respiratory: Negative  Endocrine: Negative  Musculoskeletal: Positive for back pain and stiffness  Negative for falls and muscle weakness  Gastrointestinal: Negative for bowel incontinence  Genitourinary: Negative for bladder incontinence  Neurological: Negative for loss of balance and sensory change  Objective:    Physical Exam  Vitals reviewed  Constitutional:       Comments: Overweight and mildly uncomfortable but no acute distress   Musculoskeletal:         General: Tenderness present  Right lower leg: No edema  Left lower leg: No edema  Comments: She does have some tenderness of her left paralumbar region adjacent to the CVA angle  I do not believe she has CVA tenderness based on her examination today  Lateral flexion of her trunk exacerbates her discomfort  Neurological:      General: No focal deficit present  Mental Status: She is alert and oriented to person, place, and time  Comments: She does have a positive straight leg raise test on the left  She has normal bulk strength and tone    She has somewhat diminished DTRs in her lower extremities but they are equal throughout   Psychiatric:      Comments: Mildly anxious appearance

## 2021-09-03 NOTE — ASSESSMENT & PLAN NOTE
The patient has some left lower back pain  Believe this is a paralumbar strain  It is in the region of the CVA on the left  She did have trace leuks on her urine depth though no blood today  I do not believe it represents renal stone disease  Unlikely to represent pyelo  She has no fever nausea vomiting X cetera  We are going to give her a prescription for Cipro to hold on to and culture her urine  Will follow up when results are available  We are going to give her some Robaxin 750 to use q i d     We did give her warnings as to sedation  She will use ice and we gave her low back exercise program to try  She is going to call with a follow-up report the next day or 2  She agrees with this plan

## 2021-09-15 ENCOUNTER — OFFICE VISIT (OUTPATIENT)
Dept: VASCULAR SURGERY | Facility: CLINIC | Age: 66
End: 2021-09-15
Payer: MEDICARE

## 2021-09-15 VITALS
RESPIRATION RATE: 16 BRPM | WEIGHT: 205 LBS | SYSTOLIC BLOOD PRESSURE: 148 MMHG | HEIGHT: 65 IN | BODY MASS INDEX: 34.16 KG/M2 | DIASTOLIC BLOOD PRESSURE: 92 MMHG | HEART RATE: 89 BPM

## 2021-09-15 DIAGNOSIS — I87.2 CHRONIC VENOUS INSUFFICIENCY: ICD-10-CM

## 2021-09-15 DIAGNOSIS — I83.813 VARICOSE VEINS OF BILATERAL LOWER EXTREMITIES WITH PAIN: Primary | ICD-10-CM

## 2021-09-15 PROCEDURE — 99213 OFFICE O/P EST LOW 20 MIN: CPT | Performed by: SURGERY

## 2021-09-15 RX ORDER — METHOCARBAMOL 750 MG/1
TABLET, FILM COATED ORAL
COMMUNITY
Start: 2021-09-02 | End: 2021-09-24 | Stop reason: ALTCHOICE

## 2021-09-15 NOTE — PROGRESS NOTES
Assessment/Plan:    Pt is a 75 yo F w/ GERD, HTN, OA, recent NSTEMI 5/21, obesity, HLD, back pain, presents to discuss venous disease    Chronic venous insufficiency  Varicose veins of bilateral lower extremities with pain  -hx of R EVLT and stabs ('00s [facility])  -BLE painful varicosities, edema, venous symptoms, L>R  -discussed the pathophysiology of venous disease and options for treatment including medical and surgical; she may be a surgical candidate but currently symptoms are not limiting, she had recent MI, and she is not doing conservative management  -will start conservative management including daily compression use, leg elevation, exercise, weight loss, skin care  -I suspect that she has some LLE PAD as well and has some vague symptoms of leg tiredness with walking; this is not limiting and does not need further workup at this time  -f/u PRN; patient will call for appt if symptoms worsen or she would like to consider surgery; would need 3 mo conservative management trial and reflux study first    CAD  -recent presentation with CP, Trop 13, NSTEMI s/p cath with only mild disease  -further care per cards    Medications  -continue ASA/plavix/statin (DAPT for recent NSTEMI)      Subjective:      Patient ID: Jonathon Chambers is a 74 y o  female  Patient c/o BLE painful VV  Pt c/o painful, itching, burning,  burning veins and swelling in her legs, Rt leg > Lt leg  Pt does not wear compression stockings or elevates her legs  Pt had RLE VV stripping about 10 years ago  Pt is on ASA 81 mg, Plavix,  and Atorvastatin  HPI:    Patient presents to discuss venous disease  She has a hx of intervention on the R side in the '00s by [facility]ques  She has tightness of the legs and B edema  Her Left leg symptoms are worse than the right  She has B aching, burning, itching,    Family hx of venous disease in her mother  She wears compression in the winter but not in warmer weather    She elevates her legs as much as possible  She does walking for exercise and walks her new puppy  Her weight has been stable  She is taking ASA/plavix/statin (DAPT for recent NSTEMI)    Prior smoker but quit >30 yrs ago  The following portions of the patient's history were reviewed and updated as appropriate: allergies, current medications, past family history, past medical history, past social history, past surgical history and problem list     Review of Systems   Constitutional: Positive for fatigue (since COVID in 4/21)  HENT: Negative  Eyes: Negative  Respiratory: Negative  Negative for shortness of breath  Cardiovascular: Positive for leg swelling  Negative for chest pain  Painful veins   Gastrointestinal: Negative  Endocrine: Negative  Genitourinary: Negative  Musculoskeletal: Negative for gait problem and neck stiffness  Leg pain   Skin: Positive for rash  Negative for wound  Allergic/Immunologic: Negative  Neurological: Positive for numbness (hands)  Hematological: Negative  Psychiatric/Behavioral: Negative  Objective:      /92 (BP Location: Left arm, Patient Position: Sitting)   Pulse 89   Resp 16   Ht 5' 5" (1 651 m)   Wt 93 kg (205 lb)   BMI 34 11 kg/m²          Physical Exam  Vitals and nursing note reviewed  Constitutional:       Appearance: She is well-developed  HENT:      Head: Normocephalic and atraumatic  Eyes:      Conjunctiva/sclera: Conjunctivae normal    Cardiovascular:      Rate and Rhythm: Normal rate and regular rhythm  Pulses:           Radial pulses are 2+ on the right side and 2+ on the left side  Dorsalis pedis pulses are 2+ on the right side and 0 on the left side  Posterior tibial pulses are 2+ on the right side and 0 on the left side  Heart sounds: Normal heart sounds  No murmur heard  Comments: No carotid brutis B  Pulmonary:      Effort: Pulmonary effort is normal  No respiratory distress        Breath sounds: Normal breath sounds  No wheezing or rales  Abdominal:      General: There is no distension  Palpations: Abdomen is soft  Tenderness: There is no abdominal tenderness  There is no rebound  Musculoskeletal:         General: Normal range of motion  Cervical back: Normal range of motion and neck supple  Right lower le+ Edema present  Left lower le+ Edema present  Skin:     General: Skin is warm and dry  Comments: Large varicosity over L lateral thigh and anterior shin  Large varicosity over R anterior shin  Mild stasis changes with patchy darkness  Scattered retics and spiders BLE   Neurological:      Mental Status: She is alert and oriented to person, place, and time  Psychiatric:         Behavior: Behavior normal            I have reviewed and made appropriate changes to the review of systems input by the medical assistant  Vitals:    09/15/21 1403   BP: 148/92   BP Location: Left arm   Patient Position: Sitting   Pulse: 89   Resp: 16   Weight: 93 kg (205 lb)   Height: 5' 5" (1 651 m)       Patient Active Problem List   Diagnosis    Allergic rhinitis    Benign essential hypertension    Dermatitis    GERD without esophagitis    Hyperlipoproteinemia    Nontoxic single thyroid nodule    Osteoarthritis of knee    Rectocele    Uterovaginal prolapse    Cystocele, lateral    Varicose veins of leg with edema, unspecified laterality    Family history of sudden cardiac death    Pure hypercholesterolemia    Obesity (BMI 30-39  9)    Chronic venous insufficiency    Actinic keratosis    Medicare annual wellness visit, subsequent    Chest pain    Rosacea    Encounter for observation for suspected exposure to other biological agents ruled out    COVID-19    Elevated troponin I level    Hypertensive urgency    Acute left-sided low back pain without sciatica       Past Surgical History:   Procedure Laterality Date    BLADDER SURGERY  2012    Bladder "lift"    BREAST BIOPSY Right     benign    BREAST CYST EXCISION Right      =    BREAST LUMPECTOMY      COLONOSCOPY      HYSTERECTOMY  1990    KNEE SURGERY Right     x 2    NOSE SURGERY      MS LARYNGOSCOPY,DIRCT,OP SCOPE,BIOPSY Left 2018    Procedure: MICROLARYNGOSCOPY AND EXCISION OF LEFT VOCAL FOLD POLYP;  Surgeon: Alpa Hernandez MD;  [facility];  Service: ENT   [address] SURGERY Left 2009       Family History   Problem Relation Age of Onset   Kale Martinez Breast cancer Mother 77    Hypertension Mother     Varicose Veins Mother         of lower extremities    Hypertension Father     Coronary artery disease Father     Coronary artery disease Sister     Heart disease Sister     Varicose Veins Sister         of lower extremities    No Known Problems Daughter     Breast cancer Maternal Aunt 72    No Known Problems Maternal Aunt     No Known Problems Sister     No Known Problems Sister        Social History     Socioeconomic History    Marital status: /Civil Union     Spouse name: Not on file    Number of children: Not on file    Years of education: Not on file    Highest education level: Not on file   Occupational History    Not on file   Tobacco Use    Smoking status: Former Smoker     Quit date:      Years since quittin 7    Smokeless tobacco: Never Used   Vaping Use    Vaping Use: Never used   Substance and Sexual Activity    Alcohol use: Not Currently     Comment: rarely, twice per year / social drinker per [employer]script                 Drug use: No    Sexual activity: Not on file   Other Topics Concern    Not on file   Social History Narrative    Not on file     Social Determinants of Health     Financial Resource Strain:     Difficulty of Paying Living Expenses:    Food Insecurity:     Worried About Running Out of Food in the Last Year:     [address] in the Last Year:    Transportation Needs:     Lack of Transportation (Medical):  Lack of Transportation (Non-Medical):    Physical Activity:     Days of Exercise per Week:     Minutes of Exercise per Session:    Stress:     Feeling of Stress :    Social Connections:     Frequency of Communication with Friends and Family:     Frequency of Social Gatherings with Friends and Family:     Attends [employer] Services:     Active Member of Clubs or Organizations:     Attends Club or Organization Meetings:     Marital Status:    Intimate Partner Violence:     Fear of Current or Ex-Partner:     Emotionally Abused:     Physically Abused:     Sexually Abused:         Allergies   Allergen Reactions    Lisinopril Other (See Comments)     Other reaction(s): swollen legs    Hydrocodone-Acetaminophen Dizziness    Irbesartan-Hydrochlorothiazide Other (See Comments)      Hypertension  Pt had hypertension, arm heaviness and headache         Current Outpatient Medications:     amLODIPine (NORVASC) 5 mg tablet, Take 1 tablet (5 mg total) by mouth daily, Disp: 30 tablet, Rfl: 2    aspirin 81 mg chewable tablet, Chew 1 tablet (81 mg total) daily, Disp: 30 tablet, Rfl: 0    atorvastatin (LIPITOR) 40 mg tablet, Take 1 tablet (40 mg total) by mouth daily with dinner, Disp: 30 tablet, Rfl: 2    BIOTIN PO, Take by mouth, Disp: , Rfl:     cholecalciferol (VITAMIN D3) 1,000 units tablet, Take 1,000 Units by mouth daily, Disp: , Rfl:     clopidogrel (PLAVIX) 75 mg tablet, Take 1 tablet (75 mg total) by mouth daily, Disp: 30 tablet, Rfl: 2    esomeprazole (NexIUM) 40 MG capsule, Take 1 capsule (40 mg total) by mouth daily, Disp: 90 capsule, Rfl: 1    Ting, Zingiber officinalis, (TING ROOT PO), Take by mouth, Disp: , Rfl:     latanoprost (XALATAN) 0 005 % ophthalmic solution, , Disp: , Rfl:     losartan-hydrochlorothiazide (HYZAAR) 100-25 MG per tablet, Take 1 tablet by mouth daily, Disp: , Rfl:     metoprolol succinate (TOPROL-XL) 50 mg 24 hr tablet, Take 1 tablet (50 mg total) by mouth daily, Disp: 90 tablet, Rfl: 1    multivitamin (THERAGRAN) TABS, Take 1 tablet by mouth daily, Disp: , Rfl:     Sulfacetamide Sodium-Sulfur (Sulfacetamide Sod-Sulfur Wash) 9-4 5 % LIQD, Apply topically twice a day, Disp: 454 g, Rfl: 2    albuterol (PROVENTIL HFA,VENTOLIN HFA) 90 mcg/act inhaler, Inhale 2 puffs every 6 (six) hours as needed for wheezing or shortness of breath (Patient not taking: Reported on 6/21/2021), Disp: 1 Inhaler, Rfl: 5    Ivermectin 1 % CREA, Apply topically daily for 30 doses Apply topically to face once daily, Disp: 45 g, Rfl: 2    methocarbamol (ROBAXIN) 750 mg tablet, , Disp: , Rfl:     metroNIDAZOLE (METROGEL) 1 % gel, Apply topically daily (Patient not taking: Reported on 5/28/2021), Disp: 45 g, Rfl: 1    Multiple Vitamins-Minerals (VITAMIN D3 COMPLETE PO), Take by mouth (Patient not taking: Reported on 9/15/2021), Disp: , Rfl:     triamcinolone (KENALOG) 0 1 % cream, Apply topically 2 (two) times a day, Disp: 30 g, Rfl: 0

## 2021-09-15 NOTE — PATIENT INSTRUCTIONS
1) Venous disease  -you have venous disease in your legs which is what you had surgery for on the right side  -you may have a surgical option on the left side, but unless your symptoms are very bothersome, this is not necessary  -we are going to start good medical management for your vein disease including daily compression use, leg elevation, exercise, weight loss, and skin care  -if you think that these are not helping enough and you would like to consider surgery, please come in for another appointment    2) Arteries  -based on your exam, I suspect that you have some artery disease in the left leg  -expected symptoms from this include cramping or tiredness of the legs when walking at the same distance each time  -if you notice that you have this and it is limiting you walking, please come back and we will get further testing to evaluate this    Venous Insufficiency   WHAT YOU NEED TO KNOW:   What is venous insufficiency? Venous insufficiency is a condition that prevents blood from flowing out of your legs and back to your heart  Veins contain valves that help blood flow in one direction  Venous insufficiency means the valves do not close correctly or fully  Blood flows back and pools in your leg  This can cause problems such as varicose veins  Venous insufficiency may also be called chronic venous insufficiency or venous stasis  What increases my risk for venous insufficiency? · A leg injury or blood clot    · Being a woman    · Pregnancy    · Older age    · A family history of varicose veins    · Smoking cigarettes    · Obesity, or not getting enough exercise    What are the signs and symptoms of venous insufficiency?    · Visible veins on your legs that may be small and red or large, thick, and blue    · Swelling in your ankles or calves    · Changes in skin color, such as dark or purple skin    · An ulcer (open sore) on your leg    · Leg pain that is worse when you are menstruating (women) or when you stand, and better when you elevate your legs    · Burning or itching    · Cramps that happen at night    · Thick, hard skin on your legs and ankles    · Feeling of heaviness in your legs    How is venous insufficiency diagnosed? · Venous duplex imaging  is a procedure used to examine the blood flow through veins  A gel will be applied to your legs  Your healthcare provider will slide a small device called a transducer across the veins  The transducer is a microphone that helps your healthcare provider hear blood moving through the vein  · Contrast venography  is a procedure used to show the veins on x-ray pictures  A catheter is guided into the vein  Contrast liquid is injected into the catheter to help the veins show up better in the pictures  Tell the healthcare provider if you have ever had an allergic reaction to contrast liquid  · Plethysmography  is a procedure that may be used to find changes in blood pressure through your veins  You will wear a blood pressure cuff on your leg  Changes in pressure and the amount of blood that can circulate through your leg veins are measured  Pressures are measured while you stand, sit, and lift your leg  How is venous insufficiency treated? · Medicine  may be given to improve blood flow  The medicines may thin your blood or reduce swelling to help blood flow  You may also need medicine to treat a bacterial infection  · Ablation  is a procedure used to close varicose veins  A catheter is guided until it is near the vein  A device will then be guided to the area  The device may produce energy through radiofrequency or a laser  The energy creates heat that will close the blood vessel  · Sclerotherapy  is a procedure used to fade visible veins  Your healthcare provider will inject a liquid into a spider vein or varicose vein  The liquid causes irritation in the vein  The vein swells and sticks together  Your body will then absorb the vein      · Surgery  may be needed if other treatments do not work  Surgery may be used to repair a leg vein valve or to clip or tie off a vein so blood cannot flow through it  You may need to have a veins removed during surgery called stripping  Surgery may be used to bypass (go around) the damaged vein  Blood will flow through a vein transplanted from another part of your body  What can I do to manage my symptoms? · Wear pressure stockings as directed  Pressure stockings help keep blood from pooling in your leg veins  Your healthcare provider can prescribe stockings that are right for you  Do not buy over-the-counter pressure stockings unless your healthcare provider says it is okay  They may not fit correctly or may have elastic that cuts off your circulation  Ask your healthcare provider when to start wearing pressure stockings and how long to wear them each day  · Do not sit or stand for long periods of time  If you have to sit for a long time, flex and extend your legs, feet, and ankles  Do this about 10 times every 30 minutes to help keep blood flowing  If you have to stand for a long time, take breaks and sit with your legs elevated  · Elevate your legs  Elevate your legs above the level of your heart to reduce swelling  Your healthcare provider may recommend that you keep your legs elevated for 30 minutes at a time  You may need to do this 3 to 4 times per day, or more if your healthcare provider recommends  · Do not smoke  Nicotine and other chemicals in cigarettes and cigars can cause blood vessel damage  Ask your healthcare provider for information if you currently smoke and need help to quit  E-cigarettes or smokeless tobacco still contain nicotine  Talk to your healthcare provider before you use these products  · Reach or maintain a healthy weight  Extra weight can make venous insufficiency worse  Ask your healthcare provider how much you should weigh   He can help you create a weight loss plan if you need to lose weight  · Exercise as directed  Walking can help increase blood flow in your calves  Ask your healthcare provider how much exercise you need each day and which exercises are best for you  · Care for your skin  Keep your skin clean  Do not use any soaps or lotions that may dry your skin  For example, do not use products that contain fragrance or alcohol  If you have a skin ulcer, your healthcare provider may recommend a wet-to-dry bandage  To do this, apply a wet bandage to your wound and allow it to dry  This will help remove drainage from your wound each time you change the bandage  Your healthcare provider will tell you how often to change your bandage and which kind of bandage to use  Check your wound for signs of infection, such as swelling or pus  · Go to physical therapy (PT) as directed  A physical therapist can help you increase movement and range of motion in your legs  When should I seek immediate care? · You have a wound that does not heal or is infected  · You have an injury that has broken your skin and caused your varicose veins to bleed  · Your leg is swollen and hard  · You have pain in your leg that does not go away or gets worse  · Your legs or feet are turning blue or black  · Your leg feels warm, tender, and painful  It may look swollen and red  When should I contact my healthcare provider? · You have a fever  · You have varicose veins and they are painful  · You have new or worsening leg pain, swelling, or redness  · You have new or worsening ulcers or other sores on your leg  · You have questions or concerns about your condition or care  CARE AGREEMENT:   You have the right to help plan your care  Learn about your health condition and how it may be treated  Discuss treatment options with your healthcare providers to decide what care you want to receive  You always have the right to refuse treatment   The above information is an  only  It is not intended as medical advice for individual conditions or treatments  Talk to your doctor, nurse or pharmacist before following any medical regimen to see if it is safe and effective for you  © Copyright [employer] 2021 Information is for End User's use only and may not be sold, redistributed or otherwise used for commercial purposes   All illustrations and images included in CareNotes® are the copyrighted property of [employer]  or [address] [place] [address]

## 2021-09-17 DIAGNOSIS — I24.9 ACUTE CORONARY SYNDROME (HCC): ICD-10-CM

## 2021-09-17 DIAGNOSIS — I10 BENIGN ESSENTIAL HYPERTENSION: ICD-10-CM

## 2021-09-17 RX ORDER — METOPROLOL SUCCINATE 50 MG/1
50 TABLET, EXTENDED RELEASE ORAL DAILY
Qty: 30 TABLET | Refills: 0
Start: 2021-09-17 | End: 2022-07-18 | Stop reason: SDUPTHER

## 2021-09-17 RX ORDER — LOSARTAN POTASSIUM AND HYDROCHLOROTHIAZIDE 25; 100 MG/1; MG/1
1 TABLET ORAL DAILY
Qty: 30 TABLET | Refills: 0 | Status: SHIPPED | OUTPATIENT
Start: 2021-09-17 | End: 2022-04-20

## 2021-09-24 ENCOUNTER — OFFICE VISIT (OUTPATIENT)
Dept: FAMILY MEDICINE CLINIC | Facility: CLINIC | Age: 66
End: 2021-09-24
Payer: MEDICARE

## 2021-09-24 VITALS
BODY MASS INDEX: 33.66 KG/M2 | DIASTOLIC BLOOD PRESSURE: 72 MMHG | SYSTOLIC BLOOD PRESSURE: 126 MMHG | WEIGHT: 202 LBS | TEMPERATURE: 97.9 F | HEIGHT: 65 IN | HEART RATE: 81 BPM | OXYGEN SATURATION: 99 %

## 2021-09-24 DIAGNOSIS — I10 BENIGN ESSENTIAL HYPERTENSION: ICD-10-CM

## 2021-09-24 DIAGNOSIS — M25.50 ARTHRALGIA, UNSPECIFIED JOINT: Primary | ICD-10-CM

## 2021-09-24 DIAGNOSIS — I24.9 ACUTE CORONARY SYNDROME (HCC): ICD-10-CM

## 2021-09-24 DIAGNOSIS — K21.9 GERD WITHOUT ESOPHAGITIS: ICD-10-CM

## 2021-09-24 DIAGNOSIS — I16.0 HYPERTENSIVE URGENCY: ICD-10-CM

## 2021-09-24 PROCEDURE — 36415 COLL VENOUS BLD VENIPUNCTURE: CPT | Performed by: FAMILY MEDICINE

## 2021-09-24 PROCEDURE — 99214 OFFICE O/P EST MOD 30 MIN: CPT | Performed by: FAMILY MEDICINE

## 2021-09-24 RX ORDER — CLOPIDOGREL BISULFATE 75 MG/1
75 TABLET ORAL DAILY
Qty: 90 TABLET | Refills: 1 | Status: SHIPPED | OUTPATIENT
Start: 2021-09-24 | End: 2021-10-13

## 2021-09-24 RX ORDER — ESOMEPRAZOLE MAGNESIUM 40 MG/1
40 CAPSULE, DELAYED RELEASE ORAL DAILY
Qty: 90 CAPSULE | Refills: 1 | Status: SHIPPED | OUTPATIENT
Start: 2021-09-24 | End: 2022-02-21

## 2021-09-24 RX ORDER — AMLODIPINE BESYLATE 5 MG/1
5 TABLET ORAL DAILY
Qty: 90 TABLET | Refills: 1 | Status: SHIPPED | OUTPATIENT
Start: 2021-09-24 | End: 2022-02-21

## 2021-09-24 NOTE — ASSESSMENT & PLAN NOTE
The patient had persistent arthralgias for months  She believes is related to 1 of her medications  It could potentially be related to the addition of atorvastatin  We are going to get CBC with diff, CMP, CK as well as C reactive protein  Lyme titer as well  Follow up when results are available  In the meantime she is going to discontinue atorvastatin  May consider resuming another agent at a lower dose if the discontinuance of atorvastatin improves her symptoms  Additionally we discussed possible imaging of her hip spine X [facility] as well as possible referral to physical therapy  She agrees with this plan

## 2021-09-24 NOTE — PROGRESS NOTES
Assessment/Plan:  Arthralgia  The patient had persistent arthralgias for months  She believes is related to 1 of her medications  It could potentially be related to the addition of atorvastatin  We are going to get CBC with diff, CMP, CK as well as C reactive protein  Lyme titer as well  Follow up when results are available  In the meantime she is going to discontinue atorvastatin  May consider resuming another agent at a lower dose if the discontinuance of atorvastatin improves her symptoms  Additionally we discussed possible imaging of her hip spine [facility] as well as possible referral to physical therapy  She agrees with this plan  Benign essential hypertension  Blood pressure well controlled  No change in therapy  Meds refilled today  Diagnoses and all orders for this visit:    Arthralgia, unspecified joint  -     CBC  -     Comprehensive metabolic panel  -     C-reactive protein  -     Creatine Kinase, Total    Hypertensive urgency  -     amLODIPine (NORVASC) 5 mg tablet; Take 1 tablet (5 mg total) by mouth daily    Acute coronary syndrome (HCC)  -     clopidogrel (PLAVIX) 75 mg tablet; Take 1 tablet (75 mg total) by mouth daily    GERD without esophagitis  -     esomeprazole (NexIUM) 40 MG capsule; Take 1 capsule (40 mg total) by mouth daily    Benign essential hypertension          Subjective:   Chief Complaint   Patient presents with    Follow-up     here for a med check        Patient ID: Daniel Naqvi is a 74 y o  female  Arthralgias and fatigue persist  Awakens 2-3 x night to urinate  Turning in bed awakens her with back pain  No back films  A couple of months of pain  L knee pain  No constitutional sx  Began with Lipitor initiation  HPI  Patient is a 73-year-old female who presents today for routine follow-up of multiple medical problems    She states today that she is very limited in her activity due to back pain as well as multiple other arthralgias in knees hips shoulders X Juan Marshall  She believes that is potentially related to her medication, possibly the addition of atorvastatin no Plavix was added at the same time  She has nocturia times 2-3 but this is unchanged  No dysuria or hematuria  No anorexia night sweats or unexpected weight loss  No chest pain or shortness of breath  She does note that she has had significant bruising since beginning Plavix  The following portions of the patient's history were reviewed and updated as appropriate: allergies, current medications, past family history, past medical history, past social history, past surgical history and problem list     Review of Systems   Constitutional: Positive for malaise/fatigue  Negative for decreased appetite, fever, weight gain and weight loss  Cardiovascular: Negative  Respiratory: Negative  Endocrine: Negative for polydipsia, polyphagia and polyuria  Hematologic/Lymphatic: Negative for adenopathy and bleeding problem  Bruises/bleeds easily  Musculoskeletal: Positive for back pain, joint pain, myalgias and stiffness  Negative for falls  Gastrointestinal: Negative for constipation and diarrhea  Genitourinary: Positive for nocturia  Negative for flank pain, hematuria and urgency  Neurological: Negative  Psychiatric/Behavioral: Negative for depression  The patient is nervous/anxious  Objective:    Physical Exam  Vitals and nursing note reviewed  Constitutional:       Appearance: She is obese  She is not ill-appearing  Neck:      Vascular: No carotid bruit  Cardiovascular:      Rate and Rhythm: Normal rate and regular rhythm  Heart sounds: No murmur heard  Pulmonary:      Effort: Pulmonary effort is normal       Breath sounds: Normal breath sounds  No wheezing, rhonchi or rales  Musculoskeletal:         General: Tenderness present  Right lower leg: No edema  Left lower leg: No edema        Comments: Various tender spots, but no obvious deformity, spinal step-off X [facility]  Lymphadenopathy:      Cervical: No cervical adenopathy  Neurological:      Mental Status: She is alert and oriented to person, place, and time  Psychiatric:         Mood and Affect: Mood normal          Thought Content:  Thought content normal          Judgment: Judgment normal          Wt Readings from Last 12 Encounters:   09/24/21 91 6 kg (202 lb)   09/15/21 93 kg (205 lb)   09/03/21 91 6 kg (202 lb)   08/09/21 93 8 kg (206 lb 11 2 oz)   06/21/21 94 8 kg (209 lb)   05/28/21 93 kg (205 lb)   05/24/21 94 6 kg (208 lb 8 9 oz)   04/12/21 97 1 kg (214 lb)   04/05/21 97 3 kg (214 lb 9 6 oz)   03/25/21 97 1 kg (214 lb)   02/10/21 97 3 kg (214 lb 9 6 oz)   10/27/20 93 kg (205 lb)   ]

## 2021-09-25 LAB
ALBUMIN SERPL-MCNC: 4.4 G/DL (ref 3.6–5.1)
ALBUMIN/GLOB SERPL: 1.8 (CALC) (ref 1–2.5)
ALP SERPL-CCNC: 72 U/L (ref 37–153)
ALT SERPL-CCNC: 28 U/L (ref 6–29)
AST SERPL-CCNC: 20 U/L (ref 10–35)
BILIRUB SERPL-MCNC: 0.7 MG/DL (ref 0.2–1.2)
BUN SERPL-MCNC: 18 MG/DL (ref 7–25)
BUN/CREAT SERPL: ABNORMAL (CALC) (ref 6–22)
CALCIUM SERPL-MCNC: 9.7 MG/DL (ref 8.6–10.4)
CHLORIDE SERPL-SCNC: 105 MMOL/L (ref 98–110)
CK SERPL-CCNC: 103 U/L (ref 29–143)
CO2 SERPL-SCNC: 28 MMOL/L (ref 20–32)
CREAT SERPL-MCNC: 0.64 MG/DL (ref 0.5–0.99)
CRP SERPL-MCNC: 1.5 MG/L
ERYTHROCYTE [DISTWIDTH] IN BLOOD BY AUTOMATED COUNT: 13 % (ref 11–15)
GLOBULIN SER CALC-MCNC: 2.4 G/DL (CALC) (ref 1.9–3.7)
GLUCOSE SERPL-MCNC: 104 MG/DL (ref 65–99)
HCT VFR BLD AUTO: 42.5 % (ref 35–45)
HGB BLD-MCNC: 14.1 G/DL (ref 11.7–15.5)
MCH RBC QN AUTO: 30.5 PG (ref 27–33)
MCHC RBC AUTO-ENTMCNC: 33.2 G/DL (ref 32–36)
MCV RBC AUTO: 92 FL (ref 80–100)
PLATELET # BLD AUTO: 184 THOUSAND/UL (ref 140–400)
PMV BLD REES-ECKER: 11.3 FL (ref 7.5–12.5)
POTASSIUM SERPL-SCNC: 4.1 MMOL/L (ref 3.5–5.3)
PROT SERPL-MCNC: 6.8 G/DL (ref 6.1–8.1)
RBC # BLD AUTO: 4.62 MILLION/UL (ref 3.8–5.1)
SL AMB EGFR AFRICAN AMERICAN: 106 ML/MIN/1.73M2
SL AMB EGFR NON AFRICAN AMERICAN: 92 ML/MIN/1.73M2
SODIUM SERPL-SCNC: 141 MMOL/L (ref 135–146)
WBC # BLD AUTO: 6.2 THOUSAND/UL (ref 3.8–10.8)

## 2021-10-13 ENCOUNTER — OFFICE VISIT (OUTPATIENT)
Dept: CARDIOLOGY CLINIC | Facility: CLINIC | Age: 66
End: 2021-10-13
Payer: MEDICARE

## 2021-10-13 VITALS
HEART RATE: 76 BPM | DIASTOLIC BLOOD PRESSURE: 76 MMHG | OXYGEN SATURATION: 100 % | HEIGHT: 65 IN | BODY MASS INDEX: 33.8 KG/M2 | SYSTOLIC BLOOD PRESSURE: 130 MMHG | WEIGHT: 202.9 LBS

## 2021-10-13 DIAGNOSIS — E78.00 PURE HYPERCHOLESTEROLEMIA: ICD-10-CM

## 2021-10-13 DIAGNOSIS — E78.5 HYPERLIPOPROTEINEMIA: ICD-10-CM

## 2021-10-13 DIAGNOSIS — I10 BENIGN ESSENTIAL HYPERTENSION: Primary | ICD-10-CM

## 2021-10-13 PROCEDURE — 99214 OFFICE O/P EST MOD 30 MIN: CPT | Performed by: INTERNAL MEDICINE

## 2021-10-14 ENCOUNTER — CLINICAL SUPPORT (OUTPATIENT)
Dept: FAMILY MEDICINE CLINIC | Facility: CLINIC | Age: 66
End: 2021-10-14
Payer: MEDICARE

## 2021-10-14 DIAGNOSIS — M25.50 ARTHRALGIA, UNSPECIFIED JOINT: Primary | ICD-10-CM

## 2021-10-14 PROCEDURE — 36415 COLL VENOUS BLD VENIPUNCTURE: CPT

## 2021-10-14 PROCEDURE — 86618 LYME DISEASE ANTIBODY: CPT | Performed by: FAMILY MEDICINE

## 2021-10-19 ENCOUNTER — TELEPHONE (OUTPATIENT)
Dept: CARDIOLOGY CLINIC | Facility: CLINIC | Age: 66
End: 2021-10-19

## 2021-10-19 LAB — B BURGDOR IGG+IGM SER-ACNC: -2

## 2021-10-29 ENCOUNTER — HOSPITAL ENCOUNTER (OUTPATIENT)
Dept: RADIOLOGY | Facility: HOSPITAL | Age: 66
Discharge: HOME | End: 2021-10-29
Payer: MEDICARE

## 2021-10-29 VITALS — WEIGHT: 202 LBS | HEIGHT: 65 IN | BODY MASS INDEX: 33.66 KG/M2

## 2021-10-29 DIAGNOSIS — Z12.31 ENCOUNTER FOR SCREENING MAMMOGRAM FOR MALIGNANT NEOPLASM OF BREAST: ICD-10-CM

## 2021-10-29 PROCEDURE — 77063 BREAST TOMOSYNTHESIS BI: CPT

## 2021-10-29 PROCEDURE — 77067 SCR MAMMO BI INCL CAD: CPT

## 2021-11-29 ENCOUNTER — TELEPHONE (OUTPATIENT)
Dept: CARDIOLOGY CLINIC | Facility: CLINIC | Age: 66
End: 2021-11-29

## 2022-02-19 DIAGNOSIS — I16.0 HYPERTENSIVE URGENCY: ICD-10-CM

## 2022-02-19 DIAGNOSIS — K21.9 GERD WITHOUT ESOPHAGITIS: ICD-10-CM

## 2022-02-21 RX ORDER — AMLODIPINE BESYLATE 5 MG/1
TABLET ORAL
Qty: 90 TABLET | Refills: 1 | Status: SHIPPED | OUTPATIENT
Start: 2022-02-21

## 2022-02-21 RX ORDER — ESOMEPRAZOLE MAGNESIUM 40 MG/1
CAPSULE, DELAYED RELEASE ORAL
Qty: 90 CAPSULE | Refills: 1 | Status: SHIPPED | OUTPATIENT
Start: 2022-02-21

## 2022-03-21 ENCOUNTER — RA CDI HCC (OUTPATIENT)
Dept: OTHER | Facility: HOSPITAL | Age: 67
End: 2022-03-21

## 2022-03-31 ENCOUNTER — OFFICE VISIT (OUTPATIENT)
Dept: FAMILY MEDICINE CLINIC | Facility: CLINIC | Age: 67
End: 2022-03-31
Payer: MEDICARE

## 2022-03-31 VITALS
BODY MASS INDEX: 34.82 KG/M2 | HEIGHT: 65 IN | SYSTOLIC BLOOD PRESSURE: 156 MMHG | WEIGHT: 209 LBS | DIASTOLIC BLOOD PRESSURE: 90 MMHG

## 2022-03-31 DIAGNOSIS — K21.9 GERD WITHOUT ESOPHAGITIS: ICD-10-CM

## 2022-03-31 DIAGNOSIS — I10 BENIGN ESSENTIAL HYPERTENSION: Primary | ICD-10-CM

## 2022-03-31 DIAGNOSIS — Z00.00 MEDICARE ANNUAL WELLNESS VISIT, SUBSEQUENT: ICD-10-CM

## 2022-03-31 DIAGNOSIS — N39.41 URGE INCONTINENCE OF URINE: ICD-10-CM

## 2022-03-31 DIAGNOSIS — Z79.83 LONG TERM CURRENT USE OF BISPHOSPHONATES: ICD-10-CM

## 2022-03-31 DIAGNOSIS — E78.5 HYPERLIPOPROTEINEMIA: ICD-10-CM

## 2022-03-31 DIAGNOSIS — E04.1 NONTOXIC SINGLE THYROID NODULE: ICD-10-CM

## 2022-03-31 DIAGNOSIS — E78.00 PURE HYPERCHOLESTEROLEMIA: ICD-10-CM

## 2022-03-31 DIAGNOSIS — Z78.0 ASYMPTOMATIC POSTMENOPAUSAL STATE: ICD-10-CM

## 2022-03-31 PROBLEM — Z03.818 ENCOUNTER FOR OBSERVATION FOR SUSPECTED EXPOSURE TO OTHER BIOLOGICAL AGENTS RULED OUT: Status: RESOLVED | Noted: 2021-04-08 | Resolved: 2022-03-31

## 2022-03-31 PROBLEM — R77.8 ELEVATED TROPONIN I LEVEL: Status: RESOLVED | Noted: 2021-05-21 | Resolved: 2022-03-31

## 2022-03-31 PROBLEM — R07.9 CHEST PAIN: Status: RESOLVED | Noted: 2019-05-28 | Resolved: 2022-03-31

## 2022-03-31 PROBLEM — U07.1 COVID-19: Status: RESOLVED | Noted: 2021-04-09 | Resolved: 2022-03-31

## 2022-03-31 PROBLEM — R79.89 ELEVATED TROPONIN I LEVEL: Status: RESOLVED | Noted: 2021-05-21 | Resolved: 2022-03-31

## 2022-03-31 PROCEDURE — G0439 PPPS, SUBSEQ VISIT: HCPCS | Performed by: FAMILY MEDICINE

## 2022-03-31 PROCEDURE — 99214 OFFICE O/P EST MOD 30 MIN: CPT | Performed by: FAMILY MEDICINE

## 2022-03-31 PROCEDURE — 36415 COLL VENOUS BLD VENIPUNCTURE: CPT | Performed by: FAMILY MEDICINE

## 2022-03-31 RX ORDER — VIBEGRON 75 MG/1
75 TABLET, FILM COATED ORAL DAILY
Qty: 30 TABLET | Refills: 2 | Status: SHIPPED | OUTPATIENT
Start: 2022-03-31 | End: 2022-04-05 | Stop reason: CLARIF

## 2022-03-31 NOTE — PROGRESS NOTES
Assessment/Plan:  Hyperlipoproteinemia  Lipid profile today  Follow up when results are available  She did discontinue atorvastatin due to myalgias  She may need to resume and try Co Q10  Will discuss when results of lipids are available  She agrees  Urge incontinence of urine  She has had some incontinence of urine with some component of her urge  She researched this on her own and would like a trial of Gemtesa which we prescribed for her today  Long term current use of bisphosphonates  She had a normal DEXA in 2016  Due to her long-term use of Nexium we are going to have her repeat  Benign essential hypertension  Blood pressure control suboptimal today  She does have a follow-up scheduled with Cardiology in the near future  GERD without esophagitis  She is going to continue with her Nexium  She has no worrisome symptoms  Attempts at weaning have been unsuccessful  She is going to go for DEXA scan  Diagnoses and all orders for this visit:    Benign essential hypertension  -     Cancel: CBC; Future  -     Cancel: Comprehensive metabolic panel; Future  -     CBC  -     Comprehensive metabolic panel    Nontoxic single thyroid nodule    Urge incontinence of urine  -     Vibegron (Gemtesa) 75 MG TABS; Take 75 mg by mouth in the morning    Pure hypercholesterolemia  -     Lipid Panel with Direct LDL reflex  -     Lipid panel    Asymptomatic postmenopausal state  -     DXA bone density spine hip and pelvis; Future    Long term current use of bisphosphonates  -     DXA bone density spine hip and pelvis; Future    Hyperlipoproteinemia    GERD without esophagitis          Subjective:   Chief Complaint   Patient presents with   [facility]ness Visit     Subsequent AWV/Med Check        Patient ID: Guadalupe Min is a 74 y o  female  Urinary urgency with incontinence  Sees Dr Aguero Pulse  C/w Nexium many years  Stopped atorvastatin       HPI  The patient is a 68-year-old female who presents today for routine follow-up of gastroesophageal reflux disease, hyperlipidemia, osteoarthritis in addition to hypertension as well as a complaint of urinary incontinence with some degree of urgency  She has been compliant with her medications which include Nexium, amlodipine, aspirin as well as metoprolol in addition to losartan/hydrochlorothiazide  She did discontinue her atorvastatin  She denies any cardiovascular pulmonary complaint  She has no headache or diplopia  She does complain of some urinary urgency with incontinence  She has been compliant with Nexium for many years and she has no odynophagia, dysphagia anorexia weight loss X cetera  Attempts at weaning have been unsuccessful  She did have a DEXA scan in 2016 which was normal   The following portions of the patient's history were reviewed and updated as appropriate: allergies, current medications, past family history, past medical history, past social history, past surgical history and problem list     ROS    Per the HPI  Objective:    Physical Exam  Constitutional:       Appearance: She is obese  She is not ill-appearing  Eyes:      General:         Right eye: No discharge  Left eye: No discharge  Neck:      Vascular: No carotid bruit  Cardiovascular:      Rate and Rhythm: Normal rate and regular rhythm  Heart sounds: No murmur heard  Pulmonary:      Effort: Pulmonary effort is normal       Breath sounds: Normal breath sounds  No wheezing, rhonchi or rales  Musculoskeletal:      Right lower leg: No edema  Left lower leg: No edema  Neurological:      Mental Status: She is alert and oriented to person, place, and time  Psychiatric:         Mood and Affect: Mood normal          Thought Content:  Thought content normal          Judgment: Judgment normal

## 2022-03-31 NOTE — ASSESSMENT & PLAN NOTE
Lipid profile today  Follow up when results are available  She did discontinue atorvastatin due to myalgias  She may need to resume and try Co Q10  Will discuss when results of lipids are available  She agrees

## 2022-03-31 NOTE — ASSESSMENT & PLAN NOTE
She is going to continue with her Nexium  She has no worrisome symptoms  Attempts at weaning have been unsuccessful  She is going to go for DEXA scan

## 2022-03-31 NOTE — PROGRESS NOTES
Assessment and Plan:     Problem List Items Addressed This Visit        Digestive    GERD without esophagitis     She is going to continue with her Nexium  She has no worrisome symptoms  Attempts at weaning have been unsuccessful  She is going to go for DEXA scan  Endocrine    Nontoxic single thyroid nodule       Cardiovascular and Mediastinum    Benign essential hypertension - Primary     Blood pressure control suboptimal today  She does have a follow-up scheduled with Cardiology in the near future  Relevant Orders    CBC    Comprehensive metabolic panel       Other    Urge incontinence of urine     She has had some incontinence of urine with some component of her urge  She researched this on her own and would like a trial of Gemtesa which we prescribed for her today  Relevant Medications    Vibegron (Gemtesa) 75 MG TABS    Pure hypercholesterolemia    Relevant Orders    Lipid Panel with Direct LDL reflex    Lipid panel    Medicare annual wellness visit, subsequent     The patient is a 75-year-old female who presents today for a Medicare subsequent wellness visit  All components of the visit were addressed  Gaps in care include lack of vaccinations which she declines today  Additionally she needs to complete an advanced directive and we gave her a copy of 5 wishes today  She agrees  Long term current use of bisphosphonates     She had a normal DEXA in 2016  Due to her long-term use of Nexium we are going to have her repeat  Relevant Orders    DXA bone density spine hip and pelvis    Hyperlipoproteinemia     Lipid profile today  Follow up when results are available  She did discontinue atorvastatin due to myalgias  She may need to resume and try Co Q10  Will discuss when results of lipids are available  She agrees             Other Visit Diagnoses     Asymptomatic postmenopausal state        Relevant Orders    DXA bone density spine hip and pelvis        BMI Counseling: Body mass index is 34 78 kg/m²  The BMI is above normal  Nutrition recommendations include decreasing portion sizes, encouraging healthy choices of fruits and vegetables, consuming healthier snacks and moderation in carbohydrate intake  Exercise recommendations include moderate physical activity 150 minutes/week and exercising 3-5 times per week  No pharmacotherapy was ordered  Rationale for BMI follow-up plan is due to patient being overweight or obese  Depression Screening and Follow-up Plan: Patient was screened for depression during today's encounter  They screened negative with a PHQ-2 score of 1  Preventive health issues were discussed with patient, and age appropriate screening tests were ordered as noted in patient's After Visit Summary  Personalized health advice and appropriate referrals for health education or preventive services given if needed, as noted in patient's After Visit Summary  History of Present Illness:     Patient presents for Medicare Annual Wellness visit    Patient Care Team:  Beryl Cherry MD as PCP - General  CECI Lawton MD     Problem List:     Patient Active Problem List   Diagnosis    Allergic rhinitis    Benign essential hypertension    Dermatitis    GERD without esophagitis    Hyperlipoproteinemia    Nontoxic single thyroid nodule    Osteoarthritis of knee    Rectocele    Uterovaginal prolapse    Cystocele, lateral    Varicose veins of bilateral lower extremities with pain    Family history of sudden cardiac death    Pure hypercholesterolemia    Obesity (BMI 30-39  9)    Chronic venous insufficiency    Actinic keratosis    Medicare annual wellness visit, subsequent    Rosacea    Hypertensive urgency    Acute left-sided low back pain without sciatica    Arthralgia    Urge incontinence of urine    Long term current use of bisphosphonates      Past Medical and Surgical History:     Past Medical History:   Diagnosis Date  Breast lump     Chest pain 2019    COVID-19 2021    Disease of thyroid gland     nodule, benign    Elevated troponin I level 2021    GERD (gastroesophageal reflux disease)     History of transfusion     at 9years old for bleeding during tonsillectomy, no reaction    Hypertension     Nontoxic single thyroid nodule 2012    Rectocele     Uterine leiomyoma     Varicose veins of both lower extremities      Past Surgical History:   Procedure Laterality Date    BLADDER SURGERY  2012    Bladder "lift"    BREAST BIOPSY Right     benign    BREAST CYST EXCISION Right      =    BREAST LUMPECTOMY      COLONOSCOPY      HYSTERECTOMY      KNEE SURGERY Right     x 2    NOSE SURGERY      MI LARYNGOSCOPY,DIRCT,OP SCOPE,BIOPSY Left 2018    Procedure: MICROLARYNGOSCOPY AND EXCISION OF LEFT VOCAL FOLD POLYP;  Surgeon: Rodger Rios MD;  Location[facility];  Service: ENT    TONSILLECTOMY      TUBAL LIGATION      VARICOSE VEIN SURGERY Left 2009      Family History:     Family History   Problem Relation Age of Onset   Jony Lee Breast cancer Mother 77    Hypertension Mother     Varicose Veins Mother         of lower extremities    Hypertension Father     Coronary artery disease Father     Coronary artery disease Sister     Heart disease Sister     Varicose Veins Sister         of lower extremities    No Known Problems Daughter     Breast cancer Maternal Aunt 72    No Known Problems Maternal Aunt     No Known Problems Sister     No Known Problems Sister       Social History:     Social History     Socioeconomic History    Marital status: /Civil Union     Spouse name: None    Number of children: None    Years of education: None    Highest education level: None   Occupational History    None   Tobacco Use    Smoking status: Former Smoker     Quit date:      Years since quittin 2    Smokeless tobacco: Never Used   Vaping Use    Vaping Use: Never used   Substance and Sexual Activity    Alcohol use: Not Currently    Drug use: No    Sexual activity: None   Other Topics Concern    None   Social History Narrative    None     Social Determinants of Health     Financial Resource Strain: Not on file   Food Insecurity: Not on file   Transportation Needs: Not on file   Physical Activity: Not on file   Stress: Not on file   Social Connections: Not on file   Intimate Partner Violence: Not on file   Housing Stability: Not on file      Medications and Allergies:     Current Outpatient Medications   Medication Sig Dispense Refill    amLODIPine (NORVASC) 5 mg tablet TAKE 1 TABLET EVERY DAY 90 tablet 1    aspirin 81 mg chewable tablet Chew 1 tablet (81 mg total) daily 30 tablet 0    BIOTIN PO Take by mouth      cholecalciferol (VITAMIN D3) 1,000 units tablet Take 1,000 Units by mouth daily      esomeprazole (NexIUM) 40 MG capsule TAKE 1 CAPSULE EVERY DAY 90 capsule 1    Ginger, Zingiber officinalis, (GINGER ROOT PO) Take by mouth      latanoprost (XALATAN) 0 005 % ophthalmic solution       losartan-hydrochlorothiazide (HYZAAR) 100-25 MG per tablet Take 1 tablet by mouth daily 30 tablet 0    metoprolol succinate (TOPROL-XL) 50 mg 24 hr tablet Take 1 tablet (50 mg total) by mouth daily 30 tablet 0    multivitamin (THERAGRAN) TABS Take 1 tablet by mouth daily      albuterol (PROVENTIL HFA,VENTOLIN HFA) 90 mcg/act inhaler Inhale 2 puffs every 6 (six) hours as needed for wheezing or shortness of breath (Patient not taking: Reported on 6/21/2021) 1 Inhaler 5    atorvastatin (LIPITOR) 40 mg tablet Take 1 tablet (40 mg total) by mouth daily with dinner (Patient not taking: Reported on 10/13/2021) 30 tablet 2    Ivermectin 1 % CREA Apply topically daily for 30 doses Apply topically to face once daily (Patient not taking: Reported on 10/13/2021) 45 g 2    triamcinolone (KENALOG) 0 1 % cream Apply topically 2 (two) times a day (Patient not taking: Reported on 9/24/2021) 30 g 0    Vibegron (Gemtesa) 75 MG TABS Take 75 mg by mouth in the morning 30 tablet 2     No current facility-administered medications for this visit  Allergies   Allergen Reactions    Lisinopril Other (See Comments)     Other reaction(s): swollen legs    Hydrocodone-Acetaminophen Dizziness    Irbesartan-Hydrochlorothiazide Other (See Comments)      Hypertension  Pt had hypertension, arm heaviness and headache      Immunizations:     Immunization History   Administered Date(s) Administered    Pneumococcal Conjugate 13-Valent 03/18/2019    Pneumococcal Polysaccharide PPV23 09/21/2020      Health Maintenance:         Topic Date Due    Breast Cancer Screening: Mammogram  10/29/2022    Colorectal Cancer Screening  09/13/2023    Hepatitis C Screening  Completed         Topic Date Due    COVID-19 Vaccine (1) Never done    DTaP,Tdap,and Td Vaccines (1 - Tdap) Never done    Influenza Vaccine (1) Never done      Medicare Health Risk Assessment:     /90 (BP Location: Left arm, Patient Position: Sitting)   Ht 5' 5" (1 651 m)   Wt 94 8 kg (209 lb)   BMI 34 78 kg/m²      Bella Brennan is here for her Subsequent Wellness visit  Health Risk Assessment:   Patient rates overall health as good  Patient feels that their physical health rating is slightly worse  Patient is satisfied with their life  Eyesight was rated as same  Hearing was rated as same  Patient feels that their emotional and mental health rating is same  Patients states they are never, rarely angry  Patient states they are often unusually tired/fatigued  Pain experienced in the last 7 days has been a lot  Patient's pain rating has been 7/10  Patient states that she has experienced no weight loss or gain in last 6 months  My L knee  Elvin Hyatt for TKR 5/18    Depression Screening:   PHQ-2 Score: 1      Fall Risk Screening:    In the past year, patient has experienced: no history of falling in past year      Urinary Incontinence Screening:   Patient has leaked urine accidently in the last six months  Urge incontinence  Home Safety:  Patient has trouble with stairs inside or outside of their home  Patient has working smoke alarms and has working carbon monoxide detector  Home safety hazards include: none  Nutrition:   Current diet is Regular  Medications:   Patient is currently taking over-the-counter supplements  OTC medications include: see medication list  Patient is able to manage medications  Activities of Daily Living (ADLs)/Instrumental Activities of Daily Living (IADLs):   Walk and transfer into and out of bed and chair?: Yes  Dress and groom yourself?: Yes    Bathe or shower yourself?: Yes    Feed yourself? Yes  Do your laundry/housekeeping?: Yes  Manage your money, pay your bills and track your expenses?: Yes  Make your own meals?: Yes    Do your own shopping?: Yes    Durable Medical Equipment Suppliers  None    Previous Hospitalizations:   Any hospitalizations or ED visits within the last 12 months?: Yes    How many hospitalizations have you had in the last year?: 1-2    Hospitalization Comments: Chest pain  HTN urgency   Cath negative    Advance Care Planning:   Living will: No    Durable POA for healthcare: No    Advanced directive: No      Comments: 5 Wishes given      Cognitive Screening:   Provider or family/friend/caregiver concerned regarding cognition?: No    PREVENTIVE SCREENINGS      Cardiovascular Screening:    General: Screening Not Indicated and History Lipid Disorder      Diabetes Screening:     General: Screening Current      Colorectal Cancer Screening:     General: Screening Current      Breast Cancer Screening:     General: Screening Current      Cervical Cancer Screening:    General: Screening Not Indicated      Osteoporosis Screening:    General: Risks and Benefits Discussed and Screening Current      Abdominal Aortic Aneurysm (AAA) Screening:        General: Screening Not Indicated      Lung Cancer Screening:     General: Screening Not Indicated      Hepatitis C Screening:    General: Screening Current    Screening, Brief Intervention, and Referral to Treatment (SBIRT)    Screening  Typical number of drinks in a day: 0  Typical number of drinks in a week: 0  Interpretation: Low risk drinking behavior      Single Item Drug Screening:  How often have you used an illegal drug (including marijuana) or a prescription medication for non-medical reasons in the past year? never    Single Item Drug Screen Score: 0  Interpretation: Negative screen for possible drug use disorder      Senia Cervantes MD

## 2022-03-31 NOTE — ASSESSMENT & PLAN NOTE
She has had some incontinence of urine with some component of her urge  She researched this on her own and would like a trial of Gemtesa which we prescribed for her today

## 2022-03-31 NOTE — ASSESSMENT & PLAN NOTE
Blood pressure control suboptimal today  She does have a follow-up scheduled with Cardiology in the near future

## 2022-03-31 NOTE — ASSESSMENT & PLAN NOTE
The patient is a 76-year-old female who presents today for a Medicare subsequent wellness visit  All components of the visit were addressed  Gaps in care include lack of vaccinations which she declines today  Additionally she needs to complete an advanced directive and we gave her a copy of 5 wishes today  She agrees

## 2022-03-31 NOTE — PATIENT INSTRUCTIONS
Medicare Preventive Visit Patient Instructions  Thank you for completing your Welcome to Medicare Visit or Medicare Annual Wellness Visit today  Your next wellness visit will be due in one year (4/1/2023)  The screening/preventive services that you may require over the next 5-10 years are detailed below  Some tests may not apply to you based off risk factors and/or age  Screening tests ordered at today's visit but not completed yet may show as past due  Also, please note that scanned in results may not display below  Preventive Screenings:  Service Recommendations Previous Testing/Comments   Colorectal Cancer Screening  * Colonoscopy    * Fecal Occult Blood Test (FOBT)/Fecal Immunochemical Test (FIT)  * Fecal DNA/Cologuard Test  * Flexible Sigmoidoscopy Age: 54-65 years old   Colonoscopy: every 10 years (may be performed more frequently if at higher risk)  OR  FOBT/FIT: every 1 year  OR  Cologuard: every 3 years  OR  Sigmoidoscopy: every 5 years  Screening may be recommended earlier than age 48 if at higher risk for colorectal cancer  Also, an individualized decision between you and your healthcare provider will decide whether screening between the ages of 74-80 would be appropriate  Colonoscopy: 09/13/2013  FOBT/FIT: Not on file  Cologuard: Not on file  Sigmoidoscopy: Not on file    Screening Current     Breast Cancer Screening Age: 36 years old  Frequency: every 1-2 years  Not required if history of left and right mastectomy Mammogram: 10/29/2021    Screening Current   Cervical Cancer Screening Between the ages of 21-29, pap smear recommended once every 3 years  Between the ages of 33-67, can perform pap smear with HPV co-testing every 5 years     Recommendations may differ for women with a history of total hysterectomy, cervical cancer, or abnormal pap smears in past  Pap Smear: Not on file    Screening Not Indicated   Hepatitis C Screening Once for adults born between 1945 and 1965  More frequently in patients at high risk for Hepatitis C Hep C Antibody: 03/18/2019    Screening Current   Diabetes Screening 1-2 times per year if you're at risk for diabetes or have pre-diabetes Fasting glucose: No results in last 5 years   A1C: 5 6 %    Screening Current   Cholesterol Screening Once every 5 years if you don't have a lipid disorder  May order more often based on risk factors  Lipid panel: 08/30/2021    Screening Not Indicated  History Lipid Disorder     Other Preventive Screenings Covered by Medicare:  1  Abdominal Aortic Aneurysm (AAA) Screening: covered once if your at risk  You're considered to be at risk if you have a family history of AAA  2  Lung Cancer Screening: covers low dose CT scan once per year if you meet all of the following conditions: (1) Age 50-69; (2) No signs or symptoms of lung cancer; (3) Current smoker or have quit smoking within the last 15 years; (4) You have a tobacco smoking history of at least 30 pack years (packs per day multiplied by number of years you smoked); (5) You get a written order from a healthcare provider  3  Glaucoma Screening: covered annually if you're considered high risk: (1) You have diabetes OR (2) Family history of glaucoma OR (3)  aged 48 and older OR (3)  American aged 72 and older  3  Osteoporosis Screening: covered every 2 years if you meet one of the following conditions: (1) You're estrogen deficient and at risk for osteoporosis based off medical history and other findings; (2) Have a vertebral abnormality; (3) On glucocorticoid therapy for more than 3 months; (4) Have primary hyperparathyroidism; (5) On osteoporosis medications and need to assess response to drug therapy  · Last bone density test (DXA Scan): 07/07/2016   5  HIV Screening: covered annually if you're between the age of 15-65  Also covered annually if you are younger than 13 and older than 72 with risk factors for HIV infection   For pregnant patients, it is covered up to 3 times per pregnancy  Immunizations:  Immunization Recommendations   Influenza Vaccine Annual influenza vaccination during flu season is recommended for all persons aged >= 6 months who do not have contraindications   Pneumococcal Vaccine (Prevnar and Pneumovax)  * Prevnar = PCV13  * Pneumovax = PPSV23   Adults 25-60 years old: 1-3 doses may be recommended based on certain risk factors  Adults 72 years old: Prevnar (PCV13) vaccine recommended followed by Pneumovax (PPSV23) vaccine  If already received PPSV23 since turning 65, then PCV13 recommended at least one year after PPSV23 dose  Hepatitis B Vaccine 3 dose series if at intermediate or high risk (ex: diabetes, end stage renal disease, liver disease)   Tetanus (Td) Vaccine - COST NOT COVERED BY MEDICARE PART B Following completion of primary series, a booster dose should be given every 10 years to maintain immunity against tetanus  Td may also be given as tetanus wound prophylaxis  Tdap Vaccine - COST NOT COVERED BY MEDICARE PART B Recommended at least once for all adults  For pregnant patients, recommended with each pregnancy  Shingles Vaccine (Shingrix) - COST NOT COVERED BY MEDICARE PART B  2 shot series recommended in those aged 48 and above     Health Maintenance Due:      Topic Date Due    Breast Cancer Screening: Mammogram  10/29/2022    Colorectal Cancer Screening  09/13/2023    Hepatitis C Screening  Completed     Immunizations Due:      Topic Date Due    COVID-19 Vaccine (1) Never done    DTaP,Tdap,and Td Vaccines (1 - Tdap) Never done    Influenza Vaccine (1) Never done     Advance Directives   What are advance directives? Advance directives are legal documents that state your wishes and plans for medical care  These plans are made ahead of time in case you lose your ability to make decisions for yourself  Advance directives can apply to any medical decision, such as the treatments you want, and if you want to donate organs     What are the types of advance directives? There are many types of advance directives, and each state has rules about how to use them  You may choose a combination of any of the following:  · Living will: This is a written record of the treatment you want  You can also choose which treatments you do not want, which to limit, and which to stop at a certain time  This includes surgery, medicine, IV fluid, and tube feedings  · Durable power of  for healthcare [facility]): This is a written record that states who you want to make healthcare choices for you when you are unable to make them for yourself  This person, called a proxy, is usually a family member or a friend  You may choose more than 1 proxy  · Do not resuscitate (DNR) order:  A DNR order is used in case your heart stops beating or you stop breathing  It is a request not to have certain forms of treatment, such as CPR  A DNR order may be included in other types of advance directives  · Medical directive: This covers the care that you want if you are in a coma, near death, or unable to make decisions for yourself  You can list the treatments you want for each condition  Treatment may include pain medicine, surgery, blood transfusions, dialysis, IV or tube feedings, and a ventilator (breathing machine)  · Values history: This document has questions about your views, beliefs, and how you feel and think about life  This information can help others choose the care that you would choose  Why are advance directives important? An advance directive helps you control your care  Although spoken wishes may be used, it is better to have your wishes written down  Spoken wishes can be misunderstood, or not followed  Treatments may be given even if you do not want them  An advance directive may make it easier for your family to make difficult choices about your care  Urinary Incontinence   Urinary incontinence (UI)  is when you lose control of your bladder   UI develops because your bladder cannot store or empty urine properly  The 3 most common types of UI are stress incontinence, urge incontinence, or both  Medicines:   · May be given to help strengthen your bladder control  Report any side effects of medication to your healthcare provider  Do pelvic muscle exercises often:  Your pelvic muscles help you stop urinating  Squeeze these muscles tight for 5 seconds, then relax for 5 seconds  Gradually work up to squeezing for 10 seconds  Do 3 sets of 15 repetitions a day, or as directed  This will help strengthen your pelvic muscles and improve bladder control  Train your bladder:  Go to the bathroom at set times, such as every 2 hours, even if you do not feel the urge to go  You can also try to hold your urine when you feel the urge to go  For example, hold your urine for 5 minutes when you feel the urge to go  As that becomes easier, hold your urine for 10 minutes  Self-care:   · Keep a UI record  Write down how often you leak urine and how much you leak  Make a note of what you were doing when you leaked urine  · Drink liquids as directed  You may need to limit the amount of liquid you drink to help control your urine leakage  Do not drink any liquid right before you go to bed  Limit or do not have drinks that contain caffeine or alcohol  · Prevent constipation  Eat a variety of high-fiber foods  Good examples are high-fiber cereals, beans, vegetables, and whole-grain breads  Walking is the best way to trigger your intestines to have a bowel movement  · Exercise regularly and maintain a healthy weight  Weight loss and exercise will decrease pressure on your bladder and help you control your leakage  · Use a catheter as directed  to help empty your bladder  A catheter is a tiny, plastic tube that is put into your bladder to drain your urine  · Go to behavior therapy as directed    Behavior therapy may be used to help you learn to control your urge to urinate  Weight Management   Why it is important to manage your weight:  Being overweight increases your risk of health conditions such as heart disease, high blood pressure, type 2 diabetes, and certain types of cancer  It can also increase your risk for osteoarthritis, sleep apnea, and other respiratory problems  Aim for a slow, steady weight loss  Even a small amount of weight loss can lower your risk of health problems  How to lose weight safely:  A safe and healthy way to lose weight is to eat fewer calories and get regular exercise  You can lose up about 1 pound a week by decreasing the number of calories you eat by 500 calories each day  Healthy meal plan for weight management:  A healthy meal plan includes a variety of foods, contains fewer calories, and helps you stay healthy  A healthy meal plan includes the following:  · Eat whole-grain foods more often  A healthy meal plan should contain fiber  Fiber is the part of grains, fruits, and vegetables that is not broken down by your body  Whole-grain foods are healthy and provide extra fiber in your diet  Some examples of whole-grain foods are whole-wheat breads and pastas, oatmeal, brown rice, and bulgur  · Eat a variety of vegetables every day  Include dark, leafy greens such as spinach, kale, belkis greens, and mustard greens  Eat yellow and orange vegetables such as carrots, sweet potatoes, and winter squash  · Eat a variety of fruits every day  Choose fresh or canned fruit (canned in its own juice or light syrup) instead of juice  Fruit juice has very little or no fiber  · Eat low-fat dairy foods  Drink fat-free (skim) milk or 1% milk  Eat fat-free yogurt and low-fat cottage cheese  Try low-fat cheeses such as mozzarella and other reduced-fat cheeses  · Choose meat and other protein foods that are low in fat  Choose beans or other legumes such as split peas or lentils   Choose fish, skinless poultry (chicken or turkey), or lean cuts of red meat (beef or pork)  Before you cook meat or poultry, cut off any visible fat  · Use less fat and oil  Try baking foods instead of frying them  Add less fat, such as margarine, sour cream, regular salad dressing and mayonnaise to foods  Eat fewer high-fat foods  Some examples of high-fat foods include french fries, doughnuts, ice cream, and cakes  · Eat fewer sweets  Limit foods and drinks that are high in sugar  This includes candy, cookies, regular soda, and sweetened drinks  Exercise:  Exercise at least 30 minutes per day on most days of the week  Some examples of exercise include walking, biking, dancing, and swimming  You can also fit in more physical activity by taking the stairs instead of the elevator or parking farther away from stores  Ask your healthcare provider about the best exercise plan for you  © Copyright [employer] 2018 Information is for End User's use only and may not be sold, redistributed or otherwise used for commercial purposes  All illustrations and images included in CareNotes® are the copyrighted property of [employer]  or [facility] Preventive Visit Patient Instructions  Thank you for completing your Welcome to Medicare Visit or Medicare Annual Wellness Visit today  Your next wellness visit will be due in one year (4/1/2023)  The screening/preventive services that you may require over the next 5-10 years are detailed below  Some tests may not apply to you based off risk factors and/or age  Screening tests ordered at today's visit but not completed yet may show as past due  Also, please note that scanned in results may not display below    Preventive Screenings:  Service Recommendations Previous Testing/Comments   Colorectal Cancer Screening  * Colonoscopy    * Fecal Occult Blood Test (FOBT)/Fecal Immunochemical Test (FIT)  * Fecal DNA/Cologuard Test  * Flexible Sigmoidoscopy Age: 54-65 years old   Colonoscopy: every 10 years (may be performed more frequently if at higher risk)  OR  FOBT/FIT: every 1 year  OR  Cologuard: every 3 years  OR  Sigmoidoscopy: every 5 years  Screening may be recommended earlier than age 48 if at higher risk for colorectal cancer  Also, an individualized decision between you and your healthcare provider will decide whether screening between the ages of 74-80 would be appropriate  Colonoscopy: 09/13/2013  FOBT/FIT: Not on file  Cologuard: Not on file  Sigmoidoscopy: Not on file    Screening Current     Breast Cancer Screening Age: 34 years old  Frequency: every 1-2 years  Not required if history of left and right mastectomy Mammogram: 10/29/2021    Screening Current   Cervical Cancer Screening Between the ages of 21-29, pap smear recommended once every 3 years  Between the ages of 33-67, can perform pap smear with HPV co-testing every 5 years  Recommendations may differ for women with a history of total hysterectomy, cervical cancer, or abnormal pap smears in past  Pap Smear: Not on file    Screening Not Indicated   Hepatitis C Screening Once for adults born between 1945 and 1965  More frequently in patients at high risk for Hepatitis C Hep C Antibody: 03/18/2019    Screening Current   Diabetes Screening 1-2 times per year if you're at risk for diabetes or have pre-diabetes Fasting glucose: No results in last 5 years   A1C: 5 6 %    Screening Current   Cholesterol Screening Once every 5 years if you don't have a lipid disorder  May order more often based on risk factors  Lipid panel: 08/30/2021    Screening Not Indicated  History Lipid Disorder     Other Preventive Screenings Covered by Medicare:  6  Abdominal Aortic Aneurysm (AAA) Screening: covered once if your at risk  You're considered to be at risk if you have a family history of AAA    7  Lung Cancer Screening: covers low dose CT scan once per year if you meet all of the following conditions: (1) Age 50-69; (2) No signs or symptoms of lung cancer; (3) Current smoker or have quit smoking within the last 15 years; (4) You have a tobacco smoking history of at least 30 pack years (packs per day multiplied by number of years you smoked); (5) You get a written order from a healthcare provider  8  Glaucoma Screening: covered annually if you're considered high risk: (1) You have diabetes OR (2) Family history of glaucoma OR (3)  aged 48 and older OR (3)  American aged 72 and older  5  Osteoporosis Screening: covered every 2 years if you meet one of the following conditions: (1) You're estrogen deficient and at risk for osteoporosis based off medical history and other findings; (2) Have a vertebral abnormality; (3) On glucocorticoid therapy for more than 3 months; (4) Have primary hyperparathyroidism; (5) On osteoporosis medications and need to assess response to drug therapy  · Last bone density test (DXA Scan): 07/07/2016   10  HIV Screening: covered annually if you're between the age of 15-65  Also covered annually if you are younger than 13 and older than 72 with risk factors for HIV infection  For pregnant patients, it is covered up to 3 times per pregnancy  Immunizations:  Immunization Recommendations   Influenza Vaccine Annual influenza vaccination during flu season is recommended for all persons aged >= 6 months who do not have contraindications   Pneumococcal Vaccine (Prevnar and Pneumovax)  * Prevnar = PCV13  * Pneumovax = PPSV23   Adults 25-60 years old: 1-3 doses may be recommended based on certain risk factors  Adults 72 years old: Prevnar (PCV13) vaccine recommended followed by Pneumovax (PPSV23) vaccine  If already received PPSV23 since turning 65, then PCV13 recommended at least one year after PPSV23 dose     Hepatitis B Vaccine 3 dose series if at intermediate or high risk (ex: diabetes, end stage renal disease, liver disease)   Tetanus (Td) Vaccine - COST NOT COVERED BY MEDICARE PART B Following completion of primary series, a booster dose should be given every 10 years to maintain immunity against tetanus  Td may also be given as tetanus wound prophylaxis  Tdap Vaccine - COST NOT COVERED BY MEDICARE PART B Recommended at least once for all adults  For pregnant patients, recommended with each pregnancy  Shingles Vaccine (Shingrix) - COST NOT COVERED BY MEDICARE PART B  2 shot series recommended in those aged 48 and above     Health Maintenance Due:      Topic Date Due    Breast Cancer Screening: Mammogram  10/29/2022    Colorectal Cancer Screening  09/13/2023    Hepatitis C Screening  Completed     Immunizations Due:      Topic Date Due    COVID-19 Vaccine (1) Never done    DTaP,Tdap,and Td Vaccines (1 - Tdap) Never done    Influenza Vaccine (1) Never done     Advance Directives   What are advance directives? Advance directives are legal documents that state your wishes and plans for medical care  These plans are made ahead of time in case you lose your ability to make decisions for yourself  Advance directives can apply to any medical decision, such as the treatments you want, and if you want to donate organs  What are the types of advance directives? There are many types of advance directives, and each state has rules about how to use them  You may choose a combination of any of the following:  · Living will: This is a written record of the treatment you want  You can also choose which treatments you do not want, which to limit, and which to stop at a certain time  This includes surgery, medicine, IV fluid, and tube feedings  · Durable power of  for healthcare [facility]): This is a written record that states who you want to make healthcare choices for you when you are unable to make them for yourself  This person, called a proxy, is usually a family member or a friend  You may choose more than 1 proxy  · Do not resuscitate (DNR) order:  A DNR order is used in case your heart stops beating or you stop breathing   It is a request not to have certain forms of treatment, such as CPR  A DNR order may be included in other types of advance directives  · Medical directive: This covers the care that you want if you are in a coma, near death, or unable to make decisions for yourself  You can list the treatments you want for each condition  Treatment may include pain medicine, surgery, blood transfusions, dialysis, IV or tube feedings, and a ventilator (breathing machine)  · Values history: This document has questions about your views, beliefs, and how you feel and think about life  This information can help others choose the care that you would choose  Why are advance directives important? An advance directive helps you control your care  Although spoken wishes may be used, it is better to have your wishes written down  Spoken wishes can be misunderstood, or not followed  Treatments may be given even if you do not want them  An advance directive may make it easier for your family to make difficult choices about your care  Urinary Incontinence   Urinary incontinence (UI)  is when you lose control of your bladder  UI develops because your bladder cannot store or empty urine properly  The 3 most common types of UI are stress incontinence, urge incontinence, or both  Medicines:   · May be given to help strengthen your bladder control  Report any side effects of medication to your healthcare provider  Do pelvic muscle exercises often:  Your pelvic muscles help you stop urinating  Squeeze these muscles tight for 5 seconds, then relax for 5 seconds  Gradually work up to squeezing for 10 seconds  Do 3 sets of 15 repetitions a day, or as directed  This will help strengthen your pelvic muscles and improve bladder control  Train your bladder:  Go to the bathroom at set times, such as every 2 hours, even if you do not feel the urge to go  You can also try to hold your urine when you feel the urge to go   For example, hold your urine for 5 minutes when you feel the urge to go  As that becomes easier, hold your urine for 10 minutes  Self-care:   · Keep a UI record  Write down how often you leak urine and how much you leak  Make a note of what you were doing when you leaked urine  · Drink liquids as directed  You may need to limit the amount of liquid you drink to help control your urine leakage  Do not drink any liquid right before you go to bed  Limit or do not have drinks that contain caffeine or alcohol  · Prevent constipation  Eat a variety of high-fiber foods  Good examples are high-fiber cereals, beans, vegetables, and whole-grain breads  Walking is the best way to trigger your intestines to have a bowel movement  · Exercise regularly and maintain a healthy weight  Weight loss and exercise will decrease pressure on your bladder and help you control your leakage  · Use a catheter as directed  to help empty your bladder  A catheter is a tiny, plastic tube that is put into your bladder to drain your urine  · Go to behavior therapy as directed  Behavior therapy may be used to help you learn to control your urge to urinate  Weight Management   Why it is important to manage your weight:  Being overweight increases your risk of health conditions such as heart disease, high blood pressure, type 2 diabetes, and certain types of cancer  It can also increase your risk for osteoarthritis, sleep apnea, and other respiratory problems  Aim for a slow, steady weight loss  Even a small amount of weight loss can lower your risk of health problems  How to lose weight safely:  A safe and healthy way to lose weight is to eat fewer calories and get regular exercise  You can lose up about 1 pound a week by decreasing the number of calories you eat by 500 calories each day  Healthy meal plan for weight management:  A healthy meal plan includes a variety of foods, contains fewer calories, and helps you stay healthy   A healthy meal plan includes the following:  · Eat whole-grain foods more often  A healthy meal plan should contain fiber  Fiber is the part of grains, fruits, and vegetables that is not broken down by your body  Whole-grain foods are healthy and provide extra fiber in your diet  Some examples of whole-grain foods are whole-wheat breads and pastas, oatmeal, brown rice, and bulgur  · Eat a variety of vegetables every day  Include dark, leafy greens such as spinach, kale, belkis greens, and mustard greens  Eat yellow and orange vegetables such as carrots, sweet potatoes, and winter squash  · Eat a variety of fruits every day  Choose fresh or canned fruit (canned in its own juice or light syrup) instead of juice  Fruit juice has very little or no fiber  · Eat low-fat dairy foods  Drink fat-free (skim) milk or 1% milk  Eat fat-free yogurt and low-fat cottage cheese  Try low-fat cheeses such as mozzarella and other reduced-fat cheeses  · Choose meat and other protein foods that are low in fat  Choose beans or other legumes such as split peas or lentils  Choose fish, skinless poultry (chicken or turkey), or lean cuts of red meat (beef or pork)  Before you cook meat or poultry, cut off any visible fat  · Use less fat and oil  Try baking foods instead of frying them  Add less fat, such as margarine, sour cream, regular salad dressing and mayonnaise to foods  Eat fewer high-fat foods  Some examples of high-fat foods include french fries, doughnuts, ice cream, and cakes  · Eat fewer sweets  Limit foods and drinks that are high in sugar  This includes candy, cookies, regular soda, and sweetened drinks  Exercise:  Exercise at least 30 minutes per day on most days of the week  Some examples of exercise include walking, biking, dancing, and swimming  You can also fit in more physical activity by taking the stairs instead of the elevator or parking farther away from stores  Ask your healthcare provider about the best exercise plan for you        © Copyright [employer] 2018 Information is for [employer] use only and may not be sold, redistributed or otherwise used for commercial purposes   All illustrations and images included in CareNotes® are the copyrighted property of A D A M , Inc  or [address] [place], [place] [address]eula shelton

## 2022-04-01 LAB
ALBUMIN SERPL-MCNC: 4.4 G/DL (ref 3.6–5.1)
ALBUMIN/GLOB SERPL: 1.9 (CALC) (ref 1–2.5)
ALP SERPL-CCNC: 60 U/L (ref 37–153)
ALT SERPL-CCNC: 21 U/L (ref 6–29)
AST SERPL-CCNC: 17 U/L (ref 10–35)
BILIRUB SERPL-MCNC: 0.5 MG/DL (ref 0.2–1.2)
BUN SERPL-MCNC: 16 MG/DL (ref 7–25)
BUN/CREAT SERPL: NORMAL (CALC) (ref 6–22)
CALCIUM SERPL-MCNC: 9.7 MG/DL (ref 8.6–10.4)
CHLORIDE SERPL-SCNC: 105 MMOL/L (ref 98–110)
CHOLEST SERPL-MCNC: 222 MG/DL
CHOLEST/HDLC SERPL: 3.7 (CALC)
CO2 SERPL-SCNC: 27 MMOL/L (ref 20–32)
CREAT SERPL-MCNC: 0.65 MG/DL (ref 0.5–0.99)
ERYTHROCYTE [DISTWIDTH] IN BLOOD BY AUTOMATED COUNT: 13 % (ref 11–15)
GLOBULIN SER CALC-MCNC: 2.3 G/DL (CALC) (ref 1.9–3.7)
GLUCOSE SERPL-MCNC: 90 MG/DL (ref 65–99)
HCT VFR BLD AUTO: 45.4 % (ref 35–45)
HDLC SERPL-MCNC: 60 MG/DL
HGB BLD-MCNC: 14.5 G/DL (ref 11.7–15.5)
LDLC SERPL CALC-MCNC: 134 MG/DL (CALC)
MCH RBC QN AUTO: 29.4 PG (ref 27–33)
MCHC RBC AUTO-ENTMCNC: 31.9 G/DL (ref 32–36)
MCV RBC AUTO: 91.9 FL (ref 80–100)
NONHDLC SERPL-MCNC: 162 MG/DL (CALC)
PLATELET # BLD AUTO: 177 THOUSAND/UL (ref 140–400)
PMV BLD REES-ECKER: 12 FL (ref 7.5–12.5)
POTASSIUM SERPL-SCNC: 4 MMOL/L (ref 3.5–5.3)
PROT SERPL-MCNC: 6.7 G/DL (ref 6.1–8.1)
RBC # BLD AUTO: 4.94 MILLION/UL (ref 3.8–5.1)
SL AMB EGFR AFRICAN AMERICAN: 106 ML/MIN/1.73M2
SL AMB EGFR NON AFRICAN AMERICAN: 91 ML/MIN/1.73M2
SODIUM SERPL-SCNC: 141 MMOL/L (ref 135–146)
TRIGL SERPL-MCNC: 146 MG/DL
WBC # BLD AUTO: 6.4 THOUSAND/UL (ref 3.8–10.8)

## 2022-04-05 ENCOUNTER — TELEPHONE (OUTPATIENT)
Dept: FAMILY MEDICINE CLINIC | Facility: CLINIC | Age: 67
End: 2022-04-05

## 2022-04-05 DIAGNOSIS — N39.41 URGE INCONTINENCE OF URINE: Primary | ICD-10-CM

## 2022-04-05 RX ORDER — OXYBUTYNIN CHLORIDE 5 MG/1
5 TABLET, EXTENDED RELEASE ORAL DAILY
Qty: 30 TABLET | Refills: 1 | Status: SHIPPED | OUTPATIENT
Start: 2022-04-05 | End: 2022-04-28 | Stop reason: SDUPTHER

## 2022-04-05 NOTE — TELEPHONE ENCOUNTER
[address]                 [address] [facility], [address]                                OPERATIVE REPORT    PATIENT NAME: Brad Reeder                   :        1945  MED REC NO:   [id number]                              ROOM:  ACCOUNT NO:   [de-identified]                           ADMIT DATE: 12/10/2021  PROVIDER:     True Jo    DATE OF PROCEDURE:  12/10/2020    PREOPERATIVE DIAGNOSES:  1. History of bladder cancer. 2.  Overactive bladder with urge incontinence. POSTOPERATIVE DIAGNOSES:  1. History of bladder cancer. 2.  Overactive bladder with urge incontinence. PROCEDURE:  Cystoscopy with intravesical injection of Botox 100 units. SURGEON:  True Jo MD    ANESTHESIA:  MAC.    COMPLICATIONS:  None. BLEEDING:  None. SPECIMENS:  None. HISTORY OF PRESENT ILLNESS:  The patient is a 77-year-old female with a  history of bladder cancer. She has had done well. She also has history  of overactive bladder and is managed with intravesical Botox. She is  here for surveillance cystoscopy and Botox injection. PROCEDURE IN DETAIL:  The patient was brought back to the operating room  and laid on the operating table in a supine position. Once MAC  anesthesia was obtained, she was placed in the dorsal lithotomy position  and prepped and draped in the usual sterile fashion. A time-out  performed. She was properly identified. Antibiotics were administered. The cystoscope was inserted through the urethra and the bladder. Bladder was visualized and was unremarkable. No tumors were identified. We then injected 100 units of Botox throughout her bladder. 5 units of  Botox were injected across 20 different injection sites. At that point,  the procedure was completed. She awoke from anesthesia without  complications and was taken back to postoperative anesthesia care in  good condition.   She will be discharged home today and Incontinence med Saige Maier is too expensive  Could you change it to Ditropan, as you had suggested that to her  will follow up as  an outpatient when she is due for her next Botox.         José Miguel Garner    D: 12/10/2021 15:13:38       T: 12/10/2021 15:16:04     [id number]  Job#: [id number]     Doc#: [id number]    CC:

## 2022-04-20 ENCOUNTER — OFFICE VISIT (OUTPATIENT)
Dept: CARDIOLOGY CLINIC | Facility: CLINIC | Age: 67
End: 2022-04-20
Payer: MEDICARE

## 2022-04-20 VITALS
DIASTOLIC BLOOD PRESSURE: 84 MMHG | WEIGHT: 207 LBS | HEIGHT: 65 IN | SYSTOLIC BLOOD PRESSURE: 158 MMHG | OXYGEN SATURATION: 98 % | BODY MASS INDEX: 34.49 KG/M2 | HEART RATE: 57 BPM

## 2022-04-20 DIAGNOSIS — I10 BENIGN ESSENTIAL HYPERTENSION: Primary | ICD-10-CM

## 2022-04-20 DIAGNOSIS — E78.00 PURE HYPERCHOLESTEROLEMIA: ICD-10-CM

## 2022-04-20 PROCEDURE — 99214 OFFICE O/P EST MOD 30 MIN: CPT | Performed by: INTERNAL MEDICINE

## 2022-04-20 RX ORDER — OLMESARTAN MEDOXOMIL AND HYDROCHLOROTHIAZIDE 40/25 40; 25 MG/1; MG/1
1 TABLET ORAL DAILY
Qty: 90 TABLET | Refills: 3 | Status: SHIPPED | OUTPATIENT
Start: 2022-04-20

## 2022-04-20 NOTE — PATIENT INSTRUCTIONS
Recommendations:  1  Discontinue losartan HCTZ  2  Start Olmesartan HCTZ 40-25mg daily  3  Continue remainder of medications  4  Patient at acceptable cardiovascular risk to proceed with knee surgery  Hold aspirin one week prior to surgery  5  Follow up in 3 months

## 2022-04-20 NOTE — PROGRESS NOTES
Cardiology   Tierra Guillen 74 y o  female MRN: [id number]          Impression:  1  Hypertension - not adequate control  Patient had hypertensive urgency in 5/21, which resulted in elevated troponin and chest pain  No myocardial infarction  I  2  Dyslipidemia - off statin due to myalgias  LDL increasing to 134 after being off statin        Recommendations:  1  Discontinue losartan HCTZ  2  Start Olmesartan HCTZ 40-25mg daily  3  Continue remainder of medications  4  Patient at acceptable cardiovascular risk to proceed with knee surgery  Hold aspirin one week prior to surgery  5  Follow up in 3 months  HPI: Tierra Guillen is a 74y o  year old female with hypertension, dyslipidemia, and non-obstructive CAD by cath 5/21 who presents for follow up  Admitted 5/21 with chest pain and elevated troponin - no significant CAD and thought to be due to hypertensive urgency  Echo demonstrated normal LV systolic function  Is currently having significant knee pain, and is having a replacement next month  Review of Systems   Constitutional: Negative  HENT: Negative  Eyes: Negative  Respiratory: Negative for chest tightness and shortness of breath  Cardiovascular: Positive for palpitations  Negative for chest pain and leg swelling  Gastrointestinal: Negative  Endocrine: Negative  Genitourinary: Negative  Musculoskeletal:        Knee pain  Skin: Negative  Allergic/Immunologic: Negative  Neurological: Negative  Hematological: Negative  Psychiatric/Behavioral: Negative  All other systems reviewed and are negative          Past Medical History:   Diagnosis Date    Breast lump     Chest pain 5/28/2019    COVID-19 4/9/2021    Disease of thyroid gland     nodule, benign    Elevated troponin I level 5/21/2021    GERD (gastroesophageal reflux disease)     History of transfusion     at 9years old for bleeding during tonsillectomy, no reaction    Hypertension     Nontoxic single thyroid nodule 2012    Rectocele     Uterine leiomyoma     Varicose veins of both lower extremities      Past Surgical History:   Procedure Laterality Date    BLADDER SURGERY  2012    Bladder "lift"    BREAST BIOPSY Right     benign    BREAST CYST EXCISION Right      =    BREAST LUMPECTOMY      COLONOSCOPY      HYSTERECTOMY      KNEE SURGERY Right     x 2    NOSE SURGERY      RI LARYNGOSCOPY,DIRCT,OP SCOPE,BIOPSY Left 2018    Procedure: MICROLARYNGOSCOPY AND EXCISION OF LEFT VOCAL FOLD POLYP;  Surgeon: Philly Mederos MD;  [facility];  Service: ENT    TONSILLECTOMY      TUBAL LIGATION      VARICOSE VEIN SURGERY Left 2009     Social History     Substance and Sexual Activity   Alcohol Use Not Currently     Social History     Substance and Sexual Activity   Drug Use No     Social History     Tobacco Use   Smoking Status Former Smoker    Quit date: 12    Years since quittin 3   Smokeless Tobacco Never Used     Family History   Problem Relation Age of Onset    Breast cancer Mother 77    Hypertension Mother     Varicose Veins Mother         of lower extremities    Hypertension Father     Coronary artery disease Father     Coronary artery disease Sister     Heart disease Sister     Varicose Veins Sister         of lower extremities    No Known Problems Daughter     Breast cancer Maternal Aunt 72    No Known Problems Maternal Aunt     No Known Problems Sister     No Known Problems Sister        Allergies:   Allergies   Allergen Reactions    Lisinopril Other (See Comments)     Other reaction(s): swollen legs    Hydrocodone-Acetaminophen Dizziness    Irbesartan-Hydrochlorothiazide Other (See Comments)      Hypertension  Pt had hypertension, arm heaviness and headache       Medications:     Current Outpatient Medications:     amLODIPine (NORVASC) 5 mg tablet, TAKE 1 TABLET EVERY DAY, Disp: 90 tablet, Rfl: 1    aspirin 81 mg chewable tablet, Chew 1 tablet (81 mg total) daily, Disp: 30 tablet, Rfl: 0    BIOTIN PO, Take by mouth, Disp: , Rfl:     cholecalciferol (VITAMIN D3) 1,000 units tablet, Take 1,000 Units by mouth daily, Disp: , Rfl:     esomeprazole (NexIUM) 40 MG capsule, TAKE 1 CAPSULE EVERY DAY, Disp: 90 capsule, Rfl: 1    Ting, Zingiber officinalis, (TING ROOT PO), Take by mouth, Disp: , Rfl:     latanoprost (XALATAN) 0 005 % ophthalmic solution, , Disp: , Rfl:     losartan-hydrochlorothiazide (HYZAAR) 100-25 MG per tablet, Take 1 tablet by mouth daily, Disp: 30 tablet, Rfl: 0    multivitamin (THERAGRAN) TABS, Take 1 tablet by mouth daily, Disp: , Rfl:     oxybutynin (DITROPAN-XL) 5 mg 24 hr tablet, Take 1 tablet (5 mg total) by mouth daily, Disp: 30 tablet, Rfl: 1    metoprolol succinate (TOPROL-XL) 50 mg 24 hr tablet, Take 1 tablet (50 mg total) by mouth daily (Patient not taking: Reported on 4/20/2022 ), Disp: 30 tablet, Rfl: 0      Wt Readings from Last 3 Encounters:   04/20/22 93 9 kg (207 lb)   03/31/22 94 8 kg (209 lb)   10/29/21 91 6 kg (202 lb)     Temp Readings from Last 3 Encounters:   09/24/21 97 9 °F (36 6 °C)   08/09/21 97 8 °F (36 6 °C) (Temporal)   05/28/21 98 1 °F (36 7 °C)     BP Readings from Last 3 Encounters:   04/20/22 158/84   03/31/22 156/90   10/13/21 130/76     Pulse Readings from Last 3 Encounters:   04/20/22 57   10/13/21 76   09/24/21 81         Physical Exam  HENT:      Head: Atraumatic  Mouth/Throat:      Mouth: Mucous membranes are moist    Eyes:      Extraocular Movements: Extraocular movements intact  Cardiovascular:      Rate and Rhythm: Normal rate and regular rhythm  Heart sounds: Normal heart sounds  Pulmonary:      Effort: Pulmonary effort is normal       Breath sounds: Normal breath sounds  Abdominal:      General: Abdomen is flat  Musculoskeletal:         General: Normal range of motion  Cervical back: Normal range of motion  Skin:     General: Skin is warm  Neurological:      General: No focal deficit present  Mental Status: She is alert and oriented to person, place, and time  Psychiatric:         Mood and Affect: Mood normal          Behavior: Behavior normal            Laboratory Studies:  CMP:  Lab Results   Component Value Date     2017    K 4 0 2022     2022    CO2 27 2022    BUN 16 2022    CREATININE 0 65 2022    AST 17 2022    ALT 21 2022    BILITOT 0 4 2017    EGFR 87 2021       Lipid Profile:   Lab Results   Component Value Date    CHOL 211 (H) 2017     Lab Results   Component Value Date    HDL 60 2022     Lab Results   Component Value Date    LDLCALC 134 (H) 2022     Lab Results   Component Value Date    TRIG 146 2022       Cardiac testing:   EKG reviewed personally:   Results for orders placed during the hospital encounter of 21    Echo complete with contrast if indicated    Narrative  [place] [address], [address] [facility]  [phone]    Transthoracic Echocardiogram  2D, M-mode, Doppler, and Color Doppler    Study date:  22-May-2021    Patient: Sandra Delaney  MR number: [id number]  Account number: [de-identified]  : 1955  Age: 77 years  Gender: Female  Status: Inpatient  Location: Bedside  Height: 65 in  Weight: 209 lb  BP: 147/ 76 mmHg    Indications: Chest Pain    Diagnoses: R07 9 - Chest pain, unspecified    Sonographer:  Nory Turner LEVY  Primary Physician:  Lennox Mcknight MD  Referring Physician:  WLILIAMS Gonzalez  Group:  [facility]  Interpreting Physician:  Jacquelin Mathews MD    SUMMARY    LEFT VENTRICLE:  Systolic function was normal by visual assessment  Ejection fraction was estimated in the range of 55 % to 60 %  Although no diagnostic regional wall motion abnormality was identified, this possibility cannot be completely excluded on the basis of this study    Wall thickness was mildly increased  There was mild concentric hypertrophy  Doppler parameters were consistent with abnormal left ventricular relaxation (grade 1 diastolic dysfunction)  MITRAL VALVE:  There was mild annular calcification  There was trace regurgitation  HISTORY: PRIOR HISTORY: GERD, CP, COVID 19, Elevated Troponin, Hypertension    PROCEDURE: The procedure was performed at the bedside  This was a routine study  The transthoracic approach was used  The study included complete 2D imaging, M-mode, complete spectral Doppler, and color Doppler  The heart rate was 77 bpm,  at the start of the study  Images were obtained from the parasternal, apical, subcostal, and suprasternal notch acoustic windows  Echocardiographic views were limited due to decreased penetration and lung interference  This was a  technically difficult study  LEFT VENTRICLE: Size was normal  Systolic function was normal by visual assessment  Ejection fraction was estimated in the range of 55 % to 60 %  Although no diagnostic regional wall motion abnormality was identified, this possibility  cannot be completely excluded on the basis of this study  Wall thickness was mildly increased  There was mild concentric hypertrophy  DOPPLER: Doppler parameters were consistent with abnormal left ventricular relaxation (grade 1 diastolic  dysfunction)  RIGHT VENTRICLE: The size was normal  Systolic function was normal  Wall thickness was normal     LEFT ATRIUM: Size was normal     RIGHT ATRIUM: Size was normal     MITRAL VALVE: There was mild annular calcification  There was mild thickening  DOPPLER: There was no evidence for stenosis  There was trace regurgitation  AORTIC VALVE: The valve was trileaflet  Leaflets exhibited normal thickness, normal cuspal separation, and sclerosis  DOPPLER: Transaortic velocity was within the normal range  There was no evidence for stenosis  There was no  regurgitation      TRICUSPID VALVE: The valve structure was normal  There was normal leaflet separation  DOPPLER: The transtricuspid velocity was within the normal range  There was no evidence for stenosis  There was no regurgitation  PULMONIC VALVE: Leaflets exhibited normal thickness, no calcification, and normal cuspal separation  DOPPLER: The transpulmonic velocity was within the normal range  There was no regurgitation  PERICARDIUM: There was no pericardial effusion  The pericardium was normal in appearance  AORTA: The root exhibited normal size  SYSTEMIC VEINS: IVC: The inferior vena cava was normal in size and course  Respirophasic changes were normal     SYSTEM MEASUREMENT TABLES    2D  %FS: 25 05 %  Ao Diam: 2 8 cm  Ao asc: 2 9 cm  EDV(Teich): 61 72 ml  EF(Teich): 50 26 %  ESV(Teich): 30 7 ml  IVSd: 1 21 cm  LA Area: 20 4 cm2  LA Diam: 3 52 cm  LVEDV MOD A4C: 39 76 ml  LVEF MOD A4C: 64 34 %  LVESV MOD A4C: 14 18 ml  LVIDd: 3 79 cm  LVIDs: 2 84 cm  LVLd A4C: 6 65 cm  LVLs A4C: 5 76 cm  LVPWd: 1 23 cm  RA Area: 11 01 cm2  RVIDd: 2 84 cm  SV MOD A4C: 25 58 ml  SV(Teich): 31 02 ml    MM  TAPSE: 2 74 cm    PW  E' Sept: 0 08 m/s  E/E' Sept: 10 92  MV A Jagjit: 0 91 m/s  MV Dec Inyo: 3 87 m/s2  MV DecT: 219 75 ms  MV E Jagjit: 0 85 m/s  MV E/A Ratio: 0 93  MV PHT: 63 73 ms  MVA By PHT: 3 45 cm2    [facility] Accredited Echocardiography [facility]    Prepared and electronically signed by    Bijal Almonte MD  Signed [id number] 09:33:14    No results found for this or any previous visit      Results for orders placed during the hospital encounter of 21    Cardiac catheterization    Narrative  [place] [address], [address] [facility]  [phone]    [facility]    Invasive Cardiovascular Lab Complete Report    Patient: Ruth Quiñones  MR number: [id number]  Account number: [de-identified]  Study date: 2021  Gender: Female  : 1955  Height: 65 in  Weight: 208 1 lb  BSA: 2 01 mï¾²    Allergies: LISINOPRIL, HYDROCODONE-ACETAMINOPHEN, IRBESARTAN-HYDROCHLOROTHIAZIDE    Diagnostic Cardiologist:  Marcia Delgado MD  Primary Physician:  Celso Garcia MD    SUMMARY    CORONARY CIRCULATION:  The coronary circulation is right dominant  Left main: Normal   LAD: The vessel was small to medium sized  Angiography showed no evidence of disease  Circumflex: The vessel was medium sized  1st obtuse marginal: The vessel was medium sized  Angiography showed mild atherosclerosis  Ramus intermedius: The vessel was medium to large sized  Angiography showed no evidence of disease  RCA: The vessel was medium to large sized  There was ostial spasm with catheter engagement relieved with intracoronary nitroglycerin  Ostial RCA: There was a 20 % stenosis  PROCEDURES PERFORMED    --  Left coronary angiography  --  Right coronary angiography  --  Inpatient  --  Mod Sedation Same Physician Initial 15min  --  Coronary Catheterization (w/o LHC)  PROCEDURE: The risks and alternatives of the procedures and conscious sedation were explained to the patient and informed consent was obtained  The patient was brought to the cath lab and placed on the table  The planned puncture sites  were prepped and draped in the usual sterile fashion  --  Right radial artery access  After performing an Rasheed's test to verify adequate ulnar artery supply to the hand, the radial site was prepped  The puncture site was infiltrated with local anesthetic  The vessel was accessed using the  modified Seldinger technique, a wire was advanced into the vessel, and a sheath was advanced over the wire into the vessel  --  Left coronary artery angiography  A catheter was advanced over a guidewire into the aorta and positioned in the left coronary artery ostium under fluoroscopic guidance  Angiography was performed  --  Right coronary artery angiography   A catheter was advanced over a guidewire into the aorta and positioned in the right coronary artery ostium under fluoroscopic guidance  Angiography was performed  --  Inpatient  --  Mod Sedation Same Physician Initial 15min  --  Coronary Catheterization (w/o [facility])  PROCEDURE COMPLETION: The patient tolerated the procedure well and was discharged from the cath lab  TIMING: Test started at 11:15  Test concluded at 11:38  HEMOSTASIS: The sheath was removed  The site was compressed with a Hemoband  device  Hemostasis was obtained  MEDICATIONS GIVEN: Versed (2mg/2ml), 2 mg, IV, at 11:15  Fentanyl (1OOmcg/2 ml), 50 mcg, IV, at 11:15  1% Lidocaine, 1 ml, subcutaneously, at 11:17  Nitroglycerin (200mcg/ml), 200 mcg, at 11:23  Verapamil  (5mg/2ml), 2 5 mg, IV, at 11:23  Heparin 1000 units/ml, 4,000 units, IV, at 11:23  Nitroglycerine (200mcg/ml), 200 mcg, at 11:31  Nitroglycerine (200mcg/ml), 200 mcg, at 11:32  CONTRAST GIVEN: 65 ml Omnipaque (350mg I /ml)  RADIATION  EXPOSURE: Fluoroscopy time: 4 25 min  CORONARY VESSELS:   --  The coronary circulation is right dominant  --  Left main: Normal   --  LAD: The vessel was small to medium sized  Angiography showed no evidence of disease  --  Circumflex: The vessel was medium sized  --  1st obtuse marginal: The vessel was medium sized  Angiography showed mild atherosclerosis  --  Ramus intermedius: The vessel was medium to large sized  Angiography showed no evidence of disease  --  RCA: The vessel was medium to large sized  There was ostial spasm with catheter engagement relieved with intracoronary nitroglycerin  --  Ostial RCA: There was a 20 % stenosis  Prepared and signed by    Rodríguez Bustillo MD  Signed 05/24/2021 11:44:53    Study diagram    Angiographic findings  Native coronary lesions:  ï¾·Ostial RCA: Lesion 1: 20 % stenosis      Hemodynamic tables    Pressures:  Baseline  Pressures:  - HR: 79  Pressures:  - Rhythm:  Pressures:  -- Aortic Pressure (S/D/M): 114/59/83    Outputs:  Baseline  Outputs:  -- CALCULATIONS[age]  Outputs:  -- CALCULATIONS: Body Surface Area: 2 01  Outputs:  -- CALCULATIONS: Height in cm: 165 00  Outputs:  -- CALCULATIONS: Sex: Female  Outputs:  -- CALCULATIONS: Weight in k 60  Outputs:  -- OUTPUTS: O2 consumption: 251 60  Outputs:  -- OUTPUTS: Vo2 Indexed: 125 00    No results found for this or any previous visit

## 2022-04-28 DIAGNOSIS — N39.41 URGE INCONTINENCE OF URINE: ICD-10-CM

## 2022-04-28 RX ORDER — OXYBUTYNIN CHLORIDE 5 MG/1
5 TABLET, EXTENDED RELEASE ORAL DAILY
Qty: 90 TABLET | Refills: 1 | Status: SHIPPED | OUTPATIENT
Start: 2022-04-28

## 2022-06-27 ENCOUNTER — TELEPHONE (OUTPATIENT)
Dept: CARDIOLOGY CLINIC | Facility: CLINIC | Age: 67
End: 2022-06-27

## 2022-06-27 DIAGNOSIS — I10 BENIGN ESSENTIAL HYPERTENSION: Primary | ICD-10-CM

## 2022-06-27 RX ORDER — OLMESARTAN MEDOXOMIL 40 MG/1
40 TABLET ORAL DAILY
Qty: 90 TABLET | Refills: 3 | Status: SHIPPED | OUTPATIENT
Start: 2022-06-27

## 2022-06-27 NOTE — TELEPHONE ENCOUNTER
P/c'd, she has been having several problems at once  Her BP is 86/62  Hr   PCP advised her to call  Also has diarrhea( PCP aware)  Encouraged fluids       Taking: olmesartan - HCTZ 40-25 mg qd( you had switched her from losaratan-hctz)                 Amlodipine 5 mg qd                 toprol-xl 50 mg( which the med list on 4/20/22 said she was not taking)      Please advise

## 2022-06-27 NOTE — TELEPHONE ENCOUNTER
Sounds like she is dehydrated from a diarrheal illness  Stop the olmesartan-HCTZ for now  After feeling better, start olmesartan 40mg daily alone  Thanks

## 2022-07-14 ENCOUNTER — HOSPITAL ENCOUNTER (OUTPATIENT)
Dept: RADIOLOGY | Facility: HOSPITAL | Age: 67
Discharge: HOME | End: 2022-07-14
Payer: MEDICARE

## 2022-07-14 DIAGNOSIS — Z79.83 LONG TERM CURRENT USE OF BISPHOSPHONATES: ICD-10-CM

## 2022-07-14 DIAGNOSIS — Z78.0 ASYMPTOMATIC POSTMENOPAUSAL STATE: ICD-10-CM

## 2022-07-14 PROCEDURE — 77080 DXA BONE DENSITY AXIAL: CPT

## 2022-07-15 PROBLEM — Z79.899 LONG-TERM CURRENT USE OF PROTON PUMP INHIBITOR THERAPY: Status: ACTIVE | Noted: 2022-03-31

## 2022-07-18 DIAGNOSIS — I10 BENIGN ESSENTIAL HYPERTENSION: ICD-10-CM

## 2022-07-18 RX ORDER — METOPROLOL SUCCINATE 50 MG/1
50 TABLET, EXTENDED RELEASE ORAL DAILY
Qty: 90 TABLET | Refills: 3 | Status: SHIPPED | OUTPATIENT
Start: 2022-07-18 | End: 2022-09-13 | Stop reason: SDUPTHER

## 2022-08-18 ENCOUNTER — TELEPHONE (OUTPATIENT)
Dept: DERMATOLOGY | Facility: CLINIC | Age: 67
End: 2022-08-18

## 2022-08-18 NOTE — TELEPHONE ENCOUNTER
Pt called to schedule an appt and was able to schedule in December but pt wanted to know if she can get refills on Sodium Sufacetamide Sulfur (can't find this med on the list)    Please contact pt

## 2022-08-18 NOTE — TELEPHONE ENCOUNTER
Jhonatan Morel,    This patient was seen by Dr Aurea Mckeon so I am cc'ing her  It says in the note patient is on sulfacetamide lotion but you are correct that this med is not listed in even old meds  Can you please call the patient and ask her to be more specific as to the full name (e g  is it sulfacetamide- sulfur lotion) and what percentage? And please ask her did she get this through [facility]? Because this is not covered by her insurance       Thanks,  ROSEANNA [facility]

## 2022-08-18 NOTE — TELEPHONE ENCOUNTER
combination of Metronidazole 1% Ivermectin 1% and Azeleic acid 1% An Email will be sent to you from [facility] to set up for delivery of medication

## 2022-08-24 NOTE — TELEPHONE ENCOUNTER
Kalen Vaz,    Dr Bulmaro Lagos took care of this for Dr Steve Mix when she was out so I am forwarding it to her       Thanks,  Shanique Bell

## 2022-08-24 NOTE — TELEPHONE ENCOUNTER
Pt called in and said she got her medication delivered but pt said it's not correct  She said they sent her lotion and she was using a cleanser  Pt said it was Sodium Sulfacetamide 9%/sulfer 4 5 cleanser  Pt said her face is doing good with the cleanser and she doesn't want the ltion  She also wanted to know if she could send the lotion back and stated it was 50 something $  Pt asked for a returned phone call and said she would be home the rest of the day but has an appt tomorrow morning and if she wouldn't answer they can leave a v/m   Thank you

## 2022-08-31 DIAGNOSIS — L71.9 ROSACEA: Primary | ICD-10-CM

## 2022-08-31 RX ORDER — SODIUM SULFACETAMIDE 100 MG/ML
LIQUID TOPICAL
Qty: 177 ML | Refills: 2 | Status: SHIPPED | OUTPATIENT
Start: 2022-08-31

## 2022-08-31 NOTE — TELEPHONE ENCOUNTER
Patient left a message that she is looking for the compounding medication that was ordered through [facility] in the past  Which was combination of Metronidazole 1% Ivermectin 1% and Azeleic acid 1%  Can you please order this through [facility]  [facility]     We use this service to prescribe medications that are often not covered by insurance  Your physician will send your prescription to the pharmacy  You will receive an email from [facility] where you will follow the instructions within the email to provide your billing and shipping information  Your medicine will be shipped directly to you  If you have any questions, you can call [phone] or email [email or web address]

## 2022-08-31 NOTE — TELEPHONE ENCOUNTER
The same cleanser does not appear to be available, so I ordered another form  It is unclear whether this is covered, however

## 2022-09-13 ENCOUNTER — OFFICE VISIT (OUTPATIENT)
Dept: CARDIOLOGY CLINIC | Facility: CLINIC | Age: 67
End: 2022-09-13
Payer: MEDICARE

## 2022-09-13 VITALS
DIASTOLIC BLOOD PRESSURE: 92 MMHG | HEART RATE: 76 BPM | RESPIRATION RATE: 18 BRPM | OXYGEN SATURATION: 97 % | HEIGHT: 65 IN | BODY MASS INDEX: 34.07 KG/M2 | SYSTOLIC BLOOD PRESSURE: 156 MMHG | WEIGHT: 204.5 LBS

## 2022-09-13 DIAGNOSIS — I10 BENIGN ESSENTIAL HYPERTENSION: Primary | ICD-10-CM

## 2022-09-13 DIAGNOSIS — E78.00 PURE HYPERCHOLESTEROLEMIA: ICD-10-CM

## 2022-09-13 DIAGNOSIS — I16.0 HYPERTENSIVE URGENCY: ICD-10-CM

## 2022-09-13 PROCEDURE — 93000 ELECTROCARDIOGRAM COMPLETE: CPT | Performed by: INTERNAL MEDICINE

## 2022-09-13 PROCEDURE — 99214 OFFICE O/P EST MOD 30 MIN: CPT | Performed by: INTERNAL MEDICINE

## 2022-09-13 RX ORDER — CHOLECALCIFEROL (VITAMIN D3) 25 MCG
500 TABLET,CHEWABLE ORAL DAILY
COMMUNITY

## 2022-09-13 RX ORDER — OLMESARTAN MEDOXOMIL 40 MG/1
40 TABLET ORAL DAILY
Qty: 90 TABLET | Refills: 3 | Status: SHIPPED | OUTPATIENT
Start: 2022-09-13

## 2022-09-13 RX ORDER — HYDROCHLOROTHIAZIDE 12.5 MG/1
12.5 TABLET ORAL DAILY
Qty: 90 TABLET | Refills: 3 | Status: SHIPPED | OUTPATIENT
Start: 2022-09-13

## 2022-09-13 RX ORDER — AMLODIPINE BESYLATE 5 MG/1
5 TABLET ORAL DAILY
Qty: 90 TABLET | Refills: 3 | Status: SHIPPED | OUTPATIENT
Start: 2022-09-13

## 2022-09-13 RX ORDER — ACETAMINOPHEN 325 MG/1
650 TABLET ORAL EVERY 6 HOURS PRN
COMMUNITY

## 2022-09-13 RX ORDER — METOPROLOL SUCCINATE 50 MG/1
50 TABLET, EXTENDED RELEASE ORAL DAILY
Qty: 90 TABLET | Refills: 3 | Status: SHIPPED | OUTPATIENT
Start: 2022-09-13

## 2022-09-13 NOTE — PATIENT INSTRUCTIONS
Recommendations:  1  Start HCTZ 12 5mg daily  2  Continue remainder of medications  3  Check fasting lipid panel, complete metabolic profile, TSH, CBC   4  Follow up in 6 months

## 2022-09-13 NOTE — PROGRESS NOTES
Cardiology   Troy Walker 69 y o  female MRN: [id number]          Impression:  1  Hypertension - not adequate control   Patient had hypertensive urgency in 5/21, which resulted in elevated troponin and chest pain   No myocardial infarction  2  Dyslipidemia - off statin due to myalgias  LDL increasing to 134 after being off statin        Recommendations:  1  Start HCTZ 12 5mg daily  2  Continue remainder of medications  3  Check fasting lipid panel, complete metabolic profile, TSH, CBC   4  Follow up in 6 months             HPI: Troy Walker is a 69y o  year old female with hypertension, dyslipidemia, and non-obstructive CAD by cath 5/21 who presents for follow up   Admitted 5/21 with chest pain and elevated troponin - no significant CAD and thought to be due to hypertensive urgency   Echo demonstrated normal LV systolic function   Major complaint is fatigue  Review of Systems   Constitutional: Positive for fatigue  HENT: Negative  Eyes: Negative  Respiratory: Negative for chest tightness and shortness of breath  Cardiovascular: Negative for chest pain, palpitations and leg swelling  Gastrointestinal: Negative  Endocrine: Negative  Genitourinary: Negative  Musculoskeletal: Negative  Skin: Negative  Allergic/Immunologic: Negative  Neurological: Negative  Hematological: Negative  Psychiatric/Behavioral: Negative  All other systems reviewed and are negative          Past Medical History:   Diagnosis Date    Breast lump     Chest pain 5/28/2019    COVID-19 4/9/2021    Disease of thyroid gland     nodule, benign    Elevated troponin I level 5/21/2021    GERD (gastroesophageal reflux disease)     History of transfusion     at 9years old for bleeding during tonsillectomy, no reaction    Hypertension     Nontoxic single thyroid nodule 7/25/2012    Rectocele     Uterine leiomyoma     Varicose veins of both lower extremities      Past Surgical History: Procedure Laterality Date    BLADDER SURGERY  2012    Bladder "lift"    BREAST BIOPSY Right     benign    BREAST CYST EXCISION Right      =    BREAST LUMPECTOMY      COLONOSCOPY      HYSTERECTOMY      KNEE SURGERY Right     x 2    NOSE SURGERY      ID LARYNGOSCOPY,DIRCT,OP SCOPE,BIOPSY Left 2018    Procedure: MICROLARYNGOSCOPY AND EXCISION OF LEFT VOCAL FOLD POLYP;  Surgeon: Dinora Tamez MD;  Location: [facility];  Service: ENT    TONSILLECTOMY      TUBAL LIGATION      VARICOSE VEIN SURGERY Left 2009     Social History     Substance and Sexual Activity   Alcohol Use Not Currently     Social History     Substance and Sexual Activity   Drug Use No     Social History     Tobacco Use   Smoking Status Former Smoker    Quit date: 12    Years since quittin 7   Smokeless Tobacco Never Used     Family History   Problem Relation Age of Onset    Breast cancer Mother 77    Hypertension Mother     Varicose Veins Mother         of lower extremities    Hypertension Father     Coronary artery disease Father     Coronary artery disease Sister     Heart disease Sister     Varicose Veins Sister         of lower extremities    No Known Problems Daughter     Breast cancer Maternal Aunt 72    No Known Problems Maternal Aunt     No Known Problems Sister     No Known Problems Sister        Allergies:   Allergies   Allergen Reactions    Lisinopril Other (See Comments)     Other reaction(s): swollen legs    Hydrocodone-Acetaminophen Dizziness    Irbesartan-Hydrochlorothiazide Other (See Comments)      Hypertension  Pt had hypertension, arm heaviness and headache    Pollen Extract Hives       Medications:     Current Outpatient Medications:     acetaminophen (TYLENOL) 325 mg tablet, Take 650 mg by mouth every 6 (six) hours as needed for mild pain, Disp: , Rfl:     amLODIPine (NORVASC) 5 mg tablet, TAKE 1 TABLET EVERY DAY, Disp: 90 tablet, Rfl: 1    aspirin 81 mg chewable tablet, Chew 1 tablet (81 mg total) daily, Disp: 30 tablet, Rfl: 0    BIOTIN PO, Take by mouth, Disp: , Rfl:     cholecalciferol (VITAMIN D3) 1,000 units tablet, Take 1,000 Units by mouth daily, Disp: , Rfl:     esomeprazole (NexIUM) 40 MG capsule, TAKE 1 CAPSULE EVERY DAY, Disp: 90 capsule, Rfl: 1    Ting, Zingiber officinalis, (TING ROOT PO), Take by mouth, Disp: , Rfl:     latanoprost (XALATAN) 0 005 % ophthalmic solution, , Disp: , Rfl:     metoprolol succinate (TOPROL-XL) 50 mg 24 hr tablet, Take 1 tablet (50 mg total) by mouth daily, Disp: 90 tablet, Rfl: 3    multivitamin (THERAGRAN) TABS, Take 1 tablet by mouth daily, Disp: , Rfl:     olmesartan (BENICAR) 40 mg tablet, Take 1 tablet (40 mg total) by mouth daily, Disp: 90 tablet, Rfl: 3    oxybutynin (DITROPAN-XL) 5 mg 24 hr tablet, Take 1 tablet (5 mg total) by mouth daily, Disp: 90 tablet, Rfl: 1    vitamin B-12 (CYANOCOBALAMIN) 2500 MCG TABS, Take 500 mcg by mouth daily, Disp: , Rfl:     Sulfacetamide Sodium (Sodium Sulfacetamide Wash) 10 % LIQD, Apply twice daily to affected area on face (Patient not taking: Reported on 9/13/2022), Disp: 177 mL, Rfl: 2      Wt Readings from Last 3 Encounters:   09/13/22 92 8 kg (204 lb 8 oz)   04/20/22 93 9 kg (207 lb)   03/31/22 94 8 kg (209 lb)     Temp Readings from Last 3 Encounters:   09/24/21 97 9 °F (36 6 °C)   08/09/21 97 8 °F (36 6 °C) (Temporal)   05/28/21 98 1 °F (36 7 °C)     BP Readings from Last 3 Encounters:   09/13/22 156/92   04/20/22 158/84   03/31/22 156/90     Pulse Readings from Last 3 Encounters:   09/13/22 76   04/20/22 57   10/13/21 76         Physical Exam  HENT:      Head: Atraumatic  Mouth/Throat:      Mouth: Mucous membranes are moist    Eyes:      Extraocular Movements: Extraocular movements intact  Cardiovascular:      Rate and Rhythm: Normal rate and regular rhythm  Heart sounds: Normal heart sounds     Pulmonary:      Effort: Pulmonary effort is normal       Breath sounds: Normal breath sounds  Abdominal:      General: Abdomen is flat  Musculoskeletal:         General: Normal range of motion  Cervical back: Normal range of motion  Skin:     General: Skin is warm  Neurological:      General: No focal deficit present  Mental Status: She is alert and oriented to person, place, and time  Psychiatric:         Mood and Affect: Mood normal          Behavior: Behavior normal            Laboratory Studies:  CMP:  Lab Results   Component Value Date     2017    K 4 0 2022     2022    CO2 27 2022    BUN 16 2022    CREATININE 0 65 2022    AST 17 2022    ALT 21 2022    BILITOT 0 4 2017    EGFR 87 2021       Lipid Profile:   Lab Results   Component Value Date    CHOL 211 (H) 2017     Lab Results   Component Value Date    HDL 60 2022     Lab Results   Component Value Date    LDLCALC 134 (H) 2022     Lab Results   Component Value Date    TRIG 146 2022       Cardiac testing:   EKG reviewed personally: EL 76 Nml  Results for orders placed during the hospital encounter of 21    Echo complete with contrast if indicated    Narrative  [place] [address], [address] [place] [address]  [phone]    Transthoracic Echocardiogram  2D, M-mode, Doppler, and Color Doppler    Study date:  22-May-2021    Patient: Kevin Teixeira  MR number: [id number]  Account number: [de-identified]  : 1955  Age: 77 years  Gender: Female  Status: Inpatient  Location: Bedside  Height: 65 in  Weight: 209 lb  BP: 147/ 76 mmHg    Indications: Chest Pain    Diagnoses: R07 9 - Chest pain, unspecified    Sonographer:  Marielena Quan RDCS  Primary Physician:  Shameka Cm MD  Referring Physician:  WILLIAMS Domínguez  Group:  [facility]  Interpreting Physician:  Stacia Evans MD    SUMMARY    LEFT VENTRICLE:  Systolic function was normal by visual assessment   Ejection fraction was estimated in the range of 55 % to 60 %  Although no diagnostic regional wall motion abnormality was identified, this possibility cannot be completely excluded on the basis of this study  Wall thickness was mildly increased  There was mild concentric hypertrophy  Doppler parameters were consistent with abnormal left ventricular relaxation (grade 1 diastolic dysfunction)  MITRAL VALVE:  There was mild annular calcification  There was trace regurgitation  HISTORY: PRIOR HISTORY: GERD, CP, COVID 19, Elevated Troponin, Hypertension    PROCEDURE: The procedure was performed at the bedside  This was a routine study  The transthoracic approach was used  The study included complete 2D imaging, M-mode, complete spectral Doppler, and color Doppler  The heart rate was 77 bpm,  at the start of the study  Images were obtained from the parasternal, apical, subcostal, and suprasternal notch acoustic windows  Echocardiographic views were limited due to decreased penetration and lung interference  This was a  technically difficult study  LEFT VENTRICLE: Size was normal  Systolic function was normal by visual assessment  Ejection fraction was estimated in the range of 55 % to 60 %  Although no diagnostic regional wall motion abnormality was identified, this possibility  cannot be completely excluded on the basis of this study  Wall thickness was mildly increased  There was mild concentric hypertrophy  DOPPLER: Doppler parameters were consistent with abnormal left ventricular relaxation (grade 1 diastolic  dysfunction)  RIGHT VENTRICLE: The size was normal  Systolic function was normal  Wall thickness was normal     LEFT ATRIUM: Size was normal     RIGHT ATRIUM: Size was normal     MITRAL VALVE: There was mild annular calcification  There was mild thickening  DOPPLER: There was no evidence for stenosis  There was trace regurgitation  AORTIC VALVE: The valve was trileaflet   Leaflets exhibited normal thickness, normal cuspal separation, and sclerosis  DOPPLER: Transaortic velocity was within the normal range  There was no evidence for stenosis  There was no  regurgitation  TRICUSPID VALVE: The valve structure was normal  There was normal leaflet separation  DOPPLER: The transtricuspid velocity was within the normal range  There was no evidence for stenosis  There was no regurgitation  PULMONIC VALVE: Leaflets exhibited normal thickness, no calcification, and normal cuspal separation  DOPPLER: The transpulmonic velocity was within the normal range  There was no regurgitation  PERICARDIUM: There was no pericardial effusion  The pericardium was normal in appearance  AORTA: The root exhibited normal size  SYSTEMIC VEINS: IVC: The inferior vena cava was normal in size and course  Respirophasic changes were normal     SYSTEM MEASUREMENT TABLES    2D  %FS: 25 05 %  Ao Diam: 2 8 cm  Ao asc: 2 9 cm  EDV(Teich): 61 72 ml  EF(Teich): 50 26 %  ESV(Teich): 30 7 ml  IVSd: 1 21 cm  LA Area: 20 4 cm2  LA Diam: 3 52 cm  LVEDV MOD A4C: 39 76 ml  LVEF MOD A4C: 64 34 %  LVESV MOD A4C: 14 18 ml  LVIDd: 3 79 cm  LVIDs: 2 84 cm  LVLd A4C: 6 65 cm  LVLs A4C: 5 76 cm  LVPWd: 1 23 cm  RA Area: 11 01 cm2  RVIDd: 2 84 cm  SV MOD A4C: 25 58 ml  SV(Teich): 31 02 ml    MM  TAPSE: 2 74 cm    PW  E' Sept: 0 08 m/s  E/E' Sept: 10 92  MV A Jagjit: 0 91 m/s  MV Dec Ada: 3 87 m/s2  MV DecT: 219 75 ms  MV E Jagjit: 0 85 m/s  MV E/A Ratio: 0 93  MV PHT: 63 73 ms  MVA By PHT: 3 45 cm2    [employer] Accredited Echocardiography Laboratory    Prepared and electronically signed by    Vadim Ramírez MD  Signed [id number] 09:33:14    No results found for this or any previous visit      Results for orders placed during the hospital encounter of 05/21/21    Cardiac catheterization    [facility]  [facility] [address] [facility]  [phone]    [facility]    Invasive Cardiovascular Lab Complete Report    Patient: Mary Beard  MR number: [id number]  Account number: [de-identified]  Study date: 2021  Gender: Female  : 1955  Height: 65 in  Weight: 208 1 lb  BSA: 2 01 mï¾²    Allergies: LISINOPRIL, HYDROCODONE-ACETAMINOPHEN, IRBESARTAN-HYDROCHLOROTHIAZIDE    Diagnostic Cardiologist:  Osvaldo Gray MD  Primary Physician:  Frank Walker MD    SUMMARY    CORONARY CIRCULATION:  The coronary circulation is right dominant  Left main: Normal   LAD: The vessel was small to medium sized  Angiography showed no evidence of disease  Circumflex: The vessel was medium sized  1st obtuse marginal: The vessel was medium sized  Angiography showed mild atherosclerosis  Ramus intermedius: The vessel was medium to large sized  Angiography showed no evidence of disease  RCA: The vessel was medium to large sized  There was ostial spasm with catheter engagement relieved with intracoronary nitroglycerin  Ostial RCA: There was a 20 % stenosis  PROCEDURES PERFORMED    --  Left coronary angiography  --  Right coronary angiography  --  Inpatient  --  Mod Sedation Same Physician Initial 15min  --  Coronary Catheterization (w/o [facility])  PROCEDURE: The risks and alternatives of the procedures and conscious sedation were explained to the patient and informed consent was obtained  The patient was brought to the cath lab and placed on the table  The planned puncture sites  were prepped and draped in the usual sterile fashion  --  Right radial artery access  After performing an Rasheed's test to verify adequate ulnar artery supply to the hand, the radial site was prepped  The puncture site was infiltrated with local anesthetic  The vessel was accessed using the  modified Seldinger technique, a wire was advanced into the vessel, and a sheath was advanced over the wire into the vessel  --  Left coronary artery angiography   A catheter was advanced over a guidewire into the aorta and positioned in the left coronary artery ostium under fluoroscopic guidance  Angiography was performed  --  Right coronary artery angiography  A catheter was advanced over a guidewire into the aorta and positioned in the right coronary artery ostium under fluoroscopic guidance  Angiography was performed  --  Inpatient  --  Mod Sedation Same Physician Initial 15min  --  Coronary Catheterization (w/o [facility])  PROCEDURE COMPLETION: The patient tolerated the procedure well and was discharged from the cath lab  TIMING: Test started at 11:15  Test concluded at 11:38  HEMOSTASIS: The sheath was removed  The site was compressed with a Hemoband  device  Hemostasis was obtained  MEDICATIONS GIVEN: Versed (2mg/2ml), 2 mg, IV, at 11:15  Fentanyl (1OOmcg/2 ml), 50 mcg, IV, at 11:15  1% Lidocaine, 1 ml, subcutaneously, at 11:17  Nitroglycerin (200mcg/ml), 200 mcg, at 11:23  Verapamil  (5mg/2ml), 2 5 mg, IV, at 11:23  Heparin 1000 units/ml, 4,000 units, IV, at 11:23  Nitroglycerine (200mcg/ml), 200 mcg, at 11:31  Nitroglycerine (200mcg/ml), 200 mcg, at 11:32  CONTRAST GIVEN: 65 ml Omnipaque (350mg I /ml)  RADIATION  EXPOSURE: Fluoroscopy time: 4 25 min  CORONARY VESSELS:   --  The coronary circulation is right dominant  --  Left main: Normal   --  LAD: The vessel was small to medium sized  Angiography showed no evidence of disease  --  Circumflex: The vessel was medium sized  --  1st obtuse marginal: The vessel was medium sized  Angiography showed mild atherosclerosis  --  Ramus intermedius: The vessel was medium to large sized  Angiography showed no evidence of disease  --  RCA: The vessel was medium to large sized  There was ostial spasm with catheter engagement relieved with intracoronary nitroglycerin  --  Ostial RCA: There was a 20 % stenosis      Prepared and signed by    Heber Burr MD  Signed 05/24/2021 11:44:53    Study diagram    Angiographic findings  Native coronary lesions:  ï¾·Ostial RCA: Lesion 1: 20 % stenosis  Hemodynamic tables    Pressures:  Baseline  Pressures:  - HR: 79  Pressures:  - Rhythm:  Pressures:  -- Aortic Pressure (S/D/M): 114/59/83    Outputs:  Baseline  Outputs:  -- CALCULATIONS[age]  Outputs:  -- CALCULATIONS: Body Surface Area: 2 01  Outputs:  -- CALCULATIONS: Height in cm: 165 00  Outputs:  -- CALCULATIONS: Sex: Female  Outputs:  -- CALCULATIONS: Weight in k 60  Outputs:  -- OUTPUTS: O2 consumption: 251 60  Outputs:  -- OUTPUTS: Vo2 Indexed: 125 00    No results found for this or any previous visit

## 2022-10-12 PROBLEM — Z00.00 MEDICARE ANNUAL WELLNESS VISIT, SUBSEQUENT: Status: RESOLVED | Noted: 2019-03-18 | Resolved: 2022-10-12

## 2022-11-22 NOTE — PRE-PROCEDURE INSTRUCTIONS
Pre-Surgery Instructions:   Medication Instructions   • acetaminophen (TYLENOL) 325 mg tablet Uses PRN- OK to take day of surgery   • amLODIPine (NORVASC) 5 mg tablet Take day of surgery  • aspirin 81 mg chewable tablet Hold day of surgery  • BIOTIN PO Hold day of surgery  • cholecalciferol (VITAMIN D3) 1,000 units tablet Hold day of surgery  • esomeprazole (NexIUM) 40 MG capsule Take day of surgery  • Ting, Zingiber officinalis, (TING ROOT PO) Hold day of surgery  • hydrochlorothiazide (HYDRODIURIL) 12 5 mg tablet Hold day of surgery  • latanoprost (XALATAN) 0 005 % ophthalmic solution Take night before surgery   • metoprolol succinate (TOPROL-XL) 50 mg 24 hr tablet Take night before surgery   • multivitamin (THERAGRAN) TABS Hold day of surgery  • olmesartan (BENICAR) 40 mg tablet Hold day of surgery  • Sulfacetamide Sodium (Sodium Sulfacetamide Wash) 10 % LIQD Hold day of surgery  • vitamin B-12 (CYANOCOBALAMIN) 2500 MCG TABS Hold day of surgery  Pre op instructions reviewed with pt via phone  Instructed to take nexium and amlodipine a m of surgery    Nilda Cummings [phone]  My Surgical Experience    The following information was developed to assist you to prepare for your operation  What do I need to do before coming to the hospital?  • Arrange for a responsible person to drive you to and from the hospital   • Arrange care for your children at home  Children are not allowed in the recovery areas of the hospital  • Plan to wear clothing that is easy to put on and take off  If you are having shoulder surgery, wear a shirt that buttons or zippers in the front  Bathing  o Shower the evening before and the morning of your surgery with an antibacterial soap   Please refer to the Pre Op Showering Instructions for Surgery Patients Sheet   o Remove nail polish and all body piercing jewelry  o Do not shave any body part for at least 24 hours before surgery-this includes face, arms, legs and upper body  Food  o Nothing to eat or drink after midnight the night before your surgery  This includes candy and chewing gum  o Exception: If your surgery is after 12:00pm (noon), you may have clear liquids such as 7-Up®, ginger ale, apple or cranberry juice, Jell-O®, water, or clear broth until 8:00 am  o Do not drink milk or juice with pulp on the morning before surgery  o Do not drink alcohol 24 hours before surgery  Medicine  o Follow instructions you received from your surgeon about which medicines you may take on the day of surgery  o If instructed to take medicine on the morning of surgery, take pills with just a small sip of water  Call your prescribing doctor for specific information on what to do if you take insulin    What should I bring to the hospital?    Bring:  • Crutches or a walker, if you have them, for foot or knee surgery  • A list of the daily medicines, vitamins, minerals, herbals and nutritional supplements you take  Include the dosages of medicines and the time you take them each day  • Glasses, dentures or hearing aids  • Minimal clothing; you will be wearing hospital sleepwear  • Photo ID; required to verify your identity  • If you have a Living Will or Power of , bring a copy of the documents  • If you have an ostomy, bring an extra pouch and any supplies you use    Do not bring  • Medicines or inhalers  • Money, valuables or jewelry    What other information should I know about the day of surgery? • Notify your surgeons if you develop a cold, sore throat, cough, fever, rash or any other illness  • Report to the Ambulatory Surgical/Same Day Surgery Unit  • You will be instructed to stop at Registration only if you have not been pre-registered  • Inform your  fi they do not stay that they will be asked by the staff to leave a phone number where they can be reached  • Be available to be reached before surgery   In the event the operating room schedule changes, you may be asked to come in earlier or later than expected    *It is important to tell your doctor and others involved in your health care if you are taking or have been taking any non-prescription drugs, vitamins, minerals, herbals or other nutritional supplements   Any of these may interact with some food or medicines and cause a reaction

## 2022-11-28 ENCOUNTER — ANESTHESIA EVENT (OUTPATIENT)
Dept: PERIOP | Facility: AMBULARY SURGERY CENTER | Age: 67
End: 2022-11-28

## 2022-11-28 ENCOUNTER — HOSPITAL ENCOUNTER (OUTPATIENT)
Facility: AMBULARY SURGERY CENTER | Age: 67
Setting detail: OUTPATIENT SURGERY
Discharge: HOME | End: 2022-11-28
Attending: OPHTHALMOLOGY | Admitting: OPHTHALMOLOGY

## 2022-11-28 ENCOUNTER — ANESTHESIA (OUTPATIENT)
Dept: PERIOP | Facility: AMBULARY SURGERY CENTER | Age: 67
End: 2022-11-28

## 2022-11-28 VITALS
OXYGEN SATURATION: 98 % | SYSTOLIC BLOOD PRESSURE: 178 MMHG | HEART RATE: 81 BPM | TEMPERATURE: 97.2 F | DIASTOLIC BLOOD PRESSURE: 76 MMHG | RESPIRATION RATE: 18 BRPM

## 2022-11-28 DIAGNOSIS — H25.812 COMBINED FORMS OF AGE-RELATED CATARACT OF LEFT EYE: Primary | ICD-10-CM

## 2022-11-28 DEVICE — ACRYSOF(R) IQ ASPHERIC NATURAL IOL, SINGLE-PIECE ACRYLIC FOLDABLE PCL, UV WITH BLUE LIGHTFILTER, 13.0MM LENGTH, 6.0MM ANTERIORASYMMETRIC BICONVEX OPTIC, PLANAR HAPTICS.
Type: IMPLANTABLE DEVICE | Status: FUNCTIONAL
Brand: ACRYSOF®

## 2022-11-28 RX ORDER — KETOROLAC TROMETHAMINE 5 MG/ML
1 SOLUTION OPHTHALMIC 4 TIMES DAILY
Qty: 5 ML | Refills: 0
Start: 2022-11-28

## 2022-11-28 RX ORDER — GATIFLOXACIN 5 MG/ML
SOLUTION/ DROPS OPHTHALMIC AS NEEDED
Status: DISCONTINUED | OUTPATIENT
Start: 2022-11-28 | End: 2022-11-28 | Stop reason: HOSPADM

## 2022-11-28 RX ORDER — MIDAZOLAM HYDROCHLORIDE 2 MG/2ML
INJECTION, SOLUTION INTRAMUSCULAR; INTRAVENOUS AS NEEDED
Status: DISCONTINUED | OUTPATIENT
Start: 2022-11-28 | End: 2022-11-28

## 2022-11-28 RX ORDER — CIPROFLOXACIN HYDROCHLORIDE 3.5 MG/ML
1 SOLUTION/ DROPS TOPICAL 4 TIMES DAILY
Qty: 5 ML | Refills: 0
Start: 2022-11-28 | End: 2022-12-06

## 2022-11-28 RX ORDER — PHENYLEPHRINE HCL 2.5 %
1 DROPS OPHTHALMIC (EYE)
Status: COMPLETED | OUTPATIENT
Start: 2022-11-28 | End: 2022-11-28

## 2022-11-28 RX ORDER — LIDOCAINE HYDROCHLORIDE 10 MG/ML
0.5 INJECTION, SOLUTION EPIDURAL; INFILTRATION; INTRACAUDAL; PERINEURAL ONCE AS NEEDED
Status: COMPLETED | OUTPATIENT
Start: 2022-11-28 | End: 2022-11-28

## 2022-11-28 RX ORDER — KETOROLAC TROMETHAMINE 5 MG/ML
1 SOLUTION OPHTHALMIC
Status: COMPLETED | OUTPATIENT
Start: 2022-11-28 | End: 2022-11-28

## 2022-11-28 RX ORDER — TETRACAINE HYDROCHLORIDE 5 MG/ML
1 SOLUTION OPHTHALMIC ONCE
Status: COMPLETED | OUTPATIENT
Start: 2022-11-28 | End: 2022-11-28

## 2022-11-28 RX ORDER — LIDOCAINE HYDROCHLORIDE 20 MG/ML
1 JELLY TOPICAL
Status: COMPLETED | OUTPATIENT
Start: 2022-11-28 | End: 2022-11-28

## 2022-11-28 RX ORDER — BALANCED SALT SOLUTION 6.4; .75; .48; .3; 3.9; 1.7 MG/ML; MG/ML; MG/ML; MG/ML; MG/ML; MG/ML
SOLUTION OPHTHALMIC AS NEEDED
Status: DISCONTINUED | OUTPATIENT
Start: 2022-11-28 | End: 2022-11-28 | Stop reason: HOSPADM

## 2022-11-28 RX ORDER — TETRACAINE HYDROCHLORIDE 5 MG/ML
SOLUTION OPHTHALMIC AS NEEDED
Status: DISCONTINUED | OUTPATIENT
Start: 2022-11-28 | End: 2022-11-28 | Stop reason: HOSPADM

## 2022-11-28 RX ORDER — CYCLOPENTOLATE HYDROCHLORIDE 10 MG/ML
1 SOLUTION/ DROPS OPHTHALMIC
Status: COMPLETED | OUTPATIENT
Start: 2022-11-28 | End: 2022-11-28

## 2022-11-28 RX ADMIN — CYCLOPENTOLATE HYDROCHLORIDE 1 DROP: 10 SOLUTION/ DROPS OPHTHALMIC at 11:02

## 2022-11-28 RX ADMIN — CYCLOPENTOLATE HYDROCHLORIDE 1 DROP: 10 SOLUTION/ DROPS OPHTHALMIC at 10:49

## 2022-11-28 RX ADMIN — PHENYLEPHRINE HYDROCHLORIDE 1 DROP: 25 SOLUTION/ DROPS OPHTHALMIC at 10:49

## 2022-11-28 RX ADMIN — PHENYLEPHRINE HYDROCHLORIDE 1 DROP: 25 SOLUTION/ DROPS OPHTHALMIC at 11:02

## 2022-11-28 RX ADMIN — KETOROLAC TROMETHAMINE 1 DROP: 5 SOLUTION OPHTHALMIC at 11:02

## 2022-11-28 RX ADMIN — CYCLOPENTOLATE HYDROCHLORIDE 1 DROP: 10 SOLUTION/ DROPS OPHTHALMIC at 10:17

## 2022-11-28 RX ADMIN — KETOROLAC TROMETHAMINE 1 DROP: 5 SOLUTION OPHTHALMIC at 10:49

## 2022-11-28 RX ADMIN — PHENYLEPHRINE HYDROCHLORIDE 1 DROP: 25 SOLUTION/ DROPS OPHTHALMIC at 10:34

## 2022-11-28 RX ADMIN — LIDOCAINE HYDROCHLORIDE 1 APPLICATION: 20 JELLY TOPICAL at 10:50

## 2022-11-28 RX ADMIN — KETOROLAC TROMETHAMINE 1 DROP: 5 SOLUTION OPHTHALMIC at 10:34

## 2022-11-28 RX ADMIN — CYCLOPENTOLATE HYDROCHLORIDE 1 DROP: 10 SOLUTION/ DROPS OPHTHALMIC at 10:34

## 2022-11-28 RX ADMIN — LIDOCAINE HYDROCHLORIDE 1 APPLICATION: 20 JELLY TOPICAL at 10:49

## 2022-11-28 RX ADMIN — LIDOCAINE HYDROCHLORIDE 1 APPLICATION: 20 JELLY TOPICAL at 10:34

## 2022-11-28 RX ADMIN — TETRACAINE HYDROCHLORIDE 1 DROP: 5 SOLUTION OPHTHALMIC at 10:16

## 2022-11-28 RX ADMIN — MIDAZOLAM 2 MG: 1 INJECTION INTRAMUSCULAR; INTRAVENOUS at 11:13

## 2022-11-28 RX ADMIN — PHENYLEPHRINE HYDROCHLORIDE 1 DROP: 25 SOLUTION/ DROPS OPHTHALMIC at 10:17

## 2022-11-28 RX ADMIN — KETOROLAC TROMETHAMINE 1 DROP: 5 SOLUTION OPHTHALMIC at 10:17

## 2022-11-28 NOTE — DISCHARGE INSTRUCTIONS
Dr Austin Valles Cataract Instructions    Activity:     1  No Driving until instructed   2  Keep shield on until seen tomorrow except when administering drops   3  No heavy lifting   4  No water in eye     Diet:     1  Resume normal diet    Normal Symptoms:     1  Mild Headache   2  Scratchy or picky feeling around eye    Call the office if:     1  You have any questions or concerns   2  If eye pain is not relieved by extra strength tylenol    Office phone number:  [phone]      Next appointment:     1  See Dr Austin Valles at his office tomorrow as scheduled   _________10:15am______________   2  Bring blue eye kit with you and eyedrops to the office    A new set of comprehensive instructions will be given and reviewed with you during your office visit tomorrow

## 2022-11-28 NOTE — OP NOTE
OPERATIVE REPORT    PATIENT NAME: Nga Bone    :  1955  MRN: [id number]  Pt Location: [facility] OR ROOM 01    Surgery Date: 2022    Surgeon(s) and Role:     * Urbano Linares MD - Primary    Age-related nuclear cataract, left eye [H25 12]    Post-Op Diagnosis Codes:     * Age-related nuclear cataract, left eye [H25 12]    Procedure(s):  EXTRACTION EXTRACAPSULAR CATARACT PHACO INTRAOCULAR LENS (IOL)    Anesthesia Type:   IV Sedation with Anesthesia    Operative Indications:  Age-related nuclear cataract, left eye [H25 12]  Decreased vision to 20/40  With problems reading  Pt requested cataract sx the left eye    Procedure and Technique:    Procedure Details     The patient was brought in the OR in stable condition and placed on the operative table  The left eye was prepped and draped in the usual sterile manner  Attention was directed to the left eye where a lid speculum was placed  A 2 4 mm clear corneal incision was made temperally  1/2 cc of 1% MPF Lidocaine was irrigated into the anterior chamber followed by viscoat  The side port incision was placed superiorly  The capsularrhexis was made and the nucleus was hydrodissected with BSS  The nucleus was then removed with the phaco handpiece followed by removal of the cortical material with the I/A handpiece  The capsular bag was then filled with Provisc  The IOL was folded and placed in to the capsular bag and centered well  The remaining Provisc was removed from the eye with the I/A  The wounds were hydrated with BSS and found to be water tight  The lid speculum was removed and 2 drops of Gatifloxicin were placed over the cornea  A protective eye shield was taped over the eye and the patient went to PACU in stable condition  I will see the patient in the office tomorrow and the expected post op period is a few weeks         Complications: None        Disposition: PACU   Condition: Stable    SIGNATURE: Urbano Linares MD  DATE:  2022  TIME: 11:34 AM

## 2022-11-28 NOTE — ANESTHESIA POSTPROCEDURE EVALUATION
Post-Op Assessment Note    CV Status:  Stable    Pain management: adequate     Mental Status:  Alert and awake   Hydration Status:  Euvolemic   PONV Controlled:  Controlled   Airway Patency:  Patent      Post Op Vitals Reviewed: Yes      Staff: Anesthesiologist         No notable events documented      BP     Temp     Pulse     Resp      SpO2

## 2022-11-28 NOTE — ANESTHESIA PREPROCEDURE EVALUATION
Procedure:  EXTRACTION EXTRACAPSULAR CATARACT PHACO INTRAOCULAR LENS (IOL) (Left: Eye)    Relevant Problems   CARDIO   (+) Benign essential hypertension   (+) Pure hypercholesterolemia      GI/HEPATIC   (+) GERD without esophagitis      Endocrine   (+) Nontoxic single thyroid nodule      Other   (+) Obesity (BMI 30-39  9)   Denies recent fever, cough or other symptom of upper respiratory tract infection  Confirmed NPO appropriate    Physical Exam    Airway    Mallampati score: III  TM Distance: >3 FB  Neck ROM: full     Dental   No notable dental hx     Cardiovascular      Pulmonary      Other Findings        5/24/21 LHC:  CORONARY VESSELS:   --  The coronary circulation is right dominant  --  Left main: Normal   --  LAD: The vessel was small to medium sized  Angiography showed no evidence of disease  --  Circumflex: The vessel was medium sized  --  1st obtuse marginal: The vessel was medium sized  Angiography showed mild atherosclerosis  --  Ramus intermedius: The vessel was medium to large sized  Angiography showed no evidence of disease  --  RCA: The vessel was medium to large sized  There was ostial spasm with catheter engagement relieved with intracoronary nitroglycerin  --  Ostial RCA: There was a 20 % stenosis  5/22/21 TTE:  LEFT VENTRICLE:  Systolic function was normal by visual assessment  Ejection fraction was estimated in the range of 55 % to 60 %  Although no diagnostic regional wall motion abnormality was identified, this possibility cannot be completely excluded on the basis of this study  Wall thickness was mildly increased  There was mild concentric hypertrophy  Doppler parameters were consistent with abnormal left ventricular relaxation (grade 1 diastolic dysfunction)      MITRAL VALVE:  There was mild annular calcification  There was trace regurgitation  Anesthesia Plan  ASA Score- 2     Anesthesia Type- IV sedation with anesthesia with ASA Monitors           Additional Monitors:   Airway Plan:     Comment: I discussed the risks and benefits of light IV sedation anesthesia including anticipated awareness and the possibility of the need to convert to general anesthesia  Plan Factors-Exercise tolerance (METS): >4 METS  Exercise comment: Able to climb two flights of stairs without cardiopulmonary limitation  Chart reviewed  EKG reviewed  Existing labs reviewed  Patient summary reviewed  Patient is not a current smoker  Patient did not smoke on day of surgery  Induction- intravenous  Postoperative Plan-     Informed Consent- Anesthetic plan and risks discussed with patient

## 2022-12-06 ENCOUNTER — OFFICE VISIT (OUTPATIENT)
Dept: FAMILY MEDICINE CLINIC | Facility: CLINIC | Age: 67
End: 2022-12-06

## 2022-12-06 VITALS
HEIGHT: 65 IN | HEART RATE: 76 BPM | TEMPERATURE: 98.5 F | OXYGEN SATURATION: 98 % | DIASTOLIC BLOOD PRESSURE: 80 MMHG | BODY MASS INDEX: 34.82 KG/M2 | SYSTOLIC BLOOD PRESSURE: 134 MMHG | WEIGHT: 209 LBS | RESPIRATION RATE: 18 BRPM

## 2022-12-06 DIAGNOSIS — G89.29 CHRONIC LEFT SHOULDER PAIN: Primary | ICD-10-CM

## 2022-12-06 DIAGNOSIS — E78.00 PURE HYPERCHOLESTEROLEMIA: ICD-10-CM

## 2022-12-06 DIAGNOSIS — M25.512 CHRONIC LEFT SHOULDER PAIN: Primary | ICD-10-CM

## 2022-12-06 DIAGNOSIS — I10 BENIGN ESSENTIAL HYPERTENSION: ICD-10-CM

## 2022-12-06 NOTE — ASSESSMENT & PLAN NOTE
Blood pressure better on repeat here in the office  Advised to check periodically at home as well  Continue current medication  She will get labs done as ordered by cardiology

## 2022-12-06 NOTE — ASSESSMENT & PLAN NOTE
She was previously on atorvastatin  This unfortunately caused myalgias which were difficult to tolerate  She declines to start again and will continue to work on diet and exercise to help control her cholesterol

## 2022-12-06 NOTE — PROGRESS NOTES
Assessment/Plan:       Problem List Items Addressed This Visit        Cardiovascular and Mediastinum    Benign essential hypertension     Blood pressure better on repeat here in the office  Advised to check periodically at home as well  Continue current medication  She will get labs done as ordered by cardiology  Other    Pure hypercholesterolemia     She was previously on atorvastatin  This unfortunately caused myalgias which were difficult to tolerate  She declines to start again and will continue to work on diet and exercise to help control her cholesterol  Other Visit Diagnoses     Chronic left shoulder pain    -  Primary suspect rotator cuff tendinitis  Refer for physical therapy  Relevant Orders    Ambulatory Referral to Physical Therapy      Reviewed the most recent notes from prior PCP as well as from cardiology       Declines flu vaccine  Subjective:     Dona Hill is a 69 y o  female here today with chief complaint below:  Chief Complaint   Patient presents with   • Establish Care     Declines flu shot today  • Blood Pressure Check   • Shoulder Pain     Left shoulder, started 2 months ago     - CC above per clinical staff and reviewed  HPI:  This patient is a new patient to our practice today whose prior PCP was Dr Cara Thompson who has recently retired  She is here with a concern of left shoulder pain for the past 2 months as well as concerns about getting her blood pressure checked  Of note her blood pressure was elevated at her last appointment with her PCP and with cardiology  At that time she was started on HCTZ 12 5 mg daily and repeat lab work was ordered in September by her cardiologist   She also takes metoprolol 50 mg daily and Norvasc 5 mg daily  She last had lab work done in March which demonstrated an elevated cholesterol which demonstrated an elevated cholesterol    It had been improved prior to that when she was taking atorvastatin however she was having myalgias with this  She has been off of this since then with improvement of muscle aches  She has her mammogram scheduled  Will soon be due for colonoscopy  She notes she is concerned about shoulder pain- her dog is 90 lbs and she thinks when he was sitting on her lap and she had to push him over, she felt pain in the L shoulder  Not better or worse over time  Not sure if she pulled a muscle  Saw chiro  Pain radiates into the bicep area  Usually sleeps with her L hand tucked under pillow but this hurts  Hurts to reach behind her back for bra or to reach up to dry hair  Had cataract surgery recently (L) doing well  A little incontinence, saw gyn about this  Has had bladder lift in the past   Did pelvic floor PT  Doesn't desire further treatment for this at this point  Checks BP at home infrequently    The following portions of the patient's history were reviewed and updated as appropriate: allergies, current medications, past family history, past medical history, past social history, past surgical history and problem list     ROS:  Review of Systems       Objective:      /80   Pulse 76   Temp 98 5 °F (36 9 °C)   Resp 18   Ht 5' 5" (1 651 m)   Wt 94 8 kg (209 lb)   SpO2 98%   BMI 34 78 kg/m²   BP Readings from Last 3 Encounters:   12/06/22 134/80   11/28/22 (!) 178/76   09/13/22 156/92     Wt Readings from Last 3 Encounters:   12/06/22 94 8 kg (209 lb)   09/13/22 92 8 kg (204 lb 8 oz)   04/20/22 93 9 kg (207 lb)               Physical Exam:   Physical Exam  Vitals and nursing note reviewed  Constitutional:       General: She is not in acute distress  Appearance: Normal appearance  She is well-developed  HENT:      Head: Normocephalic and atraumatic  Eyes:      Conjunctiva/sclera: Conjunctivae normal    Neck:      Vascular: No carotid bruit  Cardiovascular:      Rate and Rhythm: Normal rate and regular rhythm  Heart sounds: Normal heart sounds   No murmur heard   Pulmonary:      Effort: Pulmonary effort is normal  No respiratory distress  Breath sounds: Normal breath sounds  No wheezing  Abdominal:      Palpations: Abdomen is soft  Tenderness: There is no abdominal tenderness  There is no guarding or rebound  Musculoskeletal:      Cervical back: Neck supple  Right lower leg: No edema  Left lower leg: No edema  Comments: Limited internal and external rotation of the L shoulder due to discomfort with pain radiating into the upper arm  Lymphadenopathy:      Cervical: No cervical adenopathy  Skin:     General: Skin is warm and dry  Neurological:      Mental Status: She is alert and oriented to person, place, and time     Psychiatric:         Mood and Affect: Mood normal          Behavior: Behavior normal

## 2022-12-12 ENCOUNTER — EVALUATION (OUTPATIENT)
Dept: PHYSICAL THERAPY | Facility: CLINIC | Age: 67
End: 2022-12-12

## 2022-12-12 DIAGNOSIS — G89.29 CHRONIC LEFT SHOULDER PAIN: ICD-10-CM

## 2022-12-12 DIAGNOSIS — M25.512 CHRONIC LEFT SHOULDER PAIN: ICD-10-CM

## 2022-12-12 NOTE — PROGRESS NOTES
PT Evaluation     Today's date: 2022  Patient name: Amos Green   : 1955  MRN: [id number]  Referring provider: Nika Rothman MD  Dx:   Encounter Diagnosis     ICD-10-CM    1  Chronic left shoulder pain  M25 512 Ambulatory Referral to Physical Therapy    G89 29                      Assessment  Assessment details: Amos Green is a 69 y o  female who presents with signs and symptoms consistent with the referring diagnosis of chronic left shoulder pain  Patient presents with the following impairments: left shoulder pain, limited left shoulder AROM, limited left shoulder strength, and postural dysfunction  Due to these impairments, patient has difficulty performing the following: ADL's, recreational activities, reaching overhead, self-care activities, bed mobility, putting on/taking off shirt/sweater, putting on/taking off jacket, reaching behind the back, household chores, pushing/pulling, and shopping  Patient has been educated in home exercise program and plan of care  Patient would benefit from skilled physical therapy services to address the above functional limitations and progress towards prior level of function and independence with home exercise program   Impairments: abnormal muscle firing, abnormal or restricted ROM, activity intolerance, impaired physical strength, lacks appropriate home exercise program, pain with function, scapular dyskinesis, poor posture  and poor body mechanics  Understanding of Dx/Px/POC: good   Prognosis: good    Goals  Short Term Goals (3 weeks; target date: 1/15/23)  1  Patient will be independent with initial HEP  2  Patient will demonstrate an increase in left shoulder flexion AROM to 120°  3  Patient will demonstrate an increase in left shoulder strength of 1/2 grade on MMT  4  Patient will be able to complete all below shoulder-height ADLs without increase in symptoms  Long Term Goals (6 weeks; target date: 3/12/23)  1   Patient will be independent with comprehensive HEP  2  Patient will demonstrate an increase in left shoulder AROM to WNL in order to promote bathing/dressing without pain  3  Patient will demonstrate an increase in left shoulder strength to at least 4/5 on MMT in order to promote pain-free carrying/lifting  4  Patient will be able to place a 5 lb  weight on a shelf above shoulder height with proper scapulohumeral mechanics  Plan  Plan details: Patient verbalized understanding and agreement with insurance verification form  Patient would benefit from: skilled physical therapy  Planned modality interventions: cryotherapy, electrical stimulation/Russian stimulation, TENS, ultrasound, high voltage pulsed current: pain management, thermotherapy: hydrocollator packs, unattended electrical stimulation and high voltage pulsed current: spasm management  Planned therapy interventions: flexibility, functional ROM exercises, graded exercise, home exercise program, joint mobilization, manual therapy, neuromuscular re-education, patient education, strengthening, stretching, therapeutic exercise, therapeutic activities, activity modification, postural training, body mechanics training, graded activity, self care, muscle pump exercises, abdominal trunk stabilization, ADL retraining, ADL training, IADL retraining, breathing training, behavior modification, therapeutic training, transfer training and Price taping  Frequency: 2x week  Duration in weeks: 6  Plan of Care beginning date: 12/12/2022  Plan of Care expiration date: 3/12/2023  Treatment plan discussed with: patient        Subjective Evaluation    History of Present Illness  Mechanism of injury: Pt reports that about two months ago, she was sitting on the recliner and attempted to move her dog over  Pt states that she had recently had a left knee replacement, about six months ago  Pt states that when she attempted to lift her dog, she felt a "pull" in her left shoulder   Pt states that she went to a chiropractor, who treated the upper and lower back  Pt reports no change in left shoulder  Pt states that she went to a new PCP, as her original PCP has recently retired  Pt states that she was interested in getting an MRI, however states that she is unable to at this time because she has not started PT yet  Pt states that due to left shoulder pain, she has difficulty lifting her left arm overhead  Pt states that she also has difficulty with lifting objects and with lowering objects from overhead  Pt states that the pain sometimes radiates into the left upper arm  Pt states she is left hand dominant     Pain  Current pain ratin  At best pain ratin  At worst pain rating: 10  Location: Left shoulder  Quality: radiating and sharp  Relieving factors: rest  Aggravating factors: overhead activity and lifting  Progression: no change    Social Support  Lives with: spouse    Employment status: not working (Retired)  Hand dominance: left  Exercise history: Sedentary      Diagnostic Tests  No diagnostic tests performed  Treatments  No previous or current treatments  Patient Goals  Patient goals for therapy: decreased pain          Objective     Postural Observations    Additional Postural Observation Details  Slight forward head, rounded shoulder posture    Tenderness     Additional Tenderness Details  Tenderness in the left anterior shoulder, biceps tendon region    Active Range of Motion   Left Shoulder   Flexion: 108 degrees with pain  Abduction: 68 degrees with pain  External rotation 0°: 45 degrees with pain    Right Shoulder   Flexion: 168 degrees   Abduction: 170 degrees     Passive Range of Motion   Left Shoulder   Flexion: 115 degrees with pain  Abduction: 70 degrees with pain  External rotation 0°: 50 degrees with pain    Strength/Myotome Testing     Left Shoulder     Planes of Motion   Flexion: 3   Abduction: 2   External rotation at 0°: 2   Internal rotation at 0°: 3+     Additional Strength Details  Left shoulder MMT assessed through limited available ROM; manual resistance not applied to abduction due to pain and difficulty attaining test position               Insurance:  AMA/CMS Eval/ Re-eval POC expires FOTO Co-Insurance   CMS - [facility]  12/12/22 3/12/23  No                                    1    12/12 2   3   4   5   6     7   8   9   10   11   12     13   14   15   16   17   18     19   20  21  22  23  24     25   26  27  28  29  30    31  32   33  34  35  36         Precautions: Standard       EVAL 2 3 4 5 6   Manuals 12/12/22        STM/MFR          Joint mobs                  Neuro Re-Ed         Cervical retractions         Scapular retractions 5x        Rows         Shoulder extensions                                    Ther Ex         PROM shoulder Assessed        Cane flexion 5x5s        Cane ER 5x5s        Pulleys         Pendulums                                             Ther Activity         Pt education POC, HEP                                                              Modalities

## 2022-12-14 ENCOUNTER — OFFICE VISIT (OUTPATIENT)
Dept: PHYSICAL THERAPY | Facility: CLINIC | Age: 67
End: 2022-12-14

## 2022-12-14 DIAGNOSIS — M25.512 CHRONIC LEFT SHOULDER PAIN: Primary | ICD-10-CM

## 2022-12-14 DIAGNOSIS — G89.29 CHRONIC LEFT SHOULDER PAIN: Primary | ICD-10-CM

## 2022-12-14 NOTE — PROGRESS NOTES
Daily Note      Today's date: 2022  Patient name: Felicity Biswas  : 1955  MRN: [id number]  Referring provider: Jil Servin MD  Dx:   Encounter Diagnosis     ICD-10-CM    1  Chronic left shoulder pain  M25 512     G89 29           Subjective: Pt reports that left shoulder feels "about the same" compared to initial visit  Objective: See treatment diary below    Assessment: Pt tolerated treatment well  Pt noted referred pain in the left biceps, posterior upper arm with cane ER  Reviewed pain threshold of 2/10 with pt and she verbalized good understanding  Pt preesnts with improved L shoulder flexion PROM and notes less pain with passive techniques vs  active  Reviewed initial HEP and educated pt on heat vs  ice protocol  Plan: Continue per plan of care  Progress treatment as tolerated            Insurance:  [place]/CMS Eval/ Re-eval POC expires FOTO Co-Insurance   CMS - [facility]  12/12/22 3/12/23  No                                    1     2     3   4   5   6     7   8   9   10   11   12     13   14   15   16   17   18     19   20  21  22  23  24     25   26  27  28  29  30    31  32   33  34  35  36         Precautions: Standard       EVAL 2 3 4 5 6   Manuals 22       STM/MFR          Joint mobs                  Neuro Re-Ed         Cervical retractions         Scapular retractions 5x 2x10 (5s)        Rows         Shoulder extensions                                    Ther Ex         PROM shoulder Assessed Flex, ER       Cane flexion 5x5s 2x10 (5s)        Cane ER 5x5s 2x10 (5s)        Pulleys  2x10 (5s) flex       Shoulder IR/ER                                             Ther Activity         Pt education POC, HEP Reviewed HEP                                                             Modalities

## 2022-12-16 ENCOUNTER — HOSPITAL ENCOUNTER (OUTPATIENT)
Dept: RADIOLOGY | Facility: HOSPITAL | Age: 67
Discharge: HOME | End: 2022-12-16

## 2022-12-16 VITALS — HEIGHT: 65 IN | BODY MASS INDEX: 34.82 KG/M2 | WEIGHT: 209 LBS

## 2022-12-16 DIAGNOSIS — Z12.31 ENCOUNTER FOR SCREENING MAMMOGRAM FOR MALIGNANT NEOPLASM OF BREAST: ICD-10-CM

## 2022-12-19 ENCOUNTER — OFFICE VISIT (OUTPATIENT)
Dept: PHYSICAL THERAPY | Facility: CLINIC | Age: 67
End: 2022-12-19

## 2022-12-19 DIAGNOSIS — G89.29 CHRONIC LEFT SHOULDER PAIN: Primary | ICD-10-CM

## 2022-12-19 DIAGNOSIS — M25.512 CHRONIC LEFT SHOULDER PAIN: Primary | ICD-10-CM

## 2022-12-19 NOTE — PROGRESS NOTES
Daily Note      Today's date: 2022  Patient name: Jody Olivas  : 1955  MRN: [id number]  Referring provider: Simón Melendrez MD  Dx:   Encounter Diagnosis     ICD-10-CM    1  Chronic left shoulder pain  M25 512     G89 29           Subjective: Pt reports that her left shoulder feels about the same, but states she has been doing more at home  Pt states she has been baking and doing things around the house  Pt states that reaching behind the back also bothers her, such as when tucking in her shirt  Objective: See treatment diary below    Assessment: Pt tolerated treatment well  Pt noted tension in the anterior shoulder while performing pulleys and shoulder flexion AAROM  Pt demonstrates good form with initial HEP  Pt was progressed from scap retractions to rows and demonstrated good form  Pt demonstrates improved understanding of pain threshold and force modification  Educated pt that use of the left arm in ADLs is beneficial, but was also educated on signs and symptoms of "overdoing it," including pain/soreness lasting greater than 48 hours  Pt verbalized understanding of this  Pt introduced to cervical retractions while seated and noted no effect on L shoulder pain  Plan: Continue per plan of care  Progress treatment as tolerated            Insurance:  AMA/CMS Eval/ Re-eval POC expires FOTO Co-Insurance   CMS - [facility]  12/12/22 3/12/23  No                                    1     2     3     4   5   6     7   8   9   10   11   12     13   14   15   16   17   18     19   20  21  22  23  24     25   26  27  28  29  30    31  32   33  34  35  36         Precautions: Standard       EVAL 2 3 4 5 6   Manuals 22      STM/MFR          Joint mobs                  Neuro Re-Ed         Cervical retractions   Seated 2x10 w/ self-OP      Scapular retractions 5x 2x10 (5s)        Rows   2x10 (3s) RTB      Shoulder extensions                                    Ther Ex PROM shoulder Assessed Flex, ER Flex, ER      Cane flexion 5x5s 2x10 (5s)  2x10 (5s)       Cane ER 5x5s 2x10 (5s)  2x10 (5s)       Pulleys  2x10 (5s) flex 2x10 (5s) flex      Shoulder IR/ER   20x RTB      Behind the back stretch         Doorway stretch                           Ther Activity         Pt education POC, HEP Reviewed HEP                                                             Modalities

## 2022-12-28 ENCOUNTER — OFFICE VISIT (OUTPATIENT)
Dept: PHYSICAL THERAPY | Facility: CLINIC | Age: 67
End: 2022-12-28

## 2022-12-28 DIAGNOSIS — M25.512 CHRONIC LEFT SHOULDER PAIN: Primary | ICD-10-CM

## 2022-12-28 DIAGNOSIS — G89.29 CHRONIC LEFT SHOULDER PAIN: Primary | ICD-10-CM

## 2022-12-28 NOTE — PROGRESS NOTES
Daily Note      Today's date: 2022  Patient name: Prakash Ulloa  : 1955  MRN: [id number]  Referring provider: Celestina Ceballos MD  Dx:   Encounter Diagnosis     ICD-10-CM    1  Chronic left shoulder pain  M25 512     G89 29           Subjective: Pt reports that her left shoulder feels about the same  Pt states that she rates her current pain at 5/10  Pt states that she missed her home exercises a few times, but did use the arm to prepare food and to prepare for the holidays  Pt states that her shoulder bothers her particularly at night, but feels this may be from sleeping on it  Objective: See treatment diary below    Assessment: Pt tolerated treatment well  Pt noted "pinching" while ascending into flexion AAROM with pulleys  Pt noted slight, but inconsistent improvement with L shoulder flexion PROM with posterior and inferior glide of L GHJ during movement  Pt noted little to no pain with GHJ mobs posterior and inferiorly without movement  Educated pt regarding the role of the RTC in normal motion of the left shoulder  Pt verbalized understanding  Plan: Continue per plan of care  Progress treatment as tolerated            Insurance:  AMA/CMS Eval/ Re-eval POC expires FOTO Co-Insurance   CMS - [facility]  12/12/22 3/12/23 45/62 No      50/62                              1     2     3     4     F 5   6     7   8   9   10   11   12     13   14   15   16   17   18     19   20  21  22  23  24     25   26  27  28  29  30    31  32   33  34  35  36         Precautions: Standard       EVAL 2 3 4 5 6   Manuals 22     STM/MFR          Joint mobs    Posterior, inferior GH glide gr II-III, MWM flexion              Neuro Re-Ed         Cervical retractions   Seated 2x10 w/ self-OP      Scapular retractions 5x 2x10 (5s)        Rows   2x10 (3s) RTB 2x10 (3s) RTB     Shoulder extensions    2x10 (3s) RTB                                Ther Ex         PROM shoulder Assessed Flex, ER Flex, ER Flex, ER     Cane flexion 5x5s 2x10 (5s)  2x10 (5s)  2x10 (5s)      Cane ER 5x5s 2x10 (5s)  2x10 (5s)  2x10 (5s)      Pulleys  2x10 (5s) flex 2x10 (5s) flex 20x5s flex      Shoulder IR/ER   20x RTB Reviewed     Behind the back stretch    5x10s      Doorway stretch    5x10s low                       Ther Activity         Pt education POC, HEP Reviewed HEP  Reviewed normal scapulohumeral rhythm, goal of exercise and RTC strengthening                                                           Modalities

## 2023-01-03 ENCOUNTER — OFFICE VISIT (OUTPATIENT)
Dept: PHYSICAL THERAPY | Facility: CLINIC | Age: 68
End: 2023-01-03

## 2023-01-03 DIAGNOSIS — M25.512 CHRONIC LEFT SHOULDER PAIN: Primary | ICD-10-CM

## 2023-01-03 DIAGNOSIS — G89.29 CHRONIC LEFT SHOULDER PAIN: Primary | ICD-10-CM

## 2023-01-03 NOTE — PROGRESS NOTES
Daily Note      Today's date: 1/3/2023  Patient name: Marline Scheuermann  : 1955  MRN: [id number]  Referring provider: Reynaldo Mariee MD  Dx:   Encounter Diagnosis     ICD-10-CM    1  Chronic left shoulder pain  M25 512     G89 29           Subjective: Pt reports that her left shoulder still limits her from being able to reach above shoulder height  Pt states "I don't think it's feeling any better "    Objective: See treatment diary below    Assessment: Pt tolerated treatment fair  Pt continues to note sharp pain at end range shoulder flexion  Reviewed with pt the goals of current exercise program, as well as normal GHJ function with rotator cuff  Pt verbalized understanding  Educated pt that appreciable changes in strength and "pinching" will likely take at least 4-6 weeks  Pt was advised to schedule an appointment with orthopedic care due to limited progress at this time and pt verbalized understanding  Pt inquired about an updated HEP and this was provided today  Plan: Continue per plan of care  Provided pt with contact information for [address] [place] [address] Drive          Insurance:  AMA/CMS Eval/ Re-eval POC expires FOTO Co-Insurance   CMS - [facility]  12/12/22 3/12/23 45/62 No      50/62                              1     2     3     4     F 5    1/3 6     7   8   9   10   11   12     13   14   15   16   17   18     19   20  21  22  23  24     25   26  27  28  29  30    31  32   33  34  35  36         Precautions: Standard       EVAL 2 3 4 5 6   Manuals 12/12/22 12/14/22 12/19/22 12/28/22 1/3/23    STM/MFR          Joint mobs    Posterior, inferior GH glide gr II-III, MWM flexion Posterior, inferior GH glide gr III-IV, MWM flexion             Neuro Re-Ed         Cervical retractions   Seated 2x10 w/ self-OP      Scapular retractions 5x 2x10 (5s)        Rows   2x10 (3s) RTB 2x10 (3s) RTB 2x10 (3s) RTB    Shoulder extensions    2x10 (3s) RTB 2x10 (3s) RTB    Scaption Ther Ex         PROM shoulder Assessed Flex, ER Flex, ER Flex, ER     Cane flexion 5x5s 2x10 (5s)  2x10 (5s)  2x10 (5s)  2x10 (5s) hands clasped    Cane ER 5x5s 2x10 (5s)  2x10 (5s)  2x10 (5s)  2x10 (5s)     Pulleys  2x10 (5s) flex 2x10 (5s) flex 20x5s flex  20x5s flex    Shoulder IR/ER   20x RTB Reviewed 20x ea  RTB    Behind the back stretch    5x10s      Doorway stretch    5x10s low 5x10s low                      Ther Activity         Pt education POC, HEP Reviewed HEP  Reviewed normal scapulohumeral rhythm, goal of exercise and RTC strengthening Advised establishing care with orthopedics                                                           Modalities                               Access Code: [email or web address]  URL: [email or web address]  Date: 01/03/2023  Prepared by: Pamela Liu    Exercises  Seated Scapular Retraction - 1 x daily - 7 x weekly - 2 sets - 10 reps - 5 hold  Supine Shoulder Flexion Extension AAROM with Dowel - 1 x daily - 7 x weekly - 2 sets - 10 reps - 5 hold  Supine Shoulder External Rotation with Dowel - 1 x daily - 7 x weekly - 2 sets - 10 reps - 5 hold  Shoulder Internal Rotation with Resistance - 1 x daily - 4 x weekly - 2 sets - 10 reps  Shoulder External Rotation with Anchored Resistance - 1 x daily - 4 x weekly - 2 sets - 10 reps  Standing Shoulder Scaption - 1 x daily - 4 x weekly - 2 sets - 10 reps - 1 hold

## 2023-01-04 ENCOUNTER — APPOINTMENT (OUTPATIENT)
Dept: RADIOLOGY | Facility: CLINIC | Age: 68
End: 2023-01-04

## 2023-01-04 ENCOUNTER — OFFICE VISIT (OUTPATIENT)
Dept: OBGYN CLINIC | Facility: CLINIC | Age: 68
End: 2023-01-04

## 2023-01-04 VITALS
WEIGHT: 212.4 LBS | BODY MASS INDEX: 35.39 KG/M2 | HEIGHT: 65 IN | SYSTOLIC BLOOD PRESSURE: 150 MMHG | DIASTOLIC BLOOD PRESSURE: 82 MMHG | HEART RATE: 84 BPM

## 2023-01-04 DIAGNOSIS — M24.812 INTERNAL DERANGEMENT OF LEFT SHOULDER: ICD-10-CM

## 2023-01-04 DIAGNOSIS — M75.82 ROTATOR CUFF TENDONITIS, LEFT: ICD-10-CM

## 2023-01-04 DIAGNOSIS — M25.512 LEFT SHOULDER PAIN, UNSPECIFIED CHRONICITY: ICD-10-CM

## 2023-01-04 DIAGNOSIS — M25.512 LEFT SHOULDER PAIN, UNSPECIFIED CHRONICITY: Primary | ICD-10-CM

## 2023-01-04 NOTE — PROGRESS NOTES
Assessment/Plan:  1  Left shoulder pain, unspecified chronicity  XR shoulder 2+ vw left      2  Internal derangement of left shoulder  MRI shoulder left wo contrast      3  Rotator cuff tendonitis, left          Given the patient's ongoing pain and weakness  About the shoulder despite conservative treatment thus far, in addition to questionable calcific tendinitis on x-rays, I do feel an MRI is  Warranted at this time to rule out calcific tendinitis progress rotator cuff and/or biceps tendon tear  She will hold off on any formal physical therapy for the time being, as this is making her symptoms worse  If MRI  Confirms calcific tendinitis with no tears, we did discuss possible aspiration and lavage by Interventional Radiology  If tears were found we did discuss possible surgical intervention  She can ice and take over-the-counter medications as needed for discomfort  She will follow up after the  MRI to discuss the results and how to proceed  Subjective:   Natalie Alfredo is a 69 y o  female who presents   Today for evaluation of her left shoulder  She notes this started to bother her around August or September  She denies any specific fall, but thinks she may have aggravated this while holding her dog  She notes her pain has worsened since that time  She did see her primary care doctor who had prescribed physical therapy for the shoulder she has been doing this since 12/12/2022 with no improvement  She actually feels the shoulder is getting worse  Her pain is about the anterior lateral shoulder is worse with activity better with rest   She notes limitations in range of motion as well as weakness of the shoulder  She notes good sensation of the left upper extremity  Review of Systems   Constitutional: Negative  Negative for chills and fever  HENT: Negative  Negative for ear pain and sore throat  Eyes: Negative  Negative for pain and redness  Respiratory: Negative    Negative for shortness of breath and wheezing  Cardiovascular: Negative for chest pain and palpitations  Gastrointestinal: Negative  Negative for abdominal pain and blood in stool  Endocrine: Negative  Negative for polydipsia and polyuria  Genitourinary: Negative  Negative for difficulty urinating and dysuria  Musculoskeletal:        As noted in HPI   Skin: Negative  Negative for pallor and rash  Neurological: Negative  Negative for dizziness and numbness  Hematological: Negative  Negative for adenopathy  Does not bruise/bleed easily  Psychiatric/Behavioral: Negative  Negative for confusion and suicidal ideas  Past Medical History:   Diagnosis Date   • Breast lump    • Chest pain 5/28/2019   • COVID-19 4/9/2021   • Disease of thyroid gland     nodule, benign   • Elevated troponin I level 5/21/2021   • GERD (gastroesophageal reflux disease)    • History of transfusion     at 9years old for bleeding during tonsillectomy, no reaction   • Hypertension    • Nontoxic single thyroid nodule 7/25/2012   • Rectocele    • Uterine leiomyoma    • Varicose veins of both lower extremities        Past Surgical History:   Procedure Laterality Date   • BLADDER SURGERY  08/2012    Bladder "lift"   • BREAST BIOPSY Right     benign   • BREAST CYST EXCISION Right     1999 =   • BREAST LUMPECTOMY  2000   • COLONOSCOPY     • HYSTERECTOMY  1990   • KNEE SURGERY Right     x 2   • NOSE SURGERY     • MA LARYNGOSCOPY W/BIOPSY MICROSCOPE/TELESCOPE Left 4/2/2018    Procedure: MICROLARYNGOSCOPY AND EXCISION OF LEFT VOCAL FOLD POLYP;  Surgeon: Tabby Alegre MD;  Location: [facility];  Service: ENT   • MA XCAPSL CTRC RMVL INSJ IO LENS PROSTH W/O ECP Left 11/28/2022    Procedure: EXTRACTION EXTRACAPSULAR CATARACT PHACO INTRAOCULAR LENS (IOL);   Surgeon: Barrett Medina MD;  Location: [facility] MAIN OR;  Service: Ophthalmology   • TONSILLECTOMY     • TUBAL LIGATION     • VARICOSE VEIN SURGERY Left 12/2009       Family History   Problem Relation Age of Onset   • Breast cancer Mother 77   • Hypertension Mother    • Varicose Veins Mother         of lower extremities   • Hypertension Father    • Coronary artery disease Father    • Coronary artery disease Sister    • Heart disease Sister    • Varicose Veins Sister         of lower extremities   • No Known Problems Daughter    • Breast cancer Maternal Aunt 72   • No Known Problems Maternal Aunt    • No Known Problems Sister    • No Known Problems Sister        Social History     Occupational History   • Not on file   Tobacco Use   • Smoking status: Former     Types: Cigarettes     Quit date:      Years since quittin 0   • Smokeless tobacco: Never   Vaping Use   • Vaping Use: Never used   Substance and Sexual Activity   • Alcohol use: Not Currently   • Drug use: No   • Sexual activity: Not on file         Current Outpatient Medications:   •  acetaminophen (TYLENOL) 325 mg tablet, Take 650 mg by mouth every 6 (six) hours as needed for mild pain, Disp: , Rfl:   •  amLODIPine (NORVASC) 5 mg tablet, Take 1 tablet (5 mg total) by mouth daily, Disp: 90 tablet, Rfl: 3  •  aspirin 81 mg chewable tablet, Chew 1 tablet (81 mg total) daily, Disp: 30 tablet, Rfl: 0  •  BIOTIN PO, Take by mouth, Disp: , Rfl:   •  cholecalciferol (VITAMIN D3) 1,000 units tablet, Take 1,000 Units by mouth daily, Disp: , Rfl:   •  esomeprazole (NexIUM) 40 MG capsule, TAKE 1 CAPSULE EVERY DAY, Disp: 90 capsule, Rfl: 1  •  Ginger, Zingiber officinalis, (GINGER ROOT PO), Take by mouth, Disp: , Rfl:   •  hydrochlorothiazide (HYDRODIURIL) 12 5 mg tablet, Take 1 tablet (12 5 mg total) by mouth daily, Disp: 90 tablet, Rfl: 3  •  latanoprost (XALATAN) 0 005 % ophthalmic solution, Administer to both eyes daily at bedtime, Disp: , Rfl:   •  metoprolol succinate (TOPROL-XL) 50 mg 24 hr tablet, Take 1 tablet (50 mg total) by mouth daily (Patient taking differently: Take 50 mg by mouth daily at bedtime), Disp: 90 tablet, Rfl: 3  • multivitamin (THERAGRAN) TABS, Take 1 tablet by mouth daily, Disp: , Rfl:   •  olmesartan (BENICAR) 40 mg tablet, Take 1 tablet (40 mg total) by mouth daily, Disp: 90 tablet, Rfl: 3  •  Sulfacetamide Sodium (Sodium Sulfacetamide Wash) 10 % LIQD, Apply twice daily to affected area on face, Disp: 177 mL, Rfl: 2  •  vitamin B-12 (CYANOCOBALAMIN) 2500 MCG TABS, Take 500 mcg by mouth daily, Disp: , Rfl:     Allergies   Allergen Reactions   • Lisinopril Other (See Comments)     Other reaction(s): swollen legs   • Hydrocodone-Acetaminophen Dizziness   • Irbesartan-Hydrochlorothiazide Other (See Comments)      Hypertension  Pt had hypertension, arm heaviness and headache   • Pollen Extract Hives       Objective:  Vitals:    01/04/23 1134   BP: 150/82   Pulse: 84       Left Shoulder Exam     Tenderness   The patient is experiencing tenderness in the biceps tendon  Range of Motion   External rotation: 50   Forward flexion: 140   Internal rotation 0 degrees: L4     Muscle Strength   Abduction: 4/5   Internal rotation: 4/5   External rotation: 4/5   Biceps: 4/5     Tests   Rees test: positive  Impingement: positive  Drop arm: negative    Other   Erythema: absent  Sensation: normal  Pulse: present             Physical Exam  Constitutional:       General: She is not in acute distress  Appearance: She is well-developed  HENT:      Head: Normocephalic and atraumatic  Eyes:      General: No scleral icterus  Conjunctiva/sclera: Conjunctivae normal    Neck:      Vascular: No JVD  Cardiovascular:      Rate and Rhythm: Normal rate  Pulmonary:      Effort: Pulmonary effort is normal  No respiratory distress  Skin:     General: Skin is warm  Neurological:      Mental Status: She is alert and oriented to person, place, and time        Coordination: Coordination normal          I have personally reviewed pertinent films in PACS and my interpretation is as follows:    X-rays left shoulder:  Questionable calcific tendinitis  AC joint arthritis  Otherwise no acute osseous abnormality  This document was created using speech voice recognition software  Grammatical errors, random word insertions, pronoun errors, and incomplete sentences are an occasional consequence of this system due to software limitations, ambient noise, and hardware issues  Any formal questions or concerns about content, text, or information contained within the body of this dictation should be directly addressed to the provider for clarification

## 2023-01-05 ENCOUNTER — APPOINTMENT (OUTPATIENT)
Dept: PHYSICAL THERAPY | Facility: CLINIC | Age: 68
End: 2023-01-05

## 2023-01-17 ENCOUNTER — OFFICE VISIT (OUTPATIENT)
Dept: DERMATOLOGY | Age: 68
End: 2023-01-17

## 2023-01-17 VITALS — WEIGHT: 211 LBS | TEMPERATURE: 97.7 F | HEIGHT: 65 IN | BODY MASS INDEX: 35.16 KG/M2

## 2023-01-17 DIAGNOSIS — L71.9 ROSACEA: Primary | ICD-10-CM

## 2023-01-17 NOTE — PATIENT INSTRUCTIONS
ROSACEA     Physical Exam:  Anatomic Location Affected:  cheeks  Assessment and Plan:  Based on a thorough discussion of this condition and the management approach to it (including a comprehensive discussion of the known risks, side effects and potential benefits of treatment), the patient (family) agrees to implement the following specific plan:  Continue with sulfacetamide lotion as directed by physician  ACTINIC KERATOSIS    Physical Exam:  Anatomic Location Affected:  Left hand     Assessment and Plan:  Based on a thorough discussion of this condition and the management approach to it (including a comprehensive discussion of the known risks, side effects and potential benefits of treatment), the patient (family) agrees to implement the following specific plan:    Cryotherapy done today     Actinic keratoses are very common on sites repeatedly exposed to the sun, especially the backs of the hands and the face, most often affecting the ears, nose, cheeks, upper lip, vermilion of the lower lip, temples, forehead and balding scalp  In severely chronically sun-damaged individuals, they may also be found on the upper trunk, upper and lower limbs, and dorsum of feet  We discussed the theoretical premalignant (“pre-cancerous”) nature and etiology of these growths  We discussed the prevailing notion that actinic keratoses are a reflection of abnormal skin cell development due to DNA damage by short wavelength UVB  They are more likely to appear if the immune function is poor, due to aging, recent sun exposure, predisposing disease or certain drugs  We discussed that the main concern is that actinic keratoses may predispose to squamous cell carcinoma  It is rare for a solitary actinic keratosis to evolve to squamous cell carcinoma (SCC), but the risk of SCC occurring at some stage in a patient with more than 10 actinic keratoses is thought to be about 10 to 15%   A tender, thickened, ulcerated or enlarging actinic keratosis is suspicious of SCC  Actinic keratoses may be prevented by strict sun protection  If already present, keratoses may improve with a very high sun protection factor (50+) broad-spectrum sunscreen applied at least daily to affected areas, year-round  We recommend that UPF-rated clothing and hats and sunglasses be worn whenever possible and that a sunscreen-moisturizer combination product such as Neutrogena Daily Defense be applied at least three times a day  We performed a thorough discussion of treatment options and specific risk/benefits/alternatives including but not limited to medical “field” treatment with medications such as the following:    Topical “field area” medications such as 5-fluorouracil or Aldara (specifically, the trouble with long-term compliance, blistering and local skin reaction versus the convenience of at-home therapy and that field therapy “gets what is not yet seen”)  Cryotherapy (specifically, local pain, scarring, dyspigmentation, blistering, possible superinfection, and treats “only what we see” versus directed treatment today)  Photodynamic therapy (specifically, local pain, scarring, dyspigmentation, blistering, possible superinfection, need to schedule for a later date, and time spent in the office versus field therapy that “gets what is not yet seen”)

## 2023-01-17 NOTE — PROGRESS NOTES
[facility] Follow Up Note    Patient Name: Yuriy Turner  Encounter Date: [phone]    Today's Chief Concerns:  • Concern #1:  Rosacea follow up     Current Medications:    Current Outpatient Medications:   •  acetaminophen (TYLENOL) 325 mg tablet, Take 650 mg by mouth every 6 (six) hours as needed for mild pain, Disp: , Rfl:   •  amLODIPine (NORVASC) 5 mg tablet, Take 1 tablet (5 mg total) by mouth daily, Disp: 90 tablet, Rfl: 3  •  aspirin 81 mg chewable tablet, Chew 1 tablet (81 mg total) daily, Disp: 30 tablet, Rfl: 0  •  BIOTIN PO, Take by mouth, Disp: , Rfl:   •  cholecalciferol (VITAMIN D3) 1,000 units tablet, Take 1,000 Units by mouth daily, Disp: , Rfl:   •  esomeprazole (NexIUM) 40 MG capsule, TAKE 1 CAPSULE EVERY DAY, Disp: 90 capsule, Rfl: 1  •  Ginger, Zingiber officinalis, (GINGER ROOT PO), Take by mouth, Disp: , Rfl:   •  hydrochlorothiazide (HYDRODIURIL) 12 5 mg tablet, Take 1 tablet (12 5 mg total) by mouth daily, Disp: 90 tablet, Rfl: 3  •  latanoprost (XALATAN) 0 005 % ophthalmic solution, Administer to both eyes daily at bedtime, Disp: , Rfl:   •  metoprolol succinate (TOPROL-XL) 50 mg 24 hr tablet, Take 1 tablet (50 mg total) by mouth daily (Patient taking differently: Take 50 mg by mouth daily at bedtime), Disp: 90 tablet, Rfl: 3  •  multivitamin (THERAGRAN) TABS, Take 1 tablet by mouth daily, Disp: , Rfl:   •  olmesartan (BENICAR) 40 mg tablet, Take 1 tablet (40 mg total) by mouth daily, Disp: 90 tablet, Rfl: 3  •  Sulfacetamide Sodium (Sodium Sulfacetamide Wash) 10 % LIQD, Apply twice daily to affected area on face, Disp: 177 mL, Rfl: 2  •  vitamin B-12 (CYANOCOBALAMIN) 2500 MCG TABS, Take 500 mcg by mouth daily, Disp: , Rfl:     CONSTITUTIONAL:   There were no vitals filed for this visit  Specific Alerts:    Have you been seen by a [facility]atologist in the last 3 years? YES    Are you pregnant or planning to become pregnant?  N/A    Are you currently or planning to be nursing or breast feeding? N/A    Allergies   Allergen Reactions   • Lisinopril Other (See Comments)     Other reaction(s): swollen legs   • Hydrocodone-Acetaminophen Dizziness   • Irbesartan-Hydrochlorothiazide Other (See Comments)      Hypertension  Pt had hypertension, arm heaviness and headache   • Pollen Extract Hives       May we call your Preferred Phone number to discuss your specific medical information? YES    May we leave a detailed message that includes your specific medical information? YES    Have you traveled outside of the [facility] in the past 3 months? No    Do you currently have a pacemaker or defibrillator? No    Do you have any artificial heart valves, joints, plates, screws, rods, stents, pins, etc? No   - If Yes, were any placed within the last 2 years? Do you require any medications prior to a surgical procedure? No   - If Yes, for which procedure? - If Yes, what medications to you require? Are you taking any medications that cause you to bleed more easily ("blood thinners") YES    Have you ever experienced a rapid heartbeat with epinephrine? No    Have you ever been treated with "gold" (gold sodium thiomalate) therapy? No    [facility] can help with wrinkles, "laugh lines," facial volume loss, "double chin," "love handles," age spots, and more  Are you interested in learning today about some of the skin enhancement procedures that we offer? (If Yes, please provide more detail) No    Review of Systems:  Have you recently had or currently have any of the following?     · Fever or chills: No  · Night Sweats: No  · Headaches: No  · Weight Gain: No  · Weight Loss: No  · Blurry Vision: No  · Nausea: No  · Vomiting: No  · Diarrhea: No  · Blood in Stool: No  · Abdominal Pain: No  · Itchy Skin: No  · Painful Joints: No  · Swollen Joints: No  · Muscle Pain: No  · Irregular Mole: No  · Sun Burn: No  · Dry Skin: No  · Skin Color Changes: No  · Scar or Keloid: No  · Cold Sores/Fever Blisters: No  · Bacterial Infections/MRSA: No  · Anxiety: No  · Depression: No  · Suicidal or Homicidal Thoughts: No      PSYCH: Normal mood and affect  EYES: Normal conjunctiva  ENT: Normal lips and oral mucosa  CARDIOVASCULAR: No edema  RESPIRATORY: Normal respirations  HEME/LYMPH/IMMUNO:  No regional lymphadenopathy except as noted below in ASSESSMENT AND PLAN BY DIAGNOSIS    FULL ORGAN SYSTEM SKIN EXAM (SKIN)   Face Normal except as noted below in Assessment     ROSACEA     Physical Exam:  • Anatomic Location Affected:  cheeks  • Morphological Description:  Mild erythema  • Pertinent Positives:  • Pertinent Negatives:     Additional History of Present Condition:  Using Sulfacetamide lotion 2 times daily      Assessment and Plan:  Based on a thorough discussion of this condition and the management approach to it (including a comprehensive discussion of the known risks, side effects and potential benefits of treatment), the patient (family) agrees to implement the following specific plan:  • Continue with sulfacetamide lotion as directed by physician  ACTINIC KERATOSIS    Physical Exam:  • Anatomic Location Affected:  Left hand   • Morphological Description:  Pink scaly papule     Additional History of Present Condition:      Assessment and Plan:  Based on a thorough discussion of this condition and the management approach to it (including a comprehensive discussion of the known risks, side effects and potential benefits of treatment), the patient (family) agrees to implement the following specific plan:    • Cryotherapy done today     Actinic keratoses are very common on sites repeatedly exposed to the sun, especially the backs of the hands and the face, most often affecting the ears, nose, cheeks, upper lip, vermilion of the lower lip, temples, forehead and balding scalp   In severely chronically sun-damaged individuals, they may also be found on the upper trunk, upper and lower limbs, and dorsum of feet  We discussed the theoretical premalignant (“pre-cancerous”) nature and etiology of these growths  We discussed the prevailing notion that actinic keratoses are a reflection of abnormal skin cell development due to DNA damage by short wavelength UVB  They are more likely to appear if the immune function is poor, due to aging, recent sun exposure, predisposing disease or certain drugs  We discussed that the main concern is that actinic keratoses may predispose to squamous cell carcinoma  It is rare for a solitary actinic keratosis to evolve to squamous cell carcinoma (SCC), but the risk of SCC occurring at some stage in a patient with more than 10 actinic keratoses is thought to be about 10 to 15%  A tender, thickened, ulcerated or enlarging actinic keratosis is suspicious of SCC  Actinic keratoses may be prevented by strict sun protection  If already present, keratoses may improve with a very high sun protection factor (50+) broad-spectrum sunscreen applied at least daily to affected areas, year-round  We recommend that UPF-rated clothing and hats and sunglasses be worn whenever possible and that a sunscreen-moisturizer combination product such as Neutrogena Daily Defense be applied at least three times a day  We performed a thorough discussion of treatment options and specific risk/benefits/alternatives including but not limited to medical “field” treatment with medications such as the following:    • Topical “field area” medications such as 5-fluorouracil or Aldara (specifically, the trouble with long-term compliance, blistering and local skin reaction versus the convenience of at-home therapy and that field therapy “gets what is not yet seen”)  • Cryotherapy (specifically, local pain, scarring, dyspigmentation, blistering, possible superinfection, and treats “only what we see” versus directed treatment today)      • Photodynamic therapy (specifically, local pain, scarring, dyspigmentation, blistering, possible superinfection, need to schedule for a later date, and time spent in the office versus field therapy that “gets what is not yet seen”)          Scribe Attestation    I,:  Giovana Scott am acting as a scribe while in the presence of the attending physician :       I,:  Lilian Cheatham MD personally performed the services described in this documentation    as scribed in my presence :

## 2023-01-19 LAB
ALBUMIN SERPL-MCNC: 4.2 G/DL (ref 3.6–5.1)
ALBUMIN/GLOB SERPL: 1.8 (CALC) (ref 1–2.5)
ALP SERPL-CCNC: 71 U/L (ref 37–153)
ALT SERPL-CCNC: 18 U/L (ref 6–29)
AST SERPL-CCNC: 16 U/L (ref 10–35)
BASOPHILS # BLD AUTO: 20 CELLS/UL (ref 0–200)
BASOPHILS NFR BLD AUTO: 0.3 %
BILIRUB SERPL-MCNC: 0.5 MG/DL (ref 0.2–1.2)
BUN SERPL-MCNC: 22 MG/DL (ref 7–25)
BUN/CREAT SERPL: NORMAL (CALC) (ref 6–22)
CALCIUM SERPL-MCNC: 9.5 MG/DL (ref 8.6–10.4)
CHLORIDE SERPL-SCNC: 106 MMOL/L (ref 98–110)
CHOLEST SERPL-MCNC: 222 MG/DL
CHOLEST/HDLC SERPL: 4 (CALC)
CO2 SERPL-SCNC: 29 MMOL/L (ref 20–32)
CREAT SERPL-MCNC: 0.53 MG/DL (ref 0.5–1.05)
EOSINOPHIL # BLD AUTO: 231 CELLS/UL (ref 15–500)
EOSINOPHIL NFR BLD AUTO: 3.5 %
ERYTHROCYTE [DISTWIDTH] IN BLOOD BY AUTOMATED COUNT: 12.3 % (ref 11–15)
GFR/BSA.PRED SERPLBLD CYS-BASED-ARV: 100 ML/MIN/1.73M2
GLOBULIN SER CALC-MCNC: 2.3 G/DL (CALC) (ref 1.9–3.7)
GLUCOSE SERPL-MCNC: 90 MG/DL (ref 65–99)
HCT VFR BLD AUTO: 42.2 % (ref 35–45)
HDLC SERPL-MCNC: 55 MG/DL
HGB BLD-MCNC: 13.7 G/DL (ref 11.7–15.5)
LDLC SERPL CALC-MCNC: 141 MG/DL (CALC)
LYMPHOCYTES # BLD AUTO: 2198 CELLS/UL (ref 850–3900)
LYMPHOCYTES NFR BLD AUTO: 33.3 %
MCH RBC QN AUTO: 29.8 PG (ref 27–33)
MCHC RBC AUTO-ENTMCNC: 32.5 G/DL (ref 32–36)
MCV RBC AUTO: 91.7 FL (ref 80–100)
MONOCYTES # BLD AUTO: 330 CELLS/UL (ref 200–950)
MONOCYTES NFR BLD AUTO: 5 %
NEUTROPHILS # BLD AUTO: 3821 CELLS/UL (ref 1500–7800)
NEUTROPHILS NFR BLD AUTO: 57.9 %
NONHDLC SERPL-MCNC: 167 MG/DL (CALC)
PLATELET # BLD AUTO: 192 THOUSAND/UL (ref 140–400)
PMV BLD REES-ECKER: 11.6 FL (ref 7.5–12.5)
POTASSIUM SERPL-SCNC: 4.3 MMOL/L (ref 3.5–5.3)
PROT SERPL-MCNC: 6.5 G/DL (ref 6.1–8.1)
RBC # BLD AUTO: 4.6 MILLION/UL (ref 3.8–5.1)
SODIUM SERPL-SCNC: 142 MMOL/L (ref 135–146)
TRIGL SERPL-MCNC: 136 MG/DL
TSH SERPL-ACNC: 4.14 MIU/L (ref 0.4–4.5)
WBC # BLD AUTO: 6.6 THOUSAND/UL (ref 3.8–10.8)

## 2023-02-01 ENCOUNTER — HOSPITAL ENCOUNTER (OUTPATIENT)
Dept: RADIOLOGY | Facility: HOSPITAL | Age: 68
Discharge: HOME | End: 2023-02-01

## 2023-02-01 DIAGNOSIS — M24.812 INTERNAL DERANGEMENT OF LEFT SHOULDER: ICD-10-CM

## 2023-02-06 ENCOUNTER — OFFICE VISIT (OUTPATIENT)
Dept: OBGYN CLINIC | Facility: CLINIC | Age: 68
End: 2023-02-06

## 2023-02-06 ENCOUNTER — TELEPHONE (OUTPATIENT)
Dept: FAMILY MEDICINE CLINIC | Facility: CLINIC | Age: 68
End: 2023-02-06

## 2023-02-06 VITALS
HEART RATE: 80 BPM | BODY MASS INDEX: 35.49 KG/M2 | SYSTOLIC BLOOD PRESSURE: 164 MMHG | DIASTOLIC BLOOD PRESSURE: 78 MMHG | HEIGHT: 65 IN | WEIGHT: 213 LBS

## 2023-02-06 DIAGNOSIS — Z01.812 ENCOUNTER FOR PRE-OPERATIVE LABORATORY TESTING: ICD-10-CM

## 2023-02-06 DIAGNOSIS — M75.122 COMPLETE TEAR OF LEFT ROTATOR CUFF, UNSPECIFIED WHETHER TRAUMATIC: Primary | ICD-10-CM

## 2023-02-06 NOTE — TELEPHONE ENCOUNTER
Spoke to patient and obtained the below Pre-op information:    Name of procedure: L rotator cuff  Diagnosis:   Date of procedure (within 27 days):3/28/23  Surgeon:Dr Brigido Porras  Location of procedure (hospital or out patient facility name):  [facility]  [facility] date/location/ type (Which labs? EKG?  CXR?):labs @ ; EKG here  Records (on epic or requested): EPIC  Type of anaesthesia:  general  Paperwork to complete for Pre-op (patient bringing vs  calling office to obtain prior to appointment): [facility]  Pre-op appointment scheduled (date/time): 3/14/23

## 2023-02-06 NOTE — PROGRESS NOTES
Assessment/Plan:  1  Complete tear of left rotator cuff, unspecified whether traumatic  Comfort Arm Sling    Ambulatory Referral to Physical Therapy    Polar Care Cude w/Pad    Case request operating room: Shoulder Arthroscopy with Rotator Cuff Repair    Ambulatory referral to [facility]    Basic metabolic panel    CBC and differential    APTT    Protime-INR    EKG 12 lead    Case request operating room: Shoulder Arthroscopy with Rotator Cuff Repair      2  Encounter for pre-operative laboratory testing  Ambulatory referral to [facility]    Basic metabolic panel    CBC and differential    APTT    Protime-INR    EKG 12 lead        Scribe Attestation    I,:  Camila Mars am acting as a scribe while in the presence of the attending physician :       I,:  Hellen Zheng MD personally performed the services described in this documentation    as scribed in my presence :         Lorenza Curtis upon exam today and review of her left shoulder MRI does demonstrate signs and symptoms consistent with a full-thickness partial width rotator cuff tear  Her biceps tendon appears intact but with tendinosis  Her range of motion is limited particularly with internal rotation and abduction  She also has pain and difficulty with abduction strength  I discussed with her there is treatment options including nonoperative and operative management  Nonoperative care may include more physical therapy, activity modification, ice, heat, oral analgesics  I did discuss that there is a potential for the tear to worsen over time  There is also possibility her function and symptoms do not improve  Surgical intervention would be in the form of a left shoulder arthroscopy with rotator cuff repair  The recovery involves 6 weeks in a sling and 4 to 6 months of physical therapy   Risks of the surgery are inclusive of but not limited to bleeding, infection, nerve injury, blood clot, worsening of symptoms, not achieving the anticipated results, persistent stiffness, weakness and the need for additional surgery  The patient verbally stated they understood those risks and would like to proceed with the surgery  She would need preoperative lab testing from her PCP  She was provided with a postoperative sling she will bring on the date of surgery  She will meet with the surgical scheduler for a surgical date and I will see her back on that date of surgery  Subjective:   Alexander Carney is a 69 y o  female who presents for follow up evaluation of her left shoulder  She has had pain in the left shoulder since September 2022  She has tried physical therapy and remarks the therapy did not improve her symptoms and she felt PT made her feel worse  Today she states her pain has worsened since September and has difficulty with ADLs such as dressing herself and reaching behind her back  She received an MRI which she is here to review today  Review of Systems   Constitutional: Negative for chills, fever and unexpected weight change  HENT: Negative for nosebleeds and sore throat  Eyes: Negative for pain, redness and visual disturbance  Respiratory: Negative for cough, shortness of breath and wheezing  Cardiovascular: Negative for chest pain, palpitations and leg swelling  Gastrointestinal: Negative for nausea and vomiting  Endocrine: Negative for polydipsia and polyuria  Genitourinary: Negative for dysuria and hematuria  Musculoskeletal: Positive for arthralgias and myalgias  See HPI   Skin: Negative for rash and wound  Neurological: Negative for dizziness, numbness and headaches  Psychiatric/Behavioral: Negative for decreased concentration  The patient is not nervous/anxious            Past Medical History:   Diagnosis Date   • Breast lump    • Chest pain 05/28/2019   • COVID-19 04/09/2021   • Disease of thyroid gland     nodule, benign   • Eczema    • Elevated troponin I level 05/21/2021   • GERD (gastroesophageal reflux disease)    • History of transfusion     at 9years old for bleeding during tonsillectomy, no reaction   • Hypertension    • Nontoxic single thyroid nodule 2012   • Rectocele    • Uterine leiomyoma    • Varicose veins of both lower extremities        Past Surgical History:   Procedure Laterality Date   • ANKLE SURGERY     • BLADDER SURGERY  2012    Bladder "lift"   • BREAST BIOPSY Right     benign   • BREAST CYST EXCISION Right      =   • BREAST LUMPECTOMY     • COLONOSCOPY     • HYSTERECTOMY     • JOINT REPLACEMENT  2017 and 2022    Knees   • KNEE SURGERY Right     x 2   • NOSE SURGERY     • MD LARYNGOSCOPY W/BIOPSY MICROSCOPE/TELESCOPE Left 2018    Procedure: MICROLARYNGOSCOPY AND EXCISION OF LEFT VOCAL FOLD POLYP;  Surgeon: Lopez Mena MD;  [facility];  Service: ENT   • MD XCAPSL CTRC RMVL INSJ IO LENS PROSTH W/O ECP Left 2022    Procedure: EXTRACTION EXTRACAPSULAR CATARACT PHACO INTRAOCULAR LENS (IOL);   Surgeon: Juan Miguel Blanca MD;  Location: [facility] MAIN OR;  Service: Ophthalmology   • TENDON REPAIR     • TONSILLECTOMY     • TUBAL LIGATION     • VARICOSE VEIN SURGERY Left 2009       Family History   Problem Relation Age of Onset   • Breast cancer Mother 77   • Hypertension Mother    • Varicose Veins Mother         of lower extremities   • Hypertension Father    • Coronary artery disease Father    • Coronary artery disease Sister    • Heart disease Sister    • Varicose Veins Sister         of lower extremities   • No Known Problems Daughter    • Breast cancer Maternal Aunt 72   • No Known Problems Maternal Aunt    • No Known Problems Sister    • No Known Problems Sister    • Diabetes Maternal Grandmother         Also nephew William Apodaca       Social History     Occupational History   • Not on file   Tobacco Use   • Smoking status: Former     Types: Cigarettes     Quit date: 1976     Years since quittin 1   • Smokeless tobacco: Never   • Tobacco comments:     None   Vaping Use   • Vaping Use: Never used   Substance and Sexual Activity   • Alcohol use: Never   • Drug use: Never   • Sexual activity: Not Currently     Partners: Male     Birth control/protection: Female Sterilization         Current Outpatient Medications:   •  acetaminophen (TYLENOL) 325 mg tablet, Take 650 mg by mouth every 6 (six) hours as needed for mild pain, Disp: , Rfl:   •  amLODIPine (NORVASC) 5 mg tablet, Take 1 tablet (5 mg total) by mouth daily, Disp: 90 tablet, Rfl: 3  •  aspirin 81 mg chewable tablet, Chew 1 tablet (81 mg total) daily, Disp: 30 tablet, Rfl: 0  •  BIOTIN PO, Take by mouth, Disp: , Rfl:   •  cholecalciferol (VITAMIN D3) 1,000 units tablet, Take 1,000 Units by mouth daily, Disp: , Rfl:   •  esomeprazole (NexIUM) 40 MG capsule, TAKE 1 CAPSULE EVERY DAY, Disp: 90 capsule, Rfl: 1  •  Ginger, Zingiber officinalis, (GINGER ROOT PO), Take by mouth, Disp: , Rfl:   •  hydrochlorothiazide (HYDRODIURIL) 12 5 mg tablet, Take 1 tablet (12 5 mg total) by mouth daily, Disp: 90 tablet, Rfl: 3  •  latanoprost (XALATAN) 0 005 % ophthalmic solution, Administer to both eyes daily at bedtime, Disp: , Rfl:   •  metoprolol succinate (TOPROL-XL) 50 mg 24 hr tablet, Take 1 tablet (50 mg total) by mouth daily (Patient taking differently: Take 50 mg by mouth daily at bedtime), Disp: 90 tablet, Rfl: 3  •  multivitamin (THERAGRAN) TABS, Take 1 tablet by mouth daily, Disp: , Rfl:   •  olmesartan (BENICAR) 40 mg tablet, Take 1 tablet (40 mg total) by mouth daily, Disp: 90 tablet, Rfl: 3  •  Sulfacetamide Sodium (Sodium Sulfacetamide Wash) 10 % LIQD, Apply twice daily to affected area on face, Disp: 177 mL, Rfl: 2  •  vitamin B-12 (CYANOCOBALAMIN) 2500 MCG TABS, Take 500 mcg by mouth daily, Disp: , Rfl:     Allergies   Allergen Reactions   • Lisinopril Other (See Comments)     Other reaction(s): swollen legs   • Hydrocodone-Acetaminophen Dizziness   • Irbesartan-Hydrochlorothiazide Other (See Comments)      Hypertension  Pt had hypertension, arm heaviness and headache   • Pollen Extract Hives     Ragweed etc         Objective:  Vitals:    02/06/23 1122   BP: 164/78   Pulse: 80       Left Shoulder Exam     Range of Motion   Active abduction: 120   Internal rotation 0 degrees: Sacrum     Muscle Strength   Abduction: 3/5   Internal rotation: 5/5   External rotation: 4/5   Supraspinatus: 3/5   Subscapularis: 5/5     Other   Sensation: normal             Physical Exam  Vitals reviewed  HENT:      Head: Normocephalic and atraumatic  Eyes:      General:         Right eye: No discharge  Left eye: No discharge  Conjunctiva/sclera: Conjunctivae normal    Cardiovascular:      Rate and Rhythm: Normal rate  Pulmonary:      Effort: Pulmonary effort is normal  No respiratory distress  Musculoskeletal:      Left shoulder: Decreased range of motion  Decreased strength  Cervical back: Normal range of motion and neck supple  Comments: See Ortho   Skin:     General: Skin is warm  Neurological:      Mental Status: She is alert  I have personally reviewed pertinent films in PACS and my interpretation is as follows:  MRI of the left shoulder demonstrates a full-thickness partial width tear of the supraspinatus  This document was created using speech voice recognition software  Grammatical errors, random word insertions, pronoun errors, and incomplete sentences are an occasional consequence of this system due to software limitations, ambient noise, and hardware issues  Any formal questions or concerns about content, text, or information contained within the body of this dictation should be directly addressed to the provider for clarification

## 2023-02-13 DIAGNOSIS — K21.9 GERD WITHOUT ESOPHAGITIS: ICD-10-CM

## 2023-02-13 RX ORDER — ESOMEPRAZOLE MAGNESIUM 40 MG/1
40 CAPSULE, DELAYED RELEASE ORAL DAILY
Qty: 90 CAPSULE | Refills: 1 | Status: SHIPPED | OUTPATIENT
Start: 2023-02-13

## 2023-02-20 ENCOUNTER — TELEPHONE (OUTPATIENT)
Dept: CARDIOLOGY CLINIC | Facility: CLINIC | Age: 68
End: 2023-02-20

## 2023-02-20 NOTE — TELEPHONE ENCOUNTER
Pt called this morning with concerns she didn't feel well when she woke up she took her BP Sat morning before meds 197/110, 7 pm 183/99  Sun morning half hour after taking meds 154/91, 10 pm 157/80  This morning 143/84 and she is feeling better     HR 78/80  She stated she has a torn rotator cuff and has been dealing with a lot of pain from the shoulder

## 2023-03-06 ENCOUNTER — APPOINTMENT (OUTPATIENT)
Dept: LAB | Facility: CLINIC | Age: 68
End: 2023-03-06

## 2023-03-06 DIAGNOSIS — Z01.812 ENCOUNTER FOR PRE-OPERATIVE LABORATORY TESTING: ICD-10-CM

## 2023-03-06 DIAGNOSIS — M75.122 COMPLETE TEAR OF LEFT ROTATOR CUFF, UNSPECIFIED WHETHER TRAUMATIC: ICD-10-CM

## 2023-03-06 LAB
ANION GAP SERPL CALCULATED.3IONS-SCNC: 3 MMOL/L (ref 4–13)
APTT PPP: 26 SECONDS (ref 23–37)
BASOPHILS # BLD AUTO: 0.02 THOUSANDS/ÂΜL (ref 0–0.1)
BASOPHILS NFR BLD AUTO: 0 % (ref 0–1)
BUN SERPL-MCNC: 17 MG/DL (ref 5–25)
CALCIUM SERPL-MCNC: 9.8 MG/DL (ref 8.3–10.1)
CHLORIDE SERPL-SCNC: 108 MMOL/L (ref 96–108)
CO2 SERPL-SCNC: 29 MMOL/L (ref 21–32)
CREAT SERPL-MCNC: 0.65 MG/DL (ref 0.6–1.3)
EOSINOPHIL # BLD AUTO: 0.2 THOUSAND/ÂΜL (ref 0–0.61)
EOSINOPHIL NFR BLD AUTO: 3 % (ref 0–6)
ERYTHROCYTE [DISTWIDTH] IN BLOOD BY AUTOMATED COUNT: 14 % (ref 11.6–15.1)
GFR SERPL CREATININE-BSD FRML MDRD: 90 ML/MIN/1.73SQ M
GLUCOSE P FAST SERPL-MCNC: 101 MG/DL (ref 65–99)
HCT VFR BLD AUTO: 43.6 % (ref 34.8–46.1)
HGB BLD-MCNC: 14 G/DL (ref 11.5–15.4)
IMM GRANULOCYTES # BLD AUTO: 0.02 THOUSAND/UL (ref 0–0.2)
IMM GRANULOCYTES NFR BLD AUTO: 0 % (ref 0–2)
INR PPP: 0.97 (ref 0.84–1.19)
LYMPHOCYTES # BLD AUTO: 2.18 THOUSANDS/ÂΜL (ref 0.6–4.47)
LYMPHOCYTES NFR BLD AUTO: 34 % (ref 14–44)
MCH RBC QN AUTO: 30.2 PG (ref 26.8–34.3)
MCHC RBC AUTO-ENTMCNC: 32.1 G/DL (ref 31.4–37.4)
MCV RBC AUTO: 94 FL (ref 82–98)
MONOCYTES # BLD AUTO: 0.34 THOUSAND/ÂΜL (ref 0.17–1.22)
MONOCYTES NFR BLD AUTO: 5 % (ref 4–12)
NEUTROPHILS # BLD AUTO: 3.72 THOUSANDS/ÂΜL (ref 1.85–7.62)
NEUTS SEG NFR BLD AUTO: 58 % (ref 43–75)
NRBC BLD AUTO-RTO: 0 /100 WBCS
PLATELET # BLD AUTO: 178 THOUSANDS/UL (ref 149–390)
PMV BLD AUTO: 11.8 FL (ref 8.9–12.7)
POTASSIUM SERPL-SCNC: 4.8 MMOL/L (ref 3.5–5.3)
PROTHROMBIN TIME: 13.1 SECONDS (ref 11.6–14.5)
RBC # BLD AUTO: 4.64 MILLION/UL (ref 3.81–5.12)
SODIUM SERPL-SCNC: 140 MMOL/L (ref 135–147)
WBC # BLD AUTO: 6.48 THOUSAND/UL (ref 4.31–10.16)

## 2023-03-13 NOTE — PROGRESS NOTES
Presurgical Evaluation    Subjective:      Patient ID: Chilango Hernandez is a 69 y o  female  Chief Complaint   Patient presents with   • Pre-op Exam     pre-op 3/28 L rotator cuff repair Dr Carmina Allison [facility]  Pt states BP has been up lately  Has notified her cardiologist and they are monitoring her BP     • HM     Declines flu shot and covid boosters at this time  HPI    The following portions of the patient's history were reviewed and updated as appropriate: allergies, current medications, past family history, past medical history, past social history, past surgical history and problem list     Procedure date: 03/28/23 0730     Surgeon:  Camille Adames MD  Planned procedure:   Shoulder Arthroscopy with Rotator Cuff Repair (Left: Shoulder)  Diagnosis for procedure:  Nontraumatic incomplete tear of left rotator cuff [M75 112]  Anesthesia:  General w/ Regional  Location:  [address] [place] [address] / [place] [address]     Prior anesthesia: Yes   General; Complications:  None / Tolerated well    CAD History: None   NOTE: Patient should see Cardiology if time available before surgery, and if appropriate  Pulmonary History: None    Renal history: None    Diabetes History:  None     Neurological History: None     On Immunosuppressant meds/biologics: No      Review of Systems   Constitutional: Negative for appetite change, chills, diaphoresis, fatigue and fever  Respiratory: Negative for chest tightness and shortness of breath  Cardiovascular: Negative for chest pain  Gastrointestinal: Negative for abdominal pain, blood in stool, diarrhea, nausea and vomiting  Genitourinary: Negative for dysuria  Musculoskeletal: Positive for arthralgias           Current Outpatient Medications   Medication Sig Dispense Refill   • acetaminophen (TYLENOL) 325 mg tablet Take 650 mg by mouth every 6 (six) hours as needed for mild pain     • amLODIPine (NORVASC) 5 mg tablet Take 1 tablet (5 mg total) by mouth daily 90 tablet 3   • aspirin 81 mg chewable tablet Chew 1 tablet (81 mg total) daily 30 tablet 0   • BIOTIN PO Take by mouth     • cholecalciferol (VITAMIN D3) 1,000 units tablet Take 1,000 Units by mouth daily     • esomeprazole (NexIUM) 40 MG capsule Take 1 capsule (40 mg total) by mouth daily 90 capsule 1   • Ting, Zingiber officinalis, (TING ROOT PO) Take by mouth     • hydrochlorothiazide (HYDRODIURIL) 12 5 mg tablet Take 1 tablet (12 5 mg total) by mouth daily 90 tablet 3   • latanoprost (XALATAN) 0 005 % ophthalmic solution Administer to both eyes daily at bedtime     • metoprolol succinate (TOPROL-XL) 50 mg 24 hr tablet Take 1 tablet (50 mg total) by mouth daily (Patient taking differently: Take 50 mg by mouth daily at bedtime) 90 tablet 3   • multivitamin (THERAGRAN) TABS Take 1 tablet by mouth daily     • olmesartan (BENICAR) 40 mg tablet Take 1 tablet (40 mg total) by mouth daily 90 tablet 3   • Sulfacetamide Sodium (Sodium Sulfacetamide Wash) 10 % LIQD Apply twice daily to affected area on face 177 mL 2   • vitamin B-12 (CYANOCOBALAMIN) 2500 MCG TABS Take 500 mcg by mouth daily       No current facility-administered medications for this visit  Allergies on file:   Lisinopril, Hydrocodone-acetaminophen, Irbesartan-hydrochlorothiazide, and Pollen extract    Patient Active Problem List   Diagnosis   • Allergic rhinitis   • Benign essential hypertension   • Dermatitis   • GERD without esophagitis   • Hyperlipoproteinemia   • Nontoxic single thyroid nodule   • Osteoarthritis of knee   • Rectocele   • Uterovaginal prolapse   • Cystocele, lateral   • Varicose veins of bilateral lower extremities with pain   • Family history of sudden cardiac death   • Pure hypercholesterolemia   • Obesity (BMI 30-39  9)   • Chronic venous insufficiency   • Actinic keratosis   • Rosacea   • Hypertensive urgency   • Acute left-sided low back pain without sciatica   • Arthralgia   • Urge incontinence of urine   • Long-term current use of proton pump inhibitor therapy        Past Medical History:   Diagnosis Date   • Breast lump    • Chest pain 05/28/2019   • COVID-19 04/09/2021   • Disease of thyroid gland     nodule, benign   • Eczema    • Elevated troponin I level 05/21/2021   • GERD (gastroesophageal reflux disease)    • History of transfusion     at 9years old for bleeding during tonsillectomy, no reaction   • Hypertension    • Nontoxic single thyroid nodule 07/25/2012   • Rectocele    • Uterine leiomyoma    • Varicose veins of both lower extremities        Past Surgical History:   Procedure Laterality Date   • ANKLE SURGERY     • BLADDER SURGERY  08/2012    Bladder "lift"   • BREAST BIOPSY Right     benign   • BREAST CYST EXCISION Right     1999 =   • BREAST LUMPECTOMY  2000   • COLONOSCOPY     • HYSTERECTOMY  1990   • JOINT REPLACEMENT  6/2017 and 5/2022    Knees   • KNEE SURGERY Right     x 2   • NOSE SURGERY     • WI LARYNGOSCOPY W/BIOPSY MICROSCOPE/TELESCOPE Left 04/02/2018    Procedure: MICROLARYNGOSCOPY AND EXCISION OF LEFT VOCAL FOLD POLYP;  Surgeon: Lopez Mena MD;  [facility];  Service: ENT   • WI XCAPSL CTRC RMVL INSJ IO LENS PROSTH W/O ECP Left 11/28/2022    Procedure: EXTRACTION EXTRACAPSULAR CATARACT PHACO INTRAOCULAR LENS (IOL);   Surgeon: Juan Miguel Blanca MD;  Location: [facility] MAIN OR;  Service: Ophthalmology   • TENDON REPAIR     • TONSILLECTOMY     • TUBAL LIGATION     • VARICOSE VEIN SURGERY Left 12/2009       Family History   Problem Relation Age of Onset   • Breast cancer Mother    • Hypertension Mother    • Varicose Veins Mother         of lower extremities   • Glaucoma Mother         I also have glaucoma   • Hypertension Father    • Coronary artery disease Father    • Coronary artery disease Sister    • Heart disease Sister    • Varicose Veins Sister         of lower extremities   • No Known Problems Daughter    • Breast cancer Maternal Aunt    • No Known Problems Maternal Aunt    • No Known Problems Sister    • No Known Problems Sister    • Diabetes Maternal Grandmother         Also nephew William Dumontacher   • Thyroid disease Sister        Social History     Tobacco Use   • Smoking status: Former     Types: Cigarettes     Quit date: 1976     Years since quittin 2   • Smokeless tobacco: Never   • Tobacco comments:     None   Vaping Use   • Vaping Use: Never used   Substance Use Topics   • Alcohol use: Never   • Drug use: Never       Objective:    Vitals:    23 1024   BP: 136/80   Pulse: 82   Resp: 16   Temp: (!) 96 7 °F (35 9 °C)   SpO2: 99%   Weight: 96 3 kg (212 lb 3 2 oz)   Height: 5' 5" (1 651 m)        Physical Exam  Vitals and nursing note reviewed  Constitutional:       Appearance: Normal appearance  She is well-developed  She is obese  HENT:      Head: Normocephalic and atraumatic  Right Ear: Tympanic membrane, ear canal and external ear normal       Left Ear: Tympanic membrane, ear canal and external ear normal       Nose: Nose normal       Right Sinus: No maxillary sinus tenderness or frontal sinus tenderness  Left Sinus: No maxillary sinus tenderness or frontal sinus tenderness  Mouth/Throat:      Mouth: Mucous membranes are moist       Pharynx: Oropharynx is clear  Uvula midline  Tonsils: No tonsillar exudate  Eyes:      Extraocular Movements: Extraocular movements intact  Conjunctiva/sclera: Conjunctivae normal       Pupils: Pupils are equal, round, and reactive to light  Cardiovascular:      Rate and Rhythm: Normal rate and regular rhythm  Pulses: Normal pulses  Heart sounds: Normal heart sounds  Pulmonary:      Effort: Pulmonary effort is normal       Breath sounds: Normal breath sounds  Abdominal:      General: Bowel sounds are normal  There is no distension  Palpations: Abdomen is soft  There is no mass  Tenderness: There is no abdominal tenderness  There is no guarding or rebound     Musculoskeletal: General: No swelling or tenderness  Cervical back: Neck supple  Right lower leg: No edema  Left lower leg: No edema  Lymphadenopathy:      Cervical: No cervical adenopathy  Skin:     Findings: No rash  Neurological:      General: No focal deficit present  Mental Status: She is alert and oriented to person, place, and time  Psychiatric:         Mood and Affect: Mood normal          Behavior: Behavior normal          Thought Content: Thought content normal          Judgment: Judgment normal            Preop labs/testing available and reviewed: yes 3/6/23    eGFR   Date Value Ref Range Status   2023 90 ml/min/1 73sq m Final     WBC   Date Value Ref Range Status   2023 6 48 4 31 - 10 16 Thousand/uL Final     INR   Date Value Ref Range Status   2023 0 97 0 84 - 1 19 Final       EKG yes    Normal sinus rhythm at 77 bpm   Normal axis and normal intervals  No acute ST elevation or depression  No acute T wave inversion  Compared to prior EKG 2022, T wave flattening in leads III and aVF is new  Derryl Base, DO      Echo no    Stress test/cath no    PFT/Naman no    Functional capacity: Climb stairs                        4 Mets   Pick the highest level patient can comfortably perform   4 mets or greater for surgery    RCRI  High Risk surgery? 1 Point  CAD History:         1 Point   MI; Positive Stress Test; CP due to Mi;  Nitrate Usage to control Angina;  Pathologic Q wave on EKG  CHF Active:         1 Point   Pulm Edema; Paroxysmal Nocturnal Dyspnea;  Bibasilar Rales (crackles);S3; CHF on CXR  Cerebrovascular Disease (TIA or CVA):     1 Point  DM on Insulin:        1 Point  Serum Creat >2 0 mg/dl:       1 Point          Total Points: 1     Scorin: Class I, Very Low Risk (0 4%)     1: Class II, Low risk (0 9%)     2: Class III Moderate (6 6%)     3: Class IV High (>11%)      SOFI Risk:  GFR:   eGFR   Date Value Ref Range Status   2023 90 ml/min/1 73sq m Final         For PCP:  If GFR>60, Hold ACE/ARB/Diuretic on the day of surgery, and NSAIDS 10 days before  If GFR<45, Consider PRE and POST op Nephrology Consult  If 46 <GFR> 59 : Has Patient had SOFI in last 6 Months? no   If YES: Preop Nephrology consult   If No:  Ana Carter Nephrology consult  Assessment/Plan:    Patient is medically optimized (cleared) for the planned procedure  Further testing/evaluation is not required  Postop concerns: no    Hold Hydrochlorothiazide 12 5mg daily and Olmestartan 40mg daily on the day of surgery  Can resume when approved by surgeon  Problem List Items Addressed This Visit        Digestive    GERD without esophagitis     On PPI  Watch GERD diet  Cardiovascular and Mediastinum    Benign essential hypertension     Stable  Check blood pressure outside of office  Recommend lifestyle modifications  Hold Hydrochlorothiazide 12 5mg daily and Olmestartan 40mg daily on the day of surgery  Can resume when approved by surgeon  Other Visit Diagnoses     Pre-op examination    -  Primary    Relevant Orders    POCT ECG (Completed)    Nontraumatic incomplete tear of left rotator cuff        Management per Ortho - pending surgery  Narrow anion gap        Relevant Orders    Basic metabolic panel    Repeat nonfasting CMP in 1 month  Suspect lab error  Diagnoses and all orders for this visit:    Pre-op examination  -     POCT ECG    Nontraumatic incomplete tear of left rotator cuff    Benign essential hypertension    GERD without esophagitis    Narrow anion gap  -     Basic metabolic panel; Future        No outpatient medications have been marked as taking for the 3/14/23 encounter (Appointment) with Caitie Thorne, DO          NOTE: Please use the above to review important meds for your specialty, the remainder "per anesthesia Guidelines "    NOTE: Please place an Inbasket message for "[facility]" pool for complicated patients

## 2023-03-14 ENCOUNTER — TELEPHONE (OUTPATIENT)
Dept: FAMILY MEDICINE CLINIC | Facility: CLINIC | Age: 68
End: 2023-03-14

## 2023-03-14 ENCOUNTER — OFFICE VISIT (OUTPATIENT)
Dept: FAMILY MEDICINE CLINIC | Facility: CLINIC | Age: 68
End: 2023-03-14

## 2023-03-14 VITALS
BODY MASS INDEX: 35.35 KG/M2 | SYSTOLIC BLOOD PRESSURE: 136 MMHG | WEIGHT: 212.2 LBS | TEMPERATURE: 96.7 F | OXYGEN SATURATION: 99 % | HEIGHT: 65 IN | DIASTOLIC BLOOD PRESSURE: 80 MMHG | HEART RATE: 82 BPM | RESPIRATION RATE: 16 BRPM

## 2023-03-14 DIAGNOSIS — I10 BENIGN ESSENTIAL HYPERTENSION: ICD-10-CM

## 2023-03-14 DIAGNOSIS — E87.8 NARROW ANION GAP: ICD-10-CM

## 2023-03-14 DIAGNOSIS — K21.9 GERD WITHOUT ESOPHAGITIS: ICD-10-CM

## 2023-03-14 DIAGNOSIS — Z01.818 PRE-OP EXAMINATION: Primary | ICD-10-CM

## 2023-03-14 DIAGNOSIS — M75.112 NONTRAUMATIC INCOMPLETE TEAR OF LEFT ROTATOR CUFF: ICD-10-CM

## 2023-03-14 NOTE — ASSESSMENT & PLAN NOTE
Stable  Check blood pressure outside of office  Recommend lifestyle modifications  Hold Hydrochlorothiazide 12 5mg daily and Olmestartan 40mg daily on the day of surgery  Can resume when approved by surgeon

## 2023-03-14 NOTE — PATIENT INSTRUCTIONS
Hold Hydrochlorothiazide 12 5mg daily and Olmestartan 40mg daily on the day of surgery  Can resume when approved by surgeon

## 2023-03-17 ENCOUNTER — TELEPHONE (OUTPATIENT)
Dept: OBGYN CLINIC | Facility: CLINIC | Age: 68
End: 2023-03-17

## 2023-03-17 NOTE — PRE-PROCEDURE INSTRUCTIONS
Pre-Surgery Instructions:   Medication Instructions   • acetaminophen (TYLENOL) 325 mg tablet Hold day of surgery  • amLODIPine (NORVASC) 5 mg tablet Take day of surgery  • aspirin 81 mg chewable tablet Sent message to the office for instructions   • BIOTIN PO Hold day of surgery  • cholecalciferol (VITAMIN D3) 1,000 units tablet Hold day of surgery  • esomeprazole (NexIUM) 40 MG capsule Take day of surgery  • Ginger, Zingiber officinalis, (GINGER ROOT PO) Hold day of surgery  • hydrochlorothiazide (HYDRODIURIL) 12 5 mg tablet Hold day of surgery  • latanoprost (XALATAN) 0 005 % ophthalmic solution Take night before surgery   • metoprolol succinate (TOPROL-XL) 50 mg 24 hr tablet Take night before surgery   • multivitamin (THERAGRAN) TABS Hold day of surgery  • olmesartan (BENICAR) 40 mg tablet Hold day of surgery  • Sulfacetamide Sodium (Sodium Sulfacetamide Wash) 10 % LIQD Hold day of surgery  • vitamin B-12 (CYANOCOBALAMIN) 2500 MCG TABS Hold day of surgery  Medication instructions for day surgery reviewed  Please use only a sip of water to take your instructed medications  Avoid all over the counter vitamins, supplements and NSAIDS for one week prior to surgery per anesthesia guidelines  Tylenol is ok to take as needed  Pt to follow Dr Deedee Sparrow instructions  Pt to use hibiclens as directed  Pt to arrive to the [facility] at the given time, wear a mask   Georges Hawarden [phone]     You will receive a call one business day prior to surgery with an arrival time and hospital directions  If your surgery is scheduled on a Monday, the hospital will be calling you on the Friday prior to your surgery  If you have not heard from anyone by 8pm, please call the hospital supervisor through the hospital  at [phone]  [facility] [phone])  Do not eat or drink anything after midnight the night before your surgery, including candy, mints, lifesavers, or chewing gum   Do not drink alcohol 24hrs before your surgery  Try not to smoke at least 24hrs before your surgery  Follow the pre surgery showering instructions as listed in the [place] [facility] Booklet” or otherwise provided by your surgeon's office  Do not shave the surgical area 24 hours before surgery  Do not apply any lotions, creams, including makeup, cologne, deodorant, or perfumes after showering on the day of your surgery  No contact lenses, eye make-up, or artificial eyelashes  Remove nail polish, including gel polish, and any artificial, gel, or acrylic nails if possible  Remove all jewelry including rings and body piercing jewelry  Wear causal clothing that is easy to take on and off  Consider your type of surgery  Keep any valuables, jewelry, piercings at home  Please bring any specially ordered equipment (sling, braces) if indicated  Arrange for a responsible person to drive you to and from the hospital on the day of your surgery  Visitor Guidelines discussed  Call the surgeon's office with any new illnesses, exposures, or additional questions prior to surgery  Please reference your [place] [facility] Booklet” for additional information to prepare for your upcoming surgery

## 2023-03-17 NOTE — TELEPHONE ENCOUNTER
Moe Grimes [facility] nurse informed me that Jairo Shell was not told to hold her ASA  I have contacted Jairo Shell and advised that she should hold ASA one (1) week prior to surgery  She will also hold the [employer] vitamin that she is taking  Provider:  Michael  Caller:  Automated refill request  Surgery:  NA  Surgery Date:    Last visit:  11/16/20  Next visit: NA    Reason for call:         Requested Prescriptions     Pending Prescriptions Disp Refills   • methocarbamol (ROBAXIN) 750 MG tablet [Pharmacy Med Name: METHOCARBAMOL 750 MG TABLET] 30 tablet 0     Sig: TAKE 1 TABLET BY MOUTH EVERY DAY AT NIGHT

## 2023-03-26 ENCOUNTER — ANESTHESIA EVENT (OUTPATIENT)
Dept: PERIOP | Facility: HOSPITAL | Age: 68
End: 2023-03-26

## 2023-03-27 NOTE — ANESTHESIA PREPROCEDURE EVALUATION
Procedure:  Shoulder Arthroscopy with Rotator Cuff Repair (Left: Shoulder)    Relevant Problems   CARDIO   (+) Benign essential hypertension   (+) Hypertensive urgency   (+) Pure hypercholesterolemia      GI/HEPATIC   (+) GERD without esophagitis      MUSCULOSKELETAL   (+) Acute left-sided low back pain without sciatica   (+) Osteoarthritis of knee        Physical Exam    Airway    Mallampati score: II  TM Distance: >3 FB  Neck ROM: full     Dental   No notable dental hx     Cardiovascular  Cardiovascular exam normal    Pulmonary  Pulmonary exam normal     Other Findings        Anesthesia Plan  ASA Score- 2     Anesthesia Type- general with ASA Monitors  Additional Monitors:   Airway Plan: LMA  Comment: GA with LMA; pre-procedure interscalene nerve block  Plan Factors-Exercise tolerance (METS): >4 METS  Chart reviewed  Imaging results reviewed  Existing labs reviewed  Patient summary reviewed  Patient is not a current smoker  Obstructive sleep apnea risk education given perioperatively  Induction- intravenous  Postoperative Plan- Plan for postoperative opioid use  Informed Consent- Anesthetic plan and risks discussed with patient  I personally reviewed this patient with the CRNA  Discussed and agreed on the Anesthesia Plan with the CRNA  Fallon Jameson

## 2023-03-28 ENCOUNTER — ANESTHESIA (OUTPATIENT)
Dept: PERIOP | Facility: HOSPITAL | Age: 68
End: 2023-03-28

## 2023-03-28 ENCOUNTER — HOSPITAL ENCOUNTER (OUTPATIENT)
Facility: HOSPITAL | Age: 68
Setting detail: OUTPATIENT SURGERY
Discharge: HOME | End: 2023-03-28
Attending: ORTHOPAEDIC SURGERY | Admitting: ORTHOPAEDIC SURGERY

## 2023-03-28 VITALS
HEIGHT: 65 IN | OXYGEN SATURATION: 96 % | DIASTOLIC BLOOD PRESSURE: 63 MMHG | SYSTOLIC BLOOD PRESSURE: 117 MMHG | RESPIRATION RATE: 18 BRPM | TEMPERATURE: 96.1 F | BODY MASS INDEX: 34.89 KG/M2 | WEIGHT: 209.4 LBS | HEART RATE: 81 BPM

## 2023-03-28 DIAGNOSIS — Z98.890 S/P LEFT ROTATOR CUFF REPAIR: Primary | ICD-10-CM

## 2023-03-28 DEVICE — DBL LOADED 4.75MM BC SWVLK
Type: IMPLANTABLE DEVICE | Site: SHOULDER | Status: FUNCTIONAL
Brand: ARTHREX®

## 2023-03-28 RX ORDER — HYDROMORPHONE HCL/PF 1 MG/ML
0.5 SYRINGE (ML) INJECTION
Status: DISCONTINUED | OUTPATIENT
Start: 2023-03-28 | End: 2023-03-28 | Stop reason: HOSPADM

## 2023-03-28 RX ORDER — ONDANSETRON 2 MG/ML
INJECTION INTRAMUSCULAR; INTRAVENOUS AS NEEDED
Status: DISCONTINUED | OUTPATIENT
Start: 2023-03-28 | End: 2023-03-28

## 2023-03-28 RX ORDER — MIDAZOLAM HYDROCHLORIDE 2 MG/2ML
INJECTION, SOLUTION INTRAMUSCULAR; INTRAVENOUS AS NEEDED
Status: DISCONTINUED | OUTPATIENT
Start: 2023-03-28 | End: 2023-03-28

## 2023-03-28 RX ORDER — CEFAZOLIN SODIUM 2 G/50ML
2000 SOLUTION INTRAVENOUS ONCE
Status: COMPLETED | OUTPATIENT
Start: 2023-03-28 | End: 2023-03-28

## 2023-03-28 RX ORDER — SODIUM CHLORIDE, SODIUM LACTATE, POTASSIUM CHLORIDE, CALCIUM CHLORIDE 600; 310; 30; 20 MG/100ML; MG/100ML; MG/100ML; MG/100ML
75 INJECTION, SOLUTION INTRAVENOUS CONTINUOUS
Status: DISCONTINUED | OUTPATIENT
Start: 2023-03-28 | End: 2023-03-28 | Stop reason: HOSPADM

## 2023-03-28 RX ORDER — FENTANYL CITRATE 50 UG/ML
INJECTION, SOLUTION INTRAMUSCULAR; INTRAVENOUS AS NEEDED
Status: DISCONTINUED | OUTPATIENT
Start: 2023-03-28 | End: 2023-03-28

## 2023-03-28 RX ORDER — MEPERIDINE HYDROCHLORIDE 25 MG/ML
12.5 INJECTION INTRAMUSCULAR; INTRAVENOUS; SUBCUTANEOUS
Status: DISCONTINUED | OUTPATIENT
Start: 2023-03-28 | End: 2023-03-28 | Stop reason: HOSPADM

## 2023-03-28 RX ORDER — ONDANSETRON 2 MG/ML
4 INJECTION INTRAMUSCULAR; INTRAVENOUS ONCE AS NEEDED
Status: DISCONTINUED | OUTPATIENT
Start: 2023-03-28 | End: 2023-03-28 | Stop reason: HOSPADM

## 2023-03-28 RX ORDER — PROPOFOL 10 MG/ML
INJECTION, EMULSION INTRAVENOUS AS NEEDED
Status: DISCONTINUED | OUTPATIENT
Start: 2023-03-28 | End: 2023-03-28

## 2023-03-28 RX ORDER — DEXAMETHASONE SODIUM PHOSPHATE 10 MG/ML
INJECTION, SOLUTION INTRAMUSCULAR; INTRAVENOUS AS NEEDED
Status: DISCONTINUED | OUTPATIENT
Start: 2023-03-28 | End: 2023-03-28

## 2023-03-28 RX ORDER — OXYCODONE HYDROCHLORIDE 5 MG/1
5 TABLET ORAL EVERY 4 HOURS PRN
Qty: 15 TABLET | Refills: 0 | Status: SHIPPED | OUTPATIENT
Start: 2023-03-28

## 2023-03-28 RX ORDER — BUPIVACAINE HYDROCHLORIDE 5 MG/ML
INJECTION, SOLUTION PERINEURAL
Status: COMPLETED | OUTPATIENT
Start: 2023-03-28 | End: 2023-03-28

## 2023-03-28 RX ORDER — LIDOCAINE HYDROCHLORIDE 10 MG/ML
INJECTION, SOLUTION EPIDURAL; INFILTRATION; INTRACAUDAL; PERINEURAL AS NEEDED
Status: DISCONTINUED | OUTPATIENT
Start: 2023-03-28 | End: 2023-03-28

## 2023-03-28 RX ORDER — FENTANYL CITRATE/PF 50 MCG/ML
25 SYRINGE (ML) INJECTION
Status: DISCONTINUED | OUTPATIENT
Start: 2023-03-28 | End: 2023-03-28 | Stop reason: HOSPADM

## 2023-03-28 RX ADMIN — ONDANSETRON 4 MG: 2 INJECTION INTRAMUSCULAR; INTRAVENOUS at 13:42

## 2023-03-28 RX ADMIN — FENTANYL CITRATE 25 MCG: 50 INJECTION, SOLUTION INTRAMUSCULAR; INTRAVENOUS at 15:16

## 2023-03-28 RX ADMIN — FENTANYL CITRATE 25 MCG: 50 INJECTION, SOLUTION INTRAMUSCULAR; INTRAVENOUS at 13:50

## 2023-03-28 RX ADMIN — PROPOFOL 170 MG: 10 INJECTION, EMULSION INTRAVENOUS at 13:33

## 2023-03-28 RX ADMIN — FENTANYL CITRATE 50 MCG: 50 INJECTION, SOLUTION INTRAMUSCULAR; INTRAVENOUS at 14:13

## 2023-03-28 RX ADMIN — MIDAZOLAM 2 MG: 1 INJECTION INTRAMUSCULAR; INTRAVENOUS at 12:30

## 2023-03-28 RX ADMIN — FENTANYL CITRATE 25 MCG: 50 INJECTION, SOLUTION INTRAMUSCULAR; INTRAVENOUS at 13:37

## 2023-03-28 RX ADMIN — BUPIVACAINE HYDROCHLORIDE 15 ML: 5 INJECTION, SOLUTION PERINEURAL at 12:30

## 2023-03-28 RX ADMIN — FENTANYL CITRATE 25 MCG: 50 INJECTION, SOLUTION INTRAMUSCULAR; INTRAVENOUS at 15:28

## 2023-03-28 RX ADMIN — SODIUM CHLORIDE, SODIUM LACTATE, POTASSIUM CHLORIDE, AND CALCIUM CHLORIDE 75 ML/HR: .6; .31; .03; .02 INJECTION, SOLUTION INTRAVENOUS at 11:18

## 2023-03-28 RX ADMIN — DEXAMETHASONE SODIUM PHOSPHATE 4 MG: 10 INJECTION, SOLUTION INTRAMUSCULAR; INTRAVENOUS at 13:42

## 2023-03-28 RX ADMIN — LIDOCAINE HYDROCHLORIDE 40 MG: 10 INJECTION, SOLUTION EPIDURAL; INFILTRATION; INTRACAUDAL; PERINEURAL at 13:33

## 2023-03-28 RX ADMIN — CEFAZOLIN SODIUM 2000 MG: 2 SOLUTION INTRAVENOUS at 13:31

## 2023-03-28 NOTE — PERIOPERATIVE NURSING NOTE
Reviewed Discharge instructions verbally and written with patient and   Both receptive  Patient and  shown how to dress with left arm in sling  Patient discharged to home with , escorted to car via wheelchair

## 2023-03-28 NOTE — OP NOTE
OPERATIVE REPORT  PATIENT NAME: Hellen Freitas    :  1955  MRN: [id number]  Pt Location: [facility] OR ROOM 02    SURGERY DATE: 3/28/2023    Surgeon(s) and Role:     * Cheryl Martinez MD - Primary     * Balwinder Swan PA-C - Assisting necessary for the procedure for assistance with improved visualization due to the minimally invasive arthroscopic techniques utilized for the operation for assistance with utilization of the camera and shaver and bur as well as assistance with suture management and repair techniques and anchor placement  Jyoti Loja ATC and [facility] second Assistant    Preop Diagnosis:  Nontraumatic incomplete tear of left rotator cuff [M75 112]    Postop diagnosis:  Left shoulder full-thickness supraspinatus tendon tear and subacromial impingement    Procedure(s):  Left - Shoulder Arthroscopy with Rotator Cuff Repair utilizing speed fix technique from [employer] with a 4 75 mm swivel lock anchor and 1 fiber tape and 1 fiber link and 2 FiberWire sutures and Subacromial decompression    Specimen(s):  * No specimens in log *    Estimated Blood Loss:   Minimal    Drains:  * No LDAs found *    Anesthesia Type:   General w/ Regional    Operative Indications:  Nontraumatic incomplete tear of left rotator cuff [M75 112]  Suzi Gomez is a 68-year-old female who has been suffering with left shoulder pain and weakness  MRI demonstrated a rotator cuff tear  She had failed nonoperative measures and wished to undergo a left shoulder arthroscopy with rotator cuff repair  She understood the risks and benefits of that procedure and wished to go ahead  The risks are inclusive of not limited to infection, stiffness, nerve or blood vessel injury causing numbness pain and weakness, worsening of symptoms, recurrence of tear, failure to regain full strength and ability, failure to achieve anticipated results, and need for further surgery      Operative Findings:  Left shoulder examination under anesthesia demonstrated forward flexion and abduction each to 160 degrees with external rotation to 80 degrees and internal rotation of 70 degrees  Intra-articular findings demonstrated very mild articular cartilage wear in the glenohumeral joint with no loose bodies in the axillary pouch  Labrum was intact circumferentially  Long head of biceps was also intact  There was a very small tear along the superior border the subscapularis tendon but with no significant retraction  This did not require repair  The supraspinatus tendon however did demonstrate a full-thickness tear in its central portion did require repair with a speed fix technique  We had excellent repair of the tendon back down to bleeding bed of bone at the end of the procedure  There is also a type III subacromial spur found in the subacromial space requiring acromioplasty for subacromial decompression utilizing a bur  Complications:   None    Procedure and Technique:  Darion Ndiaye was taken to the operating room and placed supine on the OR table  She was given preoperative IV antibiotics as well as preoperative regional anesthesia by attending anesthesiologist   General anesthesia was induced and she was brought comfortably and safely into the beachchair position with all parts well-padded and the head neutral and the head mcneil  Left shoulder was taken to examination under anesthesia as described above  The left upper extremity was then prepped and draped in the usual sterile fashion  Surgical timeout was taken  We created the posterior portal with 11 blade  Diagnostic arthroscopy was begun and an anterior portal was made in the rotator interval with an 11 blade  We demonstrated very mild articular cartilage damage along the glenohumeral joint with no loose bodies in axillary pouch  The labrum was intact circumferentially  Long head of biceps was also intact    The subscapularis tendon had very mild fraying along the superior border but certainly did not require repair  The supraspinatus tendon had obvious full-thickness tear in its central aspect requiring repair  We created a lateral portal with an 11 blade  We did debride the supraspinatus tendon back to a stable rim of tissue  We also debrided the greater tuberosity down to bleeding bed of bone with use of a bur to enhance healing  We then went to the subacromial space where a subacromial spur was found that was type III  We did perform an acromioplasty on this for subacromial decompression with use of a bur  We then began our repair technique  We placed a fiber tape suture and followed by significant ration followed by a fiber link suture for a ripstop technique  We did secure these back down to the greater tuberosity utilizing a 4 75 mm swivel lock anchor  We then passed 2 FiberWire sutures from that anchor in simple fashion into the anterior and posterior aspect of the tendon tear and tied them down with arthroscopic knot-tying techniques  We had good stability of the tendon back down to a bleeding bed of bone at the end of the operation  We then removed the arthroscopic equipment and closed the portals with 4-0 nylon suture  Dry, sterile dressings were applied with a sling  She tolerated the procedure well and transferred to recovery room in stable condition  She will follow-up in 1 week for suture removal   She will be on the rotator cuff repair rehabilitation protocol     I was present for the entire procedure and A qualified resident physician was not available    Patient Disposition:  PACU         SIGNATURE: Eleni Reyez MD  DATE: March 28, 2023  TIME: 2:23 PM

## 2023-03-28 NOTE — ANESTHESIA POSTPROCEDURE EVALUATION
Post-Op Assessment Note    CV Status:  Stable  Pain Score: 0    Pain management: adequate     Mental Status:  Sleepy and arousable   Hydration Status:  Stable   PONV Controlled:  None   Airway Patency:  Patent      Post Op Vitals Reviewed: Yes      Staff: CRNA   Comments: spontaneously breathing, protecting airway  simple mask to O2, vss, fully endorsed to recovery w/o AC        No notable events documented      BP   126/65   Temp      Pulse  75   Resp   14   SpO2   99

## 2023-03-28 NOTE — ANESTHESIA PROCEDURE NOTES
Peripheral Block    Patient location during procedure: pre-op  Start time: 3/28/2023 12:30 PM  Reason for block: at surgeon's request and post-op pain management  Staffing  Performed: Anesthesiologist   Anesthesiologist: Lily Owens MD  Preanesthetic Checklist  Completed: patient identified, IV checked, site marked, risks and benefits discussed, surgical consent, monitors and equipment checked, pre-op evaluation and timeout performed  Peripheral Block  Patient position: supine  Prep: ChloraPrep  Patient monitoring: heart rate, continuous pulse ox and frequent blood pressure checks  Block type: interscalene  Laterality: left  Injection technique: single-shot  Procedures: ultrasound guided, Ultrasound guidance required for the procedure to increase accuracy and safety of medication placement and decrease risk of complications    Ultrasound permanent image savedbupivacaine (MARCAINE) 0 5 % - Perineural   15 mL - 3/28/2023 12:30:00 PM (exparel 15 cc)  Needle  Needle type: Stimuplex   Needle gauge: 22 G  Needle length: 10 cm  Needle localization: ultrasound guidance  Assessment  Injection assessment: incremental injection, local visualized surrounding nerve on ultrasound, negative aspiration for heme and no paresthesia on injection  Paresthesia pain: none  Heart rate change: no  Slow fractionated injection: yes  Post-procedure:  site cleaned  patient tolerated the procedure well with no immediate complications

## 2023-03-28 NOTE — H&P
Assessment/Plan:  The patient would like to proceed with left shoulder arthroscopy with rotator cuff repair  We discussed the procedure and risks at length, including but not limited to, infection, bleeding, wound issues, nerve injury, blood clot, stiffness, failure of procedure, and need for additional surgery  We will see the patient back at the time of surgery  Subjective:   Ammy Villa is a 69 y o  female with left shoulder rotator cuff tear who notes ongoing pain and weakness about the shoulder  Review of Systems   Constitutional: Negative  Negative for chills and fever  HENT: Negative  Negative for ear pain and sore throat  Eyes: Negative  Negative for pain and redness  Respiratory: Negative  Negative for shortness of breath and wheezing  Cardiovascular: Negative for chest pain and palpitations  Gastrointestinal: Negative  Negative for abdominal pain and blood in stool  Endocrine: Negative  Negative for polydipsia and polyuria  Genitourinary: Negative  Negative for difficulty urinating and dysuria  Musculoskeletal:        As noted in HPI   Skin: Negative  Negative for pallor and rash  Neurological: Negative  Negative for dizziness and numbness  Hematological: Negative  Negative for adenopathy  Does not bruise/bleed easily  Psychiatric/Behavioral: Negative  Negative for confusion and suicidal ideas           Past Medical History:   Diagnosis Date   • Breast lump    • Chest pain 05/28/2019   • COVID-19 04/09/2021   • Disease of thyroid gland     nodule, benign   • Eczema     facial rash- could also be rosacea   • Elevated troponin I level 05/21/2021   • GERD (gastroesophageal reflux disease)    • History of transfusion     at 9years old for bleeding during tonsillectomy, no reaction   • Hypertension    • Nontoxic single thyroid nodule 07/25/2012   • Rectocele    • Rotator cuff tear, left    • Uterine leiomyoma    • Varicose veins of both lower extremities "      Past Surgical History:   Procedure Laterality Date   • BLADDER SURGERY  2012    Bladder \"lift\"   • BREAST LUMPECTOMY Right     benign   • COLONOSCOPY     • HYSTERECTOMY     • JOINT REPLACEMENT  2017 and 2022    Knees   • KNEE SURGERY Right     arthroscopy x2   • NOSE SURGERY      deviated septum   • TN LARYNGOSCOPY W/BIOPSY MICROSCOPE/TELESCOPE Left 2018    Procedure: MICROLARYNGOSCOPY AND EXCISION OF LEFT VOCAL FOLD POLYP;  Surgeon: Obdulia Gregorio MD;  Location: [facility];  Service: ENT   • TN XCAPSL CTRC RMVL INSJ IO LENS PROSTH W/O ECP Left 2022    Procedure: EXTRACTION EXTRACAPSULAR CATARACT PHACO INTRAOCULAR LENS (IOL);   Surgeon: Rhonda Patterson MD;  Location: [facility] MAIN OR;  Service: Ophthalmology   • TENDON REPAIR Right     achilles tendon   • TONSILLECTOMY     • TUBAL LIGATION     • VARICOSE VEIN SURGERY Left 2009       Family History   Problem Relation Age of Onset   • Breast cancer Mother    • Hypertension Mother    • Varicose Veins Mother         of lower extremities   • Glaucoma Mother         I also have glaucoma   • Hypertension Father    • Coronary artery disease Father    • Coronary artery disease Sister    • Heart disease Sister    • Varicose Veins Sister         of lower extremities   • No Known Problems Sister    • No Known Problems Sister    • Hypertension Brother    • No Known Problems Daughter    • Diabetes Maternal Grandmother         Also nephew William Deestermacher   • Breast cancer Maternal Aunt    • No Known Problems Maternal Aunt        Social History     Occupational History   • Not on file   Tobacco Use   • Smoking status: Former     Types: Cigarettes     Quit date: 1976     Years since quittin 2   • Smokeless tobacco: Never   • Tobacco comments:     None   Vaping Use   • Vaping Use: Never used   Substance and Sexual Activity   • Alcohol use: Yes     Comment: seldom   • Drug use: Never   • Sexual activity: Not Currently     Partners: Male     " Birth control/protection: Surgical         Current Facility-Administered Medications:   •  bupivacaine liposomal (EXPAREL) 1 3 % injection 20 mL, 20 mL, Infiltration, Once, Starla Rosario MD  •  ceFAZolin (ANCEF) IVPB (premix in dextrose) 2,000 mg 50 mL, 2,000 mg, Intravenous, Once, Toñito Hewitt PA-C  •  lactated ringers infusion, 75 mL/hr, Intravenous, Continuous, Starla Rosario MD, Last Rate: 75 mL/hr at 03/28/23 1118, 75 mL/hr at 03/28/23 1118    Allergies   Allergen Reactions   • Lisinopril Other (See Comments)     Other reaction(s): swollen legs   • Hydrocodone-Acetaminophen Dizziness   • Irbesartan-Hydrochlorothiazide Other (See Comments)      Hypertension  Pt had hypertension, arm heaviness and headache   • Pollen Extract Hives     Ragweed etc         Objective:  Vitals:    03/28/23 1113   BP: 166/77   Pulse: 81   Resp: 18   Temp: 97 7 °F (36 5 °C)   SpO2: 96%       Ortho Exam    Physical Exam  Constitutional:       General: She is not in acute distress  Appearance: She is well-developed  HENT:      Head: Normocephalic and atraumatic  Eyes:      General: No scleral icterus  Conjunctiva/sclera: Conjunctivae normal    Neck:      Vascular: No JVD  Cardiovascular:      Rate and Rhythm: Normal rate  Pulmonary:      Effort: Pulmonary effort is normal  No respiratory distress  Skin:     General: Skin is warm  Neurological:      Mental Status: She is alert and oriented to person, place, and time  Coordination: Coordination normal          Left shoulder: Positive empty can test       This document was created using speech voice recognition software  Grammatical errors, random word insertions, pronoun errors, and incomplete sentences are an occasional consequence of this system due to software limitations, ambient noise, and hardware issues     Any formal questions or concerns about content, text, or information contained within the body of this dictation should be directly addressed to the provider for clarification

## 2023-03-28 NOTE — PERIOPERATIVE NURSING NOTE
Patient received into [facility] via stretcher from [facility]  Patient awake and alert, denies pain or discomfort  Left shoulder dressings clean, dry and intact with ice pack applied  Left arm in immobilizer sling  Fingers warm, numb, mobile  +2 radial pulse

## 2023-04-05 ENCOUNTER — OFFICE VISIT (OUTPATIENT)
Dept: OBGYN CLINIC | Facility: CLINIC | Age: 68
End: 2023-04-05

## 2023-04-05 VITALS
BODY MASS INDEX: 34.82 KG/M2 | SYSTOLIC BLOOD PRESSURE: 144 MMHG | HEART RATE: 80 BPM | DIASTOLIC BLOOD PRESSURE: 76 MMHG | WEIGHT: 209 LBS | HEIGHT: 65 IN

## 2023-04-05 DIAGNOSIS — Z98.890 S/P LEFT ROTATOR CUFF REPAIR: Primary | ICD-10-CM

## 2023-04-05 NOTE — PROGRESS NOTES
Assessment/Plan:  1  S/P left rotator cuff repair          Scribe Attestation    I,:  Sheri Thomson am acting as a scribe while in the presence of the attending physician :       I,:  Donavan Dale MD personally performed the services described in this documentation    as scribed in my presence :           Carrie's sutures were removed today without incident  John Guillen and I reviewed her intraoperative pictures together  She did have a significant tear to the supraspinatus that was readily repaired  She will begin with physical therapy next week  I stressed the importance of continued use of the sling and to not lift anything heavier than a pencil  I would like her to follow-up with her primary care physician in regards to her abdominal pain  I cannot explain his presence and its relation to her surgery  I would like her to be seen either today or tomorrow for this  She verbalized an understanding  She has difficulty obtaining an appointment either today or tomorrow I asked that she contact our office and we will ensure that this is accomplished  Subjective:   Mary Michael is a 69 y o  female who presents to the office today for postop evaluation of left shoulder arthroscopy with rotator cuff repair and subacromial decompression  Patient states she is doing very well  Her only complaint is of an intermittent pain about the upper left quadrant of her abdomen that will radiate into her back while sleeping  She describes this pain as sharp and stabbing and will wake her up to 4 times per night  She states she had a similar sensation following a knee surgery but did not last as long as a week as this pain has  She states she has been wearing her sling at all times and remains compliant with its use  She has no complaints of excessive shoulder pain  She denies distal paresthesias  Review of Systems   Constitutional: Negative for chills, fever and unexpected weight change     HENT: Negative "for hearing loss, nosebleeds and sore throat  Eyes: Negative for pain, redness and visual disturbance  Respiratory: Negative for cough, shortness of breath and wheezing  Cardiovascular: Negative for chest pain, palpitations and leg swelling  Gastrointestinal: Negative for abdominal pain, nausea and vomiting  Endocrine: Negative for polydipsia and polyuria  Genitourinary: Negative for dysuria and hematuria  Musculoskeletal:        See HPI   Skin: Negative for rash and wound  Neurological: Negative for dizziness, numbness and headaches  Psychiatric/Behavioral: Negative for decreased concentration and suicidal ideas  The patient is not nervous/anxious            Past Medical History:   Diagnosis Date   • Breast lump    • Chest pain 05/28/2019   • COVID-19 04/09/2021   • Disease of thyroid gland     nodule, benign   • Eczema     facial rash- could also be rosacea   • Elevated troponin I level 05/21/2021   • GERD (gastroesophageal reflux disease)    • History of transfusion     at 9years old for bleeding during tonsillectomy, no reaction   • Hypertension    • Nontoxic single thyroid nodule 07/25/2012   • Rectocele    • Rotator cuff tear, left    • Uterine leiomyoma    • Varicose veins of both lower extremities        Past Surgical History:   Procedure Laterality Date   • BLADDER SURGERY  08/2012    Bladder \"lift\"   • BREAST LUMPECTOMY Right 2000    benign   • COLONOSCOPY     • HYSTERECTOMY  1990   • JOINT REPLACEMENT  6/2017 and 5/2022    Knees   • KNEE SURGERY Right     arthroscopy x2   • NOSE SURGERY      deviated septum   • OH LARYNGOSCOPY W/BIOPSY MICROSCOPE/TELESCOPE Left 04/02/2018    Procedure: MICROLARYNGOSCOPY AND EXCISION OF LEFT VOCAL FOLD POLYP;  Surgeon: Sherwood Kocher, MD;  [facility];  Service: ENT   • OH SURGICAL ARTHROSCOPY SHOULDER W/ROTATOR CUFF RPR Left 3/28/2023    Procedure: Shoulder Arthroscopy with Rotator Cuff Repair and Subacromial decompression;  Surgeon: Kiki Reyes" Chris Avelar MD;  Location: [address] [place] [address];  Service: Orthopedics   • CO XCAPSL CTRC RMVL INSJ IO LENS PROSTH W/O ECP Left 2022    Procedure: EXTRACTION EXTRACAPSULAR CATARACT PHACO INTRAOCULAR LENS (IOL);   Surgeon: Chadd Boateng MD;  Location: [facility] MAIN OR;  Service: Ophthalmology   • TENDON REPAIR Right     achilles tendon   • TONSILLECTOMY     • TUBAL LIGATION     • VARICOSE VEIN SURGERY Left 2009       Family History   Problem Relation Age of Onset   • Breast cancer Mother    • Hypertension Mother    • Varicose Veins Mother         of lower extremities   • Glaucoma Mother         I also have glaucoma   • Hypertension Father    • Coronary artery disease Father    • Coronary artery disease Sister    • Heart disease Sister    • Varicose Veins Sister         of lower extremities   • No Known Problems Sister    • No Known Problems Sister    • Hypertension Brother    • No Known Problems Daughter    • Diabetes Maternal Grandmother         Also nephew iWlliam Deestermacher   • Breast cancer Maternal Aunt    • No Known Problems Maternal Aunt        Social History     Occupational History   • Not on file   Tobacco Use   • Smoking status: Former     Types: Cigarettes     Quit date: 1976     Years since quittin 2   • Smokeless tobacco: Never   • Tobacco comments:     None   Vaping Use   • Vaping Use: Never used   Substance and Sexual Activity   • Alcohol use: Yes     Comment: seldom   • Drug use: Never   • Sexual activity: Not Currently     Partners: Male     Birth control/protection: Surgical         Current Outpatient Medications:   •  acetaminophen (TYLENOL) 325 mg tablet, Take 650 mg by mouth every 6 (six) hours as needed for mild pain, Disp: , Rfl:   •  amLODIPine (NORVASC) 5 mg tablet, Take 1 tablet (5 mg total) by mouth daily (Patient taking differently: Take 5 mg by mouth every morning), Disp: 90 tablet, Rfl: 3  •  aspirin 81 mg chewable tablet, Chew 1 tablet (81 mg total) daily (Patient taking differently: Chew 81 mg every morning), Disp: 30 tablet, Rfl: 0  •  BIOTIN PO, Take by mouth in the morning, Disp: , Rfl:   •  cholecalciferol (VITAMIN D3) 1,000 units tablet, Take 1,000 Units by mouth daily, Disp: , Rfl:   •  esomeprazole (NexIUM) 40 MG capsule, Take 1 capsule (40 mg total) by mouth daily (Patient taking differently: Take 40 mg by mouth every morning), Disp: 90 capsule, Rfl: 1  •  Ginger, Zingiber officinalis, (GINGER ROOT PO), Take by mouth in the morning, Disp: , Rfl:   •  hydrochlorothiazide (HYDRODIURIL) 12 5 mg tablet, Take 1 tablet (12 5 mg total) by mouth daily (Patient taking differently: Take 12 5 mg by mouth every morning), Disp: 90 tablet, Rfl: 3  •  metoprolol succinate (TOPROL-XL) 50 mg 24 hr tablet, Take 1 tablet (50 mg total) by mouth daily (Patient taking differently: Take 50 mg by mouth daily at bedtime), Disp: 90 tablet, Rfl: 3  •  multivitamin (THERAGRAN) TABS, Take 1 tablet by mouth daily, Disp: , Rfl:   •  olmesartan (BENICAR) 40 mg tablet, Take 1 tablet (40 mg total) by mouth daily (Patient taking differently: Take 40 mg by mouth every morning), Disp: 90 tablet, Rfl: 3  •  oxyCODONE (Roxicodone) 5 immediate release tablet, Take 1 tablet (5 mg total) by mouth every 4 (four) hours as needed for moderate pain for up to 15 doses Max Daily Amount: 30 mg, Disp: 15 tablet, Rfl: 0  •  Sulfacetamide Sodium (Sodium Sulfacetamide Wash) 10 % LIQD, Apply twice daily to affected area on face (Patient taking differently: in the morning Apply twice daily to affected area on face), Disp: 177 mL, Rfl: 2  •  vitamin B-12 (CYANOCOBALAMIN) 2500 MCG TABS, Take 500 mcg by mouth daily, Disp: , Rfl:   •  latanoprost (XALATAN) 0 005 % ophthalmic solution, Administer to both eyes daily at bedtime, Disp: , Rfl:     Allergies   Allergen Reactions   • Lisinopril Other (See Comments)     Other reaction(s): swollen legs   • Hydrocodone-Acetaminophen Dizziness   • Irbesartan-Hydrochlorothiazide Other (See Comments)      Hypertension  Pt had hypertension, arm heaviness and headache   • Pollen Extract Hives     Ragweed etc         Objective:  Vitals:    04/05/23 0954   BP: 144/76   Pulse: 80       Left Shoulder Exam     Other   Sensation: normal  Pulse: present (2+ radial)     Comments:  Infection of the left upper extremity found the skin to be abnormal color and temperature postoperatively  She does have evidence of normal postoperative bruising  The left upper extremity is neurovascular intact with normal sensations to light touch and a 2+ radial pulse  She has normal motor function about the hand intrinsics  Physical Exam  Vitals reviewed  Constitutional:       Appearance: She is well-developed  HENT:      Head: Normocephalic and atraumatic  Eyes:      General:         Right eye: No discharge  Left eye: No discharge  Conjunctiva/sclera: Conjunctivae normal    Cardiovascular:      Rate and Rhythm: Regular rhythm  Pulmonary:      Effort: Pulmonary effort is normal  No respiratory distress  Breath sounds: No stridor  Musculoskeletal:      Cervical back: Normal range of motion and neck supple  Skin:     General: Skin is warm and dry  Neurological:      Mental Status: She is alert and oriented to person, place, and time  Psychiatric:         Behavior: Behavior normal                This document was created using speech voice recognition software  Grammatical errors, random word insertions, pronoun errors, and incomplete sentences are an occasional consequence of this system due to software limitations, ambient noise, and hardware issues  Any formal questions or concerns about content, text, or information contained within the body of this dictation should be directly addressed to the provider for clarification

## 2023-04-06 ENCOUNTER — CONSULT (OUTPATIENT)
Dept: SURGERY | Facility: CLINIC | Age: 68
End: 2023-04-06

## 2023-04-06 VITALS
HEART RATE: 84 BPM | BODY MASS INDEX: 35.22 KG/M2 | HEIGHT: 65 IN | DIASTOLIC BLOOD PRESSURE: 72 MMHG | SYSTOLIC BLOOD PRESSURE: 124 MMHG | TEMPERATURE: 97.6 F | WEIGHT: 211.4 LBS | OXYGEN SATURATION: 97 %

## 2023-04-06 DIAGNOSIS — M79.18 MUSCULAR ABDOMINAL PAIN IN LEFT FLANK: Primary | ICD-10-CM

## 2023-04-06 PROBLEM — R10.12 LEFT UPPER QUADRANT ABDOMINAL PAIN: Status: ACTIVE | Noted: 2023-04-06

## 2023-04-06 RX ORDER — MELOXICAM 7.5 MG/1
7.5 TABLET ORAL DAILY
Qty: 7 TABLET | Refills: 0 | Status: SHIPPED | OUTPATIENT
Start: 2023-04-06 | End: 2023-04-13

## 2023-04-06 NOTE — PROGRESS NOTES
" History and Physical Examination - General Surgery   [facility]  Joy Mullen 69 y o  female MRN: [id number]  : [id number] PCP: Helga Solorzano MD    History of Present Illness   Chief Complaint: Left upper quadrant pain  Radiating to the back only occurs in the nighttime  Denies any shortness of breath denies any chest pain  Denies any sweatiness    Patient has a recent surgery rotator cuff repair last Tuesday    At present I have no pain it only occurs at the nighttime I am sleeping on recliner since my surgery    HPI:  Joy Mullen is a 69 y o  female who presents to office with history of left upper quadrant pain which is radiating to her back    Denies any nausea vomiting diarrhea constipation    Denies any shortness of breath or sweatiness  Patient is taking Tylenol for pain    Pain is only in the nighttime patient is sleeping on a recliner surgery  As unable to sleep on the bed      Historical Information   The following portions of the patient's history were reviewed and updated as appropriate  Past Medical History:   Diagnosis Date   • Breast lump    • Chest pain 05/28/2019   • COVID-19 04/09/2021   • Disease of thyroid gland     nodule, benign   • Eczema     facial rash- could also be rosacea   • Elevated troponin I level 05/21/2021   • GERD (gastroesophageal reflux disease)    • History of transfusion     at 9years old for bleeding during tonsillectomy, no reaction   • Hypertension    • Nontoxic single thyroid nodule 07/25/2012   • Rectocele    • Rotator cuff tear, left    • Uterine leiomyoma    • Varicose veins of both lower extremities      Past Surgical History:   Procedure Laterality Date   • BLADDER SURGERY  08/2012    Bladder \"lift\"   • BREAST LUMPECTOMY Right 2000    benign   • COLONOSCOPY     • HYSTERECTOMY  1990   • JOINT REPLACEMENT  6/2017 and 5/2022    Knees   • KNEE SURGERY Right     arthroscopy x2   • NOSE SURGERY      deviated septum   • OH LARYNGOSCOPY " W/BIOPSY MICROSCOPE/TELESCOPE Left 2018    Procedure: MICROLARYNGOSCOPY AND EXCISION OF LEFT VOCAL FOLD POLYP;  Surgeon: Maria Estrada MD;  [facility];  Service: ENT   • MD SURGICAL ARTHROSCOPY SHOULDER W/ROTATOR CUFF RPR Left 3/28/2023    Procedure: Shoulder Arthroscopy with Rotator Cuff Repair and Subacromial decompression;  Surgeon: Reddy Garcia MD;  Location: [address] [place] [address];  Service: Orthopedics   • MD XCAPSL CTRC RMVL INSJ IO LENS PROSTH W/O ECP Left 2022    Procedure: EXTRACTION EXTRACAPSULAR CATARACT PHACO INTRAOCULAR LENS (IOL);   Surgeon: Jm Zamora MD;  Location: [facility] MAIN OR;  Service: Ophthalmology   • TENDON REPAIR Right     achilles tendon   • TONSILLECTOMY     • TUBAL LIGATION     • VARICOSE VEIN SURGERY Left 2009     Social History   Social History     Substance and Sexual Activity   Alcohol Use Yes    Comment: seldom     Social History     Substance and Sexual Activity   Drug Use Never     Social History     Tobacco Use   Smoking Status Former   • Types: Cigarettes   • Quit date: 1976   • Years since quittin 2   Smokeless Tobacco Never   Tobacco Comments    None     Family History:   Family History   Problem Relation Age of Onset   • Breast cancer Mother    • Hypertension Mother    • Varicose Veins Mother         of lower extremities   • Glaucoma Mother         I also have glaucoma   • Hypertension Father    • Coronary artery disease Father    • Coronary artery disease Sister    • Heart disease Sister    • Varicose Veins Sister         of lower extremities   • No Known Problems Sister    • No Known Problems Sister    • Hypertension Brother    • No Known Problems Daughter    • Diabetes Maternal Grandmother         Also nephew William Hectorermacher   • Breast cancer Maternal Aunt    • No Known Problems Maternal Aunt        Meds/Allergies   Allergies   Allergen Reactions   • Lisinopril Other (See Comments)     Other reaction(s): swollen legs   • Hydrocodone-Acetaminophen Dizziness   • Irbesartan-Hydrochlorothiazide Other (See Comments)      Hypertension  Pt had hypertension, arm heaviness and headache   • Pollen Extract Hives     Ragweed etc         Current Outpatient Medications:   •  acetaminophen (TYLENOL) 325 mg tablet, Take 650 mg by mouth every 6 (six) hours as needed for mild pain, Disp: , Rfl:   •  amLODIPine (NORVASC) 5 mg tablet, Take 1 tablet (5 mg total) by mouth daily (Patient taking differently: Take 5 mg by mouth every morning), Disp: 90 tablet, Rfl: 3  •  aspirin 81 mg chewable tablet, Chew 1 tablet (81 mg total) daily (Patient taking differently: Chew 81 mg every morning), Disp: 30 tablet, Rfl: 0  •  BIOTIN PO, Take by mouth in the morning, Disp: , Rfl:   •  cholecalciferol (VITAMIN D3) 1,000 units tablet, Take 1,000 Units by mouth daily, Disp: , Rfl:   •  esomeprazole (NexIUM) 40 MG capsule, Take 1 capsule (40 mg total) by mouth daily (Patient taking differently: Take 40 mg by mouth every morning), Disp: 90 capsule, Rfl: 1  •  Ginger, Zingiber officinalis, (GINGER ROOT PO), Take by mouth in the morning, Disp: , Rfl:   •  hydrochlorothiazide (HYDRODIURIL) 12 5 mg tablet, Take 1 tablet (12 5 mg total) by mouth daily (Patient taking differently: Take 12 5 mg by mouth every morning), Disp: 90 tablet, Rfl: 3  •  latanoprost (XALATAN) 0 005 % ophthalmic solution, Administer to both eyes daily at bedtime, Disp: , Rfl:   •  meloxicam (Mobic) 7 5 mg tablet, Take 1 tablet (7 5 mg total) by mouth daily for 7 days, Disp: 7 tablet, Rfl: 0  •  metoprolol succinate (TOPROL-XL) 50 mg 24 hr tablet, Take 1 tablet (50 mg total) by mouth daily (Patient taking differently: Take 50 mg by mouth daily at bedtime), Disp: 90 tablet, Rfl: 3  •  multivitamin (THERAGRAN) TABS, Take 1 tablet by mouth daily, Disp: , Rfl:   •  olmesartan (BENICAR) 40 mg tablet, Take 1 tablet (40 mg total) by mouth daily (Patient taking differently: Take 40 mg by mouth every morning), "Disp: 90 tablet, Rfl: 3  •  Sulfacetamide Sodium (Sodium Sulfacetamide Wash) 10 % LIQD, Apply twice daily to affected area on face (Patient taking differently: in the morning Apply twice daily to affected area on face), Disp: 177 mL, Rfl: 2  •  vitamin B-12 (CYANOCOBALAMIN) 2500 MCG TABS, Take 500 mcg by mouth daily, Disp: , Rfl:   •  oxyCODONE (Roxicodone) 5 immediate release tablet, Take 1 tablet (5 mg total) by mouth every 4 (four) hours as needed for moderate pain for up to 15 doses Max Daily Amount: 30 mg (Patient not taking: Reported on 4/6/2023), Disp: 15 tablet, Rfl: 0     REVIEW OF SYSTEMS  Constitutional:  Denies fever or chills   Eyes:  Denies change in visual acuity   HENT:  Denies nasal congestion or sore throat   Respiratory:  Denies cough or shortness of breath   Cardiovascular:  Denies chest pain has some lower extremity edema   GI: Left upper quadrant abdominal pain, no nausea, vomiting, bloody stools or diarrhea   :  Denies dysuria, frequency, difficulty in micturition and nocturia  Musculoskeletal: Left upper back pain left shoulder joint pain   Neurologic:  Denies headache, focal weakness or sensory changes   Endocrine:  Denies polyuria or polydipsia   Lymphatic:  Denies swollen glands   Psychiatric:  Denies depression or anxiety     Objective   Current Vitals:   /72 (BP Location: Right arm, Patient Position: Sitting, Cuff Size: Large)   Pulse 84   Temp 97 6 °F (36 4 °C) (Core)   Ht 5' 5\" (1 651 m)   Wt 95 9 kg (211 lb 6 4 oz)   SpO2 97%   BMI 35 18 kg/m²   Body mass index is 35 18 kg/m²  PHYSICAL EXAMS  General:  Patient is not in acute distress, laying in the bed comfortably, awake, alert responding to commands,   HEENT:  Both pupils normal-size atraumatic, normocephalic, nonicteric  Neck:  JVP not raised  Trachea central  Respiratory:  normal Breath sounds clear to auscultation,  Cardiovascular:  S1-S2 normal without any murmur   GI:  Abdomen soft nontender    Mild tenderness " over the rib where patient prosthesis or splint is resting musculoskeletal: Muscular back pain  Left lower back  Integument:  No skin rashes or ulceration  Lymphatic:  No cervical lymphadenopathy  No calf tenderness) bilateral varicose veins    Neurologic:  Patient is awake alert, responding to command, well-oriented to time and place and person moving all extremities ambulating well    Visit Diagnosis:   Diagnoses and all orders for this visit:    Muscular abdominal pain in left flank  -     meloxicam (Mobic) 7 5 mg tablet; Take 1 tablet (7 5 mg total) by mouth daily for 7 days       Plan of care was discussed with patient in detail    Pertinent notes reviewed    Assessment/Plan   Assessment:  Noncardiac lower chest and lower back pain    No shortness of breath no sweating    Symptomatic   Encounter Diagnosis   Name Primary? • Muscular abdominal pain in left flank Yes      Plan:  The risks, benefits, alternatives,and probabilities of success were discussed in detail with no guarantee made as to outcome  All questions were answered to the patient's satisfaction  She was offered to go to ER for EKG and chest CT to rule out PE or angina    Patient  is bedside: To wait for 6 7 hours in the ER    Advised   meloxicam 1 tablet daily  Patient is on Nexium  Advised to take this tablet after full meal    Patient was again reemphasized and advised if pain does not get better or get worse come to and report to ER for further work-up      Counseling / Coordination of Care  Expained patient family in detailed about coordination of care  A description of the counseling / coordination of care:  I performed an interim history, pertinent images and labs, performed a physical examination to arrive at the plan delineated above with associated thought processes       Family member/primary contact updated  /significant other at the bedside    Dr Dino Monsivais [place] [id number] [facility]    Office   Tel  [phone]  Fax   [phone]

## 2023-04-18 NOTE — ASSESSMENT & PLAN NOTE
Blood pressure control is borderline being followed by Cardiology 
Continued efforts at diet exercise for weight loss 
Her symptoms well controlled with Nexium  She has no worrisome symptoms  We refilled her prescription today  DEXA scan in 2016 was normal   We should consider repeating this possibly in the fall 
Lipid profile and follow up when results are available 
Patient presents today for subsequent wellness visit  All components of the visit were addressed 
Ultrasound was performed  October of 2020 and did not show evidence of suspicious nodule 
cousin

## 2023-04-24 ENCOUNTER — OFFICE VISIT (OUTPATIENT)
Dept: PHYSICAL THERAPY | Facility: CLINIC | Age: 68
End: 2023-04-24

## 2023-04-24 DIAGNOSIS — M75.122 COMPLETE TEAR OF LEFT ROTATOR CUFF, UNSPECIFIED WHETHER TRAUMATIC: Primary | ICD-10-CM

## 2023-04-24 NOTE — PROGRESS NOTES
"Daily Note     Today's date: 2023  Patient name: Stephane Sam  : 1955  MRN: [id number]  Referring provider: Jignesh Campa*  Dx: No diagnosis found  Subjective: patient states that she is compliant with HEP  No new complaints to offer  Objective: See treatment diary below      Assessment: Tolerated treatment well  Patient exhibited good technique with therapeutic exercises      Plan: Continue per plan of care        Precautions: per protocol; sling x 6 weeks as per MD  125 degrees flexion by 28 days  35-75 degrees ER at 0 degrees by 28 days  No pulley until week 5   No passive ABD until 3 weeks to 90 degrees  NO ER in ABD for 6 weeks  IR to body progress to 50 degrees by 28 days        Manuals         PROM  10' 10 10  10            ER = 30                                    Neuro Re-Ed                                                                                                        Ther Ex                          Wrist ROM x20 x20 x20 X 20          Putty Sq  3' ylw 3' ylw 3 min ylw 3 min ylw        SBS 10 10  3\" x 10  3\" x 20        Pendulum 20 20 x20 20 x 20x        Gentle arom elbow x10 2x10 2x10 2 x 10  2 x 10                                   Ther Activity                                       Gait Training                                       Modalities                                            "

## 2023-04-26 ENCOUNTER — OFFICE VISIT (OUTPATIENT)
Dept: PHYSICAL THERAPY | Facility: CLINIC | Age: 68
End: 2023-04-26

## 2023-04-26 ENCOUNTER — TELEPHONE (OUTPATIENT)
Dept: CARDIOLOGY CLINIC | Facility: CLINIC | Age: 68
End: 2023-04-26

## 2023-04-26 DIAGNOSIS — I10 BENIGN ESSENTIAL HYPERTENSION: Primary | ICD-10-CM

## 2023-04-26 DIAGNOSIS — M75.122 COMPLETE TEAR OF LEFT ROTATOR CUFF, UNSPECIFIED WHETHER TRAUMATIC: Primary | ICD-10-CM

## 2023-04-26 RX ORDER — AMLODIPINE BESYLATE 10 MG/1
10 TABLET ORAL DAILY
Qty: 90 TABLET | Refills: 3 | Status: SHIPPED | OUTPATIENT
Start: 2023-04-26 | End: 2023-07-25 | Stop reason: SDUPTHER

## 2023-04-26 NOTE — PROGRESS NOTES
"Daily Note     Today's date: 2023  Patient name: Sebastian Loera  : 1955  MRN: [id number]  Referring provider: Aarti Blank*  Dx:   Encounter Diagnosis     ICD-10-CM    1  Complete tear of left rotator cuff, unspecified whether traumatic  M75 [id number]                      Subjective: Patient states that she noticed some swelling following last treatment session  Objective: See treatment diary below      Assessment: Tolerated treatment well  Patient exhibited good technique with therapeutic exercises      Plan: Continue per plan of care        Precautions: per protocol; sling x 6 weeks as per MD  125 degrees flexion by 28 days  35-75 degrees ER at 0 degrees by 28 days  No pulley until week 5   No passive ABD until 3 weeks to 90 degrees  NO ER in ABD for 6 weeks  IR to body progress to 50 degrees by 28 days        Manuals        PROM  10' 10 10  10  10           ER = 30                                    Neuro Re-Ed                                                                                                        Ther Ex                          Wrist ROM x20 x20 x20 X 20          Putty Sq  3' ylw 3' ylw 3 min ylw 3 min ylw 3 min yellow       SBS 10 10  3\" x 10  3\" x 20 3\" x 20        Pendulum 20 20 x20 20 x 20x 20x       Gentle arom elbow x10 2x10 2x10 2 x 10  2 x 10  2 x 10        Shoulder isometrics      5\" x 10                     Ther Activity                                       Gait Training                                       Modalities             CP      10 min                          "

## 2023-04-26 NOTE — TELEPHONE ENCOUNTER
Spoke with pt in regards to her BP          ----- Message from [id number] sent at 4/26/2023  1:02 PM EDT -----  I received her blood pressures that she took at home for 1 week  They are all above goal   I like her to take amlodipine 10 mg daily  She can double up on the  5 mg tabs until the mail order comes in which I prescribed    Continue to monitor home blood pressures    Thanks

## 2023-04-27 ENCOUNTER — TELEPHONE (OUTPATIENT)
Dept: SLEEP CENTER | Facility: CLINIC | Age: 68
End: 2023-04-27

## 2023-04-27 NOTE — TELEPHONE ENCOUNTER
----- Message from Joyce Phoenix, DO sent at 4/26/2023 10:23 AM EDT -----  approved  ----- Message -----  From: Kevon Love  Sent: 4/21/2023   8:37 AM EDT  To: Sleep Medicine [facility] Provider    This Diagnostic sleep study needs approval      If approved please sign and return to clerical pool  If denied please include reasons why  Also provide alternative testing if warranted  Please sign and return to clerical pool

## 2023-05-01 ENCOUNTER — OFFICE VISIT (OUTPATIENT)
Dept: PHYSICAL THERAPY | Facility: CLINIC | Age: 68
End: 2023-05-01

## 2023-05-01 DIAGNOSIS — M75.122 COMPLETE TEAR OF LEFT ROTATOR CUFF, UNSPECIFIED WHETHER TRAUMATIC: Primary | ICD-10-CM

## 2023-05-01 NOTE — PROGRESS NOTES
"Daily Note     Today's date: 2023  Patient name: Jasmina Reveles  : 1955  MRN: [id number]  Referring provider: Tressa Michelle*  Dx: No diagnosis found  Subjective: Patient sore following her last treatment session   States that she has been compliant with HEP  Objective: See treatment diary below      Assessment: Tolerated treatment well  Patient exhibited good technique with therapeutic exercises      Plan: Continue per plan of care        Precautions: per protocol; sling x 6 weeks as per MD  125 degrees flexion by 28 days  35-75 degrees ER at 0 degrees by 28 days  No pulley until week 5   No passive ABD until 3 weeks to 90 degrees  NO ER in ABD for 6 weeks  IR to body progress to 50 degrees by 28 days        Manuals       PROM  10' 10 10  10  10  10         ER = 30                                    Neuro Re-Ed                                                                                                        Ther Ex                          Wrist ROM x20 x20 x20 X 20          Putty Sq  3' ylw 3' ylw 3 min ylw 3 min ylw 3 min yellow 3 min yellow      SBS 10 10  3\" x 10  3\" x 20 3\" x 20  3\"x 20       Pendulum 20 20 x20 20 x 20x 20x 20      Gentle arom elbow x10 2x10 2x10 2 x 10  2 x 10  2 x 10  2 x 10       Shoulder isometrics      5\" x 10  5\" x 10                    Ther Activity                                       Gait Training                                       Modalities             CP      10 min  10 min                         "

## 2023-05-04 ENCOUNTER — CLINICAL SUPPORT (OUTPATIENT)
Dept: CARDIOLOGY CLINIC | Facility: CLINIC | Age: 68
End: 2023-05-04

## 2023-05-04 ENCOUNTER — EVALUATION (OUTPATIENT)
Dept: PHYSICAL THERAPY | Facility: CLINIC | Age: 68
End: 2023-05-04

## 2023-05-04 VITALS — SYSTOLIC BLOOD PRESSURE: 132 MMHG | HEART RATE: 65 BPM | DIASTOLIC BLOOD PRESSURE: 78 MMHG | OXYGEN SATURATION: 99 %

## 2023-05-04 DIAGNOSIS — E78.00 PURE HYPERCHOLESTEROLEMIA: Primary | ICD-10-CM

## 2023-05-04 DIAGNOSIS — M75.122 COMPLETE TEAR OF LEFT ROTATOR CUFF, UNSPECIFIED WHETHER TRAUMATIC: Primary | ICD-10-CM

## 2023-05-04 NOTE — PROGRESS NOTES
"Daily Note     Today's date: 2023  Patient name: Aloha Frankel  : 1955  MRN: [id number]  Referring provider: Hernán Barrios*  Dx:   Encounter Diagnosis     ICD-10-CM    1  Complete tear of left rotator cuff, unspecified whether traumatic  M75 122                      Subjective: patient states that she has began to weaning out of the sling with some soreness  Objective: See treatment diary below      Assessment: Tolerated treatment well  Patient exhibited good technique with therapeutic exercises      Plan: Continue per plan of care        Precautions: per protocol; sling x 6 weeks as per MD  125 degrees flexion by 28 days  35-75 degrees ER at 0 degrees by 28 days  No pulley until week 5   No passive ABD until 3 weeks to 90 degrees  NO ER in ABD for 6 weeks  IR to body progress to 50 degrees by 28 days        Manuals      PROM  10' 10 10  10  10  10 10        ER = 30                                    Neuro Re-Ed                                                                                                        Ther Ex                          Wrist ROM x20 x20 x20 X 20     DC     Putty Sq  3' ylw 3' ylw 3 min ylw 3 min ylw 3 min yellow 3 min yellow DC     SBS 10 10  3\" x 10  3\" x 20 3\" x 20  3\"x 20       Pendulum 20 20 x20 20 x 20x 20x 20      Gentle arom elbow x10 2x10 2x10 2 x 10  2 x 10  2 x 10  2 x 10  DC     Shoulder isometrics      5\" x 10  5\" x 10       Table slides        10\"x 10      Pulleys        10\" x 4 min       AAROM shoulder flexion         NV     Ther Activity                                       Gait Training                                       Modalities             CP      10 min  10 min  10 min                        "

## 2023-05-05 NOTE — PROGRESS NOTES
No signs or symptoms of immediate adverse reaction 15 minutes post 1st leqvio injection  6-8 week lipids ordered and 2nd injection scheduled for 3 months

## 2023-05-08 ENCOUNTER — OFFICE VISIT (OUTPATIENT)
Dept: PHYSICAL THERAPY | Facility: CLINIC | Age: 68
End: 2023-05-08

## 2023-05-08 DIAGNOSIS — M75.122 COMPLETE TEAR OF LEFT ROTATOR CUFF, UNSPECIFIED WHETHER TRAUMATIC: Primary | ICD-10-CM

## 2023-05-08 NOTE — PROGRESS NOTES
"Daily Note     Today's date: 2023  Patient name: Paulie Schmidt  : 1955  MRN: [id number]  Referring provider: Mary Beth Burnham*  Dx:   Encounter Diagnosis     ICD-10-CM    1  Complete tear of left rotator cuff, unspecified whether traumatic  M75 122            Start Time: 1100  Stop Time: 1140  Total time in clinic (min): 40 minutes    Subjective: Patient reports left shoulder is painful  Patient rates pain as 5/10, but mostly achy  Objective: See treatment diary below      Assessment: Tolerated treatment well  Patient participated in skilled PT session focused on strengthening, stretching, and ROM  Patient able to complete exercises program with no increase in pain or discomfort  Patient would continue to benefit from skilled PT interventions to address strengthening, stretching, and ROM  Patient demonstrated fatigue post treatment and exhibited good technique with therapeutic exercises      Plan: Continue per plan of care        Precautions: per protocol; sling x 6 weeks as per MD  125 degrees flexion by 28 days  35-75 degrees ER at 0 degrees by 28 days  No pulley until week 5   No passive ABD until 3 weeks to 90 degrees  NO ER in ABD for 6 weeks  IR to body progress to 50 degrees by 28 days        Manuals     PROM  10' 10 10  10  10  10 10 10'       ER = 30                                    Neuro Re-Ed                                                                                                        Ther Ex                          Wrist ROM x20 x20 x20 X 20     DC     Putty Sq  3' ylw 3' ylw 3 min ylw 3 min ylw 3 min yellow 3 min yellow DC     SBS 10 10  3\" x 10  3\" x 20 3\" x 20  3\"x 20   3\" x 20    Pendulum 20 20 x20 20 x 20x 20x 20  20x    Gentle arom elbow x10 2x10 2x10 2 x 10  2 x 10  2 x 10  2 x 10  DC     Shoulder isometrics      5\" x 10  5\" x 10   5\" x 10 ea    Table slides        10\"x 10  10\" x 10 ea     Pulleys        10\" x 4 " "min   10\" x 4 min    AAROM shoulder flexion         NV 2x10    Ther Activity                                       Gait Training                                       Modalities             CP      10 min  10 min  10 min                        "

## 2023-05-11 ENCOUNTER — OFFICE VISIT (OUTPATIENT)
Dept: PHYSICAL THERAPY | Facility: CLINIC | Age: 68
End: 2023-05-11

## 2023-05-11 DIAGNOSIS — M75.122 COMPLETE TEAR OF LEFT ROTATOR CUFF, UNSPECIFIED WHETHER TRAUMATIC: Primary | ICD-10-CM

## 2023-05-11 NOTE — PROGRESS NOTES
"Daily Note     Today's date: 2023  Patient name: Yogi Estrella  : 1955  MRN: [id number]  Referring provider: Rach Patterson  Dx: No diagnosis found  Subjective: Patient states that she DC'd sling since Monday and has noticed an increase in pain along L bicep  Objective: See treatment diary below      Assessment: Tolerated treatment well  Patient able to progress with supine shoulder flexion however limited ROM due to pain      Plan: Continue per plan of care        Precautions: per protocol; sling x 6 weeks as per MD  125 degrees flexion by 28 days  35-75 degrees ER at 0 degrees by 28 days  No pulley until week 5   No passive ABD until 3 weeks to 90 degrees  NO ER in ABD for 6 weeks  IR to body progress to 50 degrees by 28 days        Manuals    PROM  10' 10 10  10  10  10 10 10' 10       ER = 30                                    Neuro Re-Ed                                                                                                        Ther Ex                                                    SBS 10 10  3\" x 10  3\" x 20 3\" x 20  3\"x 20   3\" x 20    Pendulum 20 20 x20 20 x 20x 20x 20  20x                 Shoulder isometrics      5\" x 10  5\" x 10   5\" x 10 ea    Table slides flexion/scaption        10\"x 10  10\" x 10 ea  10\" x 10    Pulleys        10\" x 4 min   10\" x 4 min 10\" x 4    AAROM shoulder flexion         NV 2x10 X 10 (minial range due to pain )   Ther Activity                                       Gait Training                                       Modalities             CP      10 min  10 min  10 min  10 min                       "

## 2023-05-15 ENCOUNTER — OFFICE VISIT (OUTPATIENT)
Dept: PHYSICAL THERAPY | Facility: CLINIC | Age: 68
End: 2023-05-15

## 2023-05-15 DIAGNOSIS — M75.122 COMPLETE TEAR OF LEFT ROTATOR CUFF, UNSPECIFIED WHETHER TRAUMATIC: Primary | ICD-10-CM

## 2023-05-15 NOTE — PROGRESS NOTES
"Daily Note     Today's date: 5/15/2023  Patient name: Praveen Singh  : 1955  MRN: [id number]  Referring provider: Shira Davey  Dx:   Encounter Diagnosis     ICD-10-CM    1  Complete tear of left rotator cuff, unspecified whether traumatic  M75 122                      Subjective: Patient continues to complain of intermittent bicep pain  Reviewed lifting restrictions per MD protocol  Objective: See treatment diary below      Assessment: Tolerated treatment well  Patient has MD f/u on Thursday       Plan: Continue per plan of care        Precautions: per protocol; sling x 6 weeks as per MD  125 degrees flexion by 28 days  35-75 degrees ER at 0 degrees by 28 days  No pulley until week 5   No passive ABD until 3 weeks to 90 degrees  NO ER in ABD for 6 weeks  IR to body progress to 50 degrees by 28 days        Manuals 5/15    4/24 4/26 5/1 5/4 5/8 5/11   PROM 10    10  10  10 10 10' 10                                                                                                                                                   Ther Ex                                                    SBS     3\" x 20 3\" x 20  3\"x 20   3\" x 20    Pendulum     20x 20x 20  20x                 Shoulder isometrics 5\" x 10     5\" x 10  5\" x 10   5\" x 10 ea    Table slides flexion/scaption 10\"x 10        10\"x 10  10\" x 10 ea  10\" x 10    Pulleys 10\" x 5 min       10\" x 4 min   10\" x 4 min 10\" x 4    AAROM shoulder flexion  3 x 5        NV 2x10 X 10 (minial range due to pain )   Ther Activity                                       Gait Training                                       Modalities             CP 10      10 min  10 min  10 min  10 min                       "

## 2023-05-17 ENCOUNTER — APPOINTMENT (OUTPATIENT)
Dept: PHYSICAL THERAPY | Facility: CLINIC | Age: 68
End: 2023-05-17
Payer: MEDICARE

## 2023-05-18 ENCOUNTER — OFFICE VISIT (OUTPATIENT)
Dept: OBGYN CLINIC | Facility: CLINIC | Age: 68
End: 2023-05-18

## 2023-05-18 ENCOUNTER — OFFICE VISIT (OUTPATIENT)
Dept: PHYSICAL THERAPY | Facility: CLINIC | Age: 68
End: 2023-05-18

## 2023-05-18 VITALS — DIASTOLIC BLOOD PRESSURE: 81 MMHG | SYSTOLIC BLOOD PRESSURE: 144 MMHG

## 2023-05-18 DIAGNOSIS — Z98.890 S/P LEFT ROTATOR CUFF REPAIR: Primary | ICD-10-CM

## 2023-05-18 DIAGNOSIS — M75.122 COMPLETE TEAR OF LEFT ROTATOR CUFF, UNSPECIFIED WHETHER TRAUMATIC: Primary | ICD-10-CM

## 2023-05-18 NOTE — PROGRESS NOTES
"Assessment/Plan:  1  S/P left rotator cuff repair          The patient is doing well and making progress of range of motion of the shoulder  I did explain that her bicep pain and upper trapezius discomfort should improve as she strengthens the shoulder more  She does not need her sling any longer  She will continue physical therapy on the rotator cuff repair protocol  She will follow-up in 6 weeks for repeat evaluation  Subjective:   Tony Gleaosn is a 69 y o  female who presents today for follow-up of her left shoulder, now about 7 weeks status post arthroscopic rotator cuff repair and subacromial decompression  The patient is doing well and notes minimal pain about the shoulder, but complains of pain about the anterior upper arm in the region of his biceps  She has been compliant with wearing her sling and attending physical therapy  She does feel she is making progress with range of motion at therapy  She denies any paresthesias of the left upper extremity        Review of Systems      Past Medical History:   Diagnosis Date   • Breast lump    • Chest pain 05/28/2019   • COVID-19 04/09/2021   • Disease of thyroid gland     nodule, benign   • Eczema     facial rash- could also be rosacea   • Elevated troponin I level 05/21/2021   • GERD (gastroesophageal reflux disease)    • History of transfusion     at 9years old for bleeding during tonsillectomy, no reaction   • Hypertension    • Nontoxic single thyroid nodule 07/25/2012   • Rectocele    • Rotator cuff tear, left    • Uterine leiomyoma    • Varicose veins of both lower extremities        Past Surgical History:   Procedure Laterality Date   • BLADDER SURGERY  08/2012    Bladder \"lift\"   • BREAST LUMPECTOMY Right 2000    benign   • COLONOSCOPY     • HYSTERECTOMY  1990   • JOINT REPLACEMENT  6/2017 and 5/2022    Knees   • KNEE SURGERY Right     arthroscopy x2   • NOSE SURGERY      deviated septum   • KY LARYNGOSCOPY W/BIOPSY MICROSCOPE/TELESCOPE Left " 2018    Procedure: MICROLARYNGOSCOPY AND EXCISION OF LEFT VOCAL FOLD POLYP;  Surgeon: Moe Reich MD;  Location: [facility];  Service: ENT   • MS SURGICAL ARTHROSCOPY SHOULDER W/ROTATOR CUFF RPR Left 3/28/2023    Procedure: Shoulder Arthroscopy with Rotator Cuff Repair and Subacromial decompression;  Surgeon: Asuncion Charlton MD;  Location: [address] [place] [address];  Service: Orthopedics   • MS XCAPSL CTRC RMVL INSJ IO LENS PROSTH W/O ECP Left 2022    Procedure: EXTRACTION EXTRACAPSULAR CATARACT PHACO INTRAOCULAR LENS (IOL);   Surgeon: Dangelo Whitaker MD;  Location: [facility];  Service: Ophthalmology   • TENDON REPAIR Right     achilles tendon   • TONSILLECTOMY     • TUBAL LIGATION     • VARICOSE VEIN SURGERY Left 2009       Family History   Problem Relation Age of Onset   • Breast cancer Mother    • Hypertension Mother    • Varicose Veins Mother         of lower extremities   • Glaucoma Mother         I also have glaucoma   • Hypertension Father    • Coronary artery disease Father    • Coronary artery disease Sister    • Heart disease Sister    • Varicose Veins Sister         of lower extremities   • No Known Problems Sister    • No Known Problems Sister    • Hypertension Brother    • No Known Problems Daughter    • Diabetes Maternal Grandmother         Also nephew William Dumontacher   • Breast cancer Maternal Aunt    • No Known Problems Maternal Aunt        Social History     Occupational History   • Not on file   Tobacco Use   • Smoking status: Former     Types: Cigarettes     Quit date: 1976     Years since quittin 4   • Smokeless tobacco: Never   • Tobacco comments:     None   Vaping Use   • Vaping Use: Never used   Substance and Sexual Activity   • Alcohol use: Yes     Comment: seldom   • Drug use: Never   • Sexual activity: Not Currently     Partners: Male     Birth control/protection: Surgical         Current Outpatient Medications:   •  acetaminophen (TYLENOL) 325 mg tablet, Take 650 mg by mouth every 6 (six) hours as needed for mild pain, Disp: , Rfl:   •  amLODIPine (NORVASC) 10 mg tablet, Take 1 tablet (10 mg total) by mouth daily, Disp: 90 tablet, Rfl: 3  •  aspirin 81 mg chewable tablet, Chew 1 tablet (81 mg total) daily (Patient taking differently: Chew 81 mg every morning), Disp: 30 tablet, Rfl: 0  •  BIOTIN PO, Take by mouth in the morning, Disp: , Rfl:   •  cholecalciferol (VITAMIN D3) 1,000 units tablet, Take 1,000 Units by mouth daily, Disp: , Rfl:   •  esomeprazole (NexIUM) 40 MG capsule, Take 1 capsule (40 mg total) by mouth daily (Patient taking differently: Take 40 mg by mouth every morning), Disp: 90 capsule, Rfl: 1  •  Ginger, Zingiber officinalis, (GINGER ROOT PO), Take by mouth in the morning, Disp: , Rfl:   •  hydrochlorothiazide (HYDRODIURIL) 12 5 mg tablet, Take 1 tablet (12 5 mg total) by mouth daily (Patient taking differently: Take 12 5 mg by mouth every morning), Disp: 90 tablet, Rfl: 3  •  latanoprost (XALATAN) 0 005 % ophthalmic solution, Administer to both eyes daily at bedtime, Disp: , Rfl:   •  metoprolol succinate (TOPROL-XL) 50 mg 24 hr tablet, Take 1 tablet (50 mg total) by mouth daily (Patient taking differently: Take 50 mg by mouth daily at bedtime), Disp: 90 tablet, Rfl: 3  •  multivitamin (THERAGRAN) TABS, Take 1 tablet by mouth daily, Disp: , Rfl:   •  olmesartan (BENICAR) 40 mg tablet, Take 1 tablet (40 mg total) by mouth daily (Patient taking differently: Take 40 mg by mouth every morning), Disp: 90 tablet, Rfl: 3  •  Sulfacetamide Sodium (Sodium Sulfacetamide Wash) 10 % LIQD, Apply twice daily to affected area on face (Patient taking differently: in the morning Apply twice daily to affected area on face), Disp: 177 mL, Rfl: 2  •  vitamin B-12 (CYANOCOBALAMIN) 2500 MCG TABS, Take 500 mcg by mouth daily, Disp: , Rfl:     Current Facility-Administered Medications:   •  Inclisiran Sodium (LEQVIO) subcutaneous injection 284 mg, 284 mg, Subcutaneous, Q6 Months, Carlos Rehman MD, 284 mg at 05/04/23 1048    Allergies   Allergen Reactions   • Lisinopril Other (See Comments)     Other reaction(s): swollen legs   • Hydrocodone-Acetaminophen Dizziness   • Irbesartan-Hydrochlorothiazide Other (See Comments)      Hypertension  Pt had hypertension, arm heaviness and headache   • Pollen Extract Hives     Ragweed etc     • Atorvastatin Myalgia       Objective:  Vitals:    05/18/23 1100   BP: 144/81            Left Shoulder Exam     Tenderness   Left shoulder tenderness location: Mild tenderness upper trapezius  Range of Motion   External rotation: 20   Forward flexion: 80 (140 passive forward flexion)     Other   Erythema: absent  Sensation: normal  Pulse: present     Comments: Well healed surgical scars  No kyree deformity            Physical Exam          This document was created using speech voice recognition software  Grammatical errors, random word insertions, pronoun errors, and incomplete sentences are an occasional consequence of this system due to software limitations, ambient noise, and hardware issues  Any formal questions or concerns about content, text, or information contained within the body of this dictation should be directly addressed to the provider for clarification  The patient is a 64y Female complaining of wound check.

## 2023-05-18 NOTE — PROGRESS NOTES
PT Re-evaluation     Today's date: 2023  Patient name: Amelia Weaver  : 1955  MRN: [id number]  Referring provider: Gauri Pretty*  Dx:   Encounter Diagnosis     ICD-10-CM    1  Complete tear of left rotator cuff, unspecified whether traumatic  M75 122 Ambulatory Referral to Physical Therapy                     Assessment:    Lesly Martinez has been attending physical therapy for L shoulder RTCR  She has been making excellent progress as per protocol  She would continue to benefit from skilled physical therapy to achieve maximal functional goals as per protocol  Thank you for your referral       Assessment details: Amelia Weaver is a 69 y o  female s/p L RTCR  She with clinical presentation is consistent with their referring diagnosis  Due to these impairments, Patient has difficulty performing a/iadls, recreational activities and engaging in social activities    Patient would benefit from skilled physical therapy as per protocol to address their aforementioned impairments, improve their level of function and to improve their overall quality of life   has been given a home exercise program and is in agreement with the plan of care  Thank you for your referral     Impairments: abnormal or restricted ROM, abnormal movement, activity intolerance, impaired physical strength, lacks appropriate home exercise program and pain with function  Understanding of Dx/Px/POC: excellent  Goals  ST Goals - 2-4 weeks  1  Patient will report decreased pain with activity by at least 2 points within 4 weeks- met  2  Patient will improve ROM per protocol - partially met  3  Patient will perform IADLs without pain per protocol - partially met  5  Patient will increase strength by 25% as per protocol  nt    LT Goals - Discharge  1  Patient will improve FOTO score to maximum stated or greater by discharge  2  Patient will return to preferred recreational activity without significant pain increase by discharge   3  Patient will return to all house work related activities without pain by discharge     Plan  Patient would benefit from: skilled physical therapy  Referral necessary: No  Planned therapy interventions: joint mobilization, manual therapy, neuromuscular re-education, strengthening, stretching, therapeutic activities, therapeutic exercise and home exercise program  Frequency: 2x week  Duration in visits: 20  Duration in weeks: 20  Plan of Care beginning date: 2023  Plan of Care expiration date: 2023  Treatment plan discussed with: patient        Subjective Evaluation    History of Present Illness  Mechanism of injury: Patient reports that she has a crazy dog that she had pushed out of the way a lot possibly causing an injury to the L shoulder  She had RTCR on   She has been compliant with wearing her sling, except to shower  She has been trying to sleep in her bed but does still use the recliner  CC:  She reports an achey discomfort in the anterior shoulder which radiates into the bicep region  Function:  ADLs, eating, dressing and bathing mostly using her R UE  She is LHD  She would like to return to driving and cooking, cleaning  She does do some light yard work  She does have a trip planned on May 30             Recurrent probem    Quality of life: excellent    Pain  Current pain ratin  Location: anterior shoulder/bicep   Quality: dull ache  Relieving factors: medications and ice  Aggravating factors: overhead activity, lifting, keyboarding and eating  Progression: improved    Social Support  Lives with: spouse    Employment status: not working  Hand dominance: left      Diagnostic Tests  X-ray: abnormal  MRI studies: abnormal  Treatments  Previous treatment: physical therapy  Patient Goals  Patient goals for therapy: decreased pain, decreased edema, increased strength, independence with ADLs/IADLs, return to sport/leisure activities and increased motion          Objective Observations   Left Shoulder   Positive for incision  Additional Observation Details  Well healed portals  2 intact steri strips  No noticeable color or temp changes  No signs of infection  Clean and dry  Denies radicular sx  Good cap bed refill    Palpation   Left   Tenderness of the levator scapulae and supraspinatus       Cervical/Thoracic Screen   Cervical range of motion within normal limits  Cervical range of motion within normal limits with the following exceptions: Sling soreness  Thoracic range of motion within normal limits    Active Range of Motion     Left Shoulder - 5/18/23    Flexion:  133  ABD:  128  ER:  Behind head  IR:  Pocket      Right Shoulder   Flexion: 165 degrees   Abduction: 165 degrees   External rotation BTH: [employer]  Internal rotation BTB: WFL    Passive Range of Motion       Left Shoulder                                             5/18/23      Flexion: 105 degrees                                150 degrees  ABD:                                                          145 degrees  External rotation 0°: 12 degrees                55 degrees           IR:                                                               76 degrees        Right Shoulder   Normal passive range of motion    Strength/Myotome Testing     Right Shoulder   Normal muscle strength    Additional Strength Details  nt

## 2023-05-18 NOTE — PROGRESS NOTES
"Daily Note     Today's date: 2023  Patient name: Kevin Arnold  : 1955  MRN: [id number]  Referring provider: Adams Burgos*  Dx:   Encounter Diagnosis     ICD-10-CM    1  Complete tear of left rotator cuff, unspecified whether traumatic  M75 122                      Subjective: Patient reports that she continues to have pain in the bicep area  Objective: See treatment diary below      Assessment: Tolerated treatment well  Patient would benefit from continued PT      Plan: Continue per plan of care        Precautions: per protocol; sling x 6 weeks as per MD  125 degrees flexion by 28 days  35-75 degrees ER at 0 degrees by 28 days  No pulley until week 5   No passive ABD until 3 weeks to 90 degrees  NO ER in ABD for 6 weeks  IR to body progress to 50 degrees by 28 days      [id number]         Manuals 5/15 5/18   4/24 4/26 5/1 5/4 5/8 5/11   PROM 10 10   10  10  10 10 10' 10                 rev  10                                                                                                                                Ther Ex                                       Ladder climb   5 x :10           SBS     3\" x 20 3\" x 20  3\"x 20   3\" x 20    Pendulum     20x 20x 20  20x                 Shoulder isometrics 5\" x 10 5 x :10    5\" x 10  5\" x 10   5\" x 10 ea    Table slides flexion/scaption/ ER 10\"x 10  10 x :10      10\"x 10  10\" x 10 ea  10\" x 10    Pulleys 10\" x 5 min 5'      10\" x 4 min   10\" x 4 min 10\" x 4    AAROM shoulder flexion  3 x 5  10 x :10      NV 2x10 X 10 (minial range due to pain )   Cane IR/EXT  5 x :10                                     Gait Training                                       Modalities             CP 10      10 min  10 min  10 min  10 min                       "

## 2023-05-22 ENCOUNTER — OFFICE VISIT (OUTPATIENT)
Dept: PHYSICAL THERAPY | Facility: CLINIC | Age: 68
End: 2023-05-22

## 2023-05-22 DIAGNOSIS — M75.122 COMPLETE TEAR OF LEFT ROTATOR CUFF, UNSPECIFIED WHETHER TRAUMATIC: Primary | ICD-10-CM

## 2023-05-22 NOTE — PROGRESS NOTES
"Daily Note     Today's date: 2023  Patient name: Jabari Austin  : 1955  MRN: [id number]  Referring provider: Chai Willoughby*  Dx: No diagnosis found  Subjective: patient states that she is feeling well  Objective: See treatment diary below      Assessment: Tolerated treatment well  Patient exhibited good technique with therapeutic exercises      Plan: Continue per plan of care        Precautions: per protocol; sling x 6 weeks as per MD  125 degrees flexion by 28 days  35-75 degrees ER at 0 degrees by 28 days  No pulley until week 5   No passive ABD until 3 weeks to 90 degrees  NO ER in ABD for 6 weeks  IR to body progress to 50 degrees by 28 days      [id number]         Manuals 5/15 5/18 5/22  4/24 4/26 5/1 5/4 5/8 5/11   PROM 10 10 10  10  10  10 10 10' 10                 rev  10                                                                                                                                Ther Ex                                       Ladder climb   5 x :10 10\" x 10                                                  Shoulder isometrics 5\" x 10 5 x :10 5\" x 10    5\" x 10  5\" x 10   5\" x 10 ea    Table slides flexion/abduction/ ER 10\"x 10  10 x :10 10\"x 10      10\"x 10  10\" x 10 ea  10\" x 10    Pulleys 10\" x 5 min 5' 5     10\" x 4 min   10\" x 4 min 10\" x 4    AAROM shoulder flexion  3 x 5  10 x :10 10\" x 10      NV 2x10 X 10 (minial range due to pain )   Cane IR/EXT  5 x :10 5\" x 10           Supine AROM flexion    x 10                        Gait Training                                       Modalities             CP 10   10   10 min  10 min  10 min  10 min                       "

## 2023-05-25 ENCOUNTER — OFFICE VISIT (OUTPATIENT)
Dept: PHYSICAL THERAPY | Facility: CLINIC | Age: 68
End: 2023-05-25

## 2023-05-25 DIAGNOSIS — M75.122 COMPLETE TEAR OF LEFT ROTATOR CUFF, UNSPECIFIED WHETHER TRAUMATIC: Primary | ICD-10-CM

## 2023-05-25 NOTE — PROGRESS NOTES
"Daily Note     Today's date: 2023  Patient name: Adilene Pinedo  : 1955  MRN: [id number]  Referring provider: Taras Koenig*  Dx:   Encounter Diagnosis     ICD-10-CM    1  Complete tear of left rotator cuff, unspecified whether traumatic  M75 122                      Subjective: Patient reports that she does continue to have soreness  Objective: See treatment diary below      Assessment: Tolerated treatment well  Patient would benefit from continued PT      Plan: Continue per plan of care        Precautions: per protocol; sling x 6 weeks as per MD  125 degrees flexion by 28 days  35-75 degrees ER at 0 degrees by 28 days  No pulley until week 5   No passive ABD until 3 weeks to 90 degrees  NO ER in ABD for 6 weeks  IR to body progress to 50 degrees by 28 days      [id number]         Manuals 5/15 5/18 5/22 5/25 4/24 4/26 5/1 5/4 5/8 5/11   PROM 10 10 10 10 10  10  10 10 10' 10                 rev  10                                                                                                                                Ther Ex                                       Ladder climb   5 x :10 10\" x 10  10 x :10                                                Shoulder isometrics 5\" x 10 5 x :10 5\" x 10  10 x :5  5\" x 10  5\" x 10   5\" x 10 ea    Table slides flexion/abduction/ ER 10\"x 10  10 x :10 10\"x 10  10 x :10    10\"x 10  10\" x 10 ea  10\" x 10    Pulleys 10\" x 5 min 5' 5 5'    10\" x 4 min   10\" x 4 min 10\" x 4    AAROM shoulder flexion  3 x 5  10 x :10 10\" x 10  10 x :10    NV 2x10 X 10 (minial range due to pain )   Cane IR/EXT  5 x :10 5\" x 10  10 x :05         Supine AROM flexion    x 10  X 10         Haircut    10 x :10         Supine AROM flex    nv         S/L ER    nv         S/l ABD    nv         Modalities             CP 10   10   10 min  10 min  10 min  10 min                       "

## 2023-05-30 ENCOUNTER — APPOINTMENT (OUTPATIENT)
Dept: PHYSICAL THERAPY | Facility: CLINIC | Age: 68
End: 2023-05-30
Payer: MEDICARE

## 2023-06-05 ENCOUNTER — OFFICE VISIT (OUTPATIENT)
Dept: PHYSICAL THERAPY | Facility: CLINIC | Age: 68
End: 2023-06-05
Payer: MEDICARE

## 2023-06-05 DIAGNOSIS — M75.122 COMPLETE TEAR OF LEFT ROTATOR CUFF, UNSPECIFIED WHETHER TRAUMATIC: Primary | ICD-10-CM

## 2023-06-05 PROCEDURE — 97140 MANUAL THERAPY 1/> REGIONS: CPT

## 2023-06-05 PROCEDURE — 97110 THERAPEUTIC EXERCISES: CPT

## 2023-06-05 NOTE — PROGRESS NOTES
"Daily Note     Today's date: 2023  Patient name: Beata Choe  : 1955  MRN: [id number]  Referring provider: Dom Sorenson*  Dx: No diagnosis found  Subjective: Patient states that she is feeling well  Objective: See treatment diary below      Assessment: Tolerated treatment well  Patient exhibited good technique with therapeutic exercises      Plan: Continue per plan of care        Precautions: per protocol; sling x 6 weeks as per MD  125 degrees flexion by 28 days  35-75 degrees ER at 0 degrees by 28 days  No pulley until week 5   No passive ABD until 3 weeks to 90 degrees  NO ER in ABD for 6 weeks  IR to body progress to 50 degrees by 28 days      [id number]         Manuals 5/15 5/18 5/22 5/25 6/5 /       PROM 10 10 10 10 10                     rev  10                                                                                                                                Ther Ex                                       Ladder climb   5 x :10 10\" x 10  10 x :10 10\"x 10                                                Shoulder isometrics 5\" x 10 5 x :10 5\" x 10  10 x :5 NP        Table slides flexion/abduction/ ER 10\"x 10  10 x :10 10\"x 10  10 x :10 NP        Pulleys 10\" x 5 min 5' 5 5' 5        AAROM shoulder flexion  3 x 5  10 x :10 10\" x 10  10 x :10 10\" x 10        Cane IR/EXT  5 x :10 5\" x 10  10 x :05 5\" x 10         Supine AROM flexion    x 10  X 10 X 10         Haircut    10 x :10 10\" x 10         Supine AROM flex    nv 10        Standing flexion     10         S/L ER    nv NVNV        S/l ABD    nv         Thera band IR/ER     GTB x 10        Modalities             CP 10   10  10                          "

## 2023-06-08 ENCOUNTER — OFFICE VISIT (OUTPATIENT)
Dept: PHYSICAL THERAPY | Facility: CLINIC | Age: 68
End: 2023-06-08
Payer: MEDICARE

## 2023-06-08 DIAGNOSIS — M75.122 COMPLETE TEAR OF LEFT ROTATOR CUFF, UNSPECIFIED WHETHER TRAUMATIC: Primary | ICD-10-CM

## 2023-06-08 PROCEDURE — 97140 MANUAL THERAPY 1/> REGIONS: CPT

## 2023-06-08 PROCEDURE — 97110 THERAPEUTIC EXERCISES: CPT

## 2023-06-08 NOTE — PROGRESS NOTES
"Daily Note     Today's date: 2023  Patient name: Rishabh Macdonald  : 1955  MRN: [id number]  Referring provider: Gage Walker*  Dx: No diagnosis found  Subjective: Mild soreness following last treatment session  Objective: See treatment diary below      Assessment: Tolerated treatment well  Patient exhibited good technique with therapeutic exercises      Plan: Continue per plan of care        Precautions: per protocol; sling x 6 weeks as per MD  125 degrees flexion by 28 days  35-75 degrees ER at 0 degrees by 28 days  No pulley until week 5   No passive ABD until 3 weeks to 90 degrees  NO ER in ABD for 6 weeks  IR to body progress to 50 degrees by 28 days      [id number]         Manuals 5/15 5/18 5/22 5/25 6/5 6/8       PROM 10 10 10 10 10 10                     rev  10                                                                                                                                Ther Ex                                       Ladder climb   5 x :10 10\" x 10  10 x :10 10\"x 10  10\" x 10                                               Shoulder isometrics 5\" x 10 5 x :10 5\" x 10  10 x :5 NP        Table slides flexion/abduction/ ER 10\"x 10  10 x :10 10\"x 10  10 x :10 NP        Pulleys 10\" x 5 min 5' 5 5' 5 5       AAROM shoulder flexion  3 x 5  10 x :10 10\" x 10  10 x :10 10\" x 10 10\" x 10       Cane IR/EXT  5 x :10 5\" x 10  10 x :05 5\" x 10  5\" x 10        Supine AROM flexion    x 10  X 10 X 10  10\" x 10        Haircut    10 x :10 10\" x 10  10\"        Supine AROM flex    nv 10 10                     S/L ER    nv NVNV X 15        S/l ABD    nv  X 15        Thera band IR/ER     GTB x 10 GTB x 10        Modalities             CP 10   10  10 10                          "

## 2023-06-12 ENCOUNTER — OFFICE VISIT (OUTPATIENT)
Dept: PHYSICAL THERAPY | Facility: CLINIC | Age: 68
End: 2023-06-12
Payer: MEDICARE

## 2023-06-12 DIAGNOSIS — M75.122 COMPLETE TEAR OF LEFT ROTATOR CUFF, UNSPECIFIED WHETHER TRAUMATIC: Primary | ICD-10-CM

## 2023-06-12 PROCEDURE — 97110 THERAPEUTIC EXERCISES: CPT

## 2023-06-12 PROCEDURE — 97140 MANUAL THERAPY 1/> REGIONS: CPT

## 2023-06-12 NOTE — PROGRESS NOTES
"Daily Note     Today's date: 2023  Patient name: Sidra Carranza  : 1955  MRN: [id number]  Referring provider: Cesario Clarke*  Dx: No diagnosis found  Subjective: Patient states that she is \"achy\" to begin treatment      Objective: See treatment diary below      Assessment: Tolerated treatment well  Patient exhibited good technique with therapeutic exercises      Plan: Continue per plan of care        Precautions: per protocol; sling x 6 weeks as per MD  125 degrees flexion by 28 days  35-75 degrees ER at 0 degrees by 28 days  No pulley until week 5   No passive ABD until 3 weeks to 90 degrees  NO ER in ABD for 6 weeks  IR to body progress to 50 degrees by 28 days      [id number]         Manuals 5/15 5/18 5/22 5/25 6/5 6/8 6/12      PROM 10 10 10 10 10 10  10                   rev  10                                                                                                                                Ther Ex                                       Ladder climb   5 x :10 10\" x 10  10 x :10 10\"x 10  10\" x 10  10\" x 10       Pulleys 10\" x 5 min 5' 5 5' 5 5 5       AAROM shoulder flexion  3 x 5  10 x :10 10\" x 10  10 x :10 10\" x 10 10\" x 10 10\" x 10       Cane IR/EXT  5 x :10 5\" x 10  10 x :05 5\" x 10  5\" x 10  10\" x 5       Standing AROM flexion/sc       10x      Haircut    10 x :10 10\" x 10  10\"  10\" x 10       Supine AROM flex    nv 10 10  10       S/L ER    nv NVNV X 15  X 20      S/l ABD    nv  X 15  X 20       Thera band IR/ER     GTB x 10 GTB x 10  NV      Theraband rows/extensiom       NV      Modalities             CP 10   10  10 10  10                         "

## 2023-06-15 ENCOUNTER — OFFICE VISIT (OUTPATIENT)
Dept: PHYSICAL THERAPY | Facility: CLINIC | Age: 68
End: 2023-06-15
Payer: MEDICARE

## 2023-06-15 DIAGNOSIS — M75.122 COMPLETE TEAR OF LEFT ROTATOR CUFF, UNSPECIFIED WHETHER TRAUMATIC: Primary | ICD-10-CM

## 2023-06-15 PROCEDURE — 97110 THERAPEUTIC EXERCISES: CPT

## 2023-06-15 PROCEDURE — 97140 MANUAL THERAPY 1/> REGIONS: CPT

## 2023-06-15 NOTE — PROGRESS NOTES
"Daily Note     Today's date: 6/15/2023  Patient name: Aurelio Bailon  : 1955  MRN: [id number]  Referring provider: Ziggy Figueroa*  Dx: No diagnosis found  Subjective: Patient states that she continues to notice moderate soreness In bicep      Objective: See treatment diary below      Assessment: Tolerated treatment well  Patient exhibited good technique with therapeutic exercises      Plan: Continue per plan of care        Precautions: per protocol; sling x 6 weeks as per MD  125 degrees flexion by 28 days  35-75 degrees ER at 0 degrees by 28 days  No pulley until week 5   No passive ABD until 3 weeks to 90 degrees  NO ER in ABD for 6 weeks  IR to body progress to 50 degrees by 28 days      [id number]         Manuals 5/15 5/18 5/22 5/25 6/5 6/8 6/12 6/15     PROM 10 10 10 10 10 10  10 10                  rev  10                                                                                                                                Ther Ex                                       Ladder climb   5 x :10 10\" x 10  10 x :10 10\"x 10  10\" x 10  10\" x 10  10\" x 10     Pulleys 10\" x 5 min 5' 5 5' 5 5 5  5     AAROM shoulder flexion  3 x 5  10 x :10 10\" x 10  10 x :10 10\" x 10 10\" x 10 10\" x 10  10\"x 10      Cane IR/EXT  5 x :10 5\" x 10  10 x :05 5\" x 10  5\" x 10  10\" x 5  10\" x 5      Standing AROM flexion/sc       10x 10x     Haircut    10 x :10 10\" x 10  10\"  10\" x 10  10\" x 10      Supine AROM flex    nv 10 10  10  1# 10      S/L ER    nv NVNV X 15  X 20 1# 20      S/l ABD    nv  X 15  X 20  1# 10      Thera band IR/ER     GTB x 10 GTB x 10  NV      Theraband rows/extensiom       NV GTB x 20      Modalities             CP 10   10  10 10  10  10                        "

## 2023-06-19 ENCOUNTER — OFFICE VISIT (OUTPATIENT)
Dept: PHYSICAL THERAPY | Facility: CLINIC | Age: 68
End: 2023-06-19
Payer: MEDICARE

## 2023-06-19 DIAGNOSIS — M75.122 COMPLETE TEAR OF LEFT ROTATOR CUFF, UNSPECIFIED WHETHER TRAUMATIC: Primary | ICD-10-CM

## 2023-06-19 PROCEDURE — 97112 NEUROMUSCULAR REEDUCATION: CPT | Performed by: PHYSICAL THERAPIST

## 2023-06-19 PROCEDURE — 97110 THERAPEUTIC EXERCISES: CPT | Performed by: PHYSICAL THERAPIST

## 2023-06-19 PROCEDURE — 97140 MANUAL THERAPY 1/> REGIONS: CPT | Performed by: PHYSICAL THERAPIST

## 2023-06-19 NOTE — PROGRESS NOTES
"Daily Note     Today's date: 2023  Patient name: Fito Samuel  : 1955  MRN: [id number]  Referring provider: Renu Tucker*  Dx:   Encounter Diagnosis     ICD-10-CM    1  Complete tear of left rotator cuff, unspecified whether traumatic  M75 122                      Subjective: Patient reports that she feels that her shoulder still gets achy  Objective: See treatment diary below      Assessment: Tolerated treatment well  Patient would benefit from continued PT      Plan: Continue per plan of care        Precautions: per protocol; sling x 6 weeks as per MD  125 degrees flexion by 28 days  35-75 degrees ER at 0 degrees by 28 days  No pulley until week 5   No passive ABD until 3 weeks to 90 degrees  NO ER in ABD for 6 weeks  IR to body progress to 50 degrees by 28 days      [id number]         Manuals 5/15 5/18 5/22 5/25 6/5 6/8 6/12 6/15 6 19    PROM 10 10 10 10 10 10  10 10 10                 rev  10       10                                                                                                                         Ther Ex                                       Ladder climb   5 x :10 10\" x 10  10 x :10 10\"x 10  10\" x 10  10\" x 10  10\" x 10 10 x :10    Pulleys 10\" x 5 min 5' 5 5' 5 5 5  5 5'    AAROM shoulder flexion  3 x 5  10 x :10 10\" x 10  10 x :10 10\" x 10 10\" x 10 10\" x 10  10\"x 10  10 x :10    Cane IR/EXT  5 x :10 5\" x 10  10 x :05 5\" x 10  5\" x 10  10\" x 5  10\" x 5  10 x :10    Standing AROM flexion/sc       10x 10x 1# x 20    Haircut    10 x :10 10\" x 10  10\"  10\" x 10  10\" x 10  10 x :10    Supine AROM flex    nv 10 10  10  1# 10  1# x 20    S/L ER 1# x20   nv NVNV X 15  X 20 1# 20  1#x20    S/l ABD 1# x 20   nv  X 15  X 20  1# 10  1# x 20    Thera band IR/ER     GTB x 10 GTB x 10  NV      Theraband rows/extensiom GTB x 20      NV GTB x 20  GTB x 20    Modalities             CP 10   10  10 10  10  10                        "

## 2023-06-19 NOTE — PROGRESS NOTES
PT Re-evaluation     Today's date: 2023  Patient name: Mary Kramer  : 1955  MRN: [id number]  Referring provider: Ashwin Verde  Dx:   Encounter Diagnosis     ICD-10-CM    1  Complete tear of left rotator cuff, unspecified whether traumatic  M75 122 Ambulatory Referral to Physical Therapy                     Assessment:    Palak Perez has been attending physical therapy for L shoulder RTCR  She has been making excellent progress as per protocol  She would continue to benefit from skilled physical therapy to achieve maximal functional goals as per protocol  Thank you for your referral       Assessment details: Mary Kramer is a 69 y o  female s/p L RTCR  She with clinical presentation is consistent with their referring diagnosis  Due to these impairments, Patient has difficulty performing a/iadls, recreational activities and engaging in social activities    Patient would benefit from skilled physical therapy as per protocol to address their aforementioned impairments, improve their level of function and to improve their overall quality of life   has been given a home exercise program and is in agreement with the plan of care  Thank you for your referral     Impairments: abnormal or restricted ROM, abnormal movement, activity intolerance, impaired physical strength, lacks appropriate home exercise program and pain with function  Understanding of Dx/Px/POC: excellent  Goals  ST Goals - 2-4 weeks  1  Patient will report decreased pain with activity by at least 2 points within 4 weeks- met  2  Patient will improve ROM per protocol - partially met  3  Patient will perform IADLs without pain per protocol - partially met  5  Patient will increase strength by 25% as per protocol  nt    LT Goals - Discharge  1  Patient will improve FOTO score to maximum stated or greater by discharge  2  Patient will return to preferred recreational activity without significant pain increase by discharge   3  Patient will return to all house work related activities without pain by discharge     Plan  Patient would benefit from: skilled physical therapy  Referral necessary: No  Planned therapy interventions: joint mobilization, manual therapy, neuromuscular re-education, strengthening, stretching, therapeutic activities, therapeutic exercise and home exercise program  Frequency: 2x week  Duration in visits: 20  Duration in weeks: 20  Plan of Care beginning date: 2023  Plan of Care expiration date: 2023  Treatment plan discussed with: patient        Subjective Evaluation    History of Present Illness  Mechanism of injury: Patient reports that she has a crazy dog that she had pushed out of the way a lot possibly causing an injury to the L shoulder  She had RTCR on   She has been compliant with wearing her sling, except to shower  She has been trying to sleep in her bed but does still use the recliner  CC:  She reports an achey discomfort in the anterior shoulder which radiates into the bicep region  Function:  ADLs, eating, dressing and bathing mostly using her R UE  She is LHD  She would like to return to driving and cooking, cleaning  She does do some light yard work  She does have a trip planned on May 30  CC: Kiesha Krista continues to note achy pain however, she's made good progress with functional activities, as well as strength and ROM            Recurrent probem    Quality of life: excellent    Pain  Current pain ratin  Location: anterior shoulder/bicep   Quality: dull ache  Relieving factors: medications and ice  Aggravating factors: overhead activity, lifting, keyboarding and eating  Progression: improved    Social Support  Lives with: spouse    Employment status: not working  Hand dominance: left      Diagnostic Tests  X-ray: abnormal  MRI studies: abnormal  Treatments  Previous treatment: physical therapy  Patient Goals  Patient goals for therapy: decreased pain, decreased edema, increased strength, independence with ADLs/IADLs, return to sport/leisure activities and increased motion          Objective     Observations   Left Shoulder   Positive for incision  Additional Observation Details  Well healed portals  2 intact steri strips  No noticeable color or temp changes  No signs of infection  Clean and dry  Denies radicular sx  Good cap bed refill    Palpation   Left   Tenderness of the levator scapulae and supraspinatus       Cervical/Thoracic Screen   Cervical range of motion within normal limits  Cervical range of motion within normal limits with the following exceptions: Sling soreness  Thoracic range of motion within normal limits    Active Range of Motion     Left Shoulder - 5/18/23 6/19    Flexion:  133                                             150 degrees  ABD:  128                                                   135 degrees  ER:  Behind head                                        head  IR:  Pocket                                                     pocket      Right Shoulder   Flexion: 165 degrees   Abduction: 165 degrees   External rotation BT: [facility]  Internal rotation BTB: WFL    Passive Range of Motion       Left Shoulder                                             5/18/23 6/19/23      Flexion: 105 degrees                                150 degrees                           160 degrees  ABD:                                                          145 degrees                           160 degrees  External rotation 0°: 12 degrees                55 degrees                               65 degrees          IR:                                                               76 degrees                               75 degrees    Right Shoulder   Normal passive range of motion    Strength/Myotome Testing     Right Shoulder   Normal muscle strength    Additional Strength Details  nt 6/19/23    Flexion                                                       4-               ABD                                                            4  ER                                                               4+  IR                                                                4+

## 2023-06-22 ENCOUNTER — EVALUATION (OUTPATIENT)
Dept: PHYSICAL THERAPY | Facility: CLINIC | Age: 68
End: 2023-06-22
Payer: MEDICARE

## 2023-06-22 ENCOUNTER — APPOINTMENT (OUTPATIENT)
Dept: LAB | Facility: CLINIC | Age: 68
End: 2023-06-22
Payer: MEDICARE

## 2023-06-22 DIAGNOSIS — E78.00 PURE HYPERCHOLESTEROLEMIA: ICD-10-CM

## 2023-06-22 DIAGNOSIS — M75.122 COMPLETE TEAR OF LEFT ROTATOR CUFF, UNSPECIFIED WHETHER TRAUMATIC: Primary | ICD-10-CM

## 2023-06-22 LAB
CHOLEST SERPL-MCNC: 157 MG/DL
HDLC SERPL-MCNC: 58 MG/DL
LDLC SERPL CALC-MCNC: 74 MG/DL (ref 0–100)
NONHDLC SERPL-MCNC: 99 MG/DL
TRIGL SERPL-MCNC: 126 MG/DL

## 2023-06-22 PROCEDURE — 80061 LIPID PANEL: CPT

## 2023-06-22 PROCEDURE — 36415 COLL VENOUS BLD VENIPUNCTURE: CPT

## 2023-06-22 PROCEDURE — 97140 MANUAL THERAPY 1/> REGIONS: CPT | Performed by: PHYSICAL THERAPIST

## 2023-06-22 PROCEDURE — 97110 THERAPEUTIC EXERCISES: CPT | Performed by: PHYSICAL THERAPIST

## 2023-06-22 NOTE — PROGRESS NOTES
"Daily Note     Today's date: 2023  Patient name: Mariluz Henry  : 1955  MRN: [id number]  Referring provider: Christine Macias*  Dx: No diagnosis found  Subjective: Patient reports that her shoulder is achy today possibly related to the weather  Objective: See treatment diary below      Assessment: Tolerated treatment well  Patient would benefit from continued PT      Plan: Continue per plan of care        Precautions: per protocol; sling x 6 weeks as per MD  125 degrees flexion by 28 days  35-75 degrees ER at 0 degrees by 28 days  No pulley until week 5   No passive ABD until 3 weeks to 90 degrees  NO ER in ABD for 6 weeks  IR to body progress to 50 degrees by 28 days      [id number]         Manuals 5/15 5/18 5/22 5/25 6/5 6/8 6/12 6/15 6    PROM 10 10 10 10 10 10  10 10 10 10                rev  10       10                                                                                                                         Ther Ex                                       Ladder climb   5 x :10 10\" x 10  10 x :10 10\"x 10  10\" x 10  10\" x 10  10\" x 10 10 x :10 10 x :10   Pulleys 10\" x 5 min 5' 5 5' 5 5 5  5 5' 5'   AAROM shoulder flexion  3 x 5  10 x :10 10\" x 10  10 x :10 10\" x 10 10\" x 10 10\" x 10  10\"x 10  10 x :10 10 x :10   Cane IR/EXT  5 x :10 5\" x 10  10 x :05 5\" x 10  5\" x 10  10\" x 5  10\" x 5  10 x :10 10 x :10   Standing AROM flexion/sc       10x 10x 1# x 20 1# x 20   Haircut    10 x :10 10\" x 10  10\"  10\" x 10  10\" x 10  10 x :10    Supine AROM flex    nv 10 10  10  1# 10  1# x 20 1# x 20   S/L ER 1# x20   nv NVNV X 15  X 20 1# 20  1#x20 1# x 20   S/l ABD 1# x 20   nv  X 15  X 20  1# 10  1# x 20 1# x 20   Thera band IR/ER     GTB x 10 GTB x 10  NV      Theraband rows/extensiom GTB x 20      NV GTB x 20  GTB x 20 GTB x 20   Modalities             CP 10   10  10 10  10  10                        "

## 2023-06-26 ENCOUNTER — OFFICE VISIT (OUTPATIENT)
Dept: OBGYN CLINIC | Facility: CLINIC | Age: 68
End: 2023-06-26

## 2023-06-26 ENCOUNTER — OFFICE VISIT (OUTPATIENT)
Dept: PHYSICAL THERAPY | Facility: CLINIC | Age: 68
End: 2023-06-26
Payer: MEDICARE

## 2023-06-26 DIAGNOSIS — M75.122 COMPLETE TEAR OF LEFT ROTATOR CUFF, UNSPECIFIED WHETHER TRAUMATIC: Primary | ICD-10-CM

## 2023-06-26 DIAGNOSIS — Z98.890 S/P LEFT ROTATOR CUFF REPAIR: Primary | ICD-10-CM

## 2023-06-26 PROCEDURE — 99024 POSTOP FOLLOW-UP VISIT: CPT | Performed by: PHYSICIAN ASSISTANT

## 2023-06-26 PROCEDURE — 97110 THERAPEUTIC EXERCISES: CPT

## 2023-06-26 PROCEDURE — 97140 MANUAL THERAPY 1/> REGIONS: CPT

## 2023-06-26 NOTE — PROGRESS NOTES
"Daily Note     Today's date: 2023  Patient name: Shiela Resendiz  : 1955  MRN: [id number]  Referring provider: Calista Huffman*  Dx: No diagnosis found  Subjective: patient states that she continues to experience moderate amount of pain/soreness in L bicep  States that she has a difficult time sleeping due to this  Objective: See treatment diary below      Assessment: Tolerated treatment well  Patient exhibited good technique with therapeutic exercises      Plan: Continue per plan of care        Precautions: per protocol; sling x 6 weeks as per MD  125 degrees flexion by 28 days  35-75 degrees ER at 0 degrees by 28 days  No pulley until week 5   No passive ABD until 3 weeks to 90 degrees  NO ER in ABD for 6 weeks  IR to body progress to 50 degrees by 28 days      [id number]         Manuals             PROM 10                          rev                                                                                                                                  Ther Ex                                       Pball roll out standing  10\"  x5            Pulleys 5            AAROM shoulder flexion  10\"x 10             Cane IR/EXT 10\" x 10             Standing AROM flexion/scaption 1# 20             Haircut             Supine AROM flex 1# 20            S/L ER 1# 20             S/l ABD 1# 20             Cabinet taps 2# 10             Thera band IR/ER             Theraband rows/extensiom             Modalities             CP                               "

## 2023-06-26 NOTE — PROGRESS NOTES
Assessment/Plan:  1  S/P left rotator cuff repair          Patient is making progress with her shoulder, however she appears to have some biceps tendinitis  We did discuss possible ultrasound-guided steroid injection by Dr Fabiana Medrano for this, however the patient would like to wait and see how she does over the next 6 weeks  She will continue physical therapy for range of motion and strengthening  She can gradually increase her activity at home  She will follow-up in 6 weeks for repeat evaluation  She may ice and take over-the-counter medications as needed for discomfort  Subjective:   Max Oglesby is a 69 y o  female who presents today for follow-up of her left shoulder, now almost 13 weeks status post arthroscopic rotator cuff repair and subacromial decompression  The patient feels like she is making progress with physical therapy, however continues to complain about anterior shoulder pain at times  She notes a sharp pain that can last about 5 seconds with certain movements  She notes improving range of motion and strength though  She denies any paresthesias of the left upper extremity  Review of Systems   Constitutional: Negative  Negative for chills and fever  HENT: Negative  Negative for ear pain and sore throat  Eyes: Negative  Negative for pain and redness  Respiratory: Negative  Negative for shortness of breath and wheezing  Cardiovascular: Negative for chest pain and palpitations  Gastrointestinal: Negative  Negative for abdominal pain and blood in stool  Endocrine: Negative  Negative for polydipsia and polyuria  Genitourinary: Negative  Negative for difficulty urinating and dysuria  Musculoskeletal:        As noted in HPI   Skin: Negative  Negative for pallor and rash  Neurological: Negative  Negative for dizziness and numbness  Hematological: Negative  Negative for adenopathy  Does not bruise/bleed easily  Psychiatric/Behavioral: Negative    Negative for "confusion and suicidal ideas  Past Medical History:   Diagnosis Date   • Breast lump    • Chest pain 05/28/2019   • COVID-19 04/09/2021   • Disease of thyroid gland     nodule, benign   • Eczema     facial rash- could also be rosacea   • Elevated troponin I level 05/21/2021   • GERD (gastroesophageal reflux disease)    • History of transfusion     at 9years old for bleeding during tonsillectomy, no reaction   • Hypertension    • Nontoxic single thyroid nodule 07/25/2012   • Rectocele    • Rotator cuff tear, left    • Uterine leiomyoma    • Varicose veins of both lower extremities        Past Surgical History:   Procedure Laterality Date   • BLADDER SURGERY  08/2012    Bladder \"lift\"   • BREAST LUMPECTOMY Right 2000    benign   • COLONOSCOPY     • HYSTERECTOMY  1990   • JOINT REPLACEMENT  6/2017 and 5/2022    Knees   • KNEE SURGERY Right     arthroscopy x2   • NOSE SURGERY      deviated septum   • MT LARYNGOSCOPY W/BIOPSY MICROSCOPE/TELESCOPE Left 04/02/2018    Procedure: MICROLARYNGOSCOPY AND EXCISION OF LEFT VOCAL FOLD POLYP;  Surgeon: Kelsie Pimentel MD;  Location: [facility];  Service: ENT   • MT SURGICAL ARTHROSCOPY SHOULDER W/ROTATOR CUFF RPR Left 3/28/2023    Procedure: Shoulder Arthroscopy with Rotator Cuff Repair and Subacromial decompression;  Surgeon: Judie Mistry MD;  Location: [address] [place] [address];  Service: Orthopedics   • MT XCAPSL CTRC RMVL INSJ IO LENS PROSTH W/O ECP Left 11/28/2022    Procedure: EXTRACTION EXTRACAPSULAR CATARACT PHACO INTRAOCULAR LENS (IOL);   Surgeon: Albaro Hernandez MD;  Location: [facility] MAIN OR;  Service: Ophthalmology   • TENDON REPAIR Right     achilles tendon   • TONSILLECTOMY     • TUBAL LIGATION     • VARICOSE VEIN SURGERY Left 12/2009       Family History   Problem Relation Age of Onset   • Breast cancer Mother    • Hypertension Mother    • Varicose Veins Mother         of lower extremities   • Glaucoma Mother         I also have glaucoma   • Hypertension Father    • " Coronary artery disease Father    • Coronary artery disease Sister    • Heart disease Sister    • Varicose Veins Sister         of lower extremities   • No Known Problems Sister    • No Known Problems Sister    • Hypertension Brother    • No Known Problems Daughter    • Diabetes Maternal Grandmother         Also nephew William Dumontacher   • Breast cancer Maternal Aunt    • No Known Problems Maternal Aunt        Social History     Occupational History   • Not on file   Tobacco Use   • Smoking status: Former     Types: Cigarettes     Quit date: 1976     Years since quittin 5   • Smokeless tobacco: Never   • Tobacco comments:     None   Vaping Use   • Vaping Use: Never used   Substance and Sexual Activity   • Alcohol use: Yes     Comment: seldom   • Drug use: Never   • Sexual activity: Not Currently     Partners: Male     Birth control/protection: Surgical         Current Outpatient Medications:   •  acetaminophen (TYLENOL) 325 mg tablet, Take 650 mg by mouth every 6 (six) hours as needed for mild pain, Disp: , Rfl:   •  amLODIPine (NORVASC) 10 mg tablet, Take 1 tablet (10 mg total) by mouth daily, Disp: 90 tablet, Rfl: 3  •  aspirin 81 mg chewable tablet, Chew 1 tablet (81 mg total) daily (Patient taking differently: Chew 81 mg every morning), Disp: 30 tablet, Rfl: 0  •  BIOTIN PO, Take by mouth in the morning, Disp: , Rfl:   •  cholecalciferol (VITAMIN D3) 1,000 units tablet, Take 1,000 Units by mouth daily, Disp: , Rfl:   •  esomeprazole (NexIUM) 40 MG capsule, Take 1 capsule (40 mg total) by mouth daily (Patient taking differently: Take 40 mg by mouth every morning), Disp: 90 capsule, Rfl: 1  •  Ginger, Zingiber officinalis, (GINGER ROOT PO), Take by mouth in the morning, Disp: , Rfl:   •  hydrochlorothiazide (HYDRODIURIL) 12 5 mg tablet, Take 1 tablet (12 5 mg total) by mouth daily (Patient taking differently: Take 12 5 mg by mouth every morning), Disp: 90 tablet, Rfl: 3  •  latanoprost (XALATAN) 0 005 % ophthalmic solution, Administer to both eyes daily at bedtime, Disp: , Rfl:   •  metoprolol succinate (TOPROL-XL) 50 mg 24 hr tablet, Take 1 tablet (50 mg total) by mouth daily (Patient taking differently: Take 50 mg by mouth daily at bedtime), Disp: 90 tablet, Rfl: 3  •  multivitamin (THERAGRAN) TABS, Take 1 tablet by mouth daily, Disp: , Rfl:   •  olmesartan (BENICAR) 40 mg tablet, Take 1 tablet (40 mg total) by mouth daily (Patient taking differently: Take 40 mg by mouth every morning), Disp: 90 tablet, Rfl: 3  •  Sulfacetamide Sodium (Sodium Sulfacetamide Wash) 10 % LIQD, Apply twice daily to affected area on face (Patient taking differently: in the morning Apply twice daily to affected area on face), Disp: 177 mL, Rfl: 2  •  vitamin B-12 (CYANOCOBALAMIN) 2500 MCG TABS, Take 500 mcg by mouth daily, Disp: , Rfl:     Current Facility-Administered Medications:   •  Inclisiran Sodium (LEQVIO) subcutaneous injection 284 mg, 284 mg, Subcutaneous, Q6 Months, Katiana Goldstein MD, 284 mg at 05/04/23 1048    Allergies   Allergen Reactions   • Lisinopril Other (See Comments)     Other reaction(s): swollen legs   • Hydrocodone-Acetaminophen Dizziness   • Irbesartan-Hydrochlorothiazide Other (See Comments)      Hypertension  Pt had hypertension, arm heaviness and headache   • Pollen Extract Hives     Ragweed etc     • Atorvastatin Myalgia       Objective: There were no vitals filed for this visit  Left Shoulder Exam     Tenderness   The patient is experiencing tenderness in the biceps tendon  Range of Motion   External rotation: 50   Forward flexion: 160   Internal rotation 0 degrees: Sacrum     Tests   Drop arm: negative    Other   Erythema: absent  Scars: present  Sensation: normal  Pulse: present     Comments:  Positive Speeds test            Physical Exam  Constitutional:       General: She is not in acute distress  Appearance: She is well-developed  HENT:      Head: Normocephalic and atraumatic  Eyes:      General: No scleral icterus  Conjunctiva/sclera: Conjunctivae normal    Neck:      Vascular: No JVD  Cardiovascular:      Rate and Rhythm: Normal rate  Pulmonary:      Effort: Pulmonary effort is normal  No respiratory distress  Skin:     General: Skin is warm  Neurological:      Mental Status: She is alert and oriented to person, place, and time  Coordination: Coordination normal                This document was created using speech voice recognition software  Grammatical errors, random word insertions, pronoun errors, and incomplete sentences are an occasional consequence of this system due to software limitations, ambient noise, and hardware issues  Any formal questions or concerns about content, text, or information contained within the body of this dictation should be directly addressed to the provider for clarification

## 2023-06-28 NOTE — PROGRESS NOTES
Assessment/Plan:  Problem List Items Addressed This Visit        Digestive    GERD without esophagitis     On PPI  Watch GERD diet  Relevant Medications    esomeprazole (NexIUM) 40 MG capsule    Other Relevant Orders    Magnesium       Cardiovascular and Mediastinum    Benign essential hypertension     Stable  Check blood pressure outside of office  Recommend lifestyle modifications  Relevant Orders    Comprehensive metabolic panel    Chronic venous insufficiency     Worsening due to increased Norvasc 10mg QD  Patient will discuss c Cardio  Other    Hyperlipoproteinemia     Management per Cardio  On Inclisiran Leqvio injection 284 mg - next injection 8/23, received 5/4/23  Recommend lifestyle modifications  Relevant Orders    Comprehensive metabolic panel    TSH, 3rd generation with Free T4 reflex    Pure hypercholesterolemia     Management per Cardio  On Inclisiran Leqvio injection 284 mg - next injection 8/23, received 5/4/23  Recommend lifestyle modifications  Relevant Orders    Comprehensive metabolic panel    TSH, 3rd generation with Free T4 reflex    Obesity (BMI 30-39  9)     Recommend lifestyle modifications  Other Visit Diagnoses     Medicare annual wellness visit, subsequent    -  Primary    Obesity (BMI 35 0-39 9 without comorbidity)        Relevant Orders    Hemoglobin A1C    Vitamin D 25 hydroxy    Encounter for screening mammogram for breast cancer        Relevant Orders    Mammo screening bilateral w 3d & cad    Screening for colorectal cancer        Relevant Orders    Ambulatory referral to Gastroenterology    BMI 35 0-35 9,adult        Relevant Orders    Hemoglobin A1C    Vitamin D 25 hydroxy    Need for viral immunization        IFG (impaired fasting glucose)        Relevant Orders    Hemoglobin A1C           Return in 6 months (on 12/29/2023) for With PCP Dr Sherleen Osler - 6mo - HTN, HL, GERD; PRN Labs        Future Appointments   Date Time Provider Maude Metcalf   7/3/2023 11:00 AM Mele Howell, PTA [facility]   7/6/2023 11:30 AM Redgie Rasp, PT [facility]   7/10/2023 10:30 AM Redgie Rasp, PT [facility]   7/13/2023 11:30 AM Redgie Rasp, PT [facility]   7/17/2023 11:30 AM Redgie Rasp, PT [facility]   7/20/2023 11:00 AM Redgie Rasp, PT [facility]   7/24/2023 11:30 AM Redgie Rasp, PT [facility]   7/25/2023  9:40 AM MD [facility]   7/27/2023 11:00 AM Redgie Rasp, PT [facility]   7/31/2023 11:00 AM Redgie Rasp, PT [facility]   8/3/2023 11:00 AM Redgie Rasp, PT [facility]   8/7/2023 10:20 AM CARDIOLOGY NURSE [facility] [place]   8/7/2023  1:15 PM Vanessa Mckeon PA-C [facility]   9/1/2023  8:00 PM Asif Godoy 01 [facility] Sleep lab [address] [place] [address]   12/28/2023 11:20 AM Tiffani Pruett MD  [facility]        Subjective:     Bonilla Pereira is a 69 y o  female who presents today for a follow-up on her chronic medical conditions  HPI:  Chief Complaint   Patient presents with   • Medicare Wellness Visit     AWV  • Edema     Pt reports BLE have been swelling intermittently for the last few months  Did speak to cardio when she was in for a visit, but said it was not really addressed  -- Above per clinical staff and reviewed  --      HPI      Today:      PCP Dr Camryn Briggs has been out of the office  Last seen by Dr Camryn Briggs 12/6/22 to Establish Care / BP Check / Chronic Left Shoulder Pain  Obesity - Watching diet - especially salt  No exercise  HTN / CAD / H/O MI - Management per Cardio WILLIAMS Paz - next appt 7/23  Sleep Study pending 9/1/23 as she is on 4 HTN Rx   BP check outside of office on wrist cuff of 130/70-80  She notices B/L LE edema since Norvasc was increased from 5mg -10mg QD 4/26/23      Hyperlipidemia - D/C Lipitor due to myalgias  On Inclisiran Leqvio injection 284 mg - next injection 8/23, received 5/4/23  GERD - Stable on Nexium 40mg QD  Watch GERD diet  S/p Left Rotator Cuff Repair - Management per Ortho Ms  Sangita Adan PA-C  Next appt 8/23  Attending PT  Reviewed:  Labs 6/22/23, Ortho 6/26/23, Cardio 4/19/23  The following portions of the patient's history were reviewed and updated as appropriate: allergies, current medications, past family history, past medical history, past social history, past surgical history and problem list       Review of Systems   Constitutional: Negative for appetite change, chills, diaphoresis, fatigue and fever  Respiratory: Negative for chest tightness and shortness of breath  Cardiovascular: Positive for leg swelling (B/L s/p Noravasc increase)  Negative for chest pain  Gastrointestinal: Negative for abdominal pain, blood in stool, diarrhea, nausea and vomiting  Genitourinary: Negative for dysuria  Musculoskeletal: Positive for arthralgias          Current Outpatient Medications   Medication Sig Dispense Refill   • acetaminophen (TYLENOL) 325 mg tablet Take 650 mg by mouth every 6 (six) hours as needed for mild pain     • amLODIPine (NORVASC) 10 mg tablet Take 1 tablet (10 mg total) by mouth daily 90 tablet 3   • aspirin 81 mg chewable tablet Chew 1 tablet (81 mg total) daily (Patient taking differently: Chew 81 mg every morning) 30 tablet 0   • BIOTIN PO Take by mouth in the morning     • cholecalciferol (VITAMIN D3) 1,000 units tablet Take 1,000 Units by mouth daily     • esomeprazole (NexIUM) 40 MG capsule Take 1 capsule (40 mg total) by mouth daily 90 capsule 1   • Ginger, Zingiber officinalis, (GINGER ROOT PO) Take by mouth in the morning     • hydrochlorothiazide (HYDRODIURIL) 12 5 mg tablet Take 1 tablet (12 5 mg total) by mouth daily (Patient taking differently: Take 12 5 mg by mouth every morning) 90 tablet 3   • latanoprost (XALATAN) 0 005 % "ophthalmic solution Administer to both eyes daily at bedtime     • metoprolol succinate (TOPROL-XL) 50 mg 24 hr tablet Take 1 tablet (50 mg total) by mouth daily (Patient taking differently: Take 50 mg by mouth daily at bedtime) 90 tablet 3   • multivitamin (THERAGRAN) TABS Take 1 tablet by mouth daily     • olmesartan (BENICAR) 40 mg tablet Take 1 tablet (40 mg total) by mouth daily (Patient taking differently: Take 40 mg by mouth every morning) 90 tablet 3   • Sulfacetamide Sodium (Sodium Sulfacetamide Wash) 10 % LIQD Apply twice daily to affected area on face (Patient taking differently: in the morning Apply twice daily to affected area on face) 177 mL 2   • vitamin B-12 (CYANOCOBALAMIN) 2500 MCG TABS Take 500 mcg by mouth daily       Current Facility-Administered Medications   Medication Dose Route Frequency Provider Last Rate Last Admin   • Inclisiran Sodium (LEQVIO) subcutaneous injection 284 mg  284 mg Subcutaneous Q6 Months Sara Gomez MD   284 mg at 05/04/23 1048       Objective:  /78   Pulse 64   Temp 97 9 °F (36 6 °C)   Resp 16   Ht 5' 5\" (1 651 m)   Wt 96 5 kg (212 lb 12 8 oz)   SpO2 98%   BMI 35 41 kg/m²    Wt Readings from Last 3 Encounters:   06/29/23 96 5 kg (212 lb 12 8 oz)   04/19/23 97 8 kg (215 lb 9 6 oz)   04/06/23 95 9 kg (211 lb 6 4 oz)      BP Readings from Last 3 Encounters:   06/29/23 130/78   05/18/23 144/81   05/04/23 132/78          Physical Exam  Vitals and nursing note reviewed  Constitutional:       Appearance: Normal appearance  She is well-developed  She is obese  HENT:      Head: Normocephalic and atraumatic  Eyes:      Extraocular Movements: Extraocular movements intact  Conjunctiva/sclera: Conjunctivae normal    Neck:      Thyroid: No thyromegaly  Vascular: No carotid bruit  Cardiovascular:      Rate and Rhythm: Normal rate and regular rhythm  Pulses: Normal pulses  Heart sounds: Normal heart sounds     Pulmonary:      Effort: " "Pulmonary effort is normal       Breath sounds: Normal breath sounds  Abdominal:      General: Bowel sounds are normal  There is no distension  Palpations: Abdomen is soft  There is no mass  Tenderness: There is no abdominal tenderness  There is no guarding or rebound  Musculoskeletal:         General: No tenderness (Negative Jerrod's sign B/L)  Cervical back: Neck supple  Right lower leg: Edema (+1 non-pitting to mid tibia) present  Left lower leg: Edema ( +1 non-pitting to mid tibia) present  Lymphadenopathy:      Cervical: No cervical adenopathy  Neurological:      General: No focal deficit present  Mental Status: She is alert and oriented to person, place, and time  Psychiatric:         Mood and Affect: Mood normal          Behavior: Behavior normal          Thought Content: Thought content normal          Judgment: Judgment normal          Lab Results:      Lab Results   Component Value Date    WBC 6 48 03/06/2023    HGB 14 0 03/06/2023    HCT 43 6 03/06/2023     03/06/2023    CHOL 211 (H) 01/18/2017    TRIG 126 06/22/2023    HDL 58 06/22/2023    ALT 18 01/19/2023    AST 16 01/19/2023     01/18/2017    K 4 1 04/13/2023     04/13/2023    CREATININE 0 74 04/13/2023    BUN 22 04/13/2023    CO2 28 04/13/2023    TSH 4 14 01/19/2023    INR 0 97 03/06/2023    GLUF 101 (H) 03/06/2023    HGBA1C 5 6 05/22/2021     No results found for: \"URICACID\"  Invalid input(s): \"BASENAME\" Vitamin D    No results found       POCT Labs                       "

## 2023-06-29 ENCOUNTER — OFFICE VISIT (OUTPATIENT)
Dept: PHYSICAL THERAPY | Facility: CLINIC | Age: 68
End: 2023-06-29
Payer: MEDICARE

## 2023-06-29 ENCOUNTER — OFFICE VISIT (OUTPATIENT)
Dept: FAMILY MEDICINE CLINIC | Facility: CLINIC | Age: 68
End: 2023-06-29
Payer: MEDICARE

## 2023-06-29 VITALS
RESPIRATION RATE: 16 BRPM | BODY MASS INDEX: 35.45 KG/M2 | SYSTOLIC BLOOD PRESSURE: 130 MMHG | HEART RATE: 64 BPM | HEIGHT: 65 IN | WEIGHT: 212.8 LBS | DIASTOLIC BLOOD PRESSURE: 78 MMHG | TEMPERATURE: 97.9 F | OXYGEN SATURATION: 98 %

## 2023-06-29 DIAGNOSIS — E78.5 HYPERLIPOPROTEINEMIA: ICD-10-CM

## 2023-06-29 DIAGNOSIS — Z12.12 SCREENING FOR COLORECTAL CANCER: ICD-10-CM

## 2023-06-29 DIAGNOSIS — Z23 NEED FOR VIRAL IMMUNIZATION: ICD-10-CM

## 2023-06-29 DIAGNOSIS — I10 BENIGN ESSENTIAL HYPERTENSION: ICD-10-CM

## 2023-06-29 DIAGNOSIS — K21.9 GERD WITHOUT ESOPHAGITIS: ICD-10-CM

## 2023-06-29 DIAGNOSIS — I87.2 CHRONIC VENOUS INSUFFICIENCY: ICD-10-CM

## 2023-06-29 DIAGNOSIS — E78.00 PURE HYPERCHOLESTEROLEMIA: ICD-10-CM

## 2023-06-29 DIAGNOSIS — R73.01 IFG (IMPAIRED FASTING GLUCOSE): ICD-10-CM

## 2023-06-29 DIAGNOSIS — E66.9 OBESITY (BMI 35.0-39.9 WITHOUT COMORBIDITY): ICD-10-CM

## 2023-06-29 DIAGNOSIS — Z12.31 ENCOUNTER FOR SCREENING MAMMOGRAM FOR BREAST CANCER: ICD-10-CM

## 2023-06-29 DIAGNOSIS — Z12.11 SCREENING FOR COLORECTAL CANCER: ICD-10-CM

## 2023-06-29 DIAGNOSIS — M75.122 COMPLETE TEAR OF LEFT ROTATOR CUFF, UNSPECIFIED WHETHER TRAUMATIC: Primary | ICD-10-CM

## 2023-06-29 DIAGNOSIS — Z00.00 MEDICARE ANNUAL WELLNESS VISIT, SUBSEQUENT: Primary | ICD-10-CM

## 2023-06-29 DIAGNOSIS — E66.9 OBESITY (BMI 30-39.9): ICD-10-CM

## 2023-06-29 PROCEDURE — 97110 THERAPEUTIC EXERCISES: CPT

## 2023-06-29 PROCEDURE — 97140 MANUAL THERAPY 1/> REGIONS: CPT

## 2023-06-29 RX ORDER — ESOMEPRAZOLE MAGNESIUM 40 MG/1
40 CAPSULE, DELAYED RELEASE ORAL DAILY
Qty: 90 CAPSULE | Refills: 1 | Status: SHIPPED | OUTPATIENT
Start: 2023-06-29

## 2023-06-29 RX ORDER — ZOSTER VACCINE RECOMBINANT, ADJUVANTED 50 MCG/0.5
0.5 KIT INTRAMUSCULAR ONCE
Qty: 1 EACH | Refills: 1 | Status: SHIPPED | OUTPATIENT
Start: 2023-06-29 | End: 2023-06-29

## 2023-06-29 NOTE — PROGRESS NOTES
Assessment and Plan:     Problem List Items Addressed This Visit        Digestive    GERD without esophagitis     On PPI  Watch GERD diet  Relevant Medications    esomeprazole (NexIUM) 40 MG capsule    Other Relevant Orders    Magnesium       Cardiovascular and Mediastinum    Benign essential hypertension     Stable  Check blood pressure outside of office  Recommend lifestyle modifications  Relevant Orders    Comprehensive metabolic panel    Chronic venous insufficiency     Worsening due to increased Norvasc 10mg QD  Patient will discuss c Cardio  Other    Hyperlipoproteinemia     Management per Cardio  On Inclisiran Leqvio injection 284 mg - next injection 8/23, received 5/4/23  Recommend lifestyle modifications  Relevant Orders    Comprehensive metabolic panel    TSH, 3rd generation with Free T4 reflex    Pure hypercholesterolemia     Management per Cardio  On Inclisiran Leqvio injection 284 mg - next injection 8/23, received 5/4/23  Recommend lifestyle modifications  Relevant Orders    Comprehensive metabolic panel    TSH, 3rd generation with Free T4 reflex    Obesity (BMI 30-39  9)     Recommend lifestyle modifications          Other Visit Diagnoses     Medicare annual wellness visit, subsequent    -  Primary    Obesity (BMI 35 0-39 9 without comorbidity)        Relevant Orders    Hemoglobin A1C    Vitamin D 25 hydroxy    Encounter for screening mammogram for breast cancer        Relevant Orders    Mammo screening bilateral w 3d & cad    Screening for colorectal cancer        Relevant Orders    Ambulatory referral to Gastroenterology    BMI 35 0-35 9,adult        Relevant Orders    Hemoglobin A1C    Vitamin D 25 hydroxy    Need for viral immunization        IFG (impaired fasting glucose)        Relevant Orders    Hemoglobin A1C           Preventive health issues were discussed with patient, and age appropriate screening tests were ordered as noted in patient's After Visit Summary  Personalized health advice and appropriate referrals for health education or preventive services given if needed, as noted in patient's After Visit Summary  History of Present Illness:     Patient presents for a Medicare Wellness Visit    HPI   Patient Care Team:  Jd Patrick MD as PCP - General (Family Medicine)  CECI Perez MD Gershon Canning, MD (Family Medicine)     Review of Systems:     Review of Systems     See other note  Problem List:     Patient Active Problem List   Diagnosis   • Allergic rhinitis   • Benign essential hypertension   • Dermatitis   • GERD without esophagitis   • Hyperlipoproteinemia   • Nontoxic single thyroid nodule   • Osteoarthritis of knee   • Rectocele   • Uterovaginal prolapse   • Cystocele, lateral   • Varicose veins of bilateral lower extremities with pain   • Family history of sudden cardiac death   • Pure hypercholesterolemia   • Obesity (BMI 30-39  9)   • Chronic venous insufficiency   • Actinic keratosis   • Rosacea   • Hypertensive urgency   • Acute left-sided low back pain without sciatica   • Arthralgia   • Urge incontinence of urine   • Long-term current use of proton pump inhibitor therapy   • Left upper quadrant abdominal pain      Past Medical and Surgical History:     Past Medical History:   Diagnosis Date   • Breast lump    • Chest pain 05/28/2019   • COVID-19 04/09/2021   • Disease of thyroid gland     nodule, benign   • Eczema     facial rash- could also be rosacea   • Elevated troponin I level 05/21/2021   • GERD (gastroesophageal reflux disease)    • History of transfusion     at 9years old for bleeding during tonsillectomy, no reaction   • Hypertension    • Nontoxic single thyroid nodule 07/25/2012   • Rectocele    • Rotator cuff tear, left    • Uterine leiomyoma    • Varicose veins of both lower extremities      Past Surgical History:   Procedure Laterality Date   • BLADDER SURGERY  08/2012 "Bladder \"lift\"   • BREAST LUMPECTOMY Right 2000    benign   • COLONOSCOPY     • HYSTERECTOMY  1990   • JOINT REPLACEMENT  6/2017 and 5/2022    Knees   • KNEE SURGERY Right     arthroscopy x2   • NOSE SURGERY      deviated septum   • MS LARYNGOSCOPY W/BIOPSY MICROSCOPE/TELESCOPE Left 04/02/2018    Procedure: MICROLARYNGOSCOPY AND EXCISION OF LEFT VOCAL FOLD POLYP;  Surgeon: Lisa Chadwick MD;  Location: [facility];  Service: ENT   • MS SURGICAL ARTHROSCOPY SHOULDER W/ROTATOR CUFF RPR Left 3/28/2023    Procedure: Shoulder Arthroscopy with Rotator Cuff Repair and Subacromial decompression;  Surgeon: Luke Otto MD;  Location: [address] [place] [address];  Service: Orthopedics   • MS XCAPSL CTRC RMVL INSJ IO LENS PROSTH W/O ECP Left 11/28/2022    Procedure: EXTRACTION EXTRACAPSULAR CATARACT PHACO INTRAOCULAR LENS (IOL);   Surgeon: Rachel Siegel MD;  Location: [facility] MA[facility] OR;  Service: Ophthalmology   • TENDON REPAIR Right     achilles tendon   • TONSILLECTOMY     • TUBAL LIGATION     • VARICOSE VEIN SURGERY Left 12/2009      Family History:     Family History   Problem Relation Age of Onset   • Breast cancer Mother    • Hypertension Mother    • Varicose Veins Mother         of lower extremities   • Glaucoma Mother         I also have glaucoma   • Hypertension Father    • Coronary artery disease Father    • Coronary artery disease Sister    • Heart disease Sister    • Varicose Veins Sister         of lower extremities   • No Known Problems Sister    • No Known Problems Sister    • Hypertension Brother    • No Known Problems Daughter    • Diabetes Maternal Grandmother         Also nephew William Hectorermacher   • Breast cancer Maternal Aunt    • No Known Problems Maternal Aunt       Social History:     Social History     Socioeconomic History   • Marital status: /Civil Union     Spouse name: None   • Number of children: None   • Years of education: None   • Highest education level: None   Occupational History   • None " Tobacco Use   • Smoking status: Former     Types: Cigarettes     Quit date: 1976     Years since quittin 5   • Smokeless tobacco: Never   • Tobacco comments:     None   Vaping Use   • Vaping Use: Never used   Substance and Sexual Activity   • Alcohol use: Yes     Comment: seldom   • Drug use: Never   • Sexual activity: Not Currently     Partners: Male     Birth control/protection: Surgical   Other Topics Concern   • None   Social History Narrative   • None     Social Determinants of Health     Financial Resource Strain: Low Risk  (2023)    Overall Financial Resource Strain (CARDIA)    • Difficulty of Paying Living Expenses: Not hard at all   Food Insecurity: Not on file   Transportation Needs: No Transportation Needs (2023)    PRAPARE - Transportation    • Lack of Transportation (Medical): No    • Lack of Transportation (Non-Medical):  No   Physical Activity: Not on file   Stress: Not on file   Social Connections: Not on file   Intimate Partner Violence: Not on file   Housing Stability: Not on file      Medications and Allergies:     Current Outpatient Medications   Medication Sig Dispense Refill   • acetaminophen (TYLENOL) 325 mg tablet Take 650 mg by mouth every 6 (six) hours as needed for mild pain     • amLODIPine (NORVASC) 10 mg tablet Take 1 tablet (10 mg total) by mouth daily 90 tablet 3   • aspirin 81 mg chewable tablet Chew 1 tablet (81 mg total) daily (Patient taking differently: Chew 81 mg every morning) 30 tablet 0   • BIOTIN PO Take by mouth in the morning     • cholecalciferol (VITAMIN D3) 1,000 units tablet Take 1,000 Units by mouth daily     • esomeprazole (NexIUM) 40 MG capsule Take 1 capsule (40 mg total) by mouth daily 90 capsule 1   • Ginger, Zingiber officinalis, (GINGER ROOT PO) Take by mouth in the morning     • hydrochlorothiazide (HYDRODIURIL) 12 5 mg tablet Take 1 tablet (12 5 mg total) by mouth daily (Patient taking differently: Take 12 5 mg by mouth every morning) 90 tablet 3   • latanoprost (XALATAN) 0 005 % ophthalmic solution Administer to both eyes daily at bedtime     • metoprolol succinate (TOPROL-XL) 50 mg 24 hr tablet Take 1 tablet (50 mg total) by mouth daily (Patient taking differently: Take 50 mg by mouth daily at bedtime) 90 tablet 3   • multivitamin (THERAGRAN) TABS Take 1 tablet by mouth daily     • olmesartan (BENICAR) 40 mg tablet Take 1 tablet (40 mg total) by mouth daily (Patient taking differently: Take 40 mg by mouth every morning) 90 tablet 3   • Sulfacetamide Sodium (Sodium Sulfacetamide Wash) 10 % LIQD Apply twice daily to affected area on face (Patient taking differently: in the morning Apply twice daily to affected area on face) 177 mL 2   • vitamin B-12 (CYANOCOBALAMIN) 2500 MCG TABS Take 500 mcg by mouth daily       Current Facility-Administered Medications   Medication Dose Route Frequency Provider Last Rate Last Admin   • Inclisiran Sodium (LEQVIO) subcutaneous injection 284 mg  284 mg Subcutaneous Q6 Months Kerwin Jesus MD   284 mg at 05/04/23 1048     Allergies   Allergen Reactions   • Lisinopril Other (See Comments)     Other reaction(s): swollen legs   • Hydrocodone-Acetaminophen Dizziness   • Irbesartan-Hydrochlorothiazide Other (See Comments)      Hypertension  Pt had hypertension, arm heaviness and headache   • Pollen Extract Hives     Ragweed etc     • Atorvastatin Myalgia      Immunizations:     Immunization History   Administered Date(s) Administered   • COVID-19 PFIZER VACCINE 0 3 ML IM 07/09/2021, 07/30/2021   • Pneumococcal Conjugate 13-Valent 03/18/2019   • Pneumococcal Polysaccharide PPV23 09/21/2020      Health Maintenance:         Topic Date Due   • Colorectal Cancer Screening  09/13/2023   • Breast Cancer Screening: Mammogram  12/16/2023   • DXA SCAN  07/14/2024   • Hepatitis C Screening  Completed         Topic Date Due   • Influenza Vaccine (Season Ended) 09/01/2023      Medicare Screening Tests and Risk Assessments:     Jane Gomez is here for her Subsequent Wellness visit  Health Risk Assessment:   Patient rates overall health as good  Patient feels that their physical health rating is same  Patient is satisfied with their life  Eyesight was rated as same  Hearing was rated as same  Patient feels that their emotional and mental health rating is same  Patients states they are never, rarely angry  Patient states they are often unusually tired/fatigued  Pain experienced in the last 7 days has been some  Patient's pain rating has been 3/10  Patient states that she has experienced no weight loss or gain in last 6 months  Depression Screening:   PHQ-2 Score: 0      Fall Risk Screening: In the past year, patient has experienced: no history of falling in past year      Urinary Incontinence Screening:   Patient has leaked urine accidently in the last six months  Home Safety:  Patient has trouble with stairs inside or outside of their home  Patient has working smoke alarms and has working carbon monoxide detector  Home safety hazards include: medications that cause fatigue  Patient declines PT  Nutrition:   Current diet is Regular and No Added Salt  Medications:   Patient is currently taking over-the-counter supplements  OTC medications include: Tylenol  Patient is able to manage medications  Activities of Daily Living (ADLs)/Instrumental Activities of Daily Living (IADLs):   Walk and transfer into and out of bed and chair?: Yes  Dress and groom yourself?: Yes    Bathe or shower yourself?: Yes    Feed yourself?  Yes  Do your laundry/housekeeping?: Yes  Manage your money, pay your bills and track your expenses?: Yes  Make your own meals?: Yes    Do your own shopping?: Yes    Previous Hospitalizations:   Any hospitalizations or ED visits within the last 12 months?: Yes    How many hospitalizations have you had in the last year?: 1-2    Hospitalization Comments: Rotator cuff  surgery 3/28/23    Advance Care Planning:   Living will: No    Durable POA for healthcare: No    Advanced directive: No    Advanced directive counseling given: Yes    Five wishes given: Yes    Patient declined ACP directive: No      Cognitive Screening:   Provider or family/friend/caregiver concerned regarding cognition?: No    PREVENTIVE SCREENINGS      Cardiovascular Screening:    General: Screening Not Indicated and History Lipid Disorder      Diabetes Screening:     General: Screening Current      Colorectal Cancer Screening:     General: Screening Current      Breast Cancer Screening:     General: Screening Current      Cervical Cancer Screening:    General: Screening Not Indicated      Osteoporosis Screening:    General: Screening Current      Abdominal Aortic Aneurysm (AAA) Screening:        General: Screening Not Indicated      Lung Cancer Screening:     General: Screening Not Indicated      Hepatitis C Screening:    General: Screening Current    Screening, Brief Intervention, and Referral to Treatment (SBIRT)    Screening  Typical number of drinks in a day: 0  Typical number of drinks in a week: 0  Interpretation: Low risk drinking behavior  AUDIT-C Screenin) How often did you have a drink containing alcohol in the past year? never  2) How many drinks did you have on a typical day when you were drinking in the past year? 0  3) How often did you have 6 or more drinks on one occasion in the past year? never    AUDIT-C Score: 0  Interpretation: Score 0-2 (female): Negative screen for alcohol misuse    Single Item Drug Screening:  How often have you used an illegal drug (including marijuana) or a prescription medication for non-medical reasons in the past year? never    Single Item Drug Screen Score: 0  Interpretation: Negative screen for possible drug use disorder    Annual Depression Screening  Time spent screening and evaluating the patient for depression during today's encounter was 5 minutes      Other Counseling Topics:   Calcium and vitamin D "intake and regular weightbearing exercise  No results found  Physical Exam:     /78   Pulse 64   Temp 97 9 °F (36 6 °C)   Resp 16   Ht 5' 5\" (1 651 m)   Wt 96 5 kg (212 lb 12 8 oz)   SpO2 98%   BMI 35 41 kg/m²     Physical Exam     See other note          Sharif Da Silva,   "

## 2023-06-29 NOTE — PATIENT INSTRUCTIONS
Medicare Preventive Visit Patient Instructions  Thank you for completing your Welcome to Medicare Visit or Medicare Annual Wellness Visit today  Your next wellness visit will be due in one year (6/29/2024)  The screening/preventive services that you may require over the next 5-10 years are detailed below  Some tests may not apply to you based off risk factors and/or age  Screening tests ordered at today's visit but not completed yet may show as past due  Also, please note that scanned in results may not display below  Preventive Screenings:  Service Recommendations Previous Testing/Comments   Colorectal Cancer Screening  * Colonoscopy    * Fecal Occult Blood Test (FOBT)/Fecal Immunochemical Test (FIT)  * Fecal DNA/Cologuard Test  * Flexible Sigmoidoscopy Age: 39-70 years old   Colonoscopy: every 10 years (may be performed more frequently if at higher risk)  OR  FOBT/FIT: every 1 year  OR  Cologuard: every 3 years  OR  Sigmoidoscopy: every 5 years  Screening may be recommended earlier than age 39 if at higher risk for colorectal cancer  Also, an individualized decision between you and your healthcare provider will decide whether screening between the ages of 74-80 would be appropriate  Colonoscopy: 09/13/2013  FOBT/FIT: Not on file  Cologuard: Not on file  Sigmoidoscopy: Not on file    Screening Current     Breast Cancer Screening Age: 36 years old  Frequency: every 1-2 years  Not required if history of left and right mastectomy Mammogram: 12/16/2022    Screening Current   Cervical Cancer Screening Between the ages of 21-29, pap smear recommended once every 3 years  Between the ages of 33-67, can perform pap smear with HPV co-testing every 5 years     Recommendations may differ for women with a history of total hysterectomy, cervical cancer, or abnormal pap smears in past  Pap Smear: Not on file    Screening Not Indicated   Hepatitis C Screening Once for adults born between 1945 and 1965  More frequently in patients at high risk for Hepatitis C Hep C Antibody: 03/18/2019    Screening Current   Diabetes Screening 1-2 times per year if you're at risk for diabetes or have pre-diabetes Fasting glucose: 101 mg/dL (3/6/2023)  A1C: 5 6 % (5/22/2021)  Screening Current   Cholesterol Screening Once every 5 years if you don't have a lipid disorder  May order more often based on risk factors  Lipid panel: 06/22/2023    Screening Not Indicated  History Lipid Disorder     Other Preventive Screenings Covered by Medicare:  1  Abdominal Aortic Aneurysm (AAA) Screening: covered once if your at risk  You're considered to be at risk if you have a family history of AAA  2  Lung Cancer Screening: covers low dose CT scan once per year if you meet all of the following conditions: (1) Age 50-69; (2) No signs or symptoms of lung cancer; (3) Current smoker or have quit smoking within the last 15 years; (4) You have a tobacco smoking history of at least 20 pack years (packs per day multiplied by number of years you smoked); (5) You get a written order from a healthcare provider  3  Glaucoma Screening: covered annually if you're considered high risk: (1) You have diabetes OR (2) Family history of glaucoma OR (3)  aged 48 and older OR (3)  American aged 72 and older  3  Osteoporosis Screening: covered every 2 years if you meet one of the following conditions: (1) You're estrogen deficient and at risk for osteoporosis based off medical history and other findings; (2) Have a vertebral abnormality; (3) On glucocorticoid therapy for more than 3 months; (4) Have primary hyperparathyroidism; (5) On osteoporosis medications and need to assess response to drug therapy  · Last bone density test (DXA Scan): 07/14/2022   5  HIV Screening: covered annually if you're between the age of 15-65  Also covered annually if you are younger than 13 and older than 72 with risk factors for HIV infection   For pregnant patients, it is covered up to 3 times per pregnancy  Immunizations:  Immunization Recommendations   Influenza Vaccine Annual influenza vaccination during flu season is recommended for all persons aged >= 6 months who do not have contraindications   Pneumococcal Vaccine   * Pneumococcal conjugate vaccine = PCV13 (Prevnar 13), PCV15 (Vaxneuvance), PCV20 (Prevnar 20)  * Pneumococcal polysaccharide vaccine = PPSV23 (Pneumovax) Adults 25-60 years old: 1-3 doses may be recommended based on certain risk factors  Adults 72 years old: 1-2 doses may be recommended based off what pneumonia vaccine you previously received   Hepatitis B Vaccine 3 dose series if at intermediate or high risk (ex: diabetes, end stage renal disease, liver disease)   Tetanus (Td) Vaccine - COST NOT COVERED BY MEDICARE PART B Following completion of primary series, a booster dose should be given every 10 years to maintain immunity against tetanus  Td may also be given as tetanus wound prophylaxis  Tdap Vaccine - COST NOT COVERED BY MEDICARE PART B Recommended at least once for all adults  For pregnant patients, recommended with each pregnancy  Shingles Vaccine (Shingrix) - COST NOT COVERED BY MEDICARE PART B  2 shot series recommended in those aged 48 and above     Health Maintenance Due:      Topic Date Due   • Colorectal Cancer Screening  09/13/2023   • Breast Cancer Screening: Mammogram  12/16/2023   • DXA SCAN  07/14/2024   • Hepatitis C Screening  Completed     Immunizations Due:      Topic Date Due   • Influenza Vaccine (Season Ended) 09/01/2023     Advance Directives   What are advance directives? Advance directives are legal documents that state your wishes and plans for medical care  These plans are made ahead of time in case you lose your ability to make decisions for yourself  Advance directives can apply to any medical decision, such as the treatments you want, and if you want to donate organs  What are the types of advance directives?   There are many types of advance directives, and each state has rules about how to use them  You may choose a combination of any of the following:  · Living will: This is a written record of the treatment you want  You can also choose which treatments you do not want, which to limit, and which to stop at a certain time  This includes surgery, medicine, IV fluid, and tube feedings  · Durable power of  for healthcare [place] [facility]): This is a written record that states who you want to make healthcare choices for you when you are unable to make them for yourself  This person, called a proxy, is usually a family member or a friend  You may choose more than 1 proxy  · Do not resuscitate (DNR) order:  A DNR order is used in case your heart stops beating or you stop breathing  It is a request not to have certain forms of treatment, such as CPR  A DNR order may be included in other types of advance directives  · Medical directive: This covers the care that you want if you are in a coma, near death, or unable to make decisions for yourself  You can list the treatments you want for each condition  Treatment may include pain medicine, surgery, blood transfusions, dialysis, IV or tube feedings, and a ventilator (breathing machine)  · Values history: This document has questions about your views, beliefs, and how you feel and think about life  This information can help others choose the care that you would choose  Why are advance directives important? An advance directive helps you control your care  Although spoken wishes may be used, it is better to have your wishes written down  Spoken wishes can be misunderstood, or not followed  Treatments may be given even if you do not want them  An advance directive may make it easier for your family to make difficult choices about your care  Urinary Incontinence   Urinary incontinence (UI)  is when you lose control of your bladder   UI develops because your bladder cannot store or empty urine properly  The 3 most common types of UI are stress incontinence, urge incontinence, or both  Medicines:   · May be given to help strengthen your bladder control  Report any side effects of medication to your healthcare provider  Do pelvic muscle exercises often:  Your pelvic muscles help you stop urinating  Squeeze these muscles tight for 5 seconds, then relax for 5 seconds  Gradually work up to squeezing for 10 seconds  Do 3 sets of 15 repetitions a day, or as directed  This will help strengthen your pelvic muscles and improve bladder control  Train your bladder:  Go to the bathroom at set times, such as every 2 hours, even if you do not feel the urge to go  You can also try to hold your urine when you feel the urge to go  For example, hold your urine for 5 minutes when you feel the urge to go  As that becomes easier, hold your urine for 10 minutes  Self-care:   · Keep a UI record  Write down how often you leak urine and how much you leak  Make a note of what you were doing when you leaked urine  · Drink liquids as directed  You may need to limit the amount of liquid you drink to help control your urine leakage  Do not drink any liquid right before you go to bed  Limit or do not have drinks that contain caffeine or alcohol  · Prevent constipation  Eat a variety of high-fiber foods  Good examples are high-fiber cereals, beans, vegetables, and whole-grain breads  Walking is the best way to trigger your intestines to have a bowel movement  · Exercise regularly and maintain a healthy weight  Weight loss and exercise will decrease pressure on your bladder and help you control your leakage  · Use a catheter as directed  to help empty your bladder  A catheter is a tiny, plastic tube that is put into your bladder to drain your urine  · Go to behavior therapy as directed  Behavior therapy may be used to help you learn to control your urge to urinate      Weight Management   Why it is important to manage your weight:  Being overweight increases your risk of health conditions such as heart disease, high blood pressure, type 2 diabetes, and certain types of cancer  It can also increase your risk for osteoarthritis, sleep apnea, and other respiratory problems  Aim for a slow, steady weight loss  Even a small amount of weight loss can lower your risk of health problems  How to lose weight safely:  A safe and healthy way to lose weight is to eat fewer calories and get regular exercise  You can lose up about 1 pound a week by decreasing the number of calories you eat by 500 calories each day  Healthy meal plan for weight management:  A healthy meal plan includes a variety of foods, contains fewer calories, and helps you stay healthy  A healthy meal plan includes the following:  · Eat whole-grain foods more often  A healthy meal plan should contain fiber  Fiber is the part of grains, fruits, and vegetables that is not broken down by your body  Whole-grain foods are healthy and provide extra fiber in your diet  Some examples of whole-grain foods are whole-wheat breads and pastas, oatmeal, brown rice, and bulgur  · Eat a variety of vegetables every day  Include dark, leafy greens such as spinach, kale, belkis greens, and mustard greens  Eat yellow and orange vegetables such as carrots, sweet potatoes, and winter squash  · Eat a variety of fruits every day  Choose fresh or canned fruit (canned in its own juice or light syrup) instead of juice  Fruit juice has very little or no fiber  · Eat low-fat dairy foods  Drink fat-free (skim) milk or 1% milk  Eat fat-free yogurt and low-fat cottage cheese  Try low-fat cheeses such as mozzarella and other reduced-fat cheeses  · Choose meat and other protein foods that are low in fat  Choose beans or other legumes such as split peas or lentils  Choose fish, skinless poultry (chicken or turkey), or lean cuts of red meat (beef or pork)   Before you cook meat or poultry, cut off any visible fat  · Use less fat and oil  Try baking foods instead of frying them  Add less fat, such as margarine, sour cream, regular salad dressing and mayonnaise to foods  Eat fewer high-fat foods  Some examples of high-fat foods include french fries, doughnuts, ice cream, and cakes  · Eat fewer sweets  Limit foods and drinks that are high in sugar  This includes candy, cookies, regular soda, and sweetened drinks  Exercise:  Exercise at least 30 minutes per day on most days of the week  Some examples of exercise include walking, biking, dancing, and swimming  You can also fit in more physical activity by taking the stairs instead of the elevator or parking farther away from stores  Ask your healthcare provider about the best exercise plan for you  © Copyright [employer] 2018 Information is for End User's use only and may not be sold, redistributed or otherwise used for commercial purposes   All illustrations and images included in CareNotes® are the copyrighted property of [employer]  or [address] [place] [address]

## 2023-06-29 NOTE — PROGRESS NOTES
"Daily Note     Today's date: 2023  Patient name: Halima Camargo  : 1955  MRN: [id number]  Referring provider: Tameka Villa*  Dx:   Encounter Diagnosis     ICD-10-CM    1  Complete tear of left rotator cuff, unspecified whether traumatic  M75 122                      Subjective: Patient saw MD   States that she was offered cortisone injection of bicep pain  States that she declined  Objective: See treatment diary below      Assessment: Tolerated treatment well  Patient exhibited good technique with therapeutic exercises      Plan: Continue per plan of care        Precautions: per protocol; sling x 6 weeks as per MD  125 degrees flexion by 28 days  35-75 degrees ER at 0 degrees by 28 days  No pulley until week 5   No passive ABD until 3 weeks to 90 degrees  NO ER in ABD for 6 weeks  IR to body progress to 50 degrees by 28 days      [id number]         Manuals            PROM 10  10                        rev                                                                                                                                  Ther Ex                                       Pball roll out standing  10\"  x5 10\"x5           Pulleys 5 5           AAROM shoulder flexion  10\"x 10             Cane IR/EXT 10\" x 10  10\" x 10            Standing AROM flexion/scaption 1# 20  1# 20           Haircut             Supine AROM flex 1# 20 2# 15            S/L ER 1# 20  2# 15            S/l ABD 1# 20  1# 20            Cabinet taps 2# 10  2# 15            [place] ER  2# 2 x 10            [place] IR  4# 2 x 10            [place] rows  12# 2 x 10            [place] extension   9# 2 x 10            Modalities             CP                               "

## 2023-06-30 ENCOUNTER — TELEPHONE (OUTPATIENT)
Dept: CARDIOLOGY CLINIC | Facility: CLINIC | Age: 68
End: 2023-06-30

## 2023-06-30 NOTE — TELEPHONE ENCOUNTER
Called and spoke to pt and advised to decrease down to 5 mg and call next week with update on swelling   Pt verbally understood

## 2023-06-30 NOTE — ASSESSMENT & PLAN NOTE
Management per Cardio  On Inclisiran Leqvio injection 284 mg - next injection 8/23, received 5/4/23  Recommend lifestyle modifications

## 2023-06-30 NOTE — TELEPHONE ENCOUNTER
P/C having LL ankle/feet swelling  She advised it has been going on for a while   She feel probably since the increase of amplodipine in April    She was at her PCP office and recommended to call office    Denies sob,chest pain, palpations    Last ov 4/19/2023      Charla CRUZ off  Dr Daphene Pound off    Amlodipine 10 mg     Please advise

## 2023-07-03 ENCOUNTER — OFFICE VISIT (OUTPATIENT)
Dept: PHYSICAL THERAPY | Facility: CLINIC | Age: 68
End: 2023-07-03
Payer: MEDICARE

## 2023-07-03 DIAGNOSIS — M75.122 COMPLETE TEAR OF LEFT ROTATOR CUFF, UNSPECIFIED WHETHER TRAUMATIC: Primary | ICD-10-CM

## 2023-07-03 PROCEDURE — 97110 THERAPEUTIC EXERCISES: CPT

## 2023-07-03 PROCEDURE — 97140 MANUAL THERAPY 1/> REGIONS: CPT

## 2023-07-03 NOTE — PROGRESS NOTES
Daily Note     Today's date: 7/3/2023  Patient name: Wesley Kidd  : 1955  MRN: [id number]  Referring provider: Lovenia Meckel*  Dx: No diagnosis found. Subjective: Patient sore following last treatment session. Objective: See treatment diary below      Assessment: Tolerated treatment well. Patient exhibited good technique with therapeutic exercises      Plan: Continue per plan of care.       Precautions: per protocol; sling x 6 weeks as per MD  125 degrees flexion by 28 days  35-75 degrees ER at 0 degrees by 28 days  No pulley until week 5   No passive ABD until 3 weeks to 90 degrees  NO ER in ABD for 6 weeks  IR to body progress to 50 degrees by 28 days      [id number]         Manuals 6/26 6/29 7/3          PROM 10  10 10                       rev                                                                                                                                  Ther Ex                                       Pball roll out standing  10"  x5 10"x5 10" x 10           Pulleys 5 5 5          AAROM shoulder flexion  10"x 10             Cane IR/EXT 10" x 10  10" x 10  10"x 10           Standing AROM flexion/scaption 1# 20  1# 20 2# 20           Haircut             Supine AROM flex 1# 20 2# 15  2# 20          S/L ER 1# 20  2# 15  2# 20          S/l ABD 1# 20  1# 20  1# 20          Cabinet taps 2# 10  2# 15  2# 20          [place] ER  2# 2 x 10  2# 20           [place] IR  4# 2 x 10  3# 20           [place] rows  12# 2 x 10  12# 20           [place] extension   9# 2 x 10  10# 20           Modalities             CP   10

## 2023-07-06 ENCOUNTER — OFFICE VISIT (OUTPATIENT)
Dept: PHYSICAL THERAPY | Facility: CLINIC | Age: 68
End: 2023-07-06
Payer: MEDICARE

## 2023-07-06 DIAGNOSIS — M75.122 COMPLETE TEAR OF LEFT ROTATOR CUFF, UNSPECIFIED WHETHER TRAUMATIC: Primary | ICD-10-CM

## 2023-07-06 PROCEDURE — 97140 MANUAL THERAPY 1/> REGIONS: CPT

## 2023-07-06 PROCEDURE — 97110 THERAPEUTIC EXERCISES: CPT

## 2023-07-06 NOTE — PROGRESS NOTES
Daily Note     Today's date: 2023  Patient name: Kenia Uriarte  : 1955  MRN: [id number]  Referring provider: Claudean Market*  Dx:   Encounter Diagnosis     ICD-10-CM    1. Complete tear of left rotator cuff, unspecified whether traumatic  M75.122                      Subjective: No change in status since last visit. Some noticeable weakness when performing tasks at home such as putting dishes away in cabinet above shoulder height. Objective: See treatment diary below      Assessment: Tolerated treatment well. Patient exhibited good technique with therapeutic exercises      Plan: Continue per plan of care.       Precautions: per protocol; sling x 6 weeks as per MD  125 degrees flexion by 28 days  35-75 degrees ER at 0 degrees by 28 days  No pulley until week 5   No passive ABD until 3 weeks to 90 degrees  NO ER in ABD for 6 weeks  IR to body progress to 50 degrees by 28 days      [id number]         Manuals 6/26 6/29 7/3 7/6         PROM 10  10 10 10                       rev                                                                                                                                  Ther Ex                                       Pball roll out standing  10"  x5 10"x5 10" x 10  10" x 10          Pulleys 5 5 5 5         AAROM shoulder flexion  10"x 10             Cane IR/EXT 10" x 10  10" x 10  10"x 10  10" x 10          Standing AROM flexion/scaption 1# 20  1# 20 2# 20  2# 20          Haircut             Supine AROM flex 1# 20 2# 15  2# 20 3# 20         S/L ER 1# 20  2# 15  2# 20 2# 20          S/l ABD 1# 20  1# 20  1# 20 2# 20          Cabinet taps 2# 10  2# 15  2# 20 3# 10          [place] ER  2# 2 x 10  2# 20  2.7# 20          [place] IR  4# 2 x 10  3# 20  3# 20          [place] rows  12# 2 x 10  12# 20  12# 20          [place] extension   9# 2 x 10  10# 20  10# 20         Modalities             CP   10  10

## 2023-07-09 DIAGNOSIS — I10 BENIGN ESSENTIAL HYPERTENSION: ICD-10-CM

## 2023-07-10 ENCOUNTER — APPOINTMENT (OUTPATIENT)
Dept: PHYSICAL THERAPY | Facility: CLINIC | Age: 68
End: 2023-07-10
Payer: MEDICARE

## 2023-07-10 ENCOUNTER — TELEPHONE (OUTPATIENT)
Dept: CARDIOLOGY CLINIC | Facility: CLINIC | Age: 68
End: 2023-07-10

## 2023-07-10 RX ORDER — METOPROLOL SUCCINATE 50 MG/1
50 TABLET, EXTENDED RELEASE ORAL
Qty: 90 TABLET | Refills: 3 | Status: SHIPPED | OUTPATIENT
Start: 2023-07-10

## 2023-07-10 NOTE — TELEPHONE ENCOUNTER
P/C continues to have the bilaterally ankle swelling, and BP was elevated today with a terrible headache. She did have her coffee and it is better    /97  Repeat at 930 a 168/83       Pt does have an appt 7/25/23    Last week she had called in, and Dr Gilmar Agudelo cut back her amlodipine to 5 mg.     MA off     Metoprolol 50 mg qhs   Amlodipine 5 mg qd  (that was 10 mg but was changed last week due to swelling)    Please advise   MA off

## 2023-07-12 ENCOUNTER — OFFICE VISIT (OUTPATIENT)
Dept: PHYSICAL THERAPY | Facility: CLINIC | Age: 68
End: 2023-07-12
Payer: MEDICARE

## 2023-07-12 DIAGNOSIS — M75.122 COMPLETE TEAR OF LEFT ROTATOR CUFF, UNSPECIFIED WHETHER TRAUMATIC: Primary | ICD-10-CM

## 2023-07-12 PROCEDURE — 97140 MANUAL THERAPY 1/> REGIONS: CPT | Performed by: PHYSICAL THERAPIST

## 2023-07-12 PROCEDURE — 97110 THERAPEUTIC EXERCISES: CPT | Performed by: PHYSICAL THERAPIST

## 2023-07-12 PROCEDURE — 97530 THERAPEUTIC ACTIVITIES: CPT | Performed by: PHYSICAL THERAPIST

## 2023-07-12 NOTE — PROGRESS NOTES
Cardiology Follow Up    Beth Alfredo  1955  [id number]  [facility] [address]  [phone]    1. Primary hypertension        2. Pure hypercholesterolemia        3. Coronary artery disease involving native heart without angina pectoris, unspecified vessel or lesion type            Interval History:   Ms Neal Houston presents to our office for with complaints of high BP at home 133/83 - 156/89. Raya Alcantar admits to Keith LE edema at the end of the day. She denies eating foods high in sodium. She denies CP, palpitations, lightheadedness or dizziness      Medical History   Primary Cardiologist Dr Skinny Hightower  Hypertension  Dyslipidemia  Non obstructive CAD, ostial RCA 20% stenosis, by [facility] on 5/24/21 5/22/21 TTE: LVEF 55-60% grade 1 DD, trace TR,     Family hx of sudden cardiac death     Allergy: Lisinopril, Atorvastatin     Patient Active Problem List   Diagnosis   • Allergic rhinitis   • Benign essential hypertension   • Dermatitis   • GERD without esophagitis   • Hyperlipoproteinemia   • Nontoxic single thyroid nodule   • Osteoarthritis of knee   • Rectocele   • Uterovaginal prolapse   • Cystocele, lateral   • Varicose veins of bilateral lower extremities with pain   • Family history of sudden cardiac death   • Pure hypercholesterolemia   • Obesity (BMI 30-39. 9)   • Chronic venous insufficiency   • Actinic keratosis   • Rosacea   • Hypertensive urgency   • Acute left-sided low back pain without sciatica   • Arthralgia   • Urge incontinence of urine   • Long-term current use of proton pump inhibitor therapy   • Left upper quadrant abdominal pain     Past Medical History:   Diagnosis Date   • Breast lump    • Chest pain 05/28/2019   • COVID-19 04/09/2021   • Disease of thyroid gland     nodule, benign   • Eczema     facial rash- could also be rosacea   • Elevated troponin I level 05/21/2021   • GERD (gastroesophageal reflux disease)    • History of transfusion     at 9years old for bleeding during tonsillectomy, no reaction   • Hypertension    • Nontoxic single thyroid nodule 2012   • Rectocele    • Rotator cuff tear, left    • Uterine leiomyoma    • Varicose veins of both lower extremities      Social History     Socioeconomic History   • Marital status: /Civil Union     Spouse name: Not on file   • Number of children: Not on file   • Years of education: Not on file   • Highest education level: Not on file   Occupational History   • Not on file   Tobacco Use   • Smoking status: Former     Types: Cigarettes     Quit date: 1976     Years since quittin.5   • Smokeless tobacco: Never   • Tobacco comments:     None   Vaping Use   • Vaping Use: Never used   Substance and Sexual Activity   • Alcohol use: Yes     Comment: seldom   • Drug use: Never   • Sexual activity: Not Currently     Partners: Male     Birth control/protection: Surgical   Other Topics Concern   • Not on file   Social History Narrative   • Not on file     Social Determinants of Health     Financial Resource Strain: Low Risk  (2023)    Overall Financial Resource Strain (CARDIA)    • Difficulty of Paying Living Expenses: Not hard at all   Food Insecurity: Not on file   Transportation Needs: No Transportation Needs (2023)    PRAPARE - Transportation    • Lack of Transportation (Medical): No    • Lack of Transportation (Non-Medical):  No   Physical Activity: Not on file   Stress: Not on file   Social Connections: Not on file   Intimate Partner Violence: Not on file   Housing Stability: Not on file      Family History   Problem Relation Age of Onset   • Breast cancer Mother    • Hypertension Mother    • Varicose Veins Mother         of lower extremities   • Glaucoma Mother         I also have glaucoma   • Hypertension Father    • Coronary artery disease Father    • Coronary artery disease Sister    • Heart disease Sister • Varicose Veins Sister         of lower extremities   • No Known Problems Sister    • No Known Problems Sister    • Hypertension Brother    • No Known Problems Daughter    • Diabetes Maternal Grandmother         Also nephew William Hectorermacher   • Breast cancer Maternal Aunt    • No Known Problems Maternal Aunt      Past Surgical History:   Procedure Laterality Date   • BLADDER SURGERY  08/2012    Bladder "lift"   • BREAST LUMPECTOMY Right 2000    benign   • COLONOSCOPY     • HYSTERECTOMY  1990   • JOINT REPLACEMENT  6/2017 and 5/2022    Knees   • KNEE SURGERY Right     arthroscopy x2   • NOSE SURGERY      deviated septum   • WV LARYNGOSCOPY W/BIOPSY MICROSCOPE/TELESCOPE Left 04/02/2018    Procedure: MICROLARYNGOSCOPY AND EXCISION OF LEFT VOCAL FOLD POLYP;  Surgeon: Angeline Haro MD;  Location: [facility];  Service: ENT   • WV SURGICAL ARTHROSCOPY SHOULDER W/ROTATOR CUFF RPR Left 3/28/2023    Procedure: Shoulder Arthroscopy with Rotator Cuff Repair and Subacromial decompression;  Surgeon: Adriano Hightower MD;  Location: [facility]  Service: Orthopedics   • W[place] XCAPSL CTRC RMVL INSJ IO LENS PROSTH W/O ECP Left 11/28/2022    Procedure: EXTRACTION EXTRACAPSULAR CATARACT PHACO INTRAOCULAR LENS (IOL);   Surgeon: Christiano Jasso MD;  Location: [facility] MAIN OR;  Service: Ophthalmology   • TENDON REPAIR Right     achilles tendon   • TONSILLECTOMY     • TUBAL LIGATION     • VARICOSE VEIN SURGERY Left 12/2009       Current Outpatient Medications:   •  acetaminophen (TYLENOL) 325 mg tablet, Take 650 mg by mouth every 6 (six) hours as needed for mild pain, Disp: , Rfl:   •  amLODIPine (NORVASC) 10 mg tablet, Take 1 tablet (10 mg total) by mouth daily, Disp: 90 tablet, Rfl: 3  •  aspirin 81 mg chewable tablet, Chew 1 tablet (81 mg total) daily (Patient taking differently: Chew 81 mg every morning), Disp: 30 tablet, Rfl: 0  •  BIOTIN PO, Take by mouth in the morning, Disp: , Rfl:   •  cholecalciferol (VITAMIN D3) 1,000 units tablet, Take 1,000 Units by mouth daily, Disp: , Rfl:   •  esomeprazole (NexIUM) 40 MG capsule, Take 1 capsule (40 mg total) by mouth daily, Disp: 90 capsule, Rfl: 1  •  Ting, Zingiber officinalis, (TING ROOT PO), Take by mouth in the morning, Disp: , Rfl:   •  hydrochlorothiazide (HYDRODIURIL) 12.5 mg tablet, Take 1 tablet (12.5 mg total) by mouth daily (Patient taking differently: Take 12.5 mg by mouth every morning), Disp: 90 tablet, Rfl: 3  •  latanoprost (XALATAN) 0.005 % ophthalmic solution, Administer to both eyes daily at bedtime, Disp: , Rfl:   •  metoprolol succinate (TOPROL-XL) 50 mg 24 hr tablet, Take 1 tablet (50 mg total) by mouth daily at bedtime, Disp: 90 tablet, Rfl: 3  •  multivitamin (THERAGRAN) TABS, Take 1 tablet by mouth daily, Disp: , Rfl:   •  olmesartan (BENICAR) 40 mg tablet, Take 1 tablet (40 mg total) by mouth daily (Patient taking differently: Take 40 mg by mouth every morning), Disp: 90 tablet, Rfl: 3  •  Sulfacetamide Sodium (Sodium Sulfacetamide Wash) 10 % LIQD, Apply twice daily to affected area on face (Patient taking differently: in the morning Apply twice daily to affected area on face), Disp: 177 mL, Rfl: 2  •  vitamin B-12 (CYANOCOBALAMIN) 2500 MCG TABS, Take 500 mcg by mouth daily, Disp: , Rfl:     Current Facility-Administered Medications:   •  Inclisiran Sodium (LEQVIO) subcutaneous injection 284 mg, 284 mg, Subcutaneous, Q6 Months, Sonya Fox MD, 284 mg at 05/04/23 1048  Allergies   Allergen Reactions   • Lisinopril Other (See Comments)     Other reaction(s): swollen legs   • Hydrocodone-Acetaminophen Dizziness   • Irbesartan-Hydrochlorothiazide Other (See Comments)      Hypertension  Pt had hypertension, arm heaviness and headache   • Pollen Extract Hives     Ragweed etc.    • Atorvastatin Myalgia       Labs:  Appointment on 06/22/2023   Component Date Value   • Cholesterol 06/22/2023 157    • Triglycerides 06/22/2023 126    • HDL, Direct 06/22/2023 58    • LDL Calculated 06/22/2023 74    • Non-HDL-Chol (CHOL-HDL) 06/22/2023 99      Imaging: No results found. Review of Systems:  Review of Systems   Musculoskeletal: Positive for arthralgias and myalgias. All other systems reviewed and are negative. Physical Exam:  Physical Exam  Vitals reviewed. Constitutional:       Appearance: She is obese. Cardiovascular:      Rate and Rhythm: Normal rate and regular rhythm. Pulses: Normal pulses. Heart sounds: Normal heart sounds. Pulmonary:      Effort: Pulmonary effort is normal.      Breath sounds: Normal breath sounds. Musculoskeletal:         General: Normal range of motion. Cervical back: Normal range of motion and neck supple. Right lower leg: No edema. Left lower leg: No edema. Skin:     General: Skin is warm and dry. Capillary Refill: Capillary refill takes less than 2 seconds. Neurological:      General: No focal deficit present. Mental Status: She is alert and oriented to person, place, and time. Psychiatric:         Mood and Affect: Mood normal.         Discussion/Summary:  # Hypertension- RUE sitting 136/70, continue on Amlodipine 5mg daily, Metoprolol succinate 50mg daily, Benicar 40mg daily, HCTZ 12.5mg daily, DASH diet. Jan Quinones was intructed on the proper technique for home BP monitoring. She was instructed to bring her BP monitor with her to the follow up office visit. Keith LE edema- she does not presents with keith LE edema in the office today. Most likely due to PVD. Instructed to wear keith LE compression knee high socks.    #  Hypercholesterolemia  6/22/23 , , HDL 58, LDL 74, continue on LEQVIO 284mg Q6 months, DASH diet   # Non obstructive CAD, ostial RCA 20% stenosis, by [facility] on 5/24/21, denies CP continue on   Amlodipine 5mg daily, Metoprolol succinate 50mg daily, Benicar 40mg daily, HCTZ 12.5mg daily, ASA 81mg daily, LEQVIO 284mg Q6 months, heart healthy diet, I have encouraged daily walking

## 2023-07-12 NOTE — PROGRESS NOTES
Daily Note     Today's date: 2023  Patient name: Pao Lai  : 1955  MRN: [id number]  Referring provider: Pau Mondragon*  Dx:   Encounter Diagnosis     ICD-10-CM    1. Complete tear of left rotator cuff, unspecified whether traumatic  M75.122                      Subjective: Patient reports that she continues to have pain into the bicep      Objective: See treatment diary below      Assessment: Tolerated treatment well. Patient would benefit from continued PT  Patient's sx into the bicep appears to be referred pain from the RTC. Sx noted with IR stretching. Plan: Continue per plan of care.       Precautions: per protocol; sling x 6 weeks as per MD  125 degrees flexion by 28 days  35-75 degrees ER at 0 degrees by 28 days  No pulley until week 5   No passive ABD until 3 weeks to 90 degrees  NO ER in ABD for 6 weeks  IR to body progress to 50 degrees by 28 days      [id number]         Manuals 6/26 6/29 7/3 7/6 7/12        PROM 10  10 10 10  10        Iastm/(-) pressure iastm     10p        rev                                                                                                                                  Ther Ex                                       Pball roll out standing  10"  x5 10"x5 10" x 10  10" x 10  10 x :10        Pulleys 5 5 5 5 5'        AAROM shoulder flexion  10"x 10             Cane IR/EXT 10" x 10  10" x 10  10"x 10  10" x 10  10 x :10        Standing AROM flexion/scaption 1# 20  1# 20 2# 20  2# 20  2# 20        Prone Ext/Row     10        Supine AROM flex 1# 20 2# 15  2# 20 3# 20 3# 20        S/L ER 1# 20  2# 15  2# 20 2# 20  2# 20        S/l ABD 1# 20  1# 20  1# 20 2# 20  2# 20        Cabinet taps 2# 10  2# 15  2# 20 3# 10  3# 20        [place] ER  2# 2 x 10  2# 20  2.7# 20  3#  20        [place] IR  4# 2 x 10  3# 20  3# 20  3# x 20        [place] rows  12# 2 x 10  12# 20  12# 20  12# x 20        [place] extension   9# 2 x 10  10# 20  10# 20 10 # x20 Modalities             CP   10  10

## 2023-07-13 ENCOUNTER — OFFICE VISIT (OUTPATIENT)
Dept: CARDIOLOGY CLINIC | Facility: CLINIC | Age: 68
End: 2023-07-13
Payer: MEDICARE

## 2023-07-13 ENCOUNTER — APPOINTMENT (OUTPATIENT)
Dept: PHYSICAL THERAPY | Facility: CLINIC | Age: 68
End: 2023-07-13
Payer: MEDICARE

## 2023-07-13 VITALS
HEART RATE: 74 BPM | WEIGHT: 212.8 LBS | BODY MASS INDEX: 35.45 KG/M2 | DIASTOLIC BLOOD PRESSURE: 82 MMHG | HEIGHT: 65 IN | SYSTOLIC BLOOD PRESSURE: 164 MMHG | OXYGEN SATURATION: 97 %

## 2023-07-13 DIAGNOSIS — E78.00 PURE HYPERCHOLESTEROLEMIA: ICD-10-CM

## 2023-07-13 DIAGNOSIS — I25.10 CORONARY ARTERY DISEASE INVOLVING NATIVE HEART WITHOUT ANGINA PECTORIS, UNSPECIFIED VESSEL OR LESION TYPE: ICD-10-CM

## 2023-07-13 DIAGNOSIS — I10 PRIMARY HYPERTENSION: Primary | ICD-10-CM

## 2023-07-13 PROCEDURE — 99215 OFFICE O/P EST HI 40 MIN: CPT | Performed by: NURSE PRACTITIONER

## 2023-07-13 NOTE — PATIENT INSTRUCTIONS
10% or less per serving low sodium     20-30mmHg knee high compression stocking stockings  Bring BP cuff with you to the next office visit   Sit for 5 minutes prior to taking BP

## 2023-07-14 ENCOUNTER — TELEPHONE (OUTPATIENT)
Age: 68
End: 2023-07-14

## 2023-07-14 ENCOUNTER — PREP FOR PROCEDURE (OUTPATIENT)
Age: 68
End: 2023-07-14

## 2023-07-14 DIAGNOSIS — Z12.11 COLON CANCER SCREENING: Primary | ICD-10-CM

## 2023-07-14 DIAGNOSIS — Z12.11 SCREENING FOR COLON CANCER: Primary | ICD-10-CM

## 2023-07-14 NOTE — TELEPHONE ENCOUNTER
Scheduled date of colonoscopy (as of today):9/21/23  Physician performing colonoscopy:PEACE  Location of colonoscopy:[facility]  Clearances: NA

## 2023-07-17 ENCOUNTER — LAB (OUTPATIENT)
Dept: LAB | Facility: CLINIC | Age: 68
End: 2023-07-17
Payer: MEDICARE

## 2023-07-17 ENCOUNTER — OFFICE VISIT (OUTPATIENT)
Dept: PHYSICAL THERAPY | Facility: CLINIC | Age: 68
End: 2023-07-17
Payer: MEDICARE

## 2023-07-17 DIAGNOSIS — M75.122 COMPLETE TEAR OF LEFT ROTATOR CUFF, UNSPECIFIED WHETHER TRAUMATIC: Primary | ICD-10-CM

## 2023-07-17 DIAGNOSIS — K21.9 GERD WITHOUT ESOPHAGITIS: ICD-10-CM

## 2023-07-17 DIAGNOSIS — E78.5 HYPERLIPOPROTEINEMIA: ICD-10-CM

## 2023-07-17 DIAGNOSIS — E78.00 PURE HYPERCHOLESTEROLEMIA: ICD-10-CM

## 2023-07-17 DIAGNOSIS — R73.01 IFG (IMPAIRED FASTING GLUCOSE): ICD-10-CM

## 2023-07-17 DIAGNOSIS — E66.9 OBESITY (BMI 35.0-39.9 WITHOUT COMORBIDITY): ICD-10-CM

## 2023-07-17 DIAGNOSIS — I10 BENIGN ESSENTIAL HYPERTENSION: ICD-10-CM

## 2023-07-17 LAB
25(OH)D3 SERPL-MCNC: 51.3 NG/ML (ref 30–100)
ALBUMIN SERPL BCP-MCNC: 4.5 G/DL (ref 3.5–5)
ALP SERPL-CCNC: 67 U/L (ref 34–104)
ALT SERPL W P-5'-P-CCNC: 23 U/L (ref 7–52)
ANION GAP SERPL CALCULATED.3IONS-SCNC: 7 MMOL/L
AST SERPL W P-5'-P-CCNC: 18 U/L (ref 13–39)
BILIRUB SERPL-MCNC: 0.52 MG/DL (ref 0.2–1)
BUN SERPL-MCNC: 18 MG/DL (ref 5–25)
CALCIUM SERPL-MCNC: 9.4 MG/DL (ref 8.4–10.2)
CHLORIDE SERPL-SCNC: 105 MMOL/L (ref 96–108)
CO2 SERPL-SCNC: 28 MMOL/L (ref 21–32)
CREAT SERPL-MCNC: 0.71 MG/DL (ref 0.6–1.3)
EST. AVERAGE GLUCOSE BLD GHB EST-MCNC: 108 MG/DL
GFR SERPL CREATININE-BSD FRML MDRD: 87 ML/MIN/1.73SQ M
GLUCOSE P FAST SERPL-MCNC: 98 MG/DL (ref 65–99)
HBA1C MFR BLD: 5.4 %
MAGNESIUM SERPL-MCNC: 1.6 MG/DL (ref 1.9–2.7)
POTASSIUM SERPL-SCNC: 4.3 MMOL/L (ref 3.5–5.3)
PROT SERPL-MCNC: 6.8 G/DL (ref 6.4–8.4)
SODIUM SERPL-SCNC: 140 MMOL/L (ref 135–147)
TSH SERPL DL<=0.05 MIU/L-ACNC: 2.57 UIU/ML (ref 0.45–4.5)

## 2023-07-17 PROCEDURE — 97110 THERAPEUTIC EXERCISES: CPT

## 2023-07-17 PROCEDURE — 83735 ASSAY OF MAGNESIUM: CPT

## 2023-07-17 PROCEDURE — 82306 VITAMIN D 25 HYDROXY: CPT

## 2023-07-17 PROCEDURE — 84443 ASSAY THYROID STIM HORMONE: CPT

## 2023-07-17 PROCEDURE — 80053 COMPREHEN METABOLIC PANEL: CPT

## 2023-07-17 PROCEDURE — 83036 HEMOGLOBIN GLYCOSYLATED A1C: CPT

## 2023-07-17 PROCEDURE — 97140 MANUAL THERAPY 1/> REGIONS: CPT

## 2023-07-17 PROCEDURE — 36415 COLL VENOUS BLD VENIPUNCTURE: CPT

## 2023-07-17 NOTE — RESULT ENCOUNTER NOTE
Hypomagnesemia-mildly low magnesium. Start over-the-counter magnesium 400 mg daily. Nurse to see orders to repeat nonfasting CMP and magnesium in 1 week. Nexium may be contributing to low magnesium levels. Other resulted labs are stable.     Message sent to patient via [employer] patient portal.

## 2023-07-17 NOTE — PROGRESS NOTES
Daily Note     Today's date: 2023  Patient name: Abisai Peguero  : 1955  MRN: [id number]  Referring provider: Agnieszka Perez*  Dx: No diagnosis found. Subjective: Patient continues to notice bicep pain . No change following last visit. Objective: See treatment diary below      Assessment: Tolerated treatment well. Patient exhibited good technique with therapeutic exercises      Plan: Continue per plan of care.       Precautions: per protocol; sling x 6 weeks as per MD  125 degrees flexion by 28 days  35-75 degrees ER at 0 degrees by 28 days  No pulley until week 5   No passive ABD until 3 weeks to 90 degrees  NO ER in ABD for 6 weeks  IR to body progress to 50 degrees by 28 days      [id number]         Manuals 6/26 6/29 7/3 7/6 7/12 7/17       PROM 10  10 10 10  10 10       Iastm/(-) pressure iastm     10p 10        rev                                                                                                                                  Ther Ex                                       Pball roll out standing  10"  x5 10"x5 10" x 10  10" x 10  10 x :10        Pulleys 5 5 5 5 5' 5       IR strap stretch      10' x 5       Cane IR/EXT 10" x 10  10" x 10  10"x 10  10" x 10  10 x :10 10" x 10        Standing AROM flexion/scaption 1# 20  1# 20 2# 20  2# 20  2# 20 2# 20       Prone Ext/Row     10        Supine AROM flex 1# 20 2# 15  2# 20 3# 20 3# 20 3# 20        S/L ER 1# 20  2# 15  2# 20 2# 20  2# 20 2# 20        S/l ABD 1# 20  1# 20  1# 20 2# 20  2# 20 2# 20       Cabinet taps 2# 10  2# 15  2# 20 3# 10  3# 20 3# 20        [place] ER  2# 2 x 10  2# 20  2.7# 20  3#  20 nv       [place] IR  4# 2 x 10  3# 20  3# 20  3# x 20 nv       [place] rows  12# 2 x 10  12# 20  12# 20  12# x 20 nv       [place] extension   9# 2 x 10  10# 20  10# 20 10 # x20        Modalities             CP   10  10

## 2023-07-18 NOTE — RESULT ENCOUNTER NOTE
Normal hemoglobin A1c. No prediabetes or diabetes.     Message sent to patient via [employer] patient portal.

## 2023-07-20 ENCOUNTER — APPOINTMENT (OUTPATIENT)
Dept: PHYSICAL THERAPY | Facility: CLINIC | Age: 68
End: 2023-07-20
Payer: MEDICARE

## 2023-07-24 ENCOUNTER — OFFICE VISIT (OUTPATIENT)
Dept: PHYSICAL THERAPY | Facility: CLINIC | Age: 68
End: 2023-07-24
Payer: MEDICARE

## 2023-07-24 DIAGNOSIS — M75.122 COMPLETE TEAR OF LEFT ROTATOR CUFF, UNSPECIFIED WHETHER TRAUMATIC: Primary | ICD-10-CM

## 2023-07-24 PROCEDURE — 97530 THERAPEUTIC ACTIVITIES: CPT | Performed by: PHYSICAL THERAPIST

## 2023-07-24 PROCEDURE — 97140 MANUAL THERAPY 1/> REGIONS: CPT | Performed by: PHYSICAL THERAPIST

## 2023-07-24 PROCEDURE — 97110 THERAPEUTIC EXERCISES: CPT | Performed by: PHYSICAL THERAPIST

## 2023-07-24 NOTE — PROGRESS NOTES
Daily Note     Today's date: 2023  Patient name: Kisha Norwood  : 1955  MRN: [id number]  Referring provider: Natalie Choudhary*  Dx:   Encounter Diagnosis     ICD-10-CM    1. Complete tear of left rotator cuff, unspecified whether traumatic  M75.122                      Subjective: Patient reports that she continues to have pain in the bicep region. Objective: See treatment diary below      Assessment: Tolerated treatment well. Patient would benefit from continued PT      Plan: Continue per plan of care.       Precautions: per protocol; sling x 6 weeks as per MD  125 degrees flexion by 28 days  35-75 degrees ER at 0 degrees by 28 days  No pulley until week 5   No passive ABD until 3 weeks to 90 degrees  NO ER in ABD for 6 weeks  IR to body progress to 50 degrees by 28 days      [id number]         Manuals 6/26 6/29 7/3 7/6 7/12 7/17 7/24      PROM 10  10 10 10  10 10 10 LX      Iastm/(-) pressure iastm     10p 10  With IR      rev                                                                                                                                  Ther Ex                                       Pball roll out standing  10"  x5 10"x5 10" x 10  10" x 10  10 x :10        Pulleys 5 5 5 5 5' 5 5'      IR strap stretch      10' x 5 5 x :10      Cane IR/EXT 10" x 10  10" x 10  10"x 10  10" x 10  10 x :10 10" x 10  hep      Standing AROM flexion/scaption 1# 20  1# 20 2# 20  2# 20  2# 20 2# 20 2# x 20      Prone Ext/Row     10        Supine AROM flex 1# 20 2# 15  2# 20 3# 20 3# 20 3# 20  3# x 20      S/L ER 1# 20  2# 15  2# 20 2# 20  2# 20 2# 20  2#      S/l ABD 1# 20  1# 20  1# 20 2# 20  2# 20 2# 20 2#       Cabinet taps 2# 10  2# 15  2# 20 3# 10  3# 20 3# 20  3# x 20      [place] ER  2# 2 x 10  2# 20  2.7# 20  3#  20 nv       [place] IR  4# 2 x 10  3# 20  3# 20  3# x 20 nv 4# x 20      [place] rows  12# 2 x 10  12# 20  12# 20  12# x 20 nv 12# x 20      [place] extension   9# 2 x 10  10# 20  10# 20 10 # x20  10# x 20      Modalities             CP   10  10

## 2023-07-25 ENCOUNTER — OFFICE VISIT (OUTPATIENT)
Dept: CARDIOLOGY CLINIC | Facility: CLINIC | Age: 68
End: 2023-07-25
Payer: MEDICARE

## 2023-07-25 VITALS
DIASTOLIC BLOOD PRESSURE: 80 MMHG | OXYGEN SATURATION: 98 % | BODY MASS INDEX: 35.99 KG/M2 | SYSTOLIC BLOOD PRESSURE: 152 MMHG | WEIGHT: 216 LBS | HEIGHT: 65 IN | HEART RATE: 79 BPM

## 2023-07-25 DIAGNOSIS — I10 BENIGN ESSENTIAL HYPERTENSION: Primary | ICD-10-CM

## 2023-07-25 DIAGNOSIS — E78.00 PURE HYPERCHOLESTEROLEMIA: ICD-10-CM

## 2023-07-25 PROCEDURE — 99214 OFFICE O/P EST MOD 30 MIN: CPT | Performed by: INTERNAL MEDICINE

## 2023-07-25 RX ORDER — AMLODIPINE BESYLATE 5 MG/1
5 TABLET ORAL DAILY
Qty: 90 TABLET | Refills: 3 | Status: SHIPPED | OUTPATIENT
Start: 2023-07-25

## 2023-07-25 RX ORDER — HYDROCHLOROTHIAZIDE 25 MG/1
25 TABLET ORAL DAILY
Qty: 90 TABLET | Refills: 3 | Status: SHIPPED | OUTPATIENT
Start: 2023-07-25

## 2023-07-25 NOTE — PROGRESS NOTES
Cardiology   Prakash Alcaraz 77 y.o. female MRN: [id number]        Impression:  1. Hypertension - not adequate control.  Patient had hypertensive urgency in 5/21, which resulted in elevated troponin and chest pain.  No myocardial infarction.   2. Dyslipidemia - on Leqvio.       Recommendations:  1. Increase HCTZ to 25mg daily. 2. Continue remainder of medications. 3. Follow up in 4 months. HPI: Prakash Alcaraz is a 77y.o. year old female with hypertension, dyslipidemia, and non-obstructive CAD by cath 5/21 who presents for follow up.  Admitted 5/21 with chest pain and elevated troponin - no significant CAD and thought to be due to hypertensive urgency.  Echo demonstrated normal LV systolic function.  Recently saw Aidan Wilson for HTN, and BP was controlled. No chest pain, shortness of breath, or palpitations. Major complaint is LE edema. Review of Systems   Constitutional: Negative. HENT: Negative. Eyes: Negative. Respiratory: Negative for chest tightness and shortness of breath. Cardiovascular: Positive for leg swelling. Negative for chest pain and palpitations. Gastrointestinal: Negative. Endocrine: Negative. Genitourinary: Negative. Musculoskeletal: Negative. Skin: Negative. Allergic/Immunologic: Negative. Neurological: Negative. Hematological: Negative. Psychiatric/Behavioral: Negative. All other systems reviewed and are negative.         Past Medical History:   Diagnosis Date   • Breast lump    • Chest pain 05/28/2019   • COVID-19 04/09/2021   • Disease of thyroid gland     nodule, benign   • Eczema     facial rash- could also be rosacea   • Elevated troponin I level 05/21/2021   • GERD (gastroesophageal reflux disease)    • History of transfusion     at 9years old for bleeding during tonsillectomy, no reaction   • Hypertension    • Nontoxic single thyroid nodule 07/25/2012   • Rectocele    • Rotator cuff tear, left    • Uterine leiomyoma    • Varicose veins of both lower extremities      Past Surgical History:   Procedure Laterality Date   • BLADDER SURGERY  2012    Bladder "lift"   • BREAST LUMPECTOMY Right     benign   • COLONOSCOPY     • HYSTERECTOMY     • JOINT REPLACEMENT  2017 and 2022    Knees   • KNEE SURGERY Right     arthroscopy x2   • NOSE SURGERY      deviated septum   • ME LARYNGOSCOPY W/BIOPSY MICROSCOPE/TELESCOPE Left 2018    Procedure: MICROLARYNGOSCOPY AND EXCISION OF LEFT VOCAL FOLD POLYP;  Surgeon: Sonny Wilson MD;  Location: [facility] OR;  Service: ENT   • ME SURGICAL ARTHROSCOPY SHOULDER W/ROTATOR CUFF RPR Left 3/28/2023    Procedure: Shoulder Arthroscopy with Rotator Cuff Repair and Subacromial decompression;  Surgeon: Molly Martinez MD;  Location: [facility];  Service: Orthopedics   • ME XCAPSL CTRC RMVL INSJ IO LENS PROSTH W/O ECP Left 2022    Procedure: EXTRACTION EXTRACAPSULAR CATARACT PHACO INTRAOCULAR LENS (IOL);   Surgeon: Rosette Delgado MD;  Location: [facility] MAIN OR;  Service: Ophthalmology   • TENDON REPAIR Right     achilles tendon   • TONSILLECTOMY     • TUBAL LIGATION     • VARICOSE VEIN SURGERY Left 2009     Social History     Substance and Sexual Activity   Alcohol Use Yes    Comment: seldom     Social History     Substance and Sexual Activity   Drug Use Never     Social History     Tobacco Use   Smoking Status Former   • Types: Cigarettes   • Quit date: 1976   • Years since quittin.5   Smokeless Tobacco Never   Tobacco Comments    None     Family History   Problem Relation Age of Onset   • Breast cancer Mother    • Hypertension Mother    • Varicose Veins Mother         of lower extremities   • Glaucoma Mother         I also have glaucoma   • Hypertension Father    • Coronary artery disease Father    • Coronary artery disease Sister    • Heart disease Sister    • Varicose Veins Sister         of lower extremities   • No Known Problems Sister    • No Known Problems Sister • Hypertension Brother    • No Known Problems Daughter    • Diabetes Maternal Grandmother         Also nephew William Dumontacher   • Breast cancer Maternal Aunt    • No Known Problems Maternal Aunt        Allergies:   Allergies   Allergen Reactions   • Lisinopril Other (See Comments)     Other reaction(s): swollen legs   • Hydrocodone-Acetaminophen Dizziness   • Irbesartan-Hydrochlorothiazide Other (See Comments)      Hypertension  Pt had hypertension, arm heaviness and headache   • Pollen Extract Hives     Ragweed etc.    • Atorvastatin Myalgia       Medications:     Current Outpatient Medications:   •  acetaminophen (TYLENOL) 325 mg tablet, Take 650 mg by mouth every 6 (six) hours as needed for mild pain, Disp: , Rfl:   •  amLODIPine (NORVASC) 10 mg tablet, Take 1 tablet (10 mg total) by mouth daily (Patient taking differently: Take 5 mg by mouth daily), Disp: 90 tablet, Rfl: 3  •  aspirin 81 mg chewable tablet, Chew 1 tablet (81 mg total) daily (Patient taking differently: Chew 81 mg every morning), Disp: 30 tablet, Rfl: 0  •  BIOTIN PO, Take by mouth in the morning, Disp: , Rfl:   •  cholecalciferol (VITAMIN D3) 1,000 units tablet, Take 1,000 Units by mouth daily, Disp: , Rfl:   •  esomeprazole (NexIUM) 40 MG capsule, Take 1 capsule (40 mg total) by mouth daily, Disp: 90 capsule, Rfl: 1  •  Ginger, Zingiber officinalis, (GINGER ROOT PO), Take by mouth in the morning, Disp: , Rfl:   •  hydrochlorothiazide (HYDRODIURIL) 12.5 mg tablet, Take 1 tablet (12.5 mg total) by mouth daily (Patient taking differently: Take 12.5 mg by mouth every morning), Disp: 90 tablet, Rfl: 3  •  latanoprost (XALATAN) 0.005 % ophthalmic solution, Administer to both eyes daily at bedtime, Disp: , Rfl:   •  Magnesium 400 MG CAPS, Take 1 capsule (400 mg total) by mouth in the morning, Disp: , Rfl:   •  metoprolol succinate (TOPROL-XL) 50 mg 24 hr tablet, Take 1 tablet (50 mg total) by mouth daily at bedtime, Disp: 90 tablet, Rfl: 3  • multivitamin (THERAGRAN) TABS, Take 1 tablet by mouth daily, Disp: , Rfl:   •  olmesartan (BENICAR) 40 mg tablet, Take 1 tablet (40 mg total) by mouth daily (Patient taking differently: Take 40 mg by mouth every morning), Disp: 90 tablet, Rfl: 3  •  Sulfacetamide Sodium (Sodium Sulfacetamide Wash) 10 % LIQD, Apply twice daily to affected area on face (Patient taking differently: in the morning Apply twice daily to affected area on face), Disp: 177 mL, Rfl: 2  •  vitamin B-12 (CYANOCOBALAMIN) 2500 MCG TABS, Take 500 mcg by mouth daily, Disp: , Rfl:   •  polyethylene glycol (GOLYTELY) 4000 mL solution, Take 4,000 mL by mouth once for 1 dose Take as directed by the office (Patient not taking: Reported on 2023), Disp: 4000 mL, Rfl: 0    Current Facility-Administered Medications:   •  Inclisiran Sodium (LEQVIO) subcutaneous injection 284 mg, 284 mg, Subcutaneous, Q6 Months, Esther Sousa MD, 284 mg at 23 1048       Wt Readings from Last 3 Encounters:   23 98 kg (216 lb)   23 96.5 kg (212 lb 12.8 oz)   23 96.5 kg (212 lb 12.8 oz)     Temp Readings from Last 3 Encounters:   23 97.9 °F (36.6 °C)   23 97.6 °F (36.4 °C) (Core)   23 (!) 96.1 °F (35.6 °C)     BP Readings from Last 3 Encounters:   23 152/80   23 164/82   23 130/78     Pulse Readings from Last 3 Encounters:   23 79   23 74   23 64         Physical Exam  HENT:      Head: Atraumatic. Mouth/Throat:      Mouth: Mucous membranes are moist.   Eyes:      Extraocular Movements: Extraocular movements intact. Cardiovascular:      Rate and Rhythm: Normal rate and regular rhythm. Heart sounds: Normal heart sounds. Pulmonary:      Effort: Pulmonary effort is normal.      Breath sounds: Normal breath sounds. Abdominal:      General: Abdomen is flat. Musculoskeletal:         General: Normal range of motion. Cervical back: Normal range of motion.       Right lower le+ Pitting Edema present. Left lower le+ Pitting Edema present. Skin:     General: Skin is warm. Neurological:      General: No focal deficit present. Mental Status: She is alert and oriented to person, place, and time. Psychiatric:         Mood and Affect: Mood normal.         Behavior: Behavior normal.           Laboratory Studies:  CMP:  Lab Results   Component Value Date     2017    K 4.3 2023     2023    CO2 28 2023    BUN 18 2023    CREATININE 0.71 2023    AST 18 2023    ALT 23 2023    BILITOT 0.4 2017    EGFR 87 2023       Lipid Profile:   Lab Results   Component Value Date    CHOL 211 (H) 2017     Lab Results   Component Value Date    HDL 58 2023     Lab Results   Component Value Date    LDLCALC 74 2023     Lab Results   Component Value Date    TRIG 126 2023       Cardiac testing:   EKG reviewed personally:   Results for orders placed during the hospital encounter of 21    Echo complete with contrast if indicated    Narrative  [address] [place] [address], [address] [place] [address]  [phone]    Transthoracic Echocardiogram  2D, M-mode, Doppler, and Color Doppler    Study date:  22-May-2021    Patient: Kisha Norwood  MR number: [id number]  Account number: [de-identified]  : 1955  Age: 77 years  Gender: Female  Status: Inpatient  Location: Bedside  Height: 65 in  Weight: 209 lb  BP: 147/ 76 mmHg    Indications: Chest Pain    Diagnoses: R07.9 - Chest pain, unspecified    Sonographer:  Juan A Castano RDCS  Primary Physician:  Dori Mata MD  Referring Physician:  WILLIAMS Donis  Group:  [facility]  Interpreting Physician:  Justin Jones MD    SUMMARY    LEFT VENTRICLE:  Systolic function was normal by visual assessment. Ejection fraction was estimated in the range of 55 % to 60 %.   Although no diagnostic regional wall motion abnormality was identified, this possibility cannot be completely excluded on the basis of this study. Wall thickness was mildly increased. There was mild concentric hypertrophy. Doppler parameters were consistent with abnormal left ventricular relaxation (grade 1 diastolic dysfunction). MITRAL VALVE:  There was mild annular calcification. There was trace regurgitation. HISTORY: PRIOR HISTORY: GERD, CP, COVID 19, Elevated Troponin, Hypertension    PROCEDURE: The procedure was performed at the bedside. This was a routine study. The transthoracic approach was used. The study included complete 2D imaging, M-mode, complete spectral Doppler, and color Doppler. The heart rate was 77 bpm,  at the start of the study. Images were obtained from the parasternal, apical, subcostal, and suprasternal notch acoustic windows. Echocardiographic views were limited due to decreased penetration and lung interference. This was a  technically difficult study. LEFT VENTRICLE: Size was normal. Systolic function was normal by visual assessment. Ejection fraction was estimated in the range of 55 % to 60 %. Although no diagnostic regional wall motion abnormality was identified, this possibility  cannot be completely excluded on the basis of this study. Wall thickness was mildly increased. There was mild concentric hypertrophy. DOPPLER: Doppler parameters were consistent with abnormal left ventricular relaxation (grade 1 diastolic  dysfunction). RIGHT VENTRICLE: The size was normal. Systolic function was normal. Wall thickness was normal.    LEFT ATRIUM: Size was normal.    RIGHT ATRIUM: Size was normal.    MITRAL VALVE: There was mild annular calcification. There was mild thickening. DOPPLER: There was no evidence for stenosis. There was trace regurgitation. AORTIC VALVE: The valve was trileaflet. Leaflets exhibited normal thickness, normal cuspal separation, and sclerosis. DOPPLER: Transaortic velocity was within the normal range.  There was no evidence for stenosis. There was no  regurgitation. TRICUSPID VALVE: The valve structure was normal. There was normal leaflet separation. DOPPLER: The transtricuspid velocity was within the normal range. There was no evidence for stenosis. There was no regurgitation. PULMONIC VALVE: Leaflets exhibited normal thickness, no calcification, and normal cuspal separation. DOPPLER: The transpulmonic velocity was within the normal range. There was no regurgitation. PERICARDIUM: There was no pericardial effusion. The pericardium was normal in appearance. AORTA: The root exhibited normal size. SYSTEMIC VEINS: IVC: The inferior vena cava was normal in size and course. Respirophasic changes were normal.    SYSTEM MEASUREMENT TABLES    2D  %FS: 25.05 %  Ao Diam: 2.8 cm  Ao asc: 2.9 cm  EDV(Teich): 61.72 ml  EF(Teich): 50.26 %  ESV(Teich): 30.7 ml  IVSd: 1.21 cm  LA Area: 20.4 cm2  LA Diam: 3.52 cm  LVEDV MOD A4C: 39.76 ml  LVEF MOD A4C: 64.34 %  LVESV MOD A4C: 14.18 ml  LVIDd: 3.79 cm  LVIDs: 2.84 cm  LVLd A4C: 6.65 cm  LVLs A4C: 5.76 cm  LVPWd: 1.23 cm  RA Area: 11.01 cm2  RVIDd: 2.84 cm  SV MOD A4C: 25.58 ml  SV(Teich): 31.02 ml    MM  TAPSE: 2.74 cm    PW  E' Sept: 0.08 m/s  E/E' Sept: 10.92  MV A Jagjit: 0.91 m/s  MV Dec [place]: 3.87 m/s2  MV DecT: 219.75 ms  MV E Jagjit: 0.85 m/s  MV E/A Ratio: 0.93  MV PHT: 63.73 ms  MVA By PHT: 3.45 cm2    [employer] Accredited Echocardiography Laboratory    Prepared and electronically signed by    Kamille Mendoza MD  Signed [id number] 09:33:14    No results found for this or any previous visit.     Results for orders placed during the hospital encounter of 05/21/21    Cardiac catheterization    [address]  [phone]    [facility]    Invasive Cardiovascular Lab Complete Report    Patient: John Moreno  MR number: [id number]  Account number: [de-identified]  Study date: 05/24/2021  Gender: Female  : 1955  Height: 65 in  Weight: 208.1 lb  BSA: 2.01 mï¾²    Allergies: LISINOPRIL, HYDROCODONE-ACETAMINOPHEN, IRBESARTAN-HYDROCHLOROTHIAZIDE    Diagnostic Cardiologist:  Rosario White MD  Primary Physician:  Donald Austin MD    SUMMARY    CORONARY CIRCULATION:  The coronary circulation is right dominant. Left main: Normal.  LAD: The vessel was small to medium sized. Angiography showed no evidence of disease. Circumflex: The vessel was medium sized. 1st obtuse marginal: The vessel was medium sized. Angiography showed mild atherosclerosis. Ramus intermedius: The vessel was medium to large sized. Angiography showed no evidence of disease. RCA: The vessel was medium to large sized. There was ostial spasm with catheter engagement relieved with intracoronary nitroglycerin. Ostial RCA: There was a 20 % stenosis. PROCEDURES PERFORMED    --  Left coronary angiography. --  Right coronary angiography. --  Inpatient. --  Mod Sedation Same Physician Initial 15min. --  Coronary Catheterization (w/o [facility]). PROCEDURE: The risks and alternatives of the procedures and conscious sedation were explained to the patient and informed consent was obtained. The patient was brought to the cath lab and placed on the table. The planned puncture sites  were prepped and draped in the usual sterile fashion. --  Right radial artery access. After performing an Rasheed's test to verify adequate ulnar artery supply to the hand, the radial site was prepped. The puncture site was infiltrated with local anesthetic. The vessel was accessed using the  modified Seldinger technique, a wire was advanced into the vessel, and a sheath was advanced over the wire into the vessel. --  Left coronary artery angiography. A catheter was advanced over a guidewire into the aorta and positioned in the left coronary artery ostium under fluoroscopic guidance. Angiography was performed. --  Right coronary artery angiography.  A catheter was advanced over a guidewire into the aorta and positioned in the right coronary artery ostium under fluoroscopic guidance. Angiography was performed. --  Inpatient. --  Mod Sedation Same Physician Initial 15min. --  Coronary Catheterization (w/o [facility]). PROCEDURE COMPLETION: The patient tolerated the procedure well and was discharged from the cath lab. TIMING: Test started at 11:15. Test concluded at 11:38. HEMOSTASIS: The sheath was removed. The site was compressed with a Hemoband  device. Hemostasis was obtained. MEDICATIONS GIVEN: Versed (2mg/2ml), 2 mg, IV, at 11:15. Fentanyl (1OOmcg/2 ml), 50 mcg, IV, at 11:15. 1% Lidocaine, 1 ml, subcutaneously, at 11:17. Nitroglycerin (200mcg/ml), 200 mcg, at 11:23. Verapamil  (5mg/2ml), 2.5 mg, IV, at 11:23. Heparin 1000 units/ml, 4,000 units, IV, at 11:23. Nitroglycerine (200mcg/ml), 200 mcg, at 11:31. Nitroglycerine (200mcg/ml), 200 mcg, at 11:32. CONTRAST GIVEN: 65 ml Omnipaque (350mg I /ml). RADIATION  EXPOSURE: Fluoroscopy time: 4.25 min. CORONARY VESSELS:   --  The coronary circulation is right dominant. --  Left main: Normal.  --  LAD: The vessel was small to medium sized. Angiography showed no evidence of disease. --  Circumflex: The vessel was medium sized. --  1st obtuse marginal: The vessel was medium sized. Angiography showed mild atherosclerosis. --  Ramus intermedius: The vessel was medium to large sized. Angiography showed no evidence of disease. --  RCA: The vessel was medium to large sized. There was ostial spasm with catheter engagement relieved with intracoronary nitroglycerin. --  Ostial RCA: There was a 20 % stenosis. Prepared and signed by    Gomez Ha MD  Signed 05/24/2021 11:44:53    Study diagram    Angiographic findings  Native coronary lesions:  ï¾·Ostial RCA: Lesion 1: 20 % stenosis.     Hemodynamic tables    Pressures:  Baseline  Pressures:  - HR: 79  Pressures:  - Rhythm:  Pressures:  -- Aortic Pressure (S/D/M): 114/59/83    Outputs:  Baseline  Outputs:  -- CALCULATIONS[age]  Outputs:  -- CALCULATIONS: Body Surface Area: 2.01  Outputs:  -- CALCULATIONS: Height in cm: 165.00  Outputs:  -- CALCULATIONS: Sex: Female  Outputs:  -- CALCULATIONS: Weight in k.60  Outputs:  -- OUTPUTS: O2 consumption: 251.60  Outputs:  -- OUTPUTS: Vo2 Indexed: 125.00    No results found for this or any previous visit.

## 2023-07-25 NOTE — PATIENT INSTRUCTIONS
Recommendations:  1. Increase HCTZ to 25mg daily. 2. Continue remainder of medications. 3. Follow up in 4 months.

## 2023-07-27 ENCOUNTER — OFFICE VISIT (OUTPATIENT)
Dept: PHYSICAL THERAPY | Facility: CLINIC | Age: 68
End: 2023-07-27
Payer: MEDICARE

## 2023-07-27 DIAGNOSIS — M75.122 COMPLETE TEAR OF LEFT ROTATOR CUFF, UNSPECIFIED WHETHER TRAUMATIC: Primary | ICD-10-CM

## 2023-07-27 PROCEDURE — 97110 THERAPEUTIC EXERCISES: CPT

## 2023-07-27 PROCEDURE — 97530 THERAPEUTIC ACTIVITIES: CPT

## 2023-07-27 PROCEDURE — 97140 MANUAL THERAPY 1/> REGIONS: CPT

## 2023-07-27 NOTE — PROGRESS NOTES
Daily Note     Today's date: 2023  Patient name: Ivonne Ruiz  : 1955  MRN: [id number]  Referring provider: Lisa Vergara*  Dx: No diagnosis found. Subjective: Patient states that she continues to notice L bicep pain       Objective: See treatment diary below      Assessment: Tolerated treatment well. Patient would benefit from continued PT      Plan: Continue per plan of care.       Precautions: per protocol; sling x 6 weeks as per MD  125 degrees flexion by 28 days  35-75 degrees ER at 0 degrees by 28 days  No pulley until week 5   No passive ABD until 3 weeks to 90 degrees  NO ER in ABD for 6 weeks  IR to body progress to 50 degrees by 28 days      [id number]         Manuals 6/26 6/29 7/3 7/6 7/12 7/17 7/24 7/27     PROM 10  10 10 10  10 10 10 LX 10      Iastm/(-) pressure iastm     10p 10  With IR With IR and PROM     rev                                                                                                                                  Ther Ex                                       Pball roll out standing  10"  x5 10"x5 10" x 10  10" x 10  10 x :10        Pulleys 5 5 5 5 5' 5 5' 5     IR strap stretch      10' x 5 5 x :10 10" x 5      Cane IR/EXT 10" x 10  10" x 10  10"x 10  10" x 10  10 x :10 10" x 10  hep      Standing AROM flexion/scaption 1# 20  1# 20 2# 20  2# 20  2# 20 2# 20 2# x 20 2# 20      Prone Ext/Row     10        Supine AROM flex 1# 20 2# 15  2# 20 3# 20 3# 20 3# 20  3# x 20 3# 20      S/L ER 1# 20  2# 15  2# 20 2# 20  2# 20 2# 20  2# 2# 20     S/l ABD 1# 20  1# 20  1# 20 2# 20  2# 20 2# 20 2#  2# 20      Cabinet taps 2# 10  2# 15  2# 20 3# 10  3# 20 3# 20  3# x 20 NV     [place] ER  2# 2 x 10  2# 20  2.7# 20  3#  20 nv  3# 20      [place] IR  4# 2 x 10  3# 20  3# 20  3# x 20 nv 4# x 20 4# 20      [place] rows  12# 2 x 10  12# 20  12# 20  12# x 20 nv 12# x 20 12# 20      [place] extension   9# 2 x 10  10# 20  10# 20 10 # x20  10# x 20 10# 20 Modalities             CP   10  10

## 2023-07-31 ENCOUNTER — APPOINTMENT (OUTPATIENT)
Dept: PHYSICAL THERAPY | Facility: CLINIC | Age: 68
End: 2023-07-31
Payer: MEDICARE

## 2023-08-01 ENCOUNTER — LAB (OUTPATIENT)
Dept: LAB | Facility: CLINIC | Age: 68
End: 2023-08-01
Payer: MEDICARE

## 2023-08-01 DIAGNOSIS — E83.42 HYPOMAGNESEMIA: ICD-10-CM

## 2023-08-01 LAB
ALBUMIN SERPL BCP-MCNC: 4.4 G/DL (ref 3.5–5)
ALP SERPL-CCNC: 68 U/L (ref 34–104)
ALT SERPL W P-5'-P-CCNC: 20 U/L (ref 7–52)
ANION GAP SERPL CALCULATED.3IONS-SCNC: 7 MMOL/L
AST SERPL W P-5'-P-CCNC: 15 U/L (ref 13–39)
BILIRUB SERPL-MCNC: 0.45 MG/DL (ref 0.2–1)
BUN SERPL-MCNC: 20 MG/DL (ref 5–25)
CALCIUM SERPL-MCNC: 9.7 MG/DL (ref 8.4–10.2)
CHLORIDE SERPL-SCNC: 104 MMOL/L (ref 96–108)
CO2 SERPL-SCNC: 29 MMOL/L (ref 21–32)
CREAT SERPL-MCNC: 0.78 MG/DL (ref 0.6–1.3)
ERYTHROCYTE [DISTWIDTH] IN BLOOD BY AUTOMATED COUNT: 13.8 % (ref 11.6–15.1)
GFR SERPL CREATININE-BSD FRML MDRD: 77 ML/MIN/1.73SQ M
GLUCOSE SERPL-MCNC: 89 MG/DL (ref 65–140)
HCT VFR BLD AUTO: 45.5 % (ref 34.8–46.1)
HGB BLD-MCNC: 14.4 G/DL (ref 11.5–15.4)
MAGNESIUM SERPL-MCNC: 1.8 MG/DL (ref 1.9–2.7)
MCH RBC QN AUTO: 30.1 PG (ref 26.8–34.3)
MCHC RBC AUTO-ENTMCNC: 31.6 G/DL (ref 31.4–37.4)
MCV RBC AUTO: 95 FL (ref 82–98)
PLATELET # BLD AUTO: 192 THOUSANDS/UL (ref 149–390)
PMV BLD AUTO: 10.9 FL (ref 8.9–12.7)
POTASSIUM SERPL-SCNC: 4.2 MMOL/L (ref 3.5–5.3)
PROT SERPL-MCNC: 6.9 G/DL (ref 6.4–8.4)
RBC # BLD AUTO: 4.78 MILLION/UL (ref 3.81–5.12)
SODIUM SERPL-SCNC: 140 MMOL/L (ref 135–147)
TSH SERPL DL<=0.05 MIU/L-ACNC: 2.6 UIU/ML (ref 0.45–4.5)
WBC # BLD AUTO: 7.31 THOUSAND/UL (ref 4.31–10.16)

## 2023-08-01 PROCEDURE — 83735 ASSAY OF MAGNESIUM: CPT

## 2023-08-01 NOTE — RESULT ENCOUNTER NOTE
Hypomagnesemia- Improved, but still mildly low magnesium.  Increase over-the-counter magnesium 400 mg 2 pills daily.  Nurse to see orders to repeat nonfasting CMP and magnesium in 1 week.  Nexium may be contributing to low magnesium levels.      Message sent to patient via [employer] patient portal.    Nurse to also call patient.

## 2023-08-01 NOTE — RESULT ENCOUNTER NOTE
I would recommend that patient continue Nexium 40 mg daily and discuss if it is appropriate to wean Rx or try an alternative medication with PCP Dr. Sunny Cruz at next visit 12/23. Continue plan of care.     Message sent to patient via [employer] patient portal.

## 2023-08-03 ENCOUNTER — OFFICE VISIT (OUTPATIENT)
Dept: PHYSICAL THERAPY | Facility: CLINIC | Age: 68
End: 2023-08-03
Payer: MEDICARE

## 2023-08-03 DIAGNOSIS — M75.122 COMPLETE TEAR OF LEFT ROTATOR CUFF, UNSPECIFIED WHETHER TRAUMATIC: Primary | ICD-10-CM

## 2023-08-03 PROCEDURE — 97140 MANUAL THERAPY 1/> REGIONS: CPT | Performed by: PHYSICAL THERAPIST

## 2023-08-03 PROCEDURE — 97110 THERAPEUTIC EXERCISES: CPT | Performed by: PHYSICAL THERAPIST

## 2023-08-03 PROCEDURE — 97530 THERAPEUTIC ACTIVITIES: CPT | Performed by: PHYSICAL THERAPIST

## 2023-08-03 NOTE — PROGRESS NOTES
Daily Note     Today's date: 8/3/2023  Patient name: Treasure Wright  : 1955  MRN: [id number]  Referring provider: Alexander Henley*  Dx:   Encounter Diagnosis     ICD-10-CM    1. Complete tear of left rotator cuff, unspecified whether traumatic  M75.122                      Subjective: Patient reports that she continues to have pain in the bicep muscle  belly. Objective: See treatment diary below      Assessment: Tolerated treatment well. Patient would benefit from continued PT      Plan: Continue per plan of care.       Precautions: per protocol; sling x 6 weeks as per MD  125 degrees flexion by 28 days  35-75 degrees ER at 0 degrees by 28 days  No pulley until week 5   No passive ABD until 3 weeks to 90 degrees  NO ER in ABD for 6 weeks  IR to body progress to 50 degrees by 28 days      [id number]         Manuals 6/26 6/29 7/3 7/6 7/12 7/17 7/24 7/27 8/3    PROM 10  10 10 10  10 10 10 LX 10      Iastm/(-) pressure iastm     10p 10  With IR With IR and PROM     rev         10                                                                                                                         Ther Ex                                       Pball roll out standing  10"  x5 10"x5 10" x 10  10" x 10  10 x :10        Pulleys 5 5 5 5 5' 5 5' 5 5'    IR strap stretch      10' x 5 5 x :10 10" x 5  10 x :10    Cane IR/EXT 10" x 10  10" x 10  10"x 10  10" x 10  10 x :10 10" x 10  hep      Standing AROM flexion/scaption 1# 20  1# 20 2# 20  2# 20  2# 20 2# 20 2# x 20 2# 20  2# x 20    Prone Ext/Row     10        Supine AROM flex 1# 20 2# 15  2# 20 3# 20 3# 20 3# 20  3# x 20 3# 20  3# x 20    S/L ER 1# 20  2# 15  2# 20 2# 20  2# 20 2# 20  2# 2# 20 np    S/l ABD 1# 20  1# 20  1# 20 2# 20  2# 20 2# 20 2#  2# 20  np    Cabinet taps 2# 10  2# 15  2# 20 3# 10  3# 20 3# 20  3# x 20 3# x 20 3# x 20    [place] ER  2# 2 x 10  2# 20  2.7# 20  3#  20 nv  3# 20  3# x 20    [place] IR  4# 2 x 10  3# 20  3# 20  3# x 20 nv 4# x 20 4# 20  4# x 20    [place] rows  12# 2 x 10  12# 20  12# 20  12# x 20 nv 12# x 20 12# 20  12# x 20    [place] extension   9# 2 x 10  10# 20  10# 20 10 # x20  10# x 20 10# 20  10# x 20    Modalities             CP   10  10

## 2023-08-03 NOTE — PROGRESS NOTES
PT Re-evaluation     Today's date: 2023  Patient name: Tone Mckeon  : 1955  MRN: [id number]  Referring provider: Monie Caba*  Dx:   Encounter Diagnosis     ICD-10-CM    1. Complete tear of left rotator cuff, unspecified whether traumatic  M75.122 Ambulatory Referral to Physical Therapy                     Assessment:    8/3/23:  Joyce Rivas has been attending physical therapy for L shoulder RTCR. She continues to have pain in the L biceps muscle belly however, she has had good increases in strength, ROM and function. She is discontinuing formal physical therapy at this time and continue to exercise on her own. Thank you for your referral.      Joyce Rivas has been attending physical therapy for L shoulder RTCR. She has been making excellent progress as per protocol. She would continue to benefit from skilled physical therapy to achieve maximal functional goals as per protocol. Thank you for your referral.      Assessment details: Tone Mckeon is a 69 y.o. female s/p L RTCR. She with clinical presentation is consistent with their referring diagnosis. Due to these impairments, Patient has difficulty performing a/iadls, recreational activities and engaging in social activities. . Patient would benefit from skilled physical therapy as per protocol to address their aforementioned impairments, improve their level of function and to improve their overall quality of life.  has been given a home exercise program and is in agreement with the plan of care. Thank you for your referral.    Impairments: abnormal or restricted ROM, abnormal movement, activity intolerance, impaired physical strength, lacks appropriate home exercise program and pain with function  Understanding of Dx/Px/POC: excellent  Goals  ST Goals - 2-4 weeks  1. Patient will report decreased pain with activity by at least 2 points within 4 weeks- met  2. Patient will improve ROM per protocol - partially met  3.   Patient will perform IADLs without pain per protocol - partially met  5. Patient will increase strength by 25% as per protocol  nt    LT Goals - Discharge  1. Patient will improve FOTO score to maximum stated or greater by discharge  2. Patient will return to preferred recreational activity without significant pain increase by discharge   3. Patient will return to all house work related activities without pain by discharge     Plan  Patient would benefit from: skilled physical therapy  Referral necessary: No  Planned therapy interventions: joint mobilization, manual therapy, neuromuscular re-education, strengthening, stretching, therapeutic activities, therapeutic exercise and home exercise program  Frequency: 2x week  Duration in visits: 20  Duration in weeks: 20  Plan of Care beginning date: 2023  Plan of Care expiration date: 2023  Treatment plan discussed with: patient        Subjective Evaluation    History of Present Illness  Mechanism of injury: Patient reports that she has a crazy dog that she had pushed out of the way a lot possibly causing an injury to the L shoulder. She had RTCR on . She has been compliant with wearing her sling, except to shower. She has been trying to sleep in her bed but does still use the recliner. CC:  She reports an achey discomfort in the anterior shoulder which radiates into the bicep region. Function:  ADLs, eating, dressing and bathing mostly using her R UE. She is LHD. She would like to return to driving and cooking, cleaning. She does do some light yard work. She does have a trip planned on May 30. CC: Leyda Singh continues to note achy pain however, she's made good progress with functional activities, as well as strength and ROM.           Recurrent probem    Quality of life: excellent    Pain  Current pain ratin  Location: anterior shoulder/bicep   Quality: dull ache  Relieving factors: medications and ice  Aggravating factors: overhead activity, lifting, keyboarding and eating  Progression: improved    Social Support  Lives with: spouse    Employment status: not working  Hand dominance: left      Diagnostic Tests  X-ray: abnormal  MRI studies: abnormal  Treatments  Previous treatment: physical therapy  Patient Goals  Patient goals for therapy: decreased pain, decreased edema, increased strength, independence with ADLs/IADLs, return to sport/leisure activities and increased motion          Objective     Observations   Left Shoulder   Positive for incision. Additional Observation Details  Well healed portals  2 intact steri strips  No noticeable color or temp changes  No signs of infection  Clean and dry  Denies radicular sx  Good cap bed refill    Palpation   Left   Tenderness of the levator scapulae and supraspinatus.      Cervical/Thoracic Screen   Cervical range of motion within normal limits  Cervical range of motion within normal limits with the following exceptions: Sling soreness  Thoracic range of motion within normal limits    Active Range of Motion     Left Shoulder - 5/18/23                              6/19                                           8/3/23    Flexion:  133                                             150 degrees                                135 degrees  ABD:  128                                                   135 degrees                               125 degrees  ER:  Behind head                                        Head                                           T 6  IR:  Pocket                                                     Pocket                                        L4      Right Shoulder   Flexion: 165 degrees   Abduction: 165 degrees       External rotation BTH: [facility]  Internal rotation BTB: WFL    Passive Range of Motion       Left Shoulder                                             5/18/23                                   6/19/23                                   8/3/23      Flexion: 105 degrees 150 degrees                           160 degrees                         160 degrees                    ABD:                                                          145 degrees                           160 degrees                         160 degrees  External rotation 0°: 12 degrees                55 degrees                               65 degrees                          65 degrees    IR:                                                               76 degrees                               75 degrees                           77 degrees    Right Shoulder   Normal passive range of motion    Strength/Myotome Testing     Right Shoulder   Normal muscle strength    Additional Strength Details  nt                                                                      6/19/23               8/3/23    Flexion                                                       4-                     5/5       pain  ABD                                                            4                       5/5  ER                                                               4+                    5/5  IR                                                                4+                     5/5  [place]                                                                             4/5  Biceps:                                                                               5/5

## 2023-08-07 ENCOUNTER — OFFICE VISIT (OUTPATIENT)
Dept: OBGYN CLINIC | Facility: CLINIC | Age: 68
End: 2023-08-07
Payer: MEDICARE

## 2023-08-07 ENCOUNTER — APPOINTMENT (OUTPATIENT)
Dept: RADIOLOGY | Facility: CLINIC | Age: 68
End: 2023-08-07
Payer: MEDICARE

## 2023-08-07 VITALS
HEART RATE: 73 BPM | BODY MASS INDEX: 35.56 KG/M2 | HEIGHT: 65 IN | WEIGHT: 213.4 LBS | SYSTOLIC BLOOD PRESSURE: 118 MMHG | DIASTOLIC BLOOD PRESSURE: 66 MMHG

## 2023-08-07 DIAGNOSIS — Z98.890 S/P LEFT ROTATOR CUFF REPAIR: ICD-10-CM

## 2023-08-07 DIAGNOSIS — M75.22 BICEPS TENDONITIS ON LEFT: Primary | ICD-10-CM

## 2023-08-07 DIAGNOSIS — M75.122 COMPLETE TEAR OF LEFT ROTATOR CUFF, UNSPECIFIED WHETHER TRAUMATIC: ICD-10-CM

## 2023-08-07 PROCEDURE — 99213 OFFICE O/P EST LOW 20 MIN: CPT | Performed by: PHYSICIAN ASSISTANT

## 2023-08-07 PROCEDURE — 73030 X-RAY EXAM OF SHOULDER: CPT

## 2023-08-07 NOTE — PROGRESS NOTES
Assessment/Plan:  1. Biceps tendonitis on left  XR shoulder 2+ vw left      2. S/P left rotator cuff repair  MRI shoulder left wo contrast        The patient does appear to have some proximal biceps tendinitis, however she is also getting significant discomfort about the biceps muscle belly. I discussed this could be radiation of pain from the shoulder. I do feel repeat MRI is warranted to rule out recurrent rotator cuff tear. If just biceps tendinitis is shown, we did discuss referral to Dr. Luca Loya for ultrasound-guided steroid injection into the biceps tendon sheath. Patient will continue her therapy exercises at home for the time being. She will follow-up after MRI to discuss the results and how to proceed. Subjective:   Ivonne Ruiz is a 77 y.o. female who presents today for follow-up of her left shoulder, now about 4-1/2-month status post arthroscopic rotator cuff repair with subacromial decompression. The patient feels she is making progress with strength, but still complains of limitations in internal rotation of the shoulder as well as pain about the anterior upper arm in region of her biceps muscle belly. This seems to be worse with activity and will also bother her at random times throughout the day. Rest helps. She notes good sensation of the left upper extremity. The patient has been compliant with physical therapy. Review of Systems   Constitutional: Negative. Negative for chills and fever. HENT: Negative. Negative for ear pain and sore throat. Eyes: Negative. Negative for pain and redness. Respiratory: Negative. Negative for shortness of breath and wheezing. Cardiovascular: Negative for chest pain and palpitations. Gastrointestinal: Negative. Negative for abdominal pain and blood in stool. Endocrine: Negative. Negative for polydipsia and polyuria. Genitourinary: Negative. Negative for difficulty urinating and dysuria.    Musculoskeletal:        As noted in HPI Skin: Negative. Negative for pallor and rash. Neurological: Negative. Negative for dizziness and numbness. Hematological: Negative. Negative for adenopathy. Does not bruise/bleed easily. Psychiatric/Behavioral: Negative. Negative for confusion and suicidal ideas. Past Medical History:   Diagnosis Date   • Breast lump    • Chest pain 05/28/2019   • COVID-19 04/09/2021   • Disease of thyroid gland     nodule, benign   • Eczema     facial rash- could also be rosacea   • Elevated troponin I level 05/21/2021   • GERD (gastroesophageal reflux disease)    • History of transfusion     at 9years old for bleeding during tonsillectomy, no reaction   • Hypertension    • Nontoxic single thyroid nodule 07/25/2012   • Rectocele    • Rotator cuff tear, left    • Uterine leiomyoma    • Varicose veins of both lower extremities        Past Surgical History:   Procedure Laterality Date   • BLADDER SURGERY  08/2012    Bladder "lift"   • BREAST LUMPECTOMY Right 2000    benign   • COLONOSCOPY     • HYSTERECTOMY  1990   • JOINT REPLACEMENT  6/2017 and 5/2022    Knees   • KNEE SURGERY Right     arthroscopy x2   • NOSE SURGERY      deviated septum   • KS LARYNGOSCOPY W/BIOPSY MICROSCOPE/TELESCOPE Left 04/02/2018    Procedure: MICROLARYNGOSCOPY AND EXCISION OF LEFT VOCAL FOLD POLYP;  Surgeon: Pieor Tello MD;  Location:  Main OR;  Service: ENT   • KS SURGICAL ARTHROSCOPY SHOULDER W/ROTATOR CUFF RPR Left 3/28/2023    Procedure: Shoulder Arthroscopy with Rotator Cuff Repair and Subacromial decompression;  Surgeon: Jerod Sosa MD;  Location: [facility];  Service: Orthopedics   • KS XCAPSL CTRC RMVL INSJ IO LENS PROSTH W/O ECP Left 11/28/2022    Procedure: EXTRACTION EXTRACAPSULAR CATARACT PHACO INTRAOCULAR LENS (IOL);   Surgeon: Thania Guillory MD;  Location: [facility] MAIN OR;  Service: Ophthalmology   • TENDON REPAIR Right     achilles tendon   • TONSILLECTOMY     • TUBAL LIGATION     • VARICOSE VEIN SURGERY Left 2009       Family History   Problem Relation Age of Onset   • Breast cancer Mother    • Hypertension Mother    • Varicose Veins Mother         of lower extremities   • Glaucoma Mother         I also have glaucoma   • Hypertension Father    • Coronary artery disease Father    • Coronary artery disease Sister    • Heart disease Sister    • Varicose Veins Sister         of lower extremities   • No Known Problems Sister    • No Known Problems Sister    • Hypertension Brother    • No Known Problems Daughter    • Diabetes Maternal Grandmother         Also nephew William Dumontacher   • Breast cancer Maternal Aunt    • No Known Problems Maternal Aunt        Social History     Occupational History   • Not on file   Tobacco Use   • Smoking status: Former     Types: Cigarettes     Quit date: 1976     Years since quittin.6   • Smokeless tobacco: Never   • Tobacco comments:     None   Vaping Use   • Vaping Use: Never used   Substance and Sexual Activity   • Alcohol use: Yes     Comment: seldom   • Drug use: Never   • Sexual activity: Not Currently     Partners: Male     Birth control/protection: Surgical         Current Outpatient Medications:   •  acetaminophen (TYLENOL) 325 mg tablet, Take 650 mg by mouth every 6 (six) hours as needed for mild pain, Disp: , Rfl:   •  amLODIPine (NORVASC) 5 mg tablet, Take 1 tablet (5 mg total) by mouth daily, Disp: 90 tablet, Rfl: 3  •  aspirin 81 mg chewable tablet, Chew 1 tablet (81 mg total) daily (Patient taking differently: Chew 81 mg every morning), Disp: 30 tablet, Rfl: 0  •  BIOTIN PO, Take by mouth in the morning, Disp: , Rfl:   •  cholecalciferol (VITAMIN D3) 1,000 units tablet, Take 1,000 Units by mouth daily, Disp: , Rfl:   •  esomeprazole (NexIUM) 40 MG capsule, Take 1 capsule (40 mg total) by mouth daily, Disp: 90 capsule, Rfl: 1  •  Ginger, Zingiber officinalis, (GINGER ROOT PO), Take by mouth in the morning, Disp: , Rfl:   •  hydrochlorothiazide (HYDRODIURIL) 25 mg tablet, Take 1 tablet (25 mg total) by mouth daily, Disp: 90 tablet, Rfl: 3  •  latanoprost (XALATAN) 0.005 % ophthalmic solution, Administer to both eyes daily at bedtime, Disp: , Rfl:   •  Magnesium 400 MG CAPS, Take 2 capsules (800 mg total) by mouth in the morning, Disp: , Rfl:   •  metoprolol succinate (TOPROL-XL) 50 mg 24 hr tablet, Take 1 tablet (50 mg total) by mouth daily at bedtime, Disp: 90 tablet, Rfl: 3  •  multivitamin (THERAGRAN) TABS, Take 1 tablet by mouth daily, Disp: , Rfl:   •  olmesartan (BENICAR) 40 mg tablet, Take 1 tablet (40 mg total) by mouth daily (Patient taking differently: Take 40 mg by mouth every morning), Disp: 90 tablet, Rfl: 3  •  Sulfacetamide Sodium (Sodium Sulfacetamide Wash) 10 % LIQD, Apply twice daily to affected area on face (Patient taking differently: in the morning Apply twice daily to affected area on face), Disp: 177 mL, Rfl: 2  •  vitamin B-12 (CYANOCOBALAMIN) 2500 MCG TABS, Take 500 mcg by mouth daily, Disp: , Rfl:   •  polyethylene glycol (GOLYTELY) 4000 mL solution, Take 4,000 mL by mouth once for 1 dose Take as directed by the office (Patient not taking: Reported on 7/25/2023), Disp: 4000 mL, Rfl: 0    Current Facility-Administered Medications:   •  Inclisiran Sodium (LEQVIO) subcutaneous injection 284 mg, 284 mg, Subcutaneous, Q6 Months, Esther Sousa MD, 284 mg at 05/04/23 1048    Allergies   Allergen Reactions   • Lisinopril Other (See Comments)     Other reaction(s): swollen legs   • Hydrocodone-Acetaminophen Dizziness   • Irbesartan-Hydrochlorothiazide Other (See Comments)      Hypertension  Pt had hypertension, arm heaviness and headache   • Pollen Extract Hives     Ragweed etc.    • Atorvastatin Myalgia       Objective:  Vitals:    08/07/23 1309   BP: 118/66   Pulse: 73            Left Shoulder Exam     Tenderness   The patient is experiencing tenderness in the biceps tendon.     Range of Motion   External rotation: 50   Forward flexion: 170   Internal rotation 0 degrees: L5     Muscle Strength   Abduction: 4/5   Internal rotation: 5/5   External rotation: 5/5   Biceps: 4/5     Tests   Rees test: positive  Impingement: positive  Drop arm: negative    Other   Erythema: absent  Sensation: normal  Pulse: present             Physical Exam  Constitutional:       General: She is not in acute distress. Appearance: She is well-developed. HENT:      Head: Normocephalic and atraumatic. Eyes:      General: No scleral icterus. Conjunctiva/sclera: Conjunctivae normal.   Neck:      Vascular: No JVD. Cardiovascular:      Rate and Rhythm: Normal rate. Pulmonary:      Effort: Pulmonary effort is normal. No respiratory distress. Skin:     General: Skin is warm. Neurological:      Mental Status: She is alert and oriented to person, place, and time. Coordination: Coordination normal.         I have personally reviewed pertinent films in PACS and my interpretation is as follows:  X-rays left shoulder: No acute osseous abnormality. This document was created using speech voice recognition software. Grammatical errors, random word insertions, pronoun errors, and incomplete sentences are an occasional consequence of this system due to software limitations, ambient noise, and hardware issues. Any formal questions or concerns about content, text, or information contained within the body of this dictation should be directly addressed to the provider for clarification.

## 2023-08-10 ENCOUNTER — HOSPITAL ENCOUNTER (OUTPATIENT)
Dept: RADIOLOGY | Facility: HOSPITAL | Age: 68
Discharge: HOME | End: 2023-08-10
Payer: MEDICARE

## 2023-08-10 DIAGNOSIS — Z98.890 S/P LEFT ROTATOR CUFF REPAIR: ICD-10-CM

## 2023-08-10 PROCEDURE — G1004 CDSM NDSC: HCPCS

## 2023-08-10 PROCEDURE — 73221 MRI JOINT UPR EXTREM W/O DYE: CPT

## 2023-08-11 ENCOUNTER — CLINICAL SUPPORT (OUTPATIENT)
Dept: CARDIOLOGY CLINIC | Facility: CLINIC | Age: 68
End: 2023-08-11
Payer: MEDICARE

## 2023-08-11 ENCOUNTER — LAB (OUTPATIENT)
Dept: LAB | Facility: CLINIC | Age: 68
End: 2023-08-11
Payer: MEDICARE

## 2023-08-11 DIAGNOSIS — E78.00 PURE HYPERCHOLESTEROLEMIA: ICD-10-CM

## 2023-08-11 DIAGNOSIS — E83.42 HYPOMAGNESEMIA: ICD-10-CM

## 2023-08-11 DIAGNOSIS — I10 BENIGN ESSENTIAL HYPERTENSION: ICD-10-CM

## 2023-08-11 DIAGNOSIS — E78.5 HYPERLIPIDEMIA, UNSPECIFIED HYPERLIPIDEMIA TYPE: Primary | ICD-10-CM

## 2023-08-11 DIAGNOSIS — E78.5 HYPERLIPIDEMIA, UNSPECIFIED HYPERLIPIDEMIA TYPE: ICD-10-CM

## 2023-08-11 DIAGNOSIS — E78.00 PURE HYPERCHOLESTEROLEMIA: Primary | ICD-10-CM

## 2023-08-11 LAB
ALBUMIN SERPL BCP-MCNC: 4.3 G/DL (ref 3.5–5)
ALP SERPL-CCNC: 71 U/L (ref 34–104)
ALT SERPL W P-5'-P-CCNC: 22 U/L (ref 7–52)
ANION GAP SERPL CALCULATED.3IONS-SCNC: 5 MMOL/L
AST SERPL W P-5'-P-CCNC: 16 U/L (ref 13–39)
BILIRUB SERPL-MCNC: 0.38 MG/DL (ref 0.2–1)
BUN SERPL-MCNC: 19 MG/DL (ref 5–25)
CALCIUM SERPL-MCNC: 9.4 MG/DL (ref 8.4–10.2)
CHLORIDE SERPL-SCNC: 104 MMOL/L (ref 96–108)
CO2 SERPL-SCNC: 31 MMOL/L (ref 21–32)
CREAT SERPL-MCNC: 0.79 MG/DL (ref 0.6–1.3)
GFR SERPL CREATININE-BSD FRML MDRD: 76 ML/MIN/1.73SQ M
GLUCOSE SERPL-MCNC: 97 MG/DL (ref 65–140)
MAGNESIUM SERPL-MCNC: 1.9 MG/DL (ref 1.9–2.7)
POTASSIUM SERPL-SCNC: 4.2 MMOL/L (ref 3.5–5.3)
PROT SERPL-MCNC: 7 G/DL (ref 6.4–8.4)
SODIUM SERPL-SCNC: 140 MMOL/L (ref 135–147)

## 2023-08-11 PROCEDURE — 80053 COMPREHEN METABOLIC PANEL: CPT

## 2023-08-11 PROCEDURE — 36415 COLL VENOUS BLD VENIPUNCTURE: CPT

## 2023-08-11 PROCEDURE — 83735 ASSAY OF MAGNESIUM: CPT

## 2023-08-11 RX ORDER — OLMESARTAN MEDOXOMIL 40 MG/1
40 TABLET ORAL DAILY
Qty: 90 TABLET | Refills: 3 | Status: SHIPPED | OUTPATIENT
Start: 2023-08-11

## 2023-08-11 NOTE — RESULT ENCOUNTER NOTE
Repeat magnesium is stable. Other resulted labs are stable. Continue plan of care.     Message sent to patient via [facility] patient portal.

## 2023-08-14 ENCOUNTER — TELEPHONE (OUTPATIENT)
Dept: OBGYN CLINIC | Facility: CLINIC | Age: 68
End: 2023-08-14

## 2023-08-14 NOTE — TELEPHONE ENCOUNTER
sw pt and advised   ----- Message from Alanna Forman PA-C sent at 8/14/2023  9:26 AM EDT -----  No recurrent RTC tear  ----- Message -----  From: Interface, Radiology Results In  Sent: 8/14/2023   6:09 AM EDT  To: Alanna Forman PA-C

## 2023-08-29 ENCOUNTER — OFFICE VISIT (OUTPATIENT)
Dept: OBGYN CLINIC | Facility: CLINIC | Age: 68
End: 2023-08-29
Payer: MEDICARE

## 2023-08-29 VITALS
BODY MASS INDEX: 35.49 KG/M2 | HEIGHT: 65 IN | WEIGHT: 213 LBS | SYSTOLIC BLOOD PRESSURE: 149 MMHG | DIASTOLIC BLOOD PRESSURE: 79 MMHG | HEART RATE: 80 BPM

## 2023-08-29 DIAGNOSIS — M75.22 BICEPS TENDONITIS ON LEFT: Primary | ICD-10-CM

## 2023-08-29 PROCEDURE — 99213 OFFICE O/P EST LOW 20 MIN: CPT | Performed by: ORTHOPAEDIC SURGERY

## 2023-08-29 PROCEDURE — 20611 DRAIN/INJ JOINT/BURSA W/US: CPT | Performed by: ORTHOPAEDIC SURGERY

## 2023-08-29 RX ORDER — LIDOCAINE HYDROCHLORIDE 10 MG/ML
2 INJECTION, SOLUTION INFILTRATION; PERINEURAL
Status: COMPLETED | OUTPATIENT
Start: 2023-08-29 | End: 2023-08-29

## 2023-08-29 RX ORDER — TRIAMCINOLONE ACETONIDE 40 MG/ML
40 INJECTION, SUSPENSION INTRA-ARTICULAR; INTRAMUSCULAR
Status: COMPLETED | OUTPATIENT
Start: 2023-08-29 | End: 2023-08-29

## 2023-08-29 RX ADMIN — TRIAMCINOLONE ACETONIDE 40 MG: 40 INJECTION, SUSPENSION INTRA-ARTICULAR; INTRAMUSCULAR at 11:30

## 2023-08-29 RX ADMIN — LIDOCAINE HYDROCHLORIDE 2 ML: 10 INJECTION, SOLUTION INFILTRATION; PERINEURAL at 11:30

## 2023-08-29 NOTE — PROGRESS NOTES
Assessment/Plan:  1. Biceps tendonitis on left  Large joint arthrocentesis        Romulo Scar tolerated ultrasound-guided biceps tendon sheath injection today. Hopefully this gives significant relief going forward and she did experience numbness and relief of her pain immediately after the injection. She will follow-up in the office in 4 to 6 weeks if her pain persists or worsens. Large joint arthrocentesis  Universal Protocol:  Consent: Verbal consent obtained. Risks and benefits: risks, benefits and alternatives were discussed  Consent given by: patient  Time out: Immediately prior to procedure a "time out" was called to verify the correct patient, procedure, equipment, support staff and site/side marked as required. Site marked: the operative site was marked  Supporting Documentation  Indications: pain   Procedure Details  Location: shoulder - Shoulder joint: Left proximal biceps tendon sheath. Preparation: Patient was prepped and draped in the usual sterile fashion  Needle size: 25 G  Ultrasound guidance: yes  Approach: anterior  Medications administered: 2 mL lidocaine 1 %; 40 mg triamcinolone acetonide 40 mg/mL    Patient tolerance: patient tolerated the procedure well with no immediate complications  Dressing:  Sterile dressing applied            Subjective:   Yuliana Meade is a 77 y.o. female who presents for an ultrasound-guided biceps tendon sheath injection. She was referred to our office by Ila Ayoub PA-C. She had a rotator cuff repair on the left shoulder over 5 months ago and with recurrent pain in the shoulder an MRI was recently ordered. This showed biceps tenosynovitis and she was referred for injection today. She has discomfort over the anterior aspect of the shoulder    Review of Systems   Constitutional: Negative for chills, fever and unexpected weight change. HENT: Negative for hearing loss, nosebleeds and sore throat. Eyes: Negative for pain, redness and visual disturbance. Respiratory: Negative for cough, shortness of breath and wheezing. Cardiovascular: Negative for chest pain, palpitations and leg swelling. Gastrointestinal: Negative for abdominal pain, nausea and vomiting. Endocrine: Negative for polydipsia and polyuria. Genitourinary: Negative for dysuria and hematuria. Musculoskeletal:        See HPI   Skin: Negative for rash and wound. Neurological: Negative for dizziness, numbness and headaches. Psychiatric/Behavioral: Negative for decreased concentration and suicidal ideas. The patient is not nervous/anxious.           Past Medical History:   Diagnosis Date   • Breast lump    • Chest pain 05/28/2019   • COVID-19 04/09/2021   • Disease of thyroid gland     nodule, benign   • Eczema     facial rash- could also be rosacea   • Elevated troponin I level 05/21/2021   • GERD (gastroesophageal reflux disease)    • History of transfusion     at 9years old for bleeding during tonsillectomy, no reaction   • Hypertension    • Nontoxic single thyroid nodule 07/25/2012   • Rectocele    • Rotator cuff tear, left    • Uterine leiomyoma    • Varicose veins of both lower extremities        Past Surgical History:   Procedure Laterality Date   • BLADDER SURGERY  08/2012    Bladder "lift"   • BREAST LUMPECTOMY Right 2000    benign   • COLONOSCOPY     • HYSTERECTOMY  1990   • JOINT REPLACEMENT  6/2017 and 5/2022    Knees   • KNEE SURGERY Right     arthroscopy x2   • NOSE SURGERY      deviated septum   • NE LARYNGOSCOPY W/BIOPSY MICROSCOPE/TELESCOPE Left 04/02/2018    Procedure: MICROLARYNGOSCOPY AND EXCISION OF LEFT VOCAL FOLD POLYP;  Surgeon: Brady Kumar MD;  Location[facility];  Service: ENT   • NE SURGICAL ARTHROSCOPY SHOULDER W/ROTATOR CUFF RPR Left 3/28/2023    Procedure: Shoulder Arthroscopy with Rotator Cuff Repair and Subacromial decompression;  Surgeon: Hollie Sanchez MD;  Location: [facility];  Service: Orthopedics   • NE XCAPSL [facility] RMVL [address] W/O ECP Left 2022    Procedure: EXTRACTION EXTRACAPSULAR CATARACT PHACO INTRAOCULAR LENS (IOL);   Surgeon: Evelyn Edge MD;  Location: [facility] MAIN OR;  Service: Ophthalmology   • TENDON REPAIR Right     achilles tendon   • TONSILLECTOMY     • TUBAL LIGATION     • VARICOSE VEIN SURGERY Left 2009       Family History   Problem Relation Age of Onset   • Breast cancer Mother    • Hypertension Mother    • Varicose Veins Mother         of lower extremities   • Glaucoma Mother         I also have glaucoma   • Hypertension Father    • Coronary artery disease Father    • Coronary artery disease Sister    • Heart disease Sister    • Varicose Veins Sister         of lower extremities   • No Known Problems Sister    • No Known Problems Sister    • Hypertension Brother    • No Known Problems Daughter    • Diabetes Maternal Grandmother         Also nephew William Dumontacher   • Breast cancer Maternal Aunt    • No Known Problems Maternal Aunt        Social History     Occupational History   • Not on file   Tobacco Use   • Smoking status: Former     Types: Cigarettes     Quit date: 1976     Years since quittin.6   • Smokeless tobacco: Never   • Tobacco comments:     None   Vaping Use   • Vaping Use: Never used   Substance and Sexual Activity   • Alcohol use: Yes     Comment: seldom   • Drug use: Never   • Sexual activity: Not Currently     Partners: Male     Birth control/protection: Surgical         Current Outpatient Medications:   •  acetaminophen (TYLENOL) 325 mg tablet, Take 650 mg by mouth every 6 (six) hours as needed for mild pain, Disp: , Rfl:   •  amLODIPine (NORVASC) 5 mg tablet, Take 1 tablet (5 mg total) by mouth daily, Disp: 90 tablet, Rfl: 3  •  aspirin 81 mg chewable tablet, Chew 1 tablet (81 mg total) daily (Patient taking differently: Chew 81 mg every morning), Disp: 30 tablet, Rfl: 0  •  BIOTIN PO, Take by mouth in the morning, Disp: , Rfl:   •  cholecalciferol (VITAMIN D3) 1,000 units tablet, • Pollen Extract Hives     Ragweed etc.    • Atorvastatin Myalgia       Objective:  Vitals:    08/29/23 1132   BP: 149/79   Pulse: 80            Left Shoulder Exam     Tenderness   The patient is experiencing tenderness in the biceps tendon. Physical Exam  Vitals and nursing note reviewed. Constitutional:       Appearance: Normal appearance. She is well-developed. HENT:      Head: Normocephalic and atraumatic. Right Ear: External ear normal.      Left Ear: External ear normal.   Eyes:      General: No scleral icterus. Extraocular Movements: Extraocular movements intact. Conjunctiva/sclera: Conjunctivae normal.   Cardiovascular:      Rate and Rhythm: Normal rate. Pulmonary:      Effort: Pulmonary effort is normal. No respiratory distress. Musculoskeletal:      Cervical back: Normal range of motion and neck supple. Comments: See Ortho exam   Skin:     General: Skin is warm and dry. Neurological:      General: No focal deficit present. Mental Status: She is alert and oriented to person, place, and time. Psychiatric:         Behavior: Behavior normal.             This document was created using speech voice recognition software. Grammatical errors, random word insertions, pronoun errors, and incomplete sentences are an occasional consequence of this system due to software limitations, ambient noise, and hardware issues. Any formal questions or concerns about content, text, or information contained within the body of this dictation should be directly addressed to the provider for clarification.

## 2023-09-05 DIAGNOSIS — L71.9 ROSACEA: ICD-10-CM

## 2023-09-05 NOTE — TELEPHONE ENCOUNTER
Medication Sodium Sulfacetamide wash    Quantity 1 bottle    Pharmacy [facility]    PCP/Speciality Derm    Enough for 3 days -Yes

## 2023-09-07 ENCOUNTER — TELEPHONE (OUTPATIENT)
Age: 68
End: 2023-09-07

## 2023-09-07 RX ORDER — SODIUM SULFACETAMIDE 100 MG/ML
LIQUID TOPICAL
Qty: 177 ML | Refills: 2 | Status: SHIPPED | OUTPATIENT
Start: 2023-09-07

## 2023-09-07 NOTE — TELEPHONE ENCOUNTER
Patients GI provider:  Dr. Poly James    Number to return call: [phone]    Reason for call: Pt calling stating she won't be able to drink all of her prep and would like a different one.      Scheduled procedure/appointment date if applicable: Procedure 3/85/01

## 2023-09-08 ENCOUNTER — TELEPHONE (OUTPATIENT)
Age: 68
End: 2023-09-08

## 2023-09-08 NOTE — TELEPHONE ENCOUNTER
pharmacist from [facility] has called requesting a clarification on prescription . They do not have Sodium Sulfacetamide wash 10 % , They just have a compounding of sodium sulfacetamide 9% + sulfur 3% .

## 2023-09-11 DIAGNOSIS — Z12.11 COLON CANCER SCREENING: Primary | ICD-10-CM

## 2023-09-11 RX ORDER — SOD SULF/POT CHLORIDE/MAG SULF 1.479 G
24 TABLET ORAL ONCE
Qty: 24 TABLET | Refills: 0 | Status: SHIPPED | OUTPATIENT
Start: 2023-09-11 | End: 2023-09-11

## 2023-09-20 ENCOUNTER — TELEPHONE (OUTPATIENT)
Age: 68
End: 2023-09-20

## 2023-09-20 NOTE — TELEPHONE ENCOUNTER
Reason for call:   [] Refill   [] Prior Auth  [x] Other:     Office:   [x] PCP/Provider - Elvie Diaz   [] Speciality/Provider -     Patient is calling to check on her medication refill, she would like a call back to discuss the medication dosage. Sulfacetamide wash 10 % , They just have a compounding of sodium sulfacetamide 9% + sulfur 3% . CB:[phone]    Does the patient have enough for 3 days?    [] Yes   [x] No - Send as HP to POD

## 2023-09-20 NOTE — TELEPHONE ENCOUNTER
Please let patient know her [facility] prescription for sulfacetamide - sulfur wash was renewed

## 2023-09-21 ENCOUNTER — HOSPITAL ENCOUNTER (OUTPATIENT)
Dept: GASTROENTEROLOGY | Facility: AMBULARY SURGERY CENTER | Age: 68
Setting detail: OUTPATIENT SURGERY
End: 2023-09-21
Attending: INTERNAL MEDICINE
Payer: MEDICARE

## 2023-09-21 ENCOUNTER — ANESTHESIA EVENT (OUTPATIENT)
Dept: GASTROENTEROLOGY | Facility: AMBULARY SURGERY CENTER | Age: 68
End: 2023-09-21

## 2023-09-21 ENCOUNTER — ANESTHESIA (OUTPATIENT)
Dept: GASTROENTEROLOGY | Facility: AMBULARY SURGERY CENTER | Age: 68
End: 2023-09-21

## 2023-09-21 VITALS
OXYGEN SATURATION: 98 % | SYSTOLIC BLOOD PRESSURE: 126 MMHG | HEART RATE: 71 BPM | TEMPERATURE: 98 F | DIASTOLIC BLOOD PRESSURE: 65 MMHG | RESPIRATION RATE: 18 BRPM

## 2023-09-21 DIAGNOSIS — Z12.11 SCREENING FOR COLON CANCER: ICD-10-CM

## 2023-09-21 PROCEDURE — 88305 TISSUE EXAM BY PATHOLOGIST: CPT | Performed by: PATHOLOGY

## 2023-09-21 RX ORDER — FENTANYL CITRATE 50 UG/ML
INJECTION, SOLUTION INTRAMUSCULAR; INTRAVENOUS AS NEEDED
Status: DISCONTINUED | OUTPATIENT
Start: 2023-09-21 | End: 2023-09-21

## 2023-09-21 RX ORDER — PROPOFOL 10 MG/ML
INJECTION, EMULSION INTRAVENOUS AS NEEDED
Status: DISCONTINUED | OUTPATIENT
Start: 2023-09-21 | End: 2023-09-21

## 2023-09-21 RX ORDER — SODIUM CHLORIDE, SODIUM LACTATE, POTASSIUM CHLORIDE, CALCIUM CHLORIDE 600; 310; 30; 20 MG/100ML; MG/100ML; MG/100ML; MG/100ML
INJECTION, SOLUTION INTRAVENOUS CONTINUOUS PRN
Status: DISCONTINUED | OUTPATIENT
Start: 2023-09-21 | End: 2023-09-21

## 2023-09-21 RX ADMIN — PROPOFOL 50 MG: 10 INJECTION, EMULSION INTRAVENOUS at 08:12

## 2023-09-21 RX ADMIN — PROPOFOL 50 MG: 10 INJECTION, EMULSION INTRAVENOUS at 08:08

## 2023-09-21 RX ADMIN — PROPOFOL 50 MG: 10 INJECTION, EMULSION INTRAVENOUS at 08:01

## 2023-09-21 RX ADMIN — SODIUM CHLORIDE, SODIUM LACTATE, POTASSIUM CHLORIDE, AND CALCIUM CHLORIDE: .6; .31; .03; .02 INJECTION, SOLUTION INTRAVENOUS at 07:54

## 2023-09-21 RX ADMIN — PROPOFOL 110 MG: 10 INJECTION, EMULSION INTRAVENOUS at 07:57

## 2023-09-21 RX ADMIN — FENTANYL CITRATE 25 MCG: 50 INJECTION INTRAMUSCULAR; INTRAVENOUS at 08:15

## 2023-09-21 RX ADMIN — PROPOFOL 50 MG: 10 INJECTION, EMULSION INTRAVENOUS at 08:04

## 2023-09-21 NOTE — ANESTHESIA PREPROCEDURE EVALUATION
Procedure:  COLONOSCOPY    Relevant Problems   ANESTHESIA (within normal limits)      CARDIO   (+) Benign essential hypertension   (+) Hypertensive urgency   (+) Pure hypercholesterolemia      GI/HEPATIC   (+) GERD without esophagitis      MUSCULOSKELETAL   (+) Acute left-sided low back pain without sciatica   (+) Osteoarthritis of knee       TTE 5/22/2021  LEFT VENTRICLE:  Systolic function was normal by visual assessment. Ejection fraction was estimated in the range of 55 % to 60 %. Although no diagnostic regional wall motion abnormality was identified, this possibility cannot be completely excluded on the basis of this study. Wall thickness was mildly increased. There was mild concentric hypertrophy. Doppler parameters were consistent with abnormal left ventricular relaxation (grade 1 diastolic dysfunction).     MITRAL VALVE:  There was mild annular calcification. There was trace regurgitation. Physical Exam    Airway    Mallampati score: III  TM Distance: <3 FB  Neck ROM: full     Dental   No notable dental hx     Cardiovascular      Pulmonary      Other Findings        Anesthesia Plan  ASA Score- 3     Anesthesia Type- IV sedation with anesthesia with ASA Monitors. Additional Monitors:   Airway Plan:           Plan Factors-Exercise tolerance (METS): >4 METS. Chart reviewed. EKG reviewed. Existing labs reviewed. Patient summary reviewed. Patient is not a current smoker. Induction- intravenous. Postoperative Plan-     Informed Consent- Anesthetic plan and risks discussed with patient. I personally reviewed this patient with the CRNA. Discussed and agreed on the Anesthesia Plan with the CRNA. Felicia Villegas

## 2023-09-21 NOTE — H&P
History and Physical - [facility]  Amrit Garcia 77 y.o. female MRN: [id number]                  HPI: Amrit Garcia is a 77y.o. year old female who presents for colonoscopy for colon cancer screening. REVIEW OF SYSTEMS: Per the HPI, and otherwise unremarkable. Historical Information   Past Medical History:   Diagnosis Date   • Breast lump    • Chest pain 05/28/2019   • COVID-19 04/09/2021   • Disease of thyroid gland     nodule, benign   • Eczema     facial rash- could also be rosacea   • Elevated troponin I level 05/21/2021   • GERD (gastroesophageal reflux disease)    • History of transfusion     at 9years old for bleeding during tonsillectomy, no reaction   • Hypertension    • Nontoxic single thyroid nodule 07/25/2012   • Rectocele    • Rotator cuff tear, left    • Uterine leiomyoma    • Varicose veins of both lower extremities      Past Surgical History:   Procedure Laterality Date   • BLADDER SURGERY  08/2012    Bladder "lift"   • BREAST LUMPECTOMY Right 2000    benign   • COLONOSCOPY     • HYSTERECTOMY  1990   • JOINT REPLACEMENT  6/2017 and 5/2022    Knees   • KNEE SURGERY Right     arthroscopy x2   • NOSE SURGERY      deviated septum   • CT LARYNGOSCOPY W/BIOPSY MICROSCOPE/TELESCOPE Left 04/02/2018    Procedure: MICROLARYNGOSCOPY AND EXCISION OF LEFT VOCAL FOLD POLYP;  Surgeon: Walker Ortiz MD;  [facility];  Service: ENT   • CT SURGICAL ARTHROSCOPY SHOULDER W/ROTATOR CUFF RPR Left 3/28/2023    Procedure: Shoulder Arthroscopy with Rotator Cuff Repair and Subacromial decompression;  Surgeon: Heidi Soni MD;  Location: [facility];  Service: Orthopedics   • CT XCAPSL [facility] RMVL INSJ IO LENS PROSTH W/O ECP Left 11/28/2022    Procedure: EXTRACTION EXTRACAPSULAR CATARACT PHACO INTRAOCULAR LENS (IOL);   Surgeon: Ivania Camejo MD;  Location: [facility] MAIN OR;  Service: Ophthalmology   • TENDON REPAIR Right     achilles tendon   • TONSILLECTOMY     • TUBAL LIGATION • VARICOSE VEIN SURGERY Left 2009     Social History   Social History     Substance and Sexual Activity   Alcohol Use Yes    Comment: seldom     Social History     Substance and Sexual Activity   Drug Use Never     Social History     Tobacco Use   Smoking Status Former   • Types: Cigarettes   • Quit date: 1976   • Years since quittin.7   Smokeless Tobacco Never   Tobacco Comments    None     Family History   Problem Relation Age of Onset   • Breast cancer Mother    • Hypertension Mother    • Varicose Veins Mother         of lower extremities   • Glaucoma Mother         I also have glaucoma   • Hypertension Father    • Coronary artery disease Father    • Coronary artery disease Sister    • Heart disease Sister    • Varicose Veins Sister         of lower extremities   • No Known Problems Sister    • No Known Problems Sister    • Hypertension Brother    • No Known Problems Daughter    • Diabetes Maternal Grandmother         Also nephew William Dumontacher   • Breast cancer Maternal Aunt    • No Known Problems Maternal Aunt        Meds/Allergies       Current Outpatient Medications:   •  acetaminophen (TYLENOL) 325 mg tablet  •  amLODIPine (NORVASC) 5 mg tablet  •  aspirin 81 mg chewable tablet  •  BIOTIN PO  •  cholecalciferol (VITAMIN D3) 1,000 units tablet  •  esomeprazole (NexIUM) 40 MG capsule  •  Ting, Zingiber officinalis, (TING ROOT PO)  •  hydrochlorothiazide (HYDRODIURIL) 25 mg tablet  •  latanoprost (XALATAN) 0.005 % ophthalmic solution  •  Magnesium 400 MG CAPS  •  metoprolol succinate (TOPROL-XL) 50 mg 24 hr tablet  •  multivitamin (THERAGRAN) TABS  •  olmesartan (BENICAR) 40 mg tablet  •  Sulfacetamide Sodium (Sodium Sulfacetamide Wash) 10 % LIQD  •  vitamin B-12 (CYANOCOBALAMIN) 2500 MCG TABS    Current Facility-Administered Medications:   •  [START ON 10/31/2023] Inclisiran Sodium (LEQVIO) subcutaneous injection 284 mg, 284 mg, Subcutaneous, Q6 Months  •  Inclisiran Sodium (LEQVIO) subcutaneous injection 284 mg, 284 mg, Subcutaneous, Once    Allergies   Allergen Reactions   • Lisinopril Other (See Comments)     Other reaction(s): swollen legs   • Hydrocodone-Acetaminophen Dizziness   • Irbesartan-Hydrochlorothiazide Other (See Comments)      Hypertension  Pt had hypertension, arm heaviness and headache   • Pollen Extract Hives     Ragweed etc.    • Atorvastatin Myalgia       Objective     There were no vitals filed for this visit. PHYSICAL EXAM    Gen: NAD  Head: NCAT  CV: RRR  CHEST: Clear  ABD: soft, NT/ND  EXT: no edema      ASSESSMENT/PLAN:  This is a 77y.o. year old female here for colonoscopy, and she is stable and optimized for her procedure.

## 2023-09-21 NOTE — ANESTHESIA POSTPROCEDURE EVALUATION
Post-Op Assessment Note    CV Status:  Stable    Pain management: adequate     Mental Status:  Alert and awake   Hydration Status:  Euvolemic   PONV Controlled:  Controlled   Airway Patency:  Patent      Post Op Vitals Reviewed: Yes      Staff: CRNA, Anesthesiologist         No notable events documented.     BP   108/54   Temp 97   Pulse 77   Resp 12   SpO2 100

## 2023-09-25 PROCEDURE — 88305 TISSUE EXAM BY PATHOLOGIST: CPT | Performed by: PATHOLOGY

## 2023-09-29 ENCOUNTER — TELEPHONE (OUTPATIENT)
Dept: GASTROENTEROLOGY | Facility: CLINIC | Age: 68
End: 2023-09-29

## 2023-10-05 ENCOUNTER — TELEPHONE (OUTPATIENT)
Age: 68
End: 2023-10-05

## 2023-10-05 NOTE — TELEPHONE ENCOUNTER
Caller: patient    Doctor: Jesse Quiles    Reason for call: pt called stating she had an injection on 8/29 by Dr Librada Ganser, she states the injection did not help and would like to know the next step    Call back#: [phone]

## 2023-10-10 ENCOUNTER — OFFICE VISIT (OUTPATIENT)
Dept: OBGYN CLINIC | Facility: CLINIC | Age: 68
End: 2023-10-10
Payer: MEDICARE

## 2023-10-10 VITALS
WEIGHT: 213 LBS | DIASTOLIC BLOOD PRESSURE: 81 MMHG | HEIGHT: 65 IN | SYSTOLIC BLOOD PRESSURE: 147 MMHG | BODY MASS INDEX: 35.49 KG/M2 | HEART RATE: 69 BPM

## 2023-10-10 DIAGNOSIS — Z98.890 S/P LEFT ROTATOR CUFF REPAIR: ICD-10-CM

## 2023-10-10 DIAGNOSIS — M75.22 BICEPS TENDONITIS ON LEFT: Primary | ICD-10-CM

## 2023-10-10 PROCEDURE — 99213 OFFICE O/P EST LOW 20 MIN: CPT | Performed by: ORTHOPAEDIC SURGERY

## 2023-10-10 RX ORDER — MELOXICAM 15 MG/1
15 TABLET ORAL DAILY
Qty: 30 TABLET | Refills: 0 | Status: SHIPPED | OUTPATIENT
Start: 2023-10-10

## 2023-10-10 NOTE — PATIENT INSTRUCTIONS
Exercises for Shoulder Abduction and Adduction   AMBULATORY CARE:   Shoulder abduction and adduction exercises  work the muscles at the back of your shoulder and your upper back. Call your doctor or physical therapist if:   You have sharp or worsening pain during exercise or at rest.    You have questions or concerns about your shoulder exercises. Before you exercise:  Warm up and stretch before you exercise. Walk or ride a stationary bike for 5 to 10 minutes to help you warm up. Stretching helps increase range of motion. It may also decrease muscle soreness and help prevent another injury. Your healthcare provider or physical therapist will tell you which of the following stretches to do:  Crossover arm stretch:  Relax your shoulders. Hold your upper arm with the opposite hand. Pull your arm across your chest until you feel a stretch. Hold the stretch for 30 seconds. Return to the starting position. Shoulder flexion stretch:  Stand facing a wall. Slowly walk your fingers up the wall until you feel a stretch. Hold the stretch for 30 seconds. Return to the starting position. Sleeper stretch:  Lie on your injured side on a firm, flat surface. Bend the elbow of your injured arm 90° with your hand facing up. Use your arm that is not injured to slowly push your injured arm down. Stop when you feel a stretch at the back of your injured shoulder. Hold the stretch for 30 seconds. Slowly return to the starting position. How to exercise with a weight:  Your healthcare provider or physical therapist will tell you how much weight to use. Shoulder abduction:  Stand and hold a weight in your hand with your palm facing your body. Slowly raise your arm to the side with your thumb pointing up. Then raise your arm over your head as far as you can without pain. Hold this position for as long as directed.  Do not raise your arm over your head unless your healthcare provider or physical therapist says it is okay.         Shoulder adduction:  Lie on your back on a firm surface. Extend your arm out to a "T." Bend your elbow so your forearm in the air. Hold a weight in your hand. Slowly raise your arm toward the ceiling and straighten your elbow. Hold this position for as long as directed. Slowly return to the starting position. How to exercise with an exercise band: Your healthcare provider or physical therapist will tell you how much resistance to use. Shoulder abduction:  Wrap the exercise band around a heavy, stable object near your foot. Grab the band with the hand of your injured shoulder. Keep your arm straight. Slowly raise your arm to the side with your thumb pointing up. Then, slowly pull the band over your head as far as you can without pain. Do not raise your arm over your head unless your healthcare provider or physical therapist says it is okay. Do not let your shoulder shrug. Hold this position for as long as directed. Slowly return to the starting position. Shoulder adduction:  Wrap the exercise band around a heavy, stable object. Stand and face away from where the band is anchored. Hold each end of the band in both hands with your elbows bent. Your elbows should not be behind your body. Keep your arms parallel to the floor and slowly straighten your elbows. Hold this position for as long as directed. Slowly return to the starting position. Follow up with your doctor or physical therapist as directed:  Write down your questions so you remember to ask them at your visits. © Copyright Shakira Cantu 2023 Information is for End User's use only and may not be sold, redistributed or otherwise used for commercial purposes. The above information is an  only. It is not intended as medical advice for individual conditions or treatments. Talk to your doctor, nurse or pharmacist before following any medical regimen to see if it is safe and effective for you.   Exercises for Internal and External Shoulder Rotation   AMBULATORY CARE:   Muscles worked during internal and external shoulder exercises:  Exercises for internal shoulder rotation work the muscles in your chest and front of your shoulder. Exercises for external shoulder rotation work the muscles in the back of your shoulder and upper back. Call your doctor or physical therapist if:   You have sharp or worsening pain during exercise or at rest.    You have questions or concerns about your shoulder exercises. Before you exercise:  Warm up and stretch before you exercise. Walk or ride a stationary bike for 5 to 10 minutes to help you warm up. Stretching helps increase range of motion. It may also decrease muscle soreness and help prevent another injury. Your healthcare provider will tell you which of the following stretches to do:  Crossover arm stretch:  Relax your shoulders. Hold your upper arm with the opposite hand. Pull your arm across your chest until you feel a stretch. Hold the stretch for 30 seconds. Return to the starting position. Shoulder flexion stretch:  Stand facing a wall. Slowly walk your fingers up the wall until you feel a stretch. Hold the stretch for 30 seconds. Return to the starting position. Sleeper stretch:  Lie on your injured side on a firm, flat surface. Bend the elbow of your injured arm 90° with your hand facing up. Use your arm that is not injured to slowly push your injured arm down. Stop when you feel a stretch at the back of your injured shoulder. Hold the stretch for 30 seconds. Slowly return to the starting position. How to exercise with a weight:  Your healthcare provider or physical therapist will tell you how much weight to use. Shoulder internal rotation:  Sit in a chair. Place a rolled up towel between your elbow and your side. Bend your elbow to 90°. Gently squeeze the towel with your elbow to prevent it from falling out. Hold the weight with your thumb pointing up.  Slowly move the weight across your chest. Stop when your hand reaches your opposite arm. Hold this position for as many seconds as directed. Slowly return to the starting position. Shoulder external rotation:  Lie on your side with your injured shoulder facing up. Bend your elbow 90°. Place a rolled up towel between your elbow and your side. Hold a weight in your hand. Gently squeeze the towel with your elbow to prevent it from falling out. Slowly rotate your arm outward, but keep your elbow bent. Stop when you feel a stretch. Hold this position for 30 seconds or as directed. Slowly return to the starting position. How to exercise with an exercise band: Your healthcare provider or physical therapist will tell you how much resistance to use. Shoulder internal rotation:  Tie one end of the exercise band to a heavy, secure object. Sit in a chair. Place a rolled up towel between your elbow and your side. Bend your elbow to 90°. Gently squeeze the towel with your elbow to prevent it from falling out. Slowly pull the band across your chest. Stop when your hand reaches your opposite arm. Hold this position for as many seconds as directed. Slowly return to the starting position. Shoulder external rotation:  Hold one end of the exercise band on the side that is not injured. Place a rolled up towel between your elbow and your side. Bend your elbow 90°. Squeeze the towel with your elbow to prevent it from falling out. Grab the end of the band and slowly turn your arm outward, but keep your elbow bent. Stop when you feel a stretch. Hold this position for 30 seconds or as directed. Slowly return to the starting position. Follow up with your physical therapist as directed:  Write down your questions so you remember to ask them at your visits. © Copyright Pippa Samaniego 2023 Information is for End User's use only and may not be sold, redistributed or otherwise used for commercial purposes.   The above information is an  only. It is not intended as medical advice for individual conditions or treatments. Talk to your doctor, nurse or pharmacist before following any medical regimen to see if it is safe and effective for you.

## 2023-10-10 NOTE — PROGRESS NOTES
Assessment/Plan:  1. Biceps tendonitis on left  meloxicam (Mobic) 15 mg tablet      2. S/P left rotator cuff repair          The patient does have ongoing symptoms consistent with biceps tendinitis, but does have some weakness and pain with shoulder abduction as well. We will start with continued conservative treatment for this for the time being, as the patient is only 6 and half months out from her initial surgery. I did prescribe her meloxicam today, which she will take for 2 weeks. We also discussed return to physical therapy, however the patient preferred home exercises. I did provide her with these today. She will follow-up in 6 to 8 weeks for repeat evaluation. If she fails make progress and has ongoing symptoms, we discussed possible revision arthroscopy of the shoulder for evaluation of her rotator cuff and biceps tenodesis. The patient is currently not interested in any surgery at this point. Subjective:   Mahsa Feng is a 77 y.o. female who presents today for follow-up of her left shoulder, now about 6 and a half months status post arthroscopic rotator cuff repair performed by Dr. Caitie Wharton. She did have a repeat MRI of her shoulder on 8/10/23, which radiology noted to show an intact rotator cuff repair but some biceps tendonitis. I am able to view these images today. She did have an US guided steroid injection into biceps tendon sheath by Dr. Tisha Terry about 6 weeks ago, which she notes did help for about a month, however over the last 2 weeks she has started with worsening "jolts" of pain about he anterior upper arm. She notes this occurs when she is using the arm, for ironing or stirring things, but can also occur at rest and at night. She notes fair ROM and strength about the shoulder and notes good sensation of the left upper extremity. She notes this pain is different than her pre-op pain. Review of Systems   Constitutional: Negative. Negative for chills and fever. HENT: Negative. Negative for ear pain and sore throat. Eyes: Negative. Negative for pain and redness. Respiratory: Negative. Negative for shortness of breath and wheezing. Cardiovascular: Negative for chest pain and palpitations. Gastrointestinal: Negative. Negative for abdominal pain and blood in stool. Endocrine: Negative. Negative for polydipsia and polyuria. Genitourinary: Negative. Negative for difficulty urinating and dysuria. Musculoskeletal:        As noted in HPI   Skin: Negative. Negative for pallor and rash. Neurological: Negative. Negative for dizziness and numbness. Hematological: Negative. Negative for adenopathy. Does not bruise/bleed easily. Psychiatric/Behavioral: Negative. Negative for confusion and suicidal ideas.          Past Medical History:   Diagnosis Date   • Breast lump    • Chest pain 05/28/2019   • COVID-19 04/09/2021   • Disease of thyroid gland     nodule, benign   • Eczema     facial rash- could also be rosacea   • Elevated troponin I level 05/21/2021   • GERD (gastroesophageal reflux disease)    • History of transfusion     at 9years old for bleeding during tonsillectomy, no reaction   • Hypertension    • Nontoxic single thyroid nodule 07/25/2012   • Rectocele    • Rotator cuff tear, left    • Uterine leiomyoma    • Varicose veins of both lower extremities        Past Surgical History:   Procedure Laterality Date   • BLADDER SURGERY  08/2012    Bladder "lift"   • BREAST LUMPECTOMY Right 2000    benign   • COLONOSCOPY     • HYSTERECTOMY  1990   • JOINT REPLACEMENT  6/2017 and 5/2022    Knees   • KNEE SURGERY Right     arthroscopy x2   • NOSE SURGERY      deviated septum   • CT LARYNGOSCOPY W/BIOPSY MICROSCOPE/TELESCOPE Left 04/02/2018    Procedure: MICROLARYNGOSCOPY AND EXCISION OF LEFT VOCAL FOLD POLYP;  Surgeon: Mary Rhodes MD;  Location: [facility];  Service: ENT   • CT SURGICAL ARTHROSCOPY SHOULDER W/ROTATOR CUFF RPR Left 3/28/2023    Procedure: Shoulder Arthroscopy with Rotator Cuff Repair and Subacromial decompression;  Surgeon: Drew Tilley MD;  Location: [facility];  Service: Orthopedics   • AK XCAPSL CTRC RMVL INSJ IO LENS PROSTH W/O ECP Left 2022    Procedure: EXTRACTION EXTRACAPSULAR CATARACT PHACO INTRAOCULAR LENS (IOL);   Surgeon: Ella Mims MD;  Location: [facility];  Service: Ophthalmology   • TENDON REPAIR Right     achilles tendon   • TONSILLECTOMY     • TUBAL LIGATION     • VARICOSE VEIN SURGERY Left 2009       Family History   Problem Relation Age of Onset   • Breast cancer Mother    • Hypertension Mother    • Varicose Veins Mother         of lower extremities   • Glaucoma Mother         I also have glaucoma   • Hypertension Father    • Coronary artery disease Father    • Coronary artery disease Sister    • Heart disease Sister    • Varicose Veins Sister         of lower extremities   • No Known Problems Sister    • No Known Problems Sister    • Hypertension Brother    • No Known Problems Daughter    • Diabetes Maternal Grandmother         Also nephew William Hectorermacher   • Breast cancer Maternal Aunt    • No Known Problems Maternal Aunt        Social History     Occupational History   • Not on file   Tobacco Use   • Smoking status: Former     Types: Cigarettes     Quit date: 1976     Years since quittin.8   • Smokeless tobacco: Never   • Tobacco comments:     None   Vaping Use   • Vaping Use: Never used   Substance and Sexual Activity   • Alcohol use: Not Currently     Comment: seldom   • Drug use: Never   • Sexual activity: Not Currently     Partners: Male     Birth control/protection: Surgical         Current Outpatient Medications:   •  amLODIPine (NORVASC) 5 mg tablet, Take 1 tablet (5 mg total) by mouth daily, Disp: 90 tablet, Rfl: 3  •  aspirin 81 mg chewable tablet, Chew 1 tablet (81 mg total) daily (Patient taking differently: Chew 81 mg every morning), Disp: 30 tablet, Rfl: 0  •  BIOTIN PO, Take by mouth in the morning, Disp: , Rfl:   •  cholecalciferol (VITAMIN D3) 1,000 units tablet, Take 1,000 Units by mouth daily, Disp: , Rfl:   •  esomeprazole (NexIUM) 40 MG capsule, Take 1 capsule (40 mg total) by mouth daily, Disp: 90 capsule, Rfl: 1  •  Ginger, Zingiber officinalis, (GINGER ROOT PO), Take by mouth in the morning, Disp: , Rfl:   •  hydrochlorothiazide (HYDRODIURIL) 25 mg tablet, Take 1 tablet (25 mg total) by mouth daily, Disp: 90 tablet, Rfl: 3  •  latanoprost (XALATAN) 0.005 % ophthalmic solution, Administer to both eyes daily at bedtime, Disp: , Rfl:   •  Magnesium 400 MG CAPS, Take 2 capsules (800 mg total) by mouth in the morning, Disp: , Rfl:   •  meloxicam (Mobic) 15 mg tablet, Take 1 tablet (15 mg total) by mouth daily, Disp: 30 tablet, Rfl: 0  •  metoprolol succinate (TOPROL-XL) 50 mg 24 hr tablet, Take 1 tablet (50 mg total) by mouth daily at bedtime, Disp: 90 tablet, Rfl: 3  •  multivitamin (THERAGRAN) TABS, Take 1 tablet by mouth daily, Disp: , Rfl:   •  olmesartan (BENICAR) 40 mg tablet, TAKE 1 TABLET EVERY DAY, Disp: 90 tablet, Rfl: 3  •  Sulfacetamide Sodium (Sodium Sulfacetamide Wash) 10 % LIQD, Apply twice daily to affected area on face, Disp: 177 mL, Rfl: 2  •  vitamin B-12 (CYANOCOBALAMIN) 2500 MCG TABS, Take 500 mcg by mouth daily, Disp: , Rfl:   •  acetaminophen (TYLENOL) 325 mg tablet, Take 650 mg by mouth every 6 (six) hours as needed for mild pain (Patient not taking: Reported on 10/10/2023), Disp: , Rfl:     Current Facility-Administered Medications:   •  Inclisiran Sodium (LEQVIO) subcutaneous injection 284 mg, 284 mg, Subcutaneous, Once, Tabatha Rojas MD    Allergies   Allergen Reactions   • Lisinopril Other (See Comments)     Other reaction(s): swollen legs   • Hydrocodone-Acetaminophen Dizziness   • Irbesartan-Hydrochlorothiazide Other (See Comments)      Hypertension  Pt had hypertension, arm heaviness and headache   • Pollen Extract Hives     Ragweed etc.    • Atorvastatin Myalgia Objective:  Vitals:    10/10/23 1054   BP: 147/81   Pulse: 69     Pain Score:   8      Right Shoulder Exam     Range of Motion   External rotation: 60   Forward flexion: 170   Internal rotation 0 degrees: T12       Left Shoulder Exam     Tenderness   The patient is experiencing tenderness in the biceps tendon. Range of Motion   External rotation: 50   Forward flexion: 160   Internal rotation 0 degrees: L5     Muscle Strength   Abduction: 4/5   Internal rotation: 5/5   External rotation: 5/5   Biceps: 4/5     Tests   Drop arm: negative    Other   Erythema: absent  Sensation: normal  Pulse: present     Comments:  Positive speeds test.             Physical Exam  Constitutional:       General: She is not in acute distress. Appearance: She is well-developed. HENT:      Head: Normocephalic and atraumatic. Eyes:      General: No scleral icterus. Conjunctiva/sclera: Conjunctivae normal.   Neck:      Vascular: No JVD. Cardiovascular:      Rate and Rhythm: Normal rate. Pulmonary:      Effort: Pulmonary effort is normal. No respiratory distress. Skin:     General: Skin is warm. Neurological:      Mental Status: She is alert and oriented to person, place, and time. Coordination: Coordination normal.       Imaging were reviewed in PACS today with my interpretation as follows: Despite the reading radiologist not appreciating any recurrent rotator cuff tear, I do question a possible small supraspinatus anterior leading edge recurrent tear. Biceps tendonitis. This document was created using speech voice recognition software. Grammatical errors, random word insertions, pronoun errors, and incomplete sentences are an occasional consequence of this system due to software limitations, ambient noise, and hardware issues. Any formal questions or concerns about content, text, or information contained within the body of this dictation should be directly addressed to the provider for clarification.

## 2023-11-17 ENCOUNTER — OFFICE VISIT (OUTPATIENT)
Dept: CARDIOLOGY CLINIC | Facility: CLINIC | Age: 68
End: 2023-11-17
Payer: MEDICARE

## 2023-11-17 VITALS
OXYGEN SATURATION: 99 % | HEIGHT: 65 IN | WEIGHT: 217.2 LBS | DIASTOLIC BLOOD PRESSURE: 80 MMHG | SYSTOLIC BLOOD PRESSURE: 140 MMHG | HEART RATE: 73 BPM | BODY MASS INDEX: 36.19 KG/M2

## 2023-11-17 DIAGNOSIS — E78.00 PURE HYPERCHOLESTEROLEMIA: ICD-10-CM

## 2023-11-17 DIAGNOSIS — I10 BENIGN ESSENTIAL HYPERTENSION: Primary | ICD-10-CM

## 2023-11-17 PROCEDURE — 99214 OFFICE O/P EST MOD 30 MIN: CPT | Performed by: INTERNAL MEDICINE

## 2023-11-17 RX ORDER — HYDROCHLOROTHIAZIDE 25 MG/1
25 TABLET ORAL DAILY
Qty: 90 TABLET | Refills: 3 | Status: SHIPPED | OUTPATIENT
Start: 2023-11-17

## 2023-11-17 RX ORDER — AMLODIPINE BESYLATE 5 MG/1
5 TABLET ORAL DAILY
Qty: 90 TABLET | Refills: 3 | Status: SHIPPED | OUTPATIENT
Start: 2023-11-17

## 2023-11-17 RX ORDER — OLMESARTAN MEDOXOMIL 40 MG/1
40 TABLET ORAL DAILY
Qty: 90 TABLET | Refills: 3 | Status: SHIPPED | OUTPATIENT
Start: 2023-11-17

## 2023-11-17 NOTE — PATIENT INSTRUCTIONS
Recommendations:  1. Continue current medications. 2. Pursue weight loss, increase exercise. 3. Check fasting lipid panel and complete metabolic profile [facility].  4. Follow up in 6 months.

## 2023-11-17 NOTE — PROGRESS NOTES
Cardiology   Zachery Torrez 77 y.o. female MRN: [id number]        Impression:  1. Hypertension - not adequate control. Patient had hypertensive urgency in 5/21, which resulted in elevated troponin and chest pain. No myocardial infarction. 2. Dyslipidemia - on Leqvio. Recommendations:  1. Continue current medications. 2. Pursue weight loss, increase exercise. 3. Check fasting lipid panel and complete metabolic profile [facility]80.  4. Follow up in 6 months. HPI: Zachery Torrez is a 77y.o. year old female with hypertension, dyslipidemia, and non-obstructive CAD by cath 5/21 who presents for follow up. Admitted 5/21 with chest pain and elevated troponin - no significant CAD and thought to be due to hypertensive urgency. Echo demonstrated normal LV systolic function. No chest pain, shortness of breath, or palpitations.          Review of Systems      Past Medical History:   Diagnosis Date    Breast lump     Chest pain 05/28/2019    COVID-19 04/09/2021    Disease of thyroid gland     nodule, benign    Eczema     facial rash- could also be rosacea    Elevated troponin I level 05/21/2021    GERD (gastroesophageal reflux disease)     History of transfusion     at 9years old for bleeding during tonsillectomy, no reaction    Hypertension     Nontoxic single thyroid nodule 07/25/2012    Rectocele     Rotator cuff tear, left     Uterine leiomyoma     Varicose veins of both lower extremities      Past Surgical History:   Procedure Laterality Date    BLADDER SURGERY  08/2012    Bladder "lift"    BREAST LUMPECTOMY Right 2000    benign    COLONOSCOPY      HYSTERECTOMY  1990    JOINT REPLACEMENT  6/2017 and 5/2022    Knees    KNEE SURGERY Right     arthroscopy x2    NOSE SURGERY      deviated septum    MA LARYNGOSCOPY W/BIOPSY MICROSCOPE/TELESCOPE Left 04/02/2018    Procedure: MICROLARYNGOSCOPY AND EXCISION OF LEFT VOCAL FOLD POLYP;  Surgeon: Beryl Oneil MD;  Location: [facility];  Service: ENT    MA SURGICAL ARTHROSCOPY SHOULDER W/ROTATOR CUFF RPR Left 3/28/2023    Procedure: Shoulder Arthroscopy with Rotator Cuff Repair and Subacromial decompression;  Surgeon: Nakia Daily MD;  Location: [facility];  Service: Orthopedics    ID XCAPSL CTRC RMVL INSJ IO LENS PROSTH W/O ECP Left 2022    Procedure: EXTRACTION EXTRACAPSULAR CATARACT PHACO INTRAOCULAR LENS (IOL); Surgeon: Kuldip Wolff MD;  Location: [facility] MAIN OR;  Service: Ophthalmology    TENDON REPAIR Right     achilles tendon    TONSILLECTOMY      TUBAL LIGATION      VARICOSE VEIN SURGERY Left 2009     Social History     Substance and Sexual Activity   Alcohol Use Yes    Comment: seldom     Social History     Substance and Sexual Activity   Drug Use Never     Social History     Tobacco Use   Smoking Status Former    Packs/day: 0.50    Types: Cigarettes    Quit date: 1976    Years since quittin.9   Smokeless Tobacco Never   Tobacco Comments    None     Family History   Problem Relation Age of Onset    Breast cancer Mother     Hypertension Mother     Varicose Veins Mother         of lower extremities    Glaucoma Mother         I also have glaucoma    Hypertension Father     Coronary artery disease Father     Coronary artery disease Sister     Heart disease Sister     Varicose Veins Sister         of lower extremities    No Known Problems Sister     No Known Problems Sister     Hypertension Brother     No Known Problems Daughter     Diabetes Maternal Grandmother         Also nephew William Fenstermacher    Breast cancer Maternal Aunt     No Known Problems Maternal Aunt        Allergies:   Allergies   Allergen Reactions    Lisinopril Other (See Comments)     Other reaction(s): swollen legs    Hydrocodone-Acetaminophen Dizziness    Irbesartan-Hydrochlorothiazide Other (See Comments)      Hypertension  Pt had hypertension, arm heaviness and headache    Pollen Extract Hives     Ragweed etc.     Atorvastatin Myalgia       Medications:     Current Outpatient Medications:     amLODIPine (NORVASC) 5 mg tablet, Take 1 tablet (5 mg total) by mouth daily, Disp: 90 tablet, Rfl: 3    aspirin 81 mg chewable tablet, Chew 1 tablet (81 mg total) daily (Patient taking differently: Chew 81 mg every morning), Disp: 30 tablet, Rfl: 0    BIOTIN PO, Take by mouth in the morning, Disp: , Rfl:     cholecalciferol (VITAMIN D3) 1,000 units tablet, Take 1,000 Units by mouth daily, Disp: , Rfl:     esomeprazole (NexIUM) 40 MG capsule, Take 1 capsule (40 mg total) by mouth daily, Disp: 90 capsule, Rfl: 1    Ginger, Zingiber officinalis, (GINGER ROOT PO), Take by mouth in the morning, Disp: , Rfl:     hydrochlorothiazide (HYDRODIURIL) 25 mg tablet, Take 1 tablet (25 mg total) by mouth daily, Disp: 90 tablet, Rfl: 3    latanoprost (XALATAN) 0.005 % ophthalmic solution, Administer to both eyes daily at bedtime, Disp: , Rfl:     Magnesium 400 MG CAPS, Take 2 capsules (800 mg total) by mouth in the morning (Patient taking differently: Take 1 capsule by mouth in the morning), Disp: , Rfl:     metoprolol succinate (TOPROL-XL) 50 mg 24 hr tablet, Take 1 tablet (50 mg total) by mouth daily at bedtime, Disp: 90 tablet, Rfl: 3    multivitamin (THERAGRAN) TABS, Take 1 tablet by mouth daily, Disp: , Rfl:     olmesartan (BENICAR) 40 mg tablet, TAKE 1 TABLET EVERY DAY, Disp: 90 tablet, Rfl: 3    Sulfacetamide Sodium (Sodium Sulfacetamide Wash) 10 % LIQD, Apply twice daily to affected area on face, Disp: 177 mL, Rfl: 2    vitamin B-12 (CYANOCOBALAMIN) 2500 MCG TABS, Take 500 mcg by mouth daily, Disp: , Rfl:     acetaminophen (TYLENOL) 325 mg tablet, Take 650 mg by mouth every 6 (six) hours as needed for mild pain (Patient not taking: Reported on 10/10/2023), Disp: , Rfl:     meloxicam (Mobic) 15 mg tablet, Take 1 tablet (15 mg total) by mouth daily, Disp: 30 tablet, Rfl: 0    Current Facility-Administered Medications:     Inclisiran Sodium (LEQVIO) subcutaneous injection 284 mg, 284 mg, Subcutaneous, Once, Babs Barrios MD       Wt Readings from Last 3 Encounters:   23 98.5 kg (217 lb 3.2 oz)   10/10/23 96.6 kg (213 lb)   23 96.6 kg (213 lb)     Temp Readings from Last 3 Encounters:   23 98 °F (36.7 °C) (Temporal)   23 97.9 °F (36.6 °C)   23 97.6 °F (36.4 °C) (Core)     BP Readings from Last 3 Encounters:   23 140/80   10/10/23 147/81   23 126/65     Pulse Readings from Last 3 Encounters:   23 73   10/10/23 69   23 71         Physical Exam      Laboratory Studies:  CMP:  Lab Results   Component Value Date     2017    K 4.2 2023     2023    CO2 31 2023    BUN 19 2023    CREATININE 0.79 2023    AST 16 2023    ALT 22 2023    BILITOT 0.4 2017    EGFR 76 2023       Lipid Profile:   Lab Results   Component Value Date    CHOL 211 (H) 2017     Lab Results   Component Value Date    HDL 58 2023     Lab Results   Component Value Date    LDLCALC 74 2023     Lab Results   Component Value Date    TRIG 126 2023       Cardiac testing:   EKG reviewed personally:   Results for orders placed during the hospital encounter of 21    Echo complete with contrast if indicated    Narrative  [address] [place] [address]  [phone]    Transthoracic Echocardiogram  2D, M-mode, Doppler, and Color Doppler    Study date:  22-May-2021    Patient: Amrit Garcia  MR number: [id number]  Account number: [de-identified]  : 1955  Age: 77 years  Gender: Female  Status: Inpatient  Location: Bedside  Height: 65 in  Weight: 209 lb  BP: 147/ 76 mmHg    Indications: Chest Pain    Diagnoses: R07.9 - Chest pain, unspecified    Sonographer:  Bebeto Aiken RDCS  Primary Physician:  Taina Leonard MD  Referring Physician:  WILLIAMS Lorenzo  Group:  [facility]  Interpreting Physician:  Andria Echevarria MD    SUMMARY    LEFT VENTRICLE:  Systolic function was normal by visual assessment. Ejection fraction was estimated in the range of 55 % to 60 %. Although no diagnostic regional wall motion abnormality was identified, this possibility cannot be completely excluded on the basis of this study. Wall thickness was mildly increased. There was mild concentric hypertrophy. Doppler parameters were consistent with abnormal left ventricular relaxation (grade 1 diastolic dysfunction). MITRAL VALVE:  There was mild annular calcification. There was trace regurgitation. HISTORY: PRIOR HISTORY: GERD, CP, COVID 19, Elevated Troponin, Hypertension    PROCEDURE: The procedure was performed at the bedside. This was a routine study. The transthoracic approach was used. The study included complete 2D imaging, M-mode, complete spectral Doppler, and color Doppler. The heart rate was 77 bpm,  at the start of the study. Images were obtained from the parasternal, apical, subcostal, and suprasternal notch acoustic windows. Echocardiographic views were limited due to decreased penetration and lung interference. This was a  technically difficult study. LEFT VENTRICLE: Size was normal. Systolic function was normal by visual assessment. Ejection fraction was estimated in the range of 55 % to 60 %. Although no diagnostic regional wall motion abnormality was identified, this possibility  cannot be completely excluded on the basis of this study. Wall thickness was mildly increased. There was mild concentric hypertrophy. DOPPLER: Doppler parameters were consistent with abnormal left ventricular relaxation (grade 1 diastolic  dysfunction). RIGHT VENTRICLE: The size was normal. Systolic function was normal. Wall thickness was normal.    LEFT ATRIUM: Size was normal.    RIGHT ATRIUM: Size was normal.    MITRAL VALVE: There was mild annular calcification. There was mild thickening. DOPPLER: There was no evidence for stenosis.  There was trace regurgitation. AORTIC VALVE: The valve was trileaflet. Leaflets exhibited normal thickness, normal cuspal separation, and sclerosis. DOPPLER: Transaortic velocity was within the normal range. There was no evidence for stenosis. There was no  regurgitation. TRICUSPID VALVE: The valve structure was normal. There was normal leaflet separation. DOPPLER: The transtricuspid velocity was within the normal range. There was no evidence for stenosis. There was no regurgitation. PULMONIC VALVE: Leaflets exhibited normal thickness, no calcification, and normal cuspal separation. DOPPLER: The transpulmonic velocity was within the normal range. There was no regurgitation. PERICARDIUM: There was no pericardial effusion. The pericardium was normal in appearance. AORTA: The root exhibited normal size. SYSTEMIC VEINS: IVC: The inferior vena cava was normal in size and course. Respirophasic changes were normal.    SYSTEM MEASUREMENT TABLES    2D  %FS: 25.05 %  Ao Diam: 2.8 cm  Ao asc: 2.9 cm  EDV(Teich): 61.72 ml  EF(Teich): 50.26 %  ESV(Teich): 30.7 ml  IVSd: 1.21 cm  LA Area: 20.4 cm2  LA Diam: 3.52 cm  LVEDV MOD A4C: 39.76 ml  LVEF MOD A4C: 64.34 %  LVESV MOD A4C: 14.18 ml  LVIDd: 3.79 cm  LVIDs: 2.84 cm  LVLd A4C: 6.65 cm  LVLs A4C: 5.76 cm  LVPWd: 1.23 cm  RA Area: 11.01 cm2  RVIDd: 2.84 cm  SV MOD A4C: 25.58 ml  SV(Teich): 31.02 ml    MM  TAPSE: 2.74 cm    PW  E' Sept: 0.08 m/s  E/E' Sept: 10.92  MV A Jagjit: 0.91 m/s  MV Dec Grant: 3.87 m/s2  MV DecT: 219.75 ms  MV E Jagjit: 0.85 m/s  MV E/A Ratio: 0.93  MV PHT: 63.73 ms  MVA By PHT: 3.45 cm2    [employer] Accredited Echocardiography Laboratory    Prepared and electronically signed by    Andria Echevarria MD  Signed [id number] 09:33:14    No results found for this or any previous visit.     Results for orders placed during the hospital encounter of 05/21/21    Cardiac catheterization    Narrative  [address], [address] [place] [address] [address] [address]  [phone]    [facility]    Invasive Cardiovascular Lab Complete Report    Patient: Marie Alvarez  MR number: [id number]  Account number: [de-identified]  Study date: 2021  Gender: Female  : 1955  Height: 65 in  Weight: 208.1 lb  BSA: 2.01 mï¾²    Allergies: LISINOPRIL, HYDROCODONE-ACETAMINOPHEN, IRBESARTAN-HYDROCHLOROTHIAZIDE    Diagnostic Cardiologist:  Jeanna Lucas MD  Primary Physician:  Tomeka Shields MD    SUMMARY    CORONARY CIRCULATION:  The coronary circulation is right dominant. Left main: Normal.  LAD: The vessel was small to medium sized. Angiography showed no evidence of disease. Circumflex: The vessel was medium sized. 1st obtuse marginal: The vessel was medium sized. Angiography showed mild atherosclerosis. Ramus intermedius: The vessel was medium to large sized. Angiography showed no evidence of disease. RCA: The vessel was medium to large sized. There was ostial spasm with catheter engagement relieved with intracoronary nitroglycerin. Ostial RCA: There was a 20 % stenosis. PROCEDURES PERFORMED    --  Left coronary angiography. --  Right coronary angiography. --  Inpatient. --  Mod Sedation Same Physician Initial 15min. --  Coronary Catheterization (w/o [facility]). PROCEDURE: The risks and alternatives of the procedures and conscious sedation were explained to the patient and informed consent was obtained. The patient was brought to the cath lab and placed on the table. The planned puncture sites  were prepped and draped in the usual sterile fashion. --  Right radial artery access. After performing an Rasheed's test to verify adequate ulnar artery supply to the hand, the radial site was prepped. The puncture site was infiltrated with local anesthetic. The vessel was accessed using the  modified Seldinger technique, a wire was advanced into the vessel, and a sheath was advanced over the wire into the vessel. --  Left coronary artery angiography.  A catheter was advanced over a guidewire into the aorta and positioned in the left coronary artery ostium under fluoroscopic guidance. Angiography was performed. --  Right coronary artery angiography. A catheter was advanced over a guidewire into the aorta and positioned in the right coronary artery ostium under fluoroscopic guidance. Angiography was performed. --  Inpatient. --  Mod Sedation Same Physician Initial 15min. --  Coronary Catheterization (w/o [facility]). PROCEDURE COMPLETION: The patient tolerated the procedure well and was discharged from the cath lab. TIMING: Test started at 11:15. Test concluded at 11:38. HEMOSTASIS: The sheath was removed. The site was compressed with a Hemoband  device. Hemostasis was obtained. MEDICATIONS GIVEN: Versed (2mg/2ml), 2 mg, IV, at 11:15. Fentanyl (1OOmcg/2 ml), 50 mcg, IV, at 11:15. 1% Lidocaine, 1 ml, subcutaneously, at 11:17. Nitroglycerin (200mcg/ml), 200 mcg, at 11:23. Verapamil  (5mg/2ml), 2.5 mg, IV, at 11:23. Heparin 1000 units/ml, 4,000 units, IV, at 11:23. Nitroglycerine (200mcg/ml), 200 mcg, at 11:31. Nitroglycerine (200mcg/ml), 200 mcg, at 11:32. CONTRAST GIVEN: 65 ml Omnipaque (350mg I /ml). RADIATION  EXPOSURE: Fluoroscopy time: 4.25 min. CORONARY VESSELS:   --  The coronary circulation is right dominant. --  Left main: Normal.  --  LAD: The vessel was small to medium sized. Angiography showed no evidence of disease. --  Circumflex: The vessel was medium sized. --  1st obtuse marginal: The vessel was medium sized. Angiography showed mild atherosclerosis. --  Ramus intermedius: The vessel was medium to large sized. Angiography showed no evidence of disease. --  RCA: The vessel was medium to large sized. There was ostial spasm with catheter engagement relieved with intracoronary nitroglycerin. --  Ostial RCA: There was a 20 % stenosis.     Prepared and signed by    Darling Carty MD  Signed 05/24/2021 11:44:53    Study diagram    Angiographic findings  Native coronary lesions:  ï¾·Ostial RCA: Lesion 1: 20 % stenosis. Hemodynamic tables    Pressures:  Baseline  Pressures:  - HR: 79  Pressures:  - Rhythm:  Pressures:  -- Aortic Pressure (S/D/M): 114/59/83    Outputs:  Baseline  Outputs:  -- CALCULATIONS[age]  Outputs:  -- CALCULATIONS: Body Surface Area: 2.01  Outputs:  -- CALCULATIONS: Height in cm: 165.00  Outputs:  -- CALCULATIONS: Sex: Female  Outputs:  -- CALCULATIONS: Weight in k.60  Outputs:  -- OUTPUTS: O2 consumption: 251.60  Outputs:  -- OUTPUTS: Vo2 Indexed: 125.00    No results found for this or any previous visit.

## 2023-11-25 DIAGNOSIS — K21.9 GERD WITHOUT ESOPHAGITIS: ICD-10-CM

## 2023-11-27 RX ORDER — ESOMEPRAZOLE MAGNESIUM 40 MG/1
40 CAPSULE, DELAYED RELEASE ORAL DAILY
Qty: 90 CAPSULE | Refills: 1 | Status: SHIPPED | OUTPATIENT
Start: 2023-11-27

## 2023-11-28 ENCOUNTER — OFFICE VISIT (OUTPATIENT)
Dept: OBGYN CLINIC | Facility: CLINIC | Age: 68
End: 2023-11-28
Payer: MEDICARE

## 2023-11-28 VITALS
HEIGHT: 65 IN | HEART RATE: 81 BPM | WEIGHT: 217 LBS | DIASTOLIC BLOOD PRESSURE: 86 MMHG | BODY MASS INDEX: 36.15 KG/M2 | SYSTOLIC BLOOD PRESSURE: 146 MMHG

## 2023-11-28 DIAGNOSIS — M75.22 BICEPS TENDONITIS ON LEFT: Primary | ICD-10-CM

## 2023-11-28 PROCEDURE — 99214 OFFICE O/P EST MOD 30 MIN: CPT | Performed by: ORTHOPAEDIC SURGERY

## 2023-11-29 NOTE — PROGRESS NOTES
Assessment/Plan:  1. Biceps tendonitis on left  Ambulatory Referral to Sports Medicine        73-year-old female who is now about 8 months status post rotator cuff repair. Today on exam her rotator cuff feels very strong. All of her symptoms currently seem to be from her biceps tendon. She did have an injection a few months ago which did provide some relief. We discussed continued treatment options. 1 option would be surgical intervention which would be a repeat shoulder arthroscopy, evaluation of her rotator cuff(although this appears to look okay on MRI and she has good strength on exam today) and likely biceps tenotomy. We discussed both tenotomy and tenodesis and she would rather have the tenotomy with an easier recovery. We discussed the risk of tenotomy being Alhaji deformity and cramping with supination. We also discussed continuing conservative management. She does not want go through with surgery at this point. I discussed her case with Dr. Rakan Smith today. We will plan to send her back to him for another repeat ultrasound guided biceps tendon sheath injection. I discussed those today that there is risk having another injection that she may rupture her biceps, however this may actually relieve some of her pain. She is okay with the risks and wishes to follow-up with Dr. Rakan Smith. I will plan to see her 4 weeks after her injection to see how she is doing. All questions were answered today. Subjective: Today visit. Presents for follow-up of the left shoulder today. She still has pain in her biceps only. The meloxicam did help slightly with her pain. Still having difficulty with some activities around the house. Previous HPI 10/10/23:  Marina Boston is a 77 y.o. female who presents today for follow-up of her left shoulder, now about 6 and a half months status post arthroscopic rotator cuff repair performed by Dr. Laci Lombardo.  She did have a repeat MRI of her shoulder on 8/10/23, which radiology noted to show an intact rotator cuff repair but some biceps tendonitis. I am able to view these images today. She did have an US guided steroid injection into biceps tendon sheath by Dr. Tisha Terry about 6 weeks ago, which she notes did help for about a month, however over the last 2 weeks she has started with worsening "jolts" of pain about he anterior upper arm. She notes this occurs when she is using the arm, for ironing or stirring things, but can also occur at rest and at night. She notes fair ROM and strength about the shoulder and notes good sensation of the left upper extremity. She notes this pain is different than her pre-op pain. Review of Systems   Constitutional: Negative. Negative for chills and fever. HENT: Negative. Negative for ear pain and sore throat. Eyes: Negative. Negative for pain and redness. Respiratory: Negative. Negative for shortness of breath and wheezing. Cardiovascular:  Negative for chest pain and palpitations. Gastrointestinal: Negative. Negative for abdominal pain and blood in stool. Endocrine: Negative. Negative for polydipsia and polyuria. Genitourinary: Negative. Negative for difficulty urinating and dysuria. Musculoskeletal:         As noted in HPI   Skin: Negative. Negative for pallor and rash. Neurological: Negative. Negative for dizziness and numbness. Hematological: Negative. Negative for adenopathy. Does not bruise/bleed easily. Psychiatric/Behavioral: Negative. Negative for confusion and suicidal ideas.           Past Medical History:   Diagnosis Date   • Breast lump    • Chest pain 05/28/2019   • COVID-19 04/09/2021   • Disease of thyroid gland     nodule, benign   • Eczema     facial rash- could also be rosacea   • Elevated troponin I level 05/21/2021   • GERD (gastroesophageal reflux disease)    • History of transfusion     at 9years old for bleeding during tonsillectomy, no reaction   • Hypertension    • Nontoxic single thyroid nodule 07/25/2012   • Rectocele    • Rotator cuff tear, left    • Uterine leiomyoma    • Varicose veins of both lower extremities        Past Surgical History:   Procedure Laterality Date   • BLADDER SURGERY  08/2012    Bladder "lift"   • BREAST LUMPECTOMY Right 2000    benign   • COLONOSCOPY     • HYSTERECTOMY  1990   • JOINT REPLACEMENT  6/2017 and 5/2022    Knees   • KNEE SURGERY Right     arthroscopy x2   • NOSE SURGERY      deviated septum   • UT LARYNGOSCOPY W/BIOPSY MICROSCOPE/TELESCOPE Left 04/02/2018    Procedure: MICROLARYNGOSCOPY AND EXCISION OF LEFT VOCAL FOLD POLYP;  Surgeon: Halina Simmons MD;  Location: [facility] OR;  Service: ENT   • UT SURGICAL ARTHROSCOPY SHOULDER W/ROTATOR CUFF RPR Left 3/28/2023    Procedure: Shoulder Arthroscopy with Rotator Cuff Repair and Subacromial decompression;  Surgeon: Nicole Kirby MD;  Location: [facility];  Service: Orthopedics   • UT XCAPSL CTRC RMVL INSJ IO LENS PROSTH W/O ECP Left 11/28/2022    Procedure: EXTRACTION EXTRACAPSULAR CATARACT PHACO INTRAOCULAR LENS (IOL);   Surgeon: Jero Patel MD;  Location: [facility] MAIN OR;  Service: Ophthalmology   • TENDON REPAIR Right     achilles tendon   • TONSILLECTOMY     • TUBAL LIGATION     • VARICOSE VEIN SURGERY Left 12/2009       Family History   Problem Relation Age of Onset   • Breast cancer Mother    • Hypertension Mother    • Varicose Veins Mother         of lower extremities   • Glaucoma Mother         I also have glaucoma   • Hypertension Father    • Coronary artery disease Father    • Coronary artery disease Sister    • Heart disease Sister    • Varicose Veins Sister         of lower extremities   • No Known Problems Sister    • No Known Problems Sister    • Hypertension Brother    • No Known Problems Daughter    • Diabetes Maternal Grandmother         Also nephew William Apodaca   • Breast cancer Maternal Aunt    • No Known Problems Maternal Aunt        Social History     Occupational History • Not on file   Tobacco Use   • Smoking status: Former     Packs/day: 0.50     Types: Cigarettes     Quit date: 1976     Years since quittin.9   • Smokeless tobacco: Never   • Tobacco comments:     None   Vaping Use   • Vaping Use: Never used   Substance and Sexual Activity   • Alcohol use: Yes     Comment: seldom   • Drug use: Never   • Sexual activity: Not Currently     Partners: Male     Birth control/protection: Surgical, Female Sterilization         Current Outpatient Medications:   •  amLODIPine (NORVASC) 5 mg tablet, Take 1 tablet (5 mg total) by mouth daily, Disp: 90 tablet, Rfl: 3  •  aspirin 81 mg chewable tablet, Chew 1 tablet (81 mg total) daily (Patient taking differently: Chew 81 mg every morning), Disp: 30 tablet, Rfl: 0  •  BIOTIN PO, Take by mouth in the morning, Disp: , Rfl:   •  cholecalciferol (VITAMIN D3) 1,000 units tablet, Take 1,000 Units by mouth daily, Disp: , Rfl:   •  esomeprazole (NexIUM) 40 MG capsule, TAKE 1 CAPSULE EVERY DAY, Disp: 90 capsule, Rfl: 1  •  Ting, Zingiber officinalis, (TING ROOT PO), Take by mouth in the morning, Disp: , Rfl:   •  hydrochlorothiazide (HYDRODIURIL) 25 mg tablet, Take 1 tablet (25 mg total) by mouth daily, Disp: 90 tablet, Rfl: 3  •  latanoprost (XALATAN) 0.005 % ophthalmic solution, Administer to both eyes daily at bedtime, Disp: , Rfl:   •  Magnesium 400 MG CAPS, Take 2 capsules (800 mg total) by mouth in the morning (Patient taking differently: Take 1 capsule by mouth in the morning), Disp: , Rfl:   •  metoprolol succinate (TOPROL-XL) 50 mg 24 hr tablet, Take 1 tablet (50 mg total) by mouth daily at bedtime, Disp: 90 tablet, Rfl: 3  •  multivitamin (THERAGRAN) TABS, Take 1 tablet by mouth daily, Disp: , Rfl:   •  olmesartan (BENICAR) 40 mg tablet, Take 1 tablet (40 mg total) by mouth daily, Disp: 90 tablet, Rfl: 3  •  Sulfacetamide Sodium (Sodium Sulfacetamide Wash) 10 % LIQD, Apply twice daily to affected area on face, Disp: 177 mL, Rfl: 2  •  vitamin B-12 (CYANOCOBALAMIN) 2500 MCG TABS, Take 500 mcg by mouth daily, Disp: , Rfl:   •  acetaminophen (TYLENOL) 325 mg tablet, Take 650 mg by mouth every 6 (six) hours as needed for mild pain (Patient not taking: Reported on 10/10/2023), Disp: , Rfl:   •  meloxicam (Mobic) 15 mg tablet, Take 1 tablet (15 mg total) by mouth daily, Disp: 30 tablet, Rfl: 0    Current Facility-Administered Medications:   •  Inclisiran Sodium (LEQVIO) subcutaneous injection 284 mg, 284 mg, Subcutaneous, Once, Karyle Sell, MD    Allergies   Allergen Reactions   • Lisinopril Other (See Comments)     Other reaction(s): swollen legs   • Hydrocodone-Acetaminophen Dizziness   • Irbesartan-Hydrochlorothiazide Other (See Comments)      Hypertension  Pt had hypertension, arm heaviness and headache   • Pollen Extract Hives     Ragweed etc.    • Atorvastatin Myalgia       Objective:  Vitals:    11/28/23 1338   BP: 146/86   Pulse: 81     Pain Score:   7      Right Shoulder Exam     Range of Motion   External rotation:  60   Forward flexion:  170   Internal rotation 0 degrees:  T12       Left Shoulder Exam     Tenderness   The patient is experiencing tenderness in the biceps tendon. Range of Motion   External rotation:  50   Forward flexion:  160   Internal rotation 0 degrees:  L5     Muscle Strength   Abduction: 5/5   Internal rotation: 5/5   External rotation: 5/5   Supraspinatus: 5/5   Subscapularis: 5/5   Biceps: 4/5     Tests   Drop arm: negative    Other   Erythema: absent  Sensation: normal  Pulse: present     Comments:  Positive speeds test.             Physical Exam  Constitutional:       General: She is not in acute distress. Appearance: She is well-developed. HENT:      Head: Normocephalic and atraumatic. Eyes:      General: No scleral icterus. Conjunctiva/sclera: Conjunctivae normal.   Neck:      Vascular: No JVD. Cardiovascular:      Rate and Rhythm: Normal rate.    Pulmonary:      Effort: Pulmonary effort is normal. No respiratory distress. Skin:     General: Skin is warm. Neurological:      Mental Status: She is alert and oriented to person, place, and time. Coordination: Coordination normal.       Imaging were reviewed in PACS today with my interpretation as follows: Despite the reading radiologist not appreciating any recurrent rotator cuff tear, I do question a possible small supraspinatus anterior leading edge recurrent tear. Biceps tendonitis. This document was created using speech voice recognition software. Grammatical errors, random word insertions, pronoun errors, and incomplete sentences are an occasional consequence of this system due to software limitations, ambient noise, and hardware issues. Any formal questions or concerns about content, text, or information contained within the body of this dictation should be directly addressed to the provider for clarification.

## 2023-12-12 ENCOUNTER — OFFICE VISIT (OUTPATIENT)
Dept: OBGYN CLINIC | Facility: CLINIC | Age: 68
End: 2023-12-12
Payer: MEDICARE

## 2023-12-12 VITALS
HEIGHT: 65 IN | WEIGHT: 217 LBS | DIASTOLIC BLOOD PRESSURE: 80 MMHG | SYSTOLIC BLOOD PRESSURE: 168 MMHG | HEART RATE: 81 BPM | BODY MASS INDEX: 36.15 KG/M2

## 2023-12-12 DIAGNOSIS — M75.22 BICEPS TENDONITIS ON LEFT: ICD-10-CM

## 2023-12-12 PROCEDURE — 20611 DRAIN/INJ JOINT/BURSA W/US: CPT | Performed by: ORTHOPAEDIC SURGERY

## 2023-12-12 PROCEDURE — 99213 OFFICE O/P EST LOW 20 MIN: CPT | Performed by: ORTHOPAEDIC SURGERY

## 2023-12-12 RX ORDER — LIDOCAINE HYDROCHLORIDE 10 MG/ML
2 INJECTION, SOLUTION INFILTRATION; PERINEURAL
Status: COMPLETED | OUTPATIENT
Start: 2023-12-12 | End: 2023-12-12

## 2023-12-12 RX ORDER — TRIAMCINOLONE ACETONIDE 40 MG/ML
40 INJECTION, SUSPENSION INTRA-ARTICULAR; INTRAMUSCULAR
Status: COMPLETED | OUTPATIENT
Start: 2023-12-12 | End: 2023-12-12

## 2023-12-12 RX ADMIN — TRIAMCINOLONE ACETONIDE 40 MG: 40 INJECTION, SUSPENSION INTRA-ARTICULAR; INTRAMUSCULAR at 14:30

## 2023-12-12 RX ADMIN — LIDOCAINE HYDROCHLORIDE 2 ML: 10 INJECTION, SOLUTION INFILTRATION; PERINEURAL at 14:30

## 2023-12-12 NOTE — PROGRESS NOTES
Assessment/Plan:  1. Biceps tendonitis on left  Ambulatory Referral to Sports Medicine    Large joint arthrocentesis          Manuel Forman tolerated an ultrasound-guided biceps tendon sheath injection today. Hopefully this gives her more relief in the shoulder going forward. I discussed with her that this will likely increase her risk for biceps rupture. If that occurs or if she had feels any increased pain in the anterior aspect of the bicep please follow-up in the office for evaluation. In the event that she would suffer a rupture it may ultimately cure this pain for her but that would not be the plan. She is aware of this risk and consented to proceed with the injection. She did feel some relief after the injection today. Large joint arthrocentesis  Universal Protocol:  Consent given by: patient  Time out: Immediately prior to procedure a "time out" was called to verify the correct patient, procedure, equipment, support staff and site/side marked as required. Site marked: the operative site was marked  Supporting Documentation  Indications: pain   Procedure Details  Location: shoulder - Shoulder joint: Left proximal biceps tendon sheath. Preparation: Patient was prepped and draped in the usual sterile fashion  Needle size: 25 G  Ultrasound guidance: yes  Approach: posterior  Medications administered: 2 mL lidocaine 1 %; 40 mg triamcinolone acetonide 40 mg/mL    Patient tolerance: patient tolerated the procedure well with no immediate complications  Dressing:  Sterile dressing applied            Subjective:   Rakan Murphy is a 77 y.o. female who presents for a left shoulder ultrasound-guided bicep injection. She had a previous injection around 3 months ago. She recently follow-up with Dr. Edis Emanuel and they had a lengthy discussion about repeat injection versus biceps tenodesis. She elected to proceed with a repeat biceps injection to see if this helps more.   She is aware that this may increase the risk of biceps rupture in the future. Review of Systems   Constitutional:  Negative for chills, fever and unexpected weight change. HENT:  Negative for hearing loss, nosebleeds and sore throat. Eyes:  Negative for pain, redness and visual disturbance. Respiratory:  Negative for cough, shortness of breath and wheezing. Cardiovascular:  Negative for chest pain, palpitations and leg swelling. Gastrointestinal:  Negative for abdominal pain, nausea and vomiting. Endocrine: Negative for polydipsia and polyuria. Genitourinary:  Negative for dysuria and hematuria. Musculoskeletal:         See HPI   Skin:  Negative for rash and wound. Neurological:  Negative for dizziness, numbness and headaches. Psychiatric/Behavioral:  Negative for decreased concentration and suicidal ideas. The patient is not nervous/anxious.           Past Medical History:   Diagnosis Date    Breast lump     Chest pain 05/28/2019    COVID-19 04/09/2021    Disease of thyroid gland     nodule, benign    Eczema     facial rash- could also be rosacea    Elevated troponin I level 05/21/2021    GERD (gastroesophageal reflux disease)     History of transfusion     at 9years old for bleeding during tonsillectomy, no reaction    Hypertension     Nontoxic single thyroid nodule 07/25/2012    Rectocele     Rotator cuff tear, left     Uterine leiomyoma     Varicose veins of both lower extremities        Past Surgical History:   Procedure Laterality Date    BLADDER SURGERY  08/2012    Bladder "lift"    BREAST LUMPECTOMY Right 2000    benign    COLONOSCOPY      HYSTERECTOMY  1990    JOINT REPLACEMENT  6/2017 and 5/2022    Knees    KNEE SURGERY Right     arthroscopy x2    NOSE SURGERY      deviated septum    AR LARYNGOSCOPY W/BIOPSY MICROSCOPE/TELESCOPE Left 04/02/2018    Procedure: MICROLARYNGOSCOPY AND EXCISION OF LEFT VOCAL FOLD POLYP;  Surgeon: Brigido Salazar MD;  Location[facility];  Service: ENT    AR SURGICAL ARTHROSCOPY SHOULDER W/ROTATOR CUFF RPR Left 3/28/2023    Procedure: Shoulder Arthroscopy with Rotator Cuff Repair and Subacromial decompression;  Surgeon: Tanya George MD;  Location: [facility];  Service: Orthopedics    WA XCAPSL CTRC RMVL INSJ IO LENS PROSTH W/O ECP Left 2022    Procedure: EXTRACTION EXTRACAPSULAR CATARACT PHACO INTRAOCULAR LENS (IOL);   Surgeon: Arabella Alonzo MD;  Location: [facility] MAIN OR;  Service: Ophthalmology    TENDON REPAIR Right     achilles tendon    TONSILLECTOMY      TUBAL LIGATION      VARICOSE VEIN SURGERY Left 2009       Family History   Problem Relation Age of Onset    Breast cancer Mother     Hypertension Mother     Varicose Veins Mother         of lower extremities    Glaucoma Mother         I also have glaucoma    Hypertension Father     Coronary artery disease Father     Coronary artery disease Sister     Heart disease Sister     Varicose Veins Sister         of lower extremities    No Known Problems Sister     No Known Problems Sister     Hypertension Brother     No Known Problems Daughter     Diabetes Maternal Grandmother         Also nephew William Fenstermacher    Breast cancer Maternal Aunt     No Known Problems Maternal Aunt        Social History     Occupational History    Not on file   Tobacco Use    Smoking status: Former     Packs/day: 0.50     Types: Cigarettes     Quit date: 1976     Years since quittin.9    Smokeless tobacco: Never    Tobacco comments:     None   Vaping Use    Vaping Use: Never used   Substance and Sexual Activity    Alcohol use: Yes     Comment: seldom    Drug use: Never    Sexual activity: Not Currently     Partners: Male     Birth control/protection: Surgical, Female Sterilization         Current Outpatient Medications:     acetaminophen (TYLENOL) 325 mg tablet, Take 650 mg by mouth every 6 (six) hours as needed for mild pain (Patient not taking: Reported on 10/10/2023), Disp: , Rfl:     amLODIPine (NORVASC) 5 mg tablet, Take 1 tablet (5 mg total) by mouth daily, Disp: 90 tablet, Rfl: 3    aspirin 81 mg chewable tablet, Chew 1 tablet (81 mg total) daily (Patient taking differently: Chew 81 mg every morning), Disp: 30 tablet, Rfl: 0    BIOTIN PO, Take by mouth in the morning, Disp: , Rfl:     cholecalciferol (VITAMIN D3) 1,000 units tablet, Take 1,000 Units by mouth daily, Disp: , Rfl:     esomeprazole (NexIUM) 40 MG capsule, TAKE 1 CAPSULE EVERY DAY, Disp: 90 capsule, Rfl: 1    Ting, Zingiber officinalis, (TING ROOT PO), Take by mouth in the morning, Disp: , Rfl:     hydrochlorothiazide (HYDRODIURIL) 25 mg tablet, Take 1 tablet (25 mg total) by mouth daily, Disp: 90 tablet, Rfl: 3    latanoprost (XALATAN) 0.005 % ophthalmic solution, Administer to both eyes daily at bedtime, Disp: , Rfl:     Magnesium 400 MG CAPS, Take 2 capsules (800 mg total) by mouth in the morning (Patient taking differently: Take 1 capsule by mouth in the morning), Disp: , Rfl:     meloxicam (Mobic) 15 mg tablet, Take 1 tablet (15 mg total) by mouth daily, Disp: 30 tablet, Rfl: 0    metoprolol succinate (TOPROL-XL) 50 mg 24 hr tablet, Take 1 tablet (50 mg total) by mouth daily at bedtime, Disp: 90 tablet, Rfl: 3    multivitamin (THERAGRAN) TABS, Take 1 tablet by mouth daily, Disp: , Rfl:     olmesartan (BENICAR) 40 mg tablet, Take 1 tablet (40 mg total) by mouth daily, Disp: 90 tablet, Rfl: 3    Sulfacetamide Sodium (Sodium Sulfacetamide Wash) 10 % LIQD, Apply twice daily to affected area on face, Disp: 177 mL, Rfl: 2    vitamin B-12 (CYANOCOBALAMIN) 2500 MCG TABS, Take 500 mcg by mouth daily, Disp: , Rfl:     Current Facility-Administered Medications:     Inclisiran Sodium (LEQVIO) subcutaneous injection 284 mg, 284 mg, Subcutaneous, Once, Nisha Christopher MD    Allergies   Allergen Reactions    Lisinopril Other (See Comments)     Other reaction(s): swollen legs    Hydrocodone-Acetaminophen Dizziness    Irbesartan-Hydrochlorothiazide Other (See Comments)      Hypertension  Pt had hypertension, arm heaviness and headache    Pollen Extract Hives     Ragweed etc.     Atorvastatin Myalgia       Objective:  Vitals:    12/12/23 1443   BP: 168/80   Pulse: 81            Left Shoulder Exam     Tenderness   The patient is experiencing tenderness in the biceps tendon. Other   Erythema: absent  Sensation: normal  Pulse: present             Physical Exam  Vitals and nursing note reviewed. Constitutional:       Appearance: Normal appearance. She is well-developed. HENT:      Head: Normocephalic and atraumatic. Right Ear: External ear normal.      Left Ear: External ear normal.   Eyes:      General: No scleral icterus. Extraocular Movements: Extraocular movements intact. Conjunctiva/sclera: Conjunctivae normal.   Cardiovascular:      Rate and Rhythm: Normal rate. Pulmonary:      Effort: Pulmonary effort is normal. No respiratory distress. Musculoskeletal:      Cervical back: Normal range of motion and neck supple. Comments: See Ortho exam   Skin:     General: Skin is warm and dry. Neurological:      General: No focal deficit present. Mental Status: She is alert and oriented to person, place, and time. Psychiatric:         Behavior: Behavior normal.           This document was created using speech voice recognition software. Grammatical errors, random word insertions, pronoun errors, and incomplete sentences are an occasional consequence of this system due to software limitations, ambient noise, and hardware issues. Any formal questions or concerns about content, text, or information contained within the body of this dictation should be directly addressed to the provider for clarification.

## 2023-12-19 ENCOUNTER — HOSPITAL ENCOUNTER (OUTPATIENT)
Dept: RADIOLOGY | Facility: HOSPITAL | Age: 68
Discharge: HOME | End: 2023-12-19
Payer: MEDICARE

## 2023-12-19 VITALS — BODY MASS INDEX: 36.15 KG/M2 | WEIGHT: 217 LBS | HEIGHT: 65 IN

## 2023-12-19 DIAGNOSIS — Z12.31 ENCOUNTER FOR SCREENING MAMMOGRAM FOR BREAST CANCER: ICD-10-CM

## 2023-12-19 PROCEDURE — 77067 SCR MAMMO BI INCL CAD: CPT

## 2023-12-19 PROCEDURE — 77063 BREAST TOMOSYNTHESIS BI: CPT

## 2023-12-20 ENCOUNTER — RA CDI HCC (OUTPATIENT)
Dept: OTHER | Facility: HOSPITAL | Age: 68
End: 2023-12-20

## 2023-12-22 NOTE — RESULT ENCOUNTER NOTE
Normal mammogram.  Advised monthly self-breast exams.  Repeat in 1 year.    Message sent to patient via [employer] patient portal.

## 2023-12-28 ENCOUNTER — OFFICE VISIT (OUTPATIENT)
Dept: FAMILY MEDICINE CLINIC | Facility: CLINIC | Age: 68
End: 2023-12-28
Payer: MEDICARE

## 2023-12-28 VITALS
OXYGEN SATURATION: 99 % | TEMPERATURE: 97.6 F | BODY MASS INDEX: 36.42 KG/M2 | HEIGHT: 65 IN | SYSTOLIC BLOOD PRESSURE: 146 MMHG | RESPIRATION RATE: 14 BRPM | HEART RATE: 66 BPM | WEIGHT: 218.6 LBS | DIASTOLIC BLOOD PRESSURE: 90 MMHG

## 2023-12-28 DIAGNOSIS — E78.00 PURE HYPERCHOLESTEROLEMIA: ICD-10-CM

## 2023-12-28 DIAGNOSIS — I10 BENIGN ESSENTIAL HYPERTENSION: Primary | ICD-10-CM

## 2023-12-28 DIAGNOSIS — E66.01 CLASS 2 SEVERE OBESITY DUE TO EXCESS CALORIES WITH SERIOUS COMORBIDITY AND BODY MASS INDEX (BMI) OF 36.0 TO 36.9 IN ADULT: ICD-10-CM

## 2023-12-28 DIAGNOSIS — K21.9 GERD WITHOUT ESOPHAGITIS: ICD-10-CM

## 2023-12-28 PROCEDURE — 99214 OFFICE O/P EST MOD 30 MIN: CPT | Performed by: FAMILY MEDICINE

## 2023-12-28 NOTE — PROGRESS NOTES
Assessment/Plan:       Problem List Items Addressed This Visit        Digestive    GERD without esophagitis     Stable on nexium            Cardiovascular and Mediastinum    Benign essential hypertension - Primary     Mildly elevated today in office   Notes she has some white coat htn and home Bps have been ok in the past  Encouraged to monitor home BP readings and report these to me or cardiology            Other    Pure hypercholesterolemia     Continuing on leqvio per Dr Weaver.  Has lab orders available in chart for spring prior to cardiology appointment.  Continue working on healthy diet, try adding in exercise like walking.         Class 2 severe obesity due to excess calories with serious comorbidity and body mass index (BMI) of 36.0 to 36.9 in adult      Discussed working on lower carb diet, adding in exercise as able.                      Most recent labs reviewed       Subjective:     Carrie is a 68 y.o. female here today with chief complaint below:  Chief Complaint   Patient presents with   • Hypertension     F/u   • Earache     Painful, R ear, comes and goes. Unsure when it started     - CC above per clinical staff and reviewed.    HPI:  Pt here for f/u on htn, gerd.  She occasionally checks at home and BP is ok.   Has been eating a lot of salty food over the holidays.  Follows w/ cardiolology    She isn't exercising  Would like to lose weight.  Has a bike and treadmill but not using them.   walks dog- she doesn't due to rotator cuff surgery on the L- worries about dog tugging on the arm.    Feels like she is achey from leqvio.  Had aches from statins.  She is following with Dr Weaver for lipids/htn as well.  Shoulders and hips are sore in the mornings. Takes tylenol, minimal relief.    Pain in the R ear.  Feels a little bettter, but had been swollen.  No fever.   No drainage from the ears.   Tends to have a runny nose.    GERD- doing ok on nexium.  Tried to taper off, but got bad  "heartburn.    Doesn't get flu vaccine.    The following portions of the patient's history were reviewed and updated as appropriate: allergies, current medications, past family history, past medical history, past social history, past surgical history and problem list.    ROS:  Review of Systems       Objective:      /90   Pulse 66   Temp 97.6 °F (36.4 °C) (Temporal)   Resp 14   Ht 5' 5\" (1.651 m)   Wt 99.2 kg (218 lb 9.6 oz)   SpO2 99%   BMI 36.38 kg/m²   BP Readings from Last 3 Encounters:   12/28/23 146/90   12/12/23 168/80   11/28/23 146/86     Wt Readings from Last 3 Encounters:   12/28/23 99.2 kg (218 lb 9.6 oz)   12/19/23 98.4 kg (217 lb)   12/12/23 98.4 kg (217 lb)               Physical Exam:   Physical Exam  Vitals and nursing note reviewed.   Constitutional:       General: She is not in acute distress.     Appearance: Normal appearance. She is well-developed. She is not ill-appearing.   HENT:      Head: Normocephalic and atraumatic.      Right Ear: Tympanic membrane, ear canal and external ear normal.      Left Ear: Tympanic membrane, ear canal and external ear normal.      Nose: Nose normal.   Eyes:      Conjunctiva/sclera: Conjunctivae normal.   Neck:      Vascular: No carotid bruit.   Cardiovascular:      Rate and Rhythm: Normal rate and regular rhythm.      Heart sounds: Normal heart sounds. No murmur heard.  Pulmonary:      Effort: Pulmonary effort is normal. No respiratory distress.      Breath sounds: Normal breath sounds. No wheezing.   Abdominal:      Palpations: Abdomen is soft.      Tenderness: There is no abdominal tenderness. There is no guarding or rebound.   Musculoskeletal:      Cervical back: Neck supple.      Right lower leg: No edema.      Left lower leg: No edema.   Lymphadenopathy:      Cervical: No cervical adenopathy.   Skin:     General: Skin is warm and dry.   Neurological:      Mental Status: She is alert and oriented to person, place, and time.   Psychiatric:         " Mood and Affect: Mood normal.         Behavior: Behavior normal.         BMI Counseling: Body mass index is 36.38 kg/m². The BMI is above normal. Nutrition recommendations include moderation in carbohydrate intake. Rationale for BMI follow-up plan is due to patient being overweight or obese.

## 2023-12-29 NOTE — ASSESSMENT & PLAN NOTE
Mildly elevated today in office   Notes she has some white coat htn and home Bps have been ok in the past  Encouraged to monitor home BP readings and report these to me or cardiology

## 2023-12-31 PROBLEM — E66.01 CLASS 2 SEVERE OBESITY DUE TO EXCESS CALORIES WITH SERIOUS COMORBIDITY AND BODY MASS INDEX (BMI) OF 36.0 TO 36.9 IN ADULT: Status: ACTIVE | Noted: 2018-09-17

## 2023-12-31 PROBLEM — E66.812 CLASS 2 SEVERE OBESITY DUE TO EXCESS CALORIES WITH SERIOUS COMORBIDITY AND BODY MASS INDEX (BMI) OF 36.0 TO 36.9 IN ADULT (HCC): Status: ACTIVE | Noted: 2018-09-17

## 2024-01-01 NOTE — ASSESSMENT & PLAN NOTE
Continuing on leqvio per Dr Weaver.  Has lab orders available in chart for spring prior to cardiology appointment.  Continue working on healthy diet, try adding in exercise like walking.

## 2024-01-23 ENCOUNTER — OFFICE VISIT (OUTPATIENT)
Dept: OBGYN CLINIC | Facility: CLINIC | Age: 69
End: 2024-01-23
Payer: MEDICARE

## 2024-01-23 VITALS
WEIGHT: 218.6 LBS | HEART RATE: 78 BPM | BODY MASS INDEX: 36.42 KG/M2 | HEIGHT: 65 IN | SYSTOLIC BLOOD PRESSURE: 157 MMHG | DIASTOLIC BLOOD PRESSURE: 88 MMHG

## 2024-01-23 DIAGNOSIS — M75.22 BICEPS TENDONITIS ON LEFT: Primary | ICD-10-CM

## 2024-01-23 DIAGNOSIS — Z98.890 S/P LEFT ROTATOR CUFF REPAIR: ICD-10-CM

## 2024-01-23 PROCEDURE — 99213 OFFICE O/P EST LOW 20 MIN: CPT | Performed by: ORTHOPAEDIC SURGERY

## 2024-01-23 NOTE — PROGRESS NOTES
Assessment/Plan:  1. Biceps tendonitis on left        2. S/P left rotator cuff repair          Scribe Attestation    I,:  Lulu Mooreandres am acting as a scribe while in the presence of the attending physician.:       I,:  Marbin Arevalo MD personally performed the services described in this documentation    as scribed in my presence.:             Carrie is a pleasant 68 year-old female who presents for follow-up evaluation of the left shoulder. I am pleased she experienced relief from the injection with Dr. Soni. She is not interested in proceeding with biceps tenodesis/tenotomy. In the meantime, she has elected to avoid activities that aggravate the biceps. If the shoulder causes her any other issues, she may return to the office for follow-up evaluation of the shoulder.    Subjective:   Carrie Sow is a 68 y.o. female who presents for follow-up evaluation of left-sided biceps tendinitis. She saw Dr. Soni for an US guided injection of the biceps tendon sheath on 12/12/2023. She has experienced relief from the injection. She notices pain with repetitive movements such as vacuuming and washing her hair. She reports her pain is tolerable.        Review of Systems   Constitutional:  Positive for activity change. Negative for chills and fever.   HENT:  Negative for ear pain and sore throat.    Eyes:  Negative for pain and visual disturbance.   Respiratory:  Negative for cough and shortness of breath.    Cardiovascular:  Negative for chest pain and palpitations.   Gastrointestinal:  Negative for abdominal pain and vomiting.   Genitourinary:  Negative for dysuria and hematuria.   Musculoskeletal:  Positive for myalgias. Negative for arthralgias and back pain.   Skin:  Negative for color change and rash.   Neurological:  Negative for seizures and syncope.   All other systems reviewed and are negative.        Past Medical History:   Diagnosis Date   • Breast lump    • Chest pain 05/28/2019   • COVID-19 04/09/2021   •  "Disease of thyroid gland     nodule, benign   • Eczema     facial rash- could also be rosacea   • Elevated troponin I level 05/21/2021   • GERD (gastroesophageal reflux disease)    • History of transfusion     at 3 years old for bleeding during tonsillectomy, no reaction   • Hypertension    • Nontoxic single thyroid nodule 07/25/2012   • Rectocele    • Rotator cuff tear, left    • Uterine leiomyoma    • Varicose veins of both lower extremities        Past Surgical History:   Procedure Laterality Date   • BLADDER SURGERY  08/2012    Bladder \"lift\"   • BREAST BIOPSY Right 2000   • BREAST LUMPECTOMY Right 2000    benign   • COLONOSCOPY     • HYSTERECTOMY  1990   • JOINT REPLACEMENT  6/2017 and 5/2022    Knees   • KNEE SURGERY Right     arthroscopy x2   • NOSE SURGERY      deviated septum   • VT LARYNGOSCOPY W/BIOPSY MICROSCOPE/TELESCOPE Left 04/02/2018    Procedure: MICROLARYNGOSCOPY AND EXCISION OF LEFT VOCAL FOLD POLYP;  Surgeon: Ochoa Cherry MD;  Location: [facility] OR;  Service: ENT   • VT SURGICAL ARTHROSCOPY SHOULDER W/ROTATOR CUFF RPR Left 03/28/2023    Procedure: Shoulder Arthroscopy with Rotator Cuff Repair and Subacromial decompression;  Surgeon: Suman Kessler MD;  Location: [facility] MAIN OR;  Service: Orthopedics   • VT XCAPSL CTRC RMVL INSJ IO LENS PROSTH W/O ECP Left 11/28/2022    Procedure: EXTRACTION EXTRACAPSULAR CATARACT PHACO INTRAOCULAR LENS (IOL);  Surgeon: Duc Nash MD;  Location: [facility] MAIN OR;  Service: Ophthalmology   • TENDON REPAIR Right     achilles tendon   • TONSILLECTOMY     • TUBAL LIGATION     • VARICOSE VEIN SURGERY Left 12/2009       Family History   Problem Relation Age of Onset   • Breast cancer Mother 66   • Hypertension Mother    • Varicose Veins Mother         of lower extremities   • Glaucoma Mother         I also have glaucoma   • Hypertension Father    • Coronary artery disease Father    • Coronary artery disease Sister    • Heart disease Sister    • Varicose Veins " Sister         of lower extremities   • No Known Problems Sister    • No Known Problems Sister    • Hypertension Brother    • No Known Problems Daughter    • Diabetes Maternal Grandmother         Also nephew William Dumontacher   • Breast cancer Maternal Aunt 65   • No Known Problems Maternal Aunt    • Thyroid disease Sister        Social History     Occupational History   • Not on file   Tobacco Use   • Smoking status: Former     Current packs/day: 0.00     Types: Cigarettes     Quit date: 1976     Years since quittin.0   • Smokeless tobacco: Never   • Tobacco comments:     None   Vaping Use   • Vaping status: Never Used   Substance and Sexual Activity   • Alcohol use: Yes     Comment: seldom   • Drug use: Never   • Sexual activity: Not Currently     Partners: Male     Birth control/protection: Female Sterilization         Current Outpatient Medications:   •  acetaminophen (TYLENOL) 325 mg tablet, Take 650 mg by mouth every 6 (six) hours as needed for mild pain, Disp: , Rfl:   •  amLODIPine (NORVASC) 5 mg tablet, Take 1 tablet (5 mg total) by mouth daily, Disp: 90 tablet, Rfl: 3  •  aspirin 81 mg chewable tablet, Chew 1 tablet (81 mg total) daily, Disp: 30 tablet, Rfl: 0  •  BIOTIN PO, Take by mouth in the morning, Disp: , Rfl:   •  cholecalciferol (VITAMIN D3) 1,000 units tablet, Take 1,000 Units by mouth daily, Disp: , Rfl:   •  esomeprazole (NexIUM) 40 MG capsule, TAKE 1 CAPSULE EVERY DAY, Disp: 90 capsule, Rfl: 1  •  Ginger, Zingiber officinalis, (GINGER ROOT PO), Take by mouth in the morning, Disp: , Rfl:   •  hydrochlorothiazide (HYDRODIURIL) 25 mg tablet, Take 1 tablet (25 mg total) by mouth daily, Disp: 90 tablet, Rfl: 3  •  latanoprost (XALATAN) 0.005 % ophthalmic solution, Administer to both eyes daily at bedtime, Disp: , Rfl:   •  Magnesium 400 MG CAPS, Take 2 capsules (800 mg total) by mouth in the morning (Patient taking differently: Take 1 capsule by mouth in the morning), Disp: , Rfl:   •   metoprolol succinate (TOPROL-XL) 50 mg 24 hr tablet, Take 1 tablet (50 mg total) by mouth daily at bedtime, Disp: 90 tablet, Rfl: 3  •  multivitamin (THERAGRAN) TABS, Take 1 tablet by mouth daily, Disp: , Rfl:   •  olmesartan (BENICAR) 40 mg tablet, Take 1 tablet (40 mg total) by mouth daily, Disp: 90 tablet, Rfl: 3  •  Sulfacetamide Sodium (Sodium Sulfacetamide Wash) 10 % LIQD, Apply twice daily to affected area on face, Disp: 177 mL, Rfl: 2  •  vitamin B-12 (CYANOCOBALAMIN) 2500 MCG TABS, Take 500 mcg by mouth daily, Disp: , Rfl:     Current Facility-Administered Medications:   •  Inclisiran Sodium (LEQVIO) subcutaneous injection 284 mg, 284 mg, Subcutaneous, Once, Nick Weaver MD    Allergies   Allergen Reactions   • Hydrocodone-Acetaminophen Dizziness   • Irbesartan-Hydrochlorothiazide Other (See Comments) and Headache     Arm heaviness    • Lisinopril Cough and Edema     swollen legs   • Pollen Extract Hives     Ragweed etc.    • Atorvastatin Myalgia       Objective:  Vitals:    01/23/24 1116   BP: 157/88   Pulse: 78       Left Shoulder Exam     Tenderness   The patient is experiencing tenderness in the biceps tendon.    Range of Motion   External rotation:  50   Forward flexion:  160   Internal rotation 0 degrees:  L5     Muscle Strength   Abduction: 5/5   Internal rotation: 5/5   External rotation: 5/5   Supraspinatus: 5/5   Subscapularis: 5/5   Biceps: 4/5     Tests   Drop arm: negative    Other   Erythema: absent  Sensation: normal  Pulse: present     Comments:  Positive speeds test.             Physical Exam  Vitals and nursing note reviewed.   Constitutional:       Appearance: Normal appearance.   HENT:      Head: Normocephalic and atraumatic.      Right Ear: External ear normal.      Left Ear: External ear normal.      Nose: Nose normal.   Eyes:      General: No scleral icterus.     Extraocular Movements: Extraocular movements intact.      Conjunctiva/sclera: Conjunctivae normal.   Cardiovascular:       Rate and Rhythm: Normal rate.   Pulmonary:      Effort: Pulmonary effort is normal. No respiratory distress.   Musculoskeletal:      Cervical back: Normal range of motion and neck supple.      Comments: See ortho exam   Skin:     General: Skin is warm and dry.   Neurological:      Mental Status: She is alert and oriented to person, place, and time.   Psychiatric:         Mood and Affect: Mood normal.         Behavior: Behavior normal.           This document was created using speech voice recognition software.   Grammatical errors, random word insertions, pronoun errors, and incomplete sentences are an occasional consequence of this system due to software limitations, ambient noise, and hardware issues.   Any formal questions or concerns about content, text, or information contained within the body of this dictation should be directly addressed to the provider for clarification.

## 2024-02-19 ENCOUNTER — TELEPHONE (OUTPATIENT)
Dept: CARDIOLOGY CLINIC | Facility: CLINIC | Age: 69
End: 2024-02-19

## 2024-02-26 ENCOUNTER — CLINICAL SUPPORT (OUTPATIENT)
Dept: CARDIOLOGY CLINIC | Facility: CLINIC | Age: 69
End: 2024-02-26
Payer: MEDICARE

## 2024-02-26 DIAGNOSIS — E78.00 PURE HYPERCHOLESTEROLEMIA: Primary | ICD-10-CM

## 2024-02-26 PROCEDURE — 96372 THER/PROPH/DIAG INJ SC/IM: CPT | Performed by: INTERNAL MEDICINE

## 2024-04-21 DIAGNOSIS — K21.9 GERD WITHOUT ESOPHAGITIS: ICD-10-CM

## 2024-04-21 RX ORDER — ESOMEPRAZOLE MAGNESIUM 40 MG/1
40 CAPSULE, DELAYED RELEASE ORAL DAILY
Qty: 90 CAPSULE | Refills: 1 | Status: SHIPPED | OUTPATIENT
Start: 2024-04-21

## 2024-05-06 ENCOUNTER — APPOINTMENT (OUTPATIENT)
Dept: LAB | Facility: CLINIC | Age: 69
End: 2024-05-06
Payer: MEDICARE

## 2024-05-06 DIAGNOSIS — E78.00 PURE HYPERCHOLESTEROLEMIA: ICD-10-CM

## 2024-05-06 LAB
ALBUMIN SERPL BCP-MCNC: 4.1 G/DL (ref 3.5–5)
ALP SERPL-CCNC: 88 U/L (ref 34–104)
ALT SERPL W P-5'-P-CCNC: 25 U/L (ref 7–52)
ANION GAP SERPL CALCULATED.3IONS-SCNC: 5 MMOL/L (ref 4–13)
AST SERPL W P-5'-P-CCNC: 16 U/L (ref 13–39)
BILIRUB SERPL-MCNC: 0.32 MG/DL (ref 0.2–1)
BUN SERPL-MCNC: 24 MG/DL (ref 5–25)
CALCIUM SERPL-MCNC: 10 MG/DL (ref 8.4–10.2)
CHLORIDE SERPL-SCNC: 103 MMOL/L (ref 96–108)
CHOLEST SERPL-MCNC: 130 MG/DL
CO2 SERPL-SCNC: 32 MMOL/L (ref 21–32)
CREAT SERPL-MCNC: 0.74 MG/DL (ref 0.6–1.3)
GFR SERPL CREATININE-BSD FRML MDRD: 82 ML/MIN/1.73SQ M
GLUCOSE P FAST SERPL-MCNC: 107 MG/DL (ref 65–99)
HDLC SERPL-MCNC: 48 MG/DL
LDLC SERPL CALC-MCNC: 56 MG/DL (ref 0–100)
POTASSIUM SERPL-SCNC: 4.8 MMOL/L (ref 3.5–5.3)
PROT SERPL-MCNC: 7.1 G/DL (ref 6.4–8.4)
SODIUM SERPL-SCNC: 140 MMOL/L (ref 135–147)
TRIGL SERPL-MCNC: 130 MG/DL

## 2024-05-06 PROCEDURE — 80061 LIPID PANEL: CPT

## 2024-05-09 ENCOUNTER — TELEPHONE (OUTPATIENT)
Dept: CARDIOLOGY CLINIC | Facility: CLINIC | Age: 69
End: 2024-05-09

## 2024-05-09 NOTE — TELEPHONE ENCOUNTER
LMOM for callback.  Pt is due for next leqvio injection end of August.  Called to schedule nurse visit.

## 2024-05-29 ENCOUNTER — NURSE TRIAGE (OUTPATIENT)
Age: 69
End: 2024-05-29

## 2024-05-29 DIAGNOSIS — R30.0 DYSURIA: Primary | ICD-10-CM

## 2024-05-29 RX ORDER — CEPHALEXIN 500 MG/1
500 CAPSULE ORAL 2 TIMES DAILY
Qty: 14 CAPSULE | Refills: 0 | Status: SHIPPED | OUTPATIENT
Start: 2024-05-29 | End: 2024-06-05

## 2024-05-29 NOTE — TELEPHONE ENCOUNTER
"Reason for Disposition  • Urinating more frequently than usual (i.e., frequency)    Answer Assessment - Initial Assessment Questions  1. SYMPTOM: \"What's the main symptom you're concerned about?\" (e.g., frequency, incontinence)           Pressure frequency and cloudy urine         2. ONSET: \"When did the    start?\"          3 days ago       3. PAIN: \"Is there any pain?\" If Yes, ask: \"How bad is it?\" (Scale: 1-10; mild, moderate, severe)          Pressure       4. CAUSE: \"What do you think is causing the symptoms?\"          UTI       5. OTHER SYMPTOMS: \"Do you have any other symptoms?\" (e.g., fever, flank pain, blood in urine, pain with urination)       Denies fever, flank pain, N/V, or  blood in the urine.    Protocols used: Urinary Symptoms-ADULT-[place]    "

## 2024-05-29 NOTE — TELEPHONE ENCOUNTER
Patient called in complaining of UTI symptoms. Patient requesting an antibiotic sent in.  Patient states symptoms include frequency , pressure, and cloudy urine.  Denies fever , chills, N/V, flank pain or blood in the urine at this time.  Patient states symptoms started Monday.  I explained protocol is to collect a urine /evaluation in the office.  Patient kindly request I reach out to Dr. Montague to ask for antibiotic to be sent in.       Please review and call the  patient back with antibiotic send in vs appointment.

## 2024-05-29 NOTE — TELEPHONE ENCOUNTER
Will order UA and culture given no acutes available today.  Have her get this done first, and then can start the antibiotic that I have ordered.     She can go to the lab to give urine sample.

## 2024-05-30 ENCOUNTER — TELEPHONE (OUTPATIENT)
Age: 69
End: 2024-05-30

## 2024-05-30 NOTE — TELEPHONE ENCOUNTER
Patient calls in stating she did not see the my chart message yesterday and started taking the antibiotics for UTI prior to getting the urine specimen collected.     Patient took two antibiotic tablets .  Should she still go and get the urine done?     Please call or message the patient back. Patient made aware to watch messages and phone.

## 2024-07-03 ENCOUNTER — OFFICE VISIT (OUTPATIENT)
Dept: FAMILY MEDICINE CLINIC | Facility: CLINIC | Age: 69
End: 2024-07-03
Payer: MEDICARE

## 2024-07-03 VITALS
WEIGHT: 216 LBS | BODY MASS INDEX: 35.99 KG/M2 | OXYGEN SATURATION: 100 % | TEMPERATURE: 96.7 F | SYSTOLIC BLOOD PRESSURE: 168 MMHG | DIASTOLIC BLOOD PRESSURE: 96 MMHG | HEART RATE: 78 BPM | HEIGHT: 65 IN | RESPIRATION RATE: 18 BRPM

## 2024-07-03 DIAGNOSIS — I83.893 SYMPTOMATIC VARICOSE VEINS OF BOTH LOWER EXTREMITIES: ICD-10-CM

## 2024-07-03 DIAGNOSIS — E66.01 CLASS 2 SEVERE OBESITY DUE TO EXCESS CALORIES WITH SERIOUS COMORBIDITY AND BODY MASS INDEX (BMI) OF 35.0 TO 35.9 IN ADULT (HCC): ICD-10-CM

## 2024-07-03 DIAGNOSIS — H92.03 EAR PAIN, BILATERAL: ICD-10-CM

## 2024-07-03 DIAGNOSIS — Z12.31 ENCOUNTER FOR SCREENING MAMMOGRAM FOR BREAST CANCER: ICD-10-CM

## 2024-07-03 DIAGNOSIS — E78.00 PURE HYPERCHOLESTEROLEMIA: ICD-10-CM

## 2024-07-03 DIAGNOSIS — Z78.0 POSTMENOPAUSE: ICD-10-CM

## 2024-07-03 DIAGNOSIS — Z00.00 MEDICARE ANNUAL WELLNESS VISIT, SUBSEQUENT: Primary | ICD-10-CM

## 2024-07-03 DIAGNOSIS — R32 URINARY INCONTINENCE, UNSPECIFIED TYPE: ICD-10-CM

## 2024-07-03 DIAGNOSIS — R73.01 IMPAIRED FASTING GLUCOSE: ICD-10-CM

## 2024-07-03 DIAGNOSIS — I10 BENIGN ESSENTIAL HYPERTENSION: ICD-10-CM

## 2024-07-03 PROCEDURE — 99214 OFFICE O/P EST MOD 30 MIN: CPT | Performed by: FAMILY MEDICINE

## 2024-07-03 PROCEDURE — G0439 PPPS, SUBSEQ VISIT: HCPCS | Performed by: FAMILY MEDICINE

## 2024-07-03 RX ORDER — LANOLIN ALCOHOL/MO/W.PET/CERES
1000 CREAM (GRAM) TOPICAL DAILY
COMMUNITY

## 2024-07-03 NOTE — PROGRESS NOTES
Ambulatory Visit  Name: Carrie Sow      : 1955      MRN: [id number]  Encounter Provider: Jessica Dunaway MD  Encounter Date: 7/3/2024   Encounter department: [facility]    Assessment & Plan   1. Medicare annual wellness visit, subsequent  2. Postmenopause  -     DXA bone density spine hip and pelvis; Future; Expected date: 2024  3. Class 2 severe obesity due to excess calories with serious comorbidity and body mass index (BMI) of 35.0 to 35.9 in adult (HCC)  Assessment & Plan:  Continue to work on healthy diet and exercise.  4. Pure hypercholesterolemia  Assessment & Plan:  Following with cardiology.  Cholesterol has been controlled.  Continue working on healthy diet/exercise.  Orders:  -     Comprehensive metabolic panel; Future; Expected date: 2024  -     Lipid Panel with Direct LDL reflex; Future; Expected date: 2024  5. Urinary incontinence, unspecified type  Comments:  Refer to urology/urogyn.  Has had previous surgery.  Orders:  -     Ambulatory Referral to Urogynecology; Future  -     Ambulatory Referral to Urogynecology; Future  -     Ambulatory referral to Urology; Future  6. Impaired fasting glucose  Comments:  Check A1c with next labs and avoid excess carbs  Orders:  -     Hemoglobin A1C; Future; Expected date: 2024  7. Benign essential hypertension  Assessment & Plan:  Elevated today.  Typically better at home.  Continuing cardiology follow-up.  Check home readings and bring in home machine to correlate.  Orders:  -     CBC and differential; Future; Expected date: 2024  8. Encounter for screening mammogram for breast cancer  -     Mammo screening bilateral w 3d & cad; Future; Expected date: 2024  9. Symptomatic varicose veins of both lower extremities  Comments:  Encourage compression stockings.  Referral to vascular.  Orders:  -     Ambulatory Referral to Vascular Surgery; Future  10. Ear pain, bilateral  Comments:  No evidence  of infection.  She will try Flonase to help with the pressure sensation from effusion.  Call if not improving.       Preventive health issues were discussed with patient, and age appropriate screening tests were ordered as noted in patient's After Visit Summary. Personalized health advice and appropriate referrals for health education or preventive services given if needed, as noted in patient's After Visit Summary.    History of Present Illness     HPI     Htn- checks occ at home and sometimes it is low at home, sometimes normal.  Occ gets into the 130s/70s.      Urinary frequency- wakes x 3 at night.  Incontinence, wears pad daily. Sometimes she will leak urine if she gets up quickly when her bladder is fully.   She had surgery for this in the past.    Has a sleep study appointment - ordered by cardiology.    Prev took leqvio but it caused bone/muscle pain.  Still feels achey.      Patient Care Team:  Jessica Dunaway MD as PCP - General (Family Medicine)  CECI Rodarte MD Christine Block, MD (Family Medicine)    Review of Systems  Medical History Reviewed by provider this encounter:  Tobacco  Allergies  Meds  Problems  Med Hx  Surg Hx  Fam Hx       Annual Wellness Visit Questionnaire   Carrie is here for her Subsequent Wellness visit.     Health Risk Assessment:   Patient rates overall health as good. Patient feels that their physical health rating is same. Patient is satisfied with their life. Eyesight was rated as same. Hearing was rated as same. Patient feels that their emotional and mental health rating is same. Patients states they are never, rarely angry. Patient states they are often unusually tired/fatigued. Pain experienced in the last 7 days has been some. Patient's pain rating has been 1/10. Patient states that she has experienced no weight loss or gain in last 6 months.     Depression Screening:   PHQ-2 Score: 0      Fall Risk Screening:   In the past year, patient has  experienced: no history of falling in past year      Urinary Incontinence Screening:   Patient has leaked urine accidently in the last six months.     Home Safety:  Patient does not have trouble with stairs inside or outside of their home. Patient has working smoke alarms and has working carbon monoxide detector. Home safety hazards include: none.     Nutrition:   Current diet is Regular.     Medications:   Patient is currently taking over-the-counter supplements. OTC medications include: see medication list. Patient is able to manage medications.     Activities of Daily Living (ADLs)/Instrumental Activities of Daily Living (IADLs):   Walk and transfer into and out of bed and chair?: Yes  Dress and groom yourself?: Yes    Bathe or shower yourself?: Yes    Feed yourself? Yes  Do your laundry/housekeeping?: Yes  Manage your money, pay your bills and track your expenses?: Yes  Make your own meals?: Yes    Do your own shopping?: Yes    Durable Medical Equipment Suppliers  None    Previous Hospitalizations:   Any hospitalizations or ED visits within the last 12 months?: No      Advance Care Planning:   Living will: Yes    Durable POA for healthcare: Yes    Advanced directive: Yes      Cognitive Screening:   Provider or family/friend/caregiver concerned regarding cognition?: No    PREVENTIVE SCREENINGS      Cardiovascular Screening:    General: Screening Not Indicated and History Lipid Disorder      Diabetes Screening:     General: Screening Current      Colorectal Cancer Screening:     General: Screening Current      Breast Cancer Screening:     General: Screening Current      Cervical Cancer Screening:    General: Screening Not Indicated      Osteoporosis Screening:    General: Screening Current      Lung Cancer Screening:     General: Screening Not Indicated      Hepatitis C Screening:    General: Screening Current    Screening, Brief Intervention, and Referral to Treatment (SBIRT)    Screening  Typical number of  "drinks in a day: 0  Typical number of drinks in a week: 0  Interpretation: Low risk drinking behavior.    AUDIT-C Screenin) How often did you have a drink containing alcohol in the past year? never  2) How many drinks did you have on a typical day when you were drinking in the past year? 0  3) How often did you have 6 or more drinks on one occasion in the past year? never    AUDIT-C Score: 0  Interpretation: Score 0-2 (female): Negative screen for alcohol misuse    Single Item Drug Screening:  How often have you used an illegal drug (including marijuana) or a prescription medication for non-medical reasons in the past year? never    Single Item Drug Screen Score: 0  Interpretation: Negative screen for possible drug use disorder    Social Determinants of Health     Financial Resource Strain: Low Risk  (7/3/2024)    Overall Financial Resource Strain (CARDIA)    • Difficulty of Paying Living Expenses: Not hard at all   Food Insecurity: No Food Insecurity (2024)    Hunger Vital Sign    • Worried About Running Out of Food in the Last Year: Never true    • Ran Out of Food in the Last Year: Never true   Transportation Needs: No Transportation Needs (2024)    PRAPARE - Transportation    • Lack of Transportation (Medical): No    • Lack of Transportation (Non-Medical): No   Housing Stability: Low Risk  (7/3/2024)    Housing Stability Vital Sign    • Unable to Pay for Housing in the Last Year: No    • Number of Times Moved in the Last Year: 1    • Homeless in the Last Year: No   Utilities: Not At Risk (2024)    [facility] Utilities    • Threatened with loss of utilities: No     No results found.    Objective     /96   Pulse 78   Temp (!) 96.7 °F (35.9 °C) (Temporal)   Resp 18   Ht 5' 5\" (1.651 m)   Wt 98 kg (216 lb)   SpO2 100%   BMI 35.94 kg/m²     Physical Exam  Vitals and nursing note reviewed.   Constitutional:       General: She is not in acute distress.     Appearance: Normal appearance. She is " well-developed. She is not ill-appearing.   HENT:      Head: Normocephalic and atraumatic.      Ears:      Comments: Small bilateral effusions, otherwise normal ear exam.  Eyes:      Conjunctiva/sclera: Conjunctivae normal.   Neck:      Vascular: No carotid bruit.   Cardiovascular:      Rate and Rhythm: Normal rate and regular rhythm.      Heart sounds: No murmur heard.     Comments: Prominent varicosities bilateral lower extremities, tenderness left greater than right.  No indurated areas along the varicosities.  Pulmonary:      Effort: Pulmonary effort is normal. No respiratory distress.      Breath sounds: Normal breath sounds.   Abdominal:      Palpations: Abdomen is soft.      Tenderness: There is no abdominal tenderness.   Musculoskeletal:      Cervical back: Neck supple.      Right lower leg: No edema.      Left lower leg: No edema.   Lymphadenopathy:      Cervical: No cervical adenopathy.   Skin:     General: Skin is warm and dry.   Neurological:      Mental Status: She is alert and oriented to person, place, and time.   Psychiatric:         Mood and Affect: Mood normal.         Behavior: Behavior normal.       Administrative Statements

## 2024-07-03 NOTE — PATIENT INSTRUCTIONS
Medicare Preventive Visit Patient Instructions  Thank you for completing your Welcome to Medicare Visit or Medicare Annual Wellness Visit today. Your next wellness visit will be due in one year (7/4/2025).  The screening/preventive services that you may require over the next 5-10 years are detailed below. Some tests may not apply to you based off risk factors and/or age. Screening tests ordered at today's visit but not completed yet may show as past due. Also, please note that scanned in results may not display below.  Preventive Screenings:  Service Recommendations Previous Testing/Comments   Colorectal Cancer Screening  * Colonoscopy    * Fecal Occult Blood Test (FOBT)/Fecal Immunochemical Test (FIT)  * Fecal DNA/Cologuard Test  * Flexible Sigmoidoscopy Age: 45-75 years old   Colonoscopy: every 10 years (may be performed more frequently if at higher risk)  OR  FOBT/FIT: every 1 year  OR  Cologuard: every 3 years  OR  Sigmoidoscopy: every 5 years  Screening may be recommended earlier than age 45 if at higher risk for colorectal cancer. Also, an individualized decision between you and your healthcare provider will decide whether screening between the ages of 76-85 would be appropriate. Colonoscopy: 09/21/2023  FOBT/FIT: Not on file  Cologuard: Not on file  Sigmoidoscopy: Not on file    Screening Current     Breast Cancer Screening Age: 40+ years old  Frequency: every 1-2 years  Not required if history of left and right mastectomy Mammogram: 12/19/2023    Screening Current   Cervical Cancer Screening Between the ages of 21-29, pap smear recommended once every 3 years.   Between the ages of 30-65, can perform pap smear with HPV co-testing every 5 years.   Recommendations may differ for women with a history of total hysterectomy, cervical cancer, or abnormal pap smears in past. Pap Smear: Not on file    Screening Not Indicated   Hepatitis C Screening Once for adults born between 1945 and 1965  More frequently in  patients at high risk for Hepatitis C Hep C Antibody: 03/18/2019    Screening Current   Diabetes Screening 1-2 times per year if you're at risk for diabetes or have pre-diabetes Fasting glucose: 107 mg/dL (5/6/2024)  A1C: 5.4 % (7/17/2023)  Screening Current   Cholesterol Screening Once every 5 years if you don't have a lipid disorder. May order more often based on risk factors. Lipid panel: 05/06/2024    Screening Not Indicated  History Lipid Disorder     Other Preventive Screenings Covered by Medicare:  Abdominal Aortic Aneurysm (AAA) Screening: covered once if your at risk. You're considered to be at risk if you have a family history of AAA.  Lung Cancer Screening: covers low dose CT scan once per year if you meet all of the following conditions: (1) Age 55-77; (2) No signs or symptoms of lung cancer; (3) Current smoker or have quit smoking within the last 15 years; (4) You have a tobacco smoking history of at least 20 pack years (packs per day multiplied by number of years you smoked); (5) You get a written order from a healthcare provider.  Glaucoma Screening: covered annually if you're considered high risk: (1) You have diabetes OR (2) Family history of glaucoma OR (3)  aged 50 and older OR (4)  American aged 65 and older  Osteoporosis Screening: covered every 2 years if you meet one of the following conditions: (1) You're estrogen deficient and at risk for osteoporosis based off medical history and other findings; (2) Have a vertebral abnormality; (3) On glucocorticoid therapy for more than 3 months; (4) Have primary hyperparathyroidism; (5) On osteoporosis medications and need to assess response to drug therapy.   Last bone density test (DXA Scan): 07/14/2022.  HIV Screening: covered annually if you're between the age of 15-65. Also covered annually if you are younger than 15 and older than 65 with risk factors for HIV infection. For pregnant patients, it is covered up to 3 times per  pregnancy.    Immunizations:  Immunization Recommendations   Influenza Vaccine Annual influenza vaccination during flu season is recommended for all persons aged >= 6 months who do not have contraindications   Pneumococcal Vaccine   * Pneumococcal conjugate vaccine = PCV13 (Prevnar 13), PCV15 (Vaxneuvance), PCV20 (Prevnar 20)  * Pneumococcal polysaccharide vaccine = PPSV23 (Pneumovax) Adults 19-65 yo with certain risk factors or if 65+ yo  If never received any pneumonia vaccine: recommend Prevnar 20 (PCV20)  Give PCV20 if previously received 1 dose of PCV13 or PPSV23   Hepatitis B Vaccine 3 dose series if at intermediate or high risk (ex: diabetes, end stage renal disease, liver disease)   Respiratory syncytial virus (RSV) Vaccine - COVERED BY MEDICARE PART D  * RSVPreF3 (Arexvy) CDC recommends that adults 60 years of age and older may receive a single dose of RSV vaccine using shared clinical decision-making (SCDM)   Tetanus (Td) Vaccine - COST NOT COVERED BY MEDICARE PART B Following completion of primary series, a booster dose should be given every 10 years to maintain immunity against tetanus. Td may also be given as tetanus wound prophylaxis.   Tdap Vaccine - COST NOT COVERED BY MEDICARE PART B Recommended at least once for all adults. For pregnant patients, recommended with each pregnancy.   Shingles Vaccine (Shingrix) - COST NOT COVERED BY MEDICARE PART B  2 shot series recommended in those 19 years and older who have or will have weakened immune systems or those 50 years and older     Health Maintenance Due:      Topic Date Due   • DXA SCAN  07/14/2024   • Breast Cancer Screening: Mammogram  12/19/2024   • Colorectal Cancer Screening  09/19/2028   • Hepatitis C Screening  Completed     Immunizations Due:      Topic Date Due   • COVID-19 Vaccine (3 - 2023-24 season) 09/01/2023   • Influenza Vaccine (1) 09/01/2024     Advance Directives   What are advance directives?  Advance directives are legal documents  that state your wishes and plans for medical care. These plans are made ahead of time in case you lose your ability to make decisions for yourself. Advance directives can apply to any medical decision, such as the treatments you want, and if you want to donate organs.   What are the types of advance directives?  There are many types of advance directives, and each state has rules about how to use them. You may choose a combination of any of the following:  Living will:  This is a written record of the treatment you want. You can also choose which treatments you do not want, which to limit, and which to stop at a certain time. This includes surgery, medicine, IV fluid, and tube feedings.   Durable power of  for healthcare (DPAHC):  This is a written record that states who you want to make healthcare choices for you when you are unable to make them for yourself. This person, called a proxy, is usually a family member or a friend. You may choose more than 1 proxy.  Do not resuscitate (DNR) order:  A DNR order is used in case your heart stops beating or you stop breathing. It is a request not to have certain forms of treatment, such as CPR. A DNR order may be included in other types of advance directives.  Medical directive:  This covers the care that you want if you are in a coma, near death, or unable to make decisions for yourself. You can list the treatments you want for each condition. Treatment may include pain medicine, surgery, blood transfusions, dialysis, IV or tube feedings, and a ventilator (breathing machine).  Values history:  This document has questions about your views, beliefs, and how you feel and think about life. This information can help others choose the care that you would choose.  Why are advance directives important?  An advance directive helps you control your care. Although spoken wishes may be used, it is better to have your wishes written down. Spoken wishes can be misunderstood, or  not followed. Treatments may be given even if you do not want them. An advance directive may make it easier for your family to make difficult choices about your care.   Urinary Incontinence   Urinary incontinence (UI)  is when you lose control of your bladder. UI develops because your bladder cannot store or empty urine properly. The 3 most common types of UI are stress incontinence, urge incontinence, or both.  Medicines:   May be given to help strengthen your bladder control. Report any side effects of medication to your healthcare provider.  Do pelvic muscle exercises often:  Your pelvic muscles help you stop urinating. Squeeze these muscles tight for 5 seconds, then relax for 5 seconds. Gradually work up to squeezing for 10 seconds. Do 3 sets of 15 repetitions a day, or as directed. This will help strengthen your pelvic muscles and improve bladder control.  Train your bladder:  Go to the bathroom at set times, such as every 2 hours, even if you do not feel the urge to go. You can also try to hold your urine when you feel the urge to go. For example, hold your urine for 5 minutes when you feel the urge to go. As that becomes easier, hold your urine for 10 minutes.   Self-care:   Keep a UI record.  Write down how often you leak urine and how much you leak. Make a note of what you were doing when you leaked urine.  Drink liquids as directed. You may need to limit the amount of liquid you drink to help control your urine leakage. Do not drink any liquid right before you go to bed. Limit or do not have drinks that contain caffeine or alcohol.   Prevent constipation.  Eat a variety of high-fiber foods. Good examples are high-fiber cereals, beans, vegetables, and whole-grain breads. Walking is the best way to trigger your intestines to have a bowel movement.  Exercise regularly and maintain a healthy weight.  Weight loss and exercise will decrease pressure on your bladder and help you control your leakage.   Use a  catheter as directed  to help empty your bladder. A catheter is a tiny, plastic tube that is put into your bladder to drain your urine.   Go to behavior therapy as directed.  Behavior therapy may be used to help you learn to control your urge to urinate.    Weight Management   Why it is important to manage your weight:  Being overweight increases your risk of health conditions such as heart disease, high blood pressure, type 2 diabetes, and certain types of cancer. It can also increase your risk for osteoarthritis, sleep apnea, and other respiratory problems. Aim for a slow, steady weight loss. Even a small amount of weight loss can lower your risk of health problems.  How to lose weight safely:  A safe and healthy way to lose weight is to eat fewer calories and get regular exercise. You can lose up about 1 pound a week by decreasing the number of calories you eat by 500 calories each day.   Healthy meal plan for weight management:  A healthy meal plan includes a variety of foods, contains fewer calories, and helps you stay healthy. A healthy meal plan includes the following:  Eat whole-grain foods more often.  A healthy meal plan should contain fiber. Fiber is the part of grains, fruits, and vegetables that is not broken down by your body. Whole-grain foods are healthy and provide extra fiber in your diet. Some examples of whole-grain foods are whole-wheat breads and pastas, oatmeal, brown rice, and bulgur.  Eat a variety of vegetables every day.  Include dark, leafy greens such as spinach, kale, belkis greens, and mustard greens. Eat yellow and orange vegetables such as carrots, sweet potatoes, and winter squash.   Eat a variety of fruits every day.  Choose fresh or canned fruit (canned in its own juice or light syrup) instead of juice. Fruit juice has very little or no fiber.  Eat low-fat dairy foods.  Drink fat-free (skim) milk or 1% milk. Eat fat-free yogurt and low-fat cottage cheese. Try low-fat cheeses such  as mozzarella and other reduced-fat cheeses.  Choose meat and other protein foods that are low in fat.  Choose beans or other legumes such as split peas or lentils. Choose fish, skinless poultry (chicken or turkey), or lean cuts of red meat (beef or pork). Before you cook meat or poultry, cut off any visible fat.   Use less fat and oil.  Try baking foods instead of frying them. Add less fat, such as margarine, sour cream, regular salad dressing and mayonnaise to foods. Eat fewer high-fat foods. Some examples of high-fat foods include french fries, doughnuts, ice cream, and cakes.  Eat fewer sweets.  Limit foods and drinks that are high in sugar. This includes candy, cookies, regular soda, and sweetened drinks.  Exercise:  Exercise at least 30 minutes per day on most days of the week. Some examples of exercise include walking, biking, dancing, and swimming. You can also fit in more physical activity by taking the stairs instead of the elevator or parking farther away from stores. Ask your healthcare provider about the best exercise plan for you.      © Copyright [employer] 2018 Information is for End User's use only and may not be sold, redistributed or otherwise used for commercial purposes. All illustrations and images included in CareNotes® are the copyrighted property of [employer]M., [employer] or [employer]

## 2024-07-05 NOTE — ASSESSMENT & PLAN NOTE
Continue to work on healthy diet and exercise.  
Elevated today.  Typically better at home.  Continuing cardiology follow-up.  Check home readings and bring in home machine to correlate.  
Following with cardiology.  Cholesterol has been controlled.  Continue working on healthy diet/exercise.  
cigarettes

## 2024-07-10 ENCOUNTER — TELEPHONE (OUTPATIENT)
Age: 69
End: 2024-07-10

## 2024-07-10 NOTE — TELEPHONE ENCOUNTER
New Patient    What is the reason for the patient’s appointment?: NP- ref for Urinary incontinence, unspecified type     Patient states this issue has been going on for years but has recently gotten worse.     What office location does the patient prefer?: [place]    Have patient records been requested?:  If No, are the records showing in Epic: Records in epic     HISTORY:   Has the patient had any previous Urologist(s)?: many year ago at [facility] unsure of full name remembers it was      Patient is now scheduled on 9/3/24 with Elisha in our [place] office. Patient is also placed on wait list.

## 2024-08-14 ENCOUNTER — APPOINTMENT (OUTPATIENT)
Dept: RADIOLOGY | Facility: CLINIC | Age: 69
End: 2024-08-14
Payer: MEDICARE

## 2024-08-14 ENCOUNTER — OFFICE VISIT (OUTPATIENT)
Dept: OBGYN CLINIC | Facility: CLINIC | Age: 69
End: 2024-08-14
Payer: MEDICARE

## 2024-08-14 VITALS
WEIGHT: 216 LBS | HEIGHT: 65 IN | HEART RATE: 76 BPM | SYSTOLIC BLOOD PRESSURE: 126 MMHG | DIASTOLIC BLOOD PRESSURE: 74 MMHG | BODY MASS INDEX: 35.99 KG/M2

## 2024-08-14 DIAGNOSIS — M75.22 BICEPS TENDONITIS ON LEFT: Primary | ICD-10-CM

## 2024-08-14 DIAGNOSIS — M75.22 BICEPS TENDONITIS ON LEFT: ICD-10-CM

## 2024-08-14 PROCEDURE — 73030 X-RAY EXAM OF SHOULDER: CPT

## 2024-08-14 PROCEDURE — 99213 OFFICE O/P EST LOW 20 MIN: CPT | Performed by: ORTHOPAEDIC SURGERY

## 2024-08-14 NOTE — PROGRESS NOTES
Assessment/Plan:  1. Biceps tendonitis on left  XR shoulder 2+ vw left    MRI shoulder left wo contrast        The patient has recurrent shoulder pain and does have weakness on examination today. We discussed biceps tenodesis vs tenotomy. She thinks she may be interested in this, however I want to ensure her RTC has no tear, given her weakness and history of prior RTC tear and repair. I am ordering an MRI to rule this out. She will FU after her MRI to discuss the results and surgical intervention.    Subjective:   Carrie Sow is a 69 y.o. female who presents today for follow-up of her left shoulder. She had a RTC repair by another surgery 16 months ago. As he's was having increased pain MRI of the shoulder was done about 1 year ago, which showed no recurrent RTC tear, but did show biceps tendonitis. She had a biceps tendon sheath corticosteroid injection by Dr. Soni back in August and December. She responded well to this and had been doing quite well up until recently. She denies any new injury to the shoulder. She notes mostly anterior shoulder pain, which is worse with activity and better with rest. She notes fair ROM but does complain of some weakness. She notes good sensation of the left upper extremity.       Review of Systems   Constitutional: Negative.  Negative for chills and fever.   HENT: Negative.  Negative for ear pain and sore throat.    Eyes: Negative.  Negative for pain and redness.   Respiratory: Negative.  Negative for shortness of breath and wheezing.    Cardiovascular:  Negative for chest pain and palpitations.   Gastrointestinal: Negative.  Negative for abdominal pain and blood in stool.   Endocrine: Negative.  Negative for polydipsia and polyuria.   Genitourinary: Negative.  Negative for difficulty urinating and dysuria.   Musculoskeletal:         As noted in HPI   Skin: Negative.  Negative for pallor and rash.   Neurological: Negative.  Negative for dizziness and numbness.   Hematological:  "Negative.  Negative for adenopathy. Does not bruise/bleed easily.   Psychiatric/Behavioral: Negative.  Negative for confusion and suicidal ideas.          Past Medical History:   Diagnosis Date   • Breast lump    • Chest pain 05/28/2019   • COVID-19 04/09/2021   • Disease of thyroid gland     nodule, benign   • Eczema     facial rash- could also be rosacea   • Elevated troponin I level 05/21/2021   • GERD (gastroesophageal reflux disease)    • History of transfusion     at 3 years old for bleeding during tonsillectomy, no reaction   • Hypertension    • Nontoxic single thyroid nodule 07/25/2012   • Rectocele    • Rotator cuff tear, left    • Uterine leiomyoma    • Varicose veins of both lower extremities        Past Surgical History:   Procedure Laterality Date   • BLADDER SURGERY  08/2012    Bladder \"lift\"   • BREAST BIOPSY Right 2000   • BREAST LUMPECTOMY Right 2000    benign   • COLONOSCOPY     • HYSTERECTOMY  1990   • JOINT REPLACEMENT  6/2017 and 5/2022    Knees   • KNEE SURGERY Right     arthroscopy x2   • NOSE SURGERY      deviated septum   • [place] LARYNGOSCOPY W/BIOPSY MICROSCOPE/TELESCOPE Left 04/02/2018    Procedure: MICROLARYNGOSCOPY AND EXCISION OF LEFT VOCAL FOLD POLYP;  Surgeon: Ochoa Cherry MD;  Location:  Main OR;  Service: ENT   • [place] SURGICAL ARTHROSCOPY SHOULDER W/ROTATOR CUFF RPR Left 03/28/2023    Procedure: Shoulder Arthroscopy with Rotator Cuff Repair and Subacromial decompression;  Surgeon: Suman Kessler MD;  Location: [facility];  Service: Orthopedics   • GA XCAPSL CTRC RMVL INSJ IO LENS PROSTH W/O ECP Left 11/28/2022    Procedure: EXTRACTION EXTRACAPSULAR CATARACT PHACO INTRAOCULAR LENS (IOL);  Surgeon: Duc Nash MD;  Location: [facility] MAIN OR;  Service: Ophthalmology   • TENDON REPAIR Right     achilles tendon   • TONSILLECTOMY     • TUBAL LIGATION     • VARICOSE VEIN SURGERY Left 12/2009       Family History   Problem Relation Age of Onset   • Breast cancer Mother 66   • " Hypertension Mother    • Varicose Veins Mother         of lower extremities   • Glaucoma Mother         I also have glaucoma   • Hypertension Father    • Coronary artery disease Father    • Coronary artery disease Sister    • Heart disease Sister    • Varicose Veins Sister         of lower extremities   • No Known Problems Sister    • No Known Problems Sister    • Hypertension Brother    • No Known Problems Daughter    • Diabetes Maternal Grandmother         Also nephew William Hectorermacher   • Breast cancer Maternal Aunt 65   • No Known Problems Maternal Aunt    • Thyroid disease Sister        Social History     Occupational History   • Not on file   Tobacco Use   • Smoking status: Former     Current packs/day: 0.00     Average packs/day: 0.5 packs/day for 4.0 years (2.0 ttl pk-yrs)     Types: Cigarettes     Start date: 1972     Quit date: 1976     Years since quittin.6   • Smokeless tobacco: Never   Vaping Use   • Vaping status: Never Used   Substance and Sexual Activity   • Alcohol use: Yes     Comment: seldom   • Drug use: Never   • Sexual activity: Not Currently     Partners: Male     Birth control/protection: Female Sterilization         Current Outpatient Medications:   •  acetaminophen (TYLENOL) 325 mg tablet, Take 650 mg by mouth every 6 (six) hours as needed for mild pain, Disp: , Rfl:   •  amLODIPine (NORVASC) 5 mg tablet, Take 1 tablet (5 mg total) by mouth daily, Disp: 90 tablet, Rfl: 3  •  aspirin 81 mg chewable tablet, Chew 1 tablet (81 mg total) daily, Disp: 30 tablet, Rfl: 0  •  BIOTIN PO, Take by mouth in the morning, Disp: , Rfl:   •  cholecalciferol (VITAMIN D3) 1,000 units tablet, Take 1,000 Units by mouth daily, Disp: , Rfl:   •  esomeprazole (NexIUM) 40 MG capsule, TAKE 1 CAPSULE EVERY DAY, Disp: 90 capsule, Rfl: 1  •  Ginger, Zingiber officinalis, (GINGER ROOT PO), Take by mouth in the morning, Disp: , Rfl:   •  hydrochlorothiazide (HYDRODIURIL) 25 mg tablet, Take 1 tablet (25 mg  total) by mouth daily, Disp: 90 tablet, Rfl: 3  •  latanoprost (XALATAN) 0.005 % ophthalmic solution, Administer to both eyes daily at bedtime, Disp: , Rfl:   •  Magnesium 400 MG CAPS, Take 2 capsules (800 mg total) by mouth in the morning (Patient taking differently: Take 1 capsule by mouth in the morning), Disp: , Rfl:   •  metoprolol succinate (TOPROL-XL) 50 mg 24 hr tablet, Take 1 tablet (50 mg total) by mouth daily at bedtime, Disp: 90 tablet, Rfl: 3  •  multivitamin (THERAGRAN) TABS, Take 1 tablet by mouth daily, Disp: , Rfl:   •  olmesartan (BENICAR) 40 mg tablet, Take 1 tablet (40 mg total) by mouth daily, Disp: 90 tablet, Rfl: 3  •  Sulfacetamide Sodium (Sodium Sulfacetamide Wash) 10 % LIQD, Apply twice daily to affected area on face, Disp: 177 mL, Rfl: 2  •  vitamin B-12 (VITAMIN B-12) 1,000 mcg tablet, Take 1,000 mcg by mouth daily, Disp: , Rfl:     Allergies   Allergen Reactions   • Hydrocodone-Acetaminophen Dizziness   • Irbesartan-Hydrochlorothiazide Other (See Comments) and Headache     Arm heaviness    • Lisinopril Cough and Edema     swollen legs   • Pollen Extract Hives     Ragweed etc.    • Atorvastatin Myalgia       Objective:  Vitals:    08/14/24 1316   BP: 126/74   Pulse: 76            Left Shoulder Exam     Tenderness   The patient is experiencing tenderness in the biceps tendon.    Range of Motion   Forward flexion:  160     Muscle Strength   Abduction: 4/5   External rotation: 5/5   Biceps: 4/5     Tests   Drop arm: negative    Other   Erythema: absent  Sensation: normal  Pulse: present     Comments:  Positive empty can test. Positive speeds test            Physical Exam  Constitutional:       General: She is not in acute distress.     Appearance: She is well-developed.   HENT:      Head: Normocephalic and atraumatic.   Eyes:      General: No scleral icterus.     Conjunctiva/sclera: Conjunctivae normal.   Neck:      Vascular: No JVD.   Cardiovascular:      Rate and Rhythm: Normal rate.    Pulmonary:      Effort: Pulmonary effort is normal. No respiratory distress.   Musculoskeletal:      Comments: As per HPI   Skin:     General: Skin is warm.   Neurological:      Mental Status: She is alert and oriented to person, place, and time.      Coordination: Coordination normal.         I have personally reviewed pertinent films in PACS and my interpretation is as follows:  Xrays left shoulder: No acute osseous abnormallity      This document was created using speech voice recognition software.   Grammatical errors, random word insertions, pronoun errors, and incomplete sentences are an occasional consequence of this system due to software limitations, ambient noise, and hardware issues.   Any formal questions or concerns about content, text, or information contained within the body of this dictation should be directly addressed to the provider for clarification.

## 2024-08-21 ENCOUNTER — HOSPITAL ENCOUNTER (OUTPATIENT)
Dept: RADIOLOGY | Facility: HOSPITAL | Age: 69
Discharge: HOME | End: 2024-08-21
Attending: ORTHOPAEDIC SURGERY
Payer: MEDICARE

## 2024-08-21 DIAGNOSIS — M75.22 BICEPS TENDONITIS ON LEFT: ICD-10-CM

## 2024-08-21 PROCEDURE — 73221 MRI JOINT UPR EXTREM W/O DYE: CPT

## 2024-09-03 ENCOUNTER — TELEPHONE (OUTPATIENT)
Age: 69
End: 2024-09-03

## 2024-09-03 NOTE — TELEPHONE ENCOUNTER
Caller: Carrie     Doctor: Irina     Reason for call: Had to r/s appt for MRI Review due to Covid r/s for 9/11 but would like to know Id you would call her with the results    Call back#: [phone]

## 2024-09-11 ENCOUNTER — OFFICE VISIT (OUTPATIENT)
Dept: OBGYN CLINIC | Facility: CLINIC | Age: 69
End: 2024-09-11
Payer: MEDICARE

## 2024-09-11 VITALS
HEART RATE: 74 BPM | DIASTOLIC BLOOD PRESSURE: 79 MMHG | SYSTOLIC BLOOD PRESSURE: 148 MMHG | BODY MASS INDEX: 35.99 KG/M2 | HEIGHT: 65 IN | WEIGHT: 216 LBS

## 2024-09-11 DIAGNOSIS — M75.22 BICEPS TENDONITIS ON LEFT: Primary | ICD-10-CM

## 2024-09-11 DIAGNOSIS — Z98.890 S/P LEFT ROTATOR CUFF REPAIR: ICD-10-CM

## 2024-09-11 PROCEDURE — 99214 OFFICE O/P EST MOD 30 MIN: CPT | Performed by: ORTHOPAEDIC SURGERY

## 2024-09-11 RX ORDER — CHLORHEXIDINE GLUCONATE ORAL RINSE 1.2 MG/ML
15 SOLUTION DENTAL ONCE
OUTPATIENT
Start: 2024-09-11 | End: 2024-09-11

## 2024-09-11 NOTE — PROGRESS NOTES
Assessment/Plan:  1. Biceps tendonitis on left  Sling    Case request operating room: ARTHROSCOPIC BICEPS TENODESIS    Comprehensive metabolic panel    CBC and Platelet    APTT    Protime-INR    EKG 12 lead    Case request operating room: ARTHROSCOPIC BICEPS TENODESIS      2. S/P left rotator cuff repair  Case request operating room: ARTHROSCOPIC BICEPS TENODESIS    Case request operating room: ARTHROSCOPIC BICEPS TENODESIS        Scribe Attestation      I,:  Lulu Teresaandres am acting as a scribe while in the presence of the attending physician.:       I,:  Marbin Arevalo MD personally performed the services described in this documentation    as scribed in my presence.:               Carrie is a pleasant 69 y.o. female who presents for follow-up of the left shoulder and MRI review. There was no evidence of rotator cuff re-tear since her surgery on 3/28/2023. There was inflammation surrounding the biceps tendon visualized. We discussed non operative versus operative treatment. Non operative treatment includes repeat corticosteroid injection of the biceps tendon sheath and physical therapy.  She has already had multiple injections of her biceps tendon and physical therapy.  She feels like she has now failed nonoperative treatment.  Operative treatment includes left shoulder arthroscopy with biceps tenodesis versus biceps tenotomy. The recovery times were discussed for both procedures today. She would remain in a sling for 3 weeks following the biceps tenodesis and begin physical therapy one week after surgery. A biceps tenotomy would require her to wear a sling for about less than a  week. She may experience difficulty with supination or pulling motions using the left arm. She expressed she enjoys cooking and uses the left arm frequently. She does have a trip planned for November and feels it would be best to wait until after the trip to undergo surgery. She elected to proceed with the tenodesis procedure, . Risks and  benefits were discussed today including infection and stiffness. A sling was provided to her today. She will meet with the surgical scheduler today to set up a date for the procedure.      Given that the patient has failed conservative treatment and continues to have severe pain and/or dysfunction that limits their activities of daily living, they would like to proceed with operative intervention.  We discussed risks, benefits and alternatives to surgery today in the clinic. We discussed with the patient the risks of no treatment, non-operative treatment, and operative treatment. The risks of operative intervention were discussed and include but are not limited to: Infection, bleeding, stiffness, loss of range of motion, blood clot, failure of surgery, fracture, delayed union, nonunion, malunion, risk of potential future arthritis, continued problems with swelling, injury to surrounding structures/nerve/artery/vein, recurrence of cysts, failure of hardware, retained hardware and/or foreign body, symptomatic hardware, and continued instability, pain, dysfunction, or disability despite repair.     Specifically, for       Biceps Tenodesis:  Risk of failure of repair or retear, biceps failure with kyree deformity, cramping, weakness, Arthrofibrosis and loss of motion needing further treatment, Continued pain/CPRS         Subjective:   Carrie Sow is a 69 y.o. female who presents for follow-up of the left shoulder and MRI review. There was no evidence of rotator cuff re-tear since her surgery on 3/28/2023. There was inflammation surrounding the biceps tendon visualized. She reports her pain is unchanged since her last visit.      Review of Systems   Constitutional:  Positive for activity change. Negative for chills and fever.   HENT:  Negative for ear pain and sore throat.    Eyes:  Negative for pain and visual disturbance.   Respiratory:  Negative for cough and shortness of breath.    Cardiovascular:  Negative for  "chest pain and palpitations.   Gastrointestinal:  Negative for abdominal pain and vomiting.   Genitourinary:  Negative for dysuria and hematuria.   Musculoskeletal:  Positive for myalgias. Negative for arthralgias and back pain.   Skin:  Negative for color change and rash.   Neurological:  Negative for seizures and syncope.   All other systems reviewed and are negative.        Past Medical History:   Diagnosis Date    Breast lump     Chest pain 05/28/2019    COVID-19 04/09/2021    Disease of thyroid gland     nodule, benign    Eczema     facial rash- could also be rosacea    Elevated troponin I level 05/21/2021    GERD (gastroesophageal reflux disease)     History of transfusion     at 3 years old for bleeding during tonsillectomy, no reaction    Hypertension     Nontoxic single thyroid nodule 07/25/2012    Rectocele     Rotator cuff tear, left     Uterine leiomyoma     Varicose veins of both lower extremities        Past Surgical History:   Procedure Laterality Date    BLADDER SURGERY  08/2012    Bladder \"lift\"    BREAST BIOPSY Right 2000    BREAST LUMPECTOMY Right 2000    benign    COLONOSCOPY      HYSTERECTOMY  1990    JOINT REPLACEMENT  6/2017 and 5/2022    Knees    KNEE SURGERY Right     arthroscopy x2    NOSE SURGERY      deviated septum    [place] LARYNGOSCOPY W/BIOPSY MICROSCOPE/TELESCOPE Left 04/02/2018    Procedure: MICROLARYNGOSCOPY AND EXCISION OF LEFT VOCAL FOLD POLYP;  Surgeon: Ochoa Cherry MD;  Location:  Main OR;  Service: ENT    [place] SURGICAL ARTHROSCOPY SHOULDER W/ROTATOR CUFF RPR Left 03/28/2023    Procedure: Shoulder Arthroscopy with Rotator Cuff Repair and Subacromial decompression;  Surgeon: Suman Kessler MD;  Location: [facility];  Service: Orthopedics    KY XCAPSL CTRC RMVL INSJ IO LENS PROSTH W/O ECP Left 11/28/2022    Procedure: EXTRACTION EXTRACAPSULAR CATARACT PHACO INTRAOCULAR LENS (IOL);  Surgeon: Duc Nash MD;  Location: [facility] MAIN OR;  Service: Ophthalmology    TENDON " REPAIR Right     achilles tendon    TONSILLECTOMY      TUBAL LIGATION      VARICOSE VEIN SURGERY Left 2009       Family History   Problem Relation Age of Onset    Breast cancer Mother 66    Hypertension Mother     Varicose Veins Mother         of lower extremities    Glaucoma Mother         I also have glaucoma    Hypertension Father     Coronary artery disease Father     Coronary artery disease Sister     Heart disease Sister     Varicose Veins Sister         of lower extremities    No Known Problems Sister     No Known Problems Sister     Hypertension Brother     No Known Problems Daughter     Diabetes Maternal Grandmother         Also nephew William Fenstermacher    Breast cancer Maternal Aunt 65    No Known Problems Maternal Aunt     Thyroid disease Sister        Social History     Occupational History    Not on file   Tobacco Use    Smoking status: Former     Current packs/day: 0.00     Average packs/day: 0.5 packs/day for 4.0 years (2.0 ttl pk-yrs)     Types: Cigarettes     Start date: 1972     Quit date: 1976     Years since quittin.7    Smokeless tobacco: Never   Vaping Use    Vaping status: Never Used   Substance and Sexual Activity    Alcohol use: Yes     Comment: seldom    Drug use: Never    Sexual activity: Not Currently     Partners: Male     Birth control/protection: Female Sterilization         Current Outpatient Medications:     acetaminophen (TYLENOL) 325 mg tablet, Take 650 mg by mouth every 6 (six) hours as needed for mild pain, Disp: , Rfl:     amLODIPine (NORVASC) 5 mg tablet, Take 1 tablet (5 mg total) by mouth daily, Disp: 90 tablet, Rfl: 3    aspirin 81 mg chewable tablet, Chew 1 tablet (81 mg total) daily, Disp: 30 tablet, Rfl: 0    BIOTIN PO, Take by mouth in the morning, Disp: , Rfl:     cholecalciferol (VITAMIN D3) 1,000 units tablet, Take 1,000 Units by mouth daily, Disp: , Rfl:     esomeprazole (NexIUM) 40 MG capsule, TAKE 1 CAPSULE EVERY DAY, Disp: 90 capsule, Rfl: 1     Ting, Zingiber officinalis, (TING ROOT PO), Take by mouth in the morning, Disp: , Rfl:     hydrochlorothiazide (HYDRODIURIL) 25 mg tablet, Take 1 tablet (25 mg total) by mouth daily, Disp: 90 tablet, Rfl: 3    latanoprost (XALATAN) 0.005 % ophthalmic solution, Administer to both eyes daily at bedtime, Disp: , Rfl:     Magnesium 400 MG CAPS, Take 2 capsules (800 mg total) by mouth in the morning (Patient taking differently: Take 1 capsule by mouth in the morning), Disp: , Rfl:     metoprolol succinate (TOPROL-XL) 50 mg 24 hr tablet, Take 1 tablet (50 mg total) by mouth daily at bedtime, Disp: 90 tablet, Rfl: 3    multivitamin (THERAGRAN) TABS, Take 1 tablet by mouth daily, Disp: , Rfl:     olmesartan (BENICAR) 40 mg tablet, Take 1 tablet (40 mg total) by mouth daily, Disp: 90 tablet, Rfl: 3    Sulfacetamide Sodium (Sodium Sulfacetamide Wash) 10 % LIQD, Apply twice daily to affected area on face, Disp: 177 mL, Rfl: 2    vitamin B-12 (VITAMIN B-12) 1,000 mcg tablet, Take 1,000 mcg by mouth daily, Disp: , Rfl:     Allergies   Allergen Reactions    Hydrocodone-Acetaminophen Dizziness    Irbesartan-Hydrochlorothiazide Other (See Comments) and Headache     Arm heaviness     Lisinopril Cough and Edema     swollen legs    Pollen Extract Hives     Ragweed etc.     Atorvastatin Myalgia       Objective:  Vitals:    09/11/24 1050   BP: 148/79   Pulse: 74       Left Shoulder Exam     Tenderness   The patient is experiencing tenderness in the biceps tendon.    Range of Motion   Forward flexion:  160     Muscle Strength   Abduction: 4/5   External rotation: 5/5   Biceps: 4/5     Tests   Drop arm: negative    Other   Erythema: absent  Sensation: normal  Pulse: present     Comments:  Positive empty can test. Positive speeds test            Physical Exam  Vitals and nursing note reviewed.   Constitutional:       Appearance: Normal appearance.   HENT:      Head: Normocephalic and atraumatic.      Right Ear: External ear normal.       Left Ear: External ear normal.      Nose: Nose normal.   Eyes:      General: No scleral icterus.     Extraocular Movements: Extraocular movements intact.      Conjunctiva/sclera: Conjunctivae normal.   Cardiovascular:      Rate and Rhythm: Normal rate.   Pulmonary:      Effort: Pulmonary effort is normal. No respiratory distress.   Musculoskeletal:      Cervical back: Normal range of motion and neck supple.      Comments: See ortho exam   Skin:     General: Skin is warm and dry.   Neurological:      Mental Status: She is alert and oriented to person, place, and time.   Psychiatric:         Mood and Affect: Mood normal.         Behavior: Behavior normal.         I have personally reviewed pertinent films in PACS and my interpretation is as follows:  MRI of the left shoulder obtained on 8/21/2024 demonstrates Status post supraspinatus tendon repair without recurrent tear. Unchanged mild long head of biceps tenosynovitis without tear.      This document was created using speech voice recognition software.   Grammatical errors, random word insertions, pronoun errors, and incomplete sentences are an occasional consequence of this system due to software limitations, ambient noise, and hardware issues.   Any formal questions or concerns about content, text, or information contained within the body of this dictation should be directly addressed to the provider for clarification.

## 2024-09-15 DIAGNOSIS — K21.9 GERD WITHOUT ESOPHAGITIS: ICD-10-CM

## 2024-09-15 RX ORDER — ESOMEPRAZOLE MAGNESIUM 40 MG/1
40 CAPSULE, DELAYED RELEASE ORAL DAILY
Qty: 90 CAPSULE | Refills: 1 | Status: SHIPPED | OUTPATIENT
Start: 2024-09-15

## 2024-10-02 DIAGNOSIS — I10 BENIGN ESSENTIAL HYPERTENSION: ICD-10-CM

## 2024-10-02 RX ORDER — HYDROCHLOROTHIAZIDE 25 MG/1
25 TABLET ORAL DAILY
Qty: 90 TABLET | Refills: 0 | Status: SHIPPED | OUTPATIENT
Start: 2024-10-02

## 2024-10-02 RX ORDER — AMLODIPINE BESYLATE 5 MG/1
5 TABLET ORAL DAILY
Qty: 90 TABLET | Refills: 0 | Status: SHIPPED | OUTPATIENT
Start: 2024-10-02

## 2024-10-11 ENCOUNTER — CONSULT (OUTPATIENT)
Dept: UROLOGY | Facility: CLINIC | Age: 69
End: 2024-10-11
Payer: MEDICARE

## 2024-10-11 ENCOUNTER — TELEPHONE (OUTPATIENT)
Dept: CARDIOLOGY CLINIC | Facility: CLINIC | Age: 69
End: 2024-10-11

## 2024-10-11 VITALS
HEIGHT: 65 IN | DIASTOLIC BLOOD PRESSURE: 84 MMHG | OXYGEN SATURATION: 96 % | SYSTOLIC BLOOD PRESSURE: 144 MMHG | BODY MASS INDEX: 34.55 KG/M2 | WEIGHT: 207.4 LBS | HEART RATE: 81 BPM

## 2024-10-11 DIAGNOSIS — R32 URINARY INCONTINENCE, UNSPECIFIED TYPE: ICD-10-CM

## 2024-10-11 LAB
POST-VOID RESIDUAL VOLUME, ML POC: 103 ML
SL AMB  POCT GLUCOSE, UA: NORMAL
SL AMB LEUKOCYTE ESTERASE,UA: NORMAL
SL AMB POCT BILIRUBIN,UA: NORMAL
SL AMB POCT BLOOD,UA: NORMAL
SL AMB POCT CLARITY,UA: CLEAR
SL AMB POCT COLOR,UA: YELLOW
SL AMB POCT KETONES,UA: NORMAL
SL AMB POCT NITRITE,UA: NORMAL
SL AMB POCT PH,UA: 6
SL AMB POCT SPECIFIC GRAVITY,UA: 1.02
SL AMB POCT URINE PROTEIN: NORMAL
SL AMB POCT UROBILINOGEN: 0.2

## 2024-10-11 PROCEDURE — 99203 OFFICE O/P NEW LOW 30 MIN: CPT

## 2024-10-11 PROCEDURE — 51798 US URINE CAPACITY MEASURE: CPT

## 2024-10-11 PROCEDURE — 81002 URINALYSIS NONAUTO W/O SCOPE: CPT

## 2024-10-11 RX ORDER — TROSPIUM CHLORIDE 20 MG/1
20 TABLET, FILM COATED ORAL 2 TIMES DAILY
Qty: 60 TABLET | Refills: 2 | Status: SHIPPED | OUTPATIENT
Start: 2024-10-11 | End: 2024-10-15

## 2024-10-11 NOTE — TELEPHONE ENCOUNTER
Called patient and offered her a follow up appointment with Dr. Weaver 10/14/2024.  Patient declined.  She said that she is seeing another cardiologist now.

## 2024-10-14 ENCOUNTER — TELEPHONE (OUTPATIENT)
Age: 69
End: 2024-10-14

## 2024-10-14 NOTE — TELEPHONE ENCOUNTER
Patient called stating her trospium chloride (SANCTURA) 20 mg tablet requires a prior authorization. Please advise.

## 2024-10-14 NOTE — TELEPHONE ENCOUNTER
PA for TROSPIUM  DENIED    Reason:(Screenshot if applicable)        Message sent to office clinical pool Yes    Denial letter scanned into Media Yes        **Please follow up with your patient regarding denial and next steps**

## 2024-10-14 NOTE — TELEPHONE ENCOUNTER
PA for TROSPIUM SUBMITTED     via    [x][facility]  [id number] -   []Surescripts-Case ID #   []Availity-Auth ID # NDC #   []Faxed to plan   []Other website   []Phone call Case ID #     Office notes sent, clinical questions answered. Awaiting determination    Turnaround time for your insurance to make a decision on your Prior Authorization can take 7-21 business days.

## 2024-10-15 DIAGNOSIS — R32 URINARY INCONTINENCE, UNSPECIFIED TYPE: Primary | ICD-10-CM

## 2024-10-15 RX ORDER — MIRABEGRON 25 MG/1
25 TABLET, FILM COATED, EXTENDED RELEASE ORAL DAILY
Qty: 90 TABLET | Refills: 3 | Status: SHIPPED | OUTPATIENT
Start: 2024-10-15 | End: 2024-10-16 | Stop reason: SDUPTHER

## 2024-10-15 NOTE — TELEPHONE ENCOUNTER
Relayed alternative medication to patient. Advised her to call office if theres any further concerns with pricing. Verbalized understanding

## 2024-10-16 DIAGNOSIS — R32 URINARY INCONTINENCE, UNSPECIFIED TYPE: ICD-10-CM

## 2024-10-16 RX ORDER — MIRABEGRON 25 MG/1
25 TABLET, FILM COATED, EXTENDED RELEASE ORAL DAILY
Qty: 90 TABLET | Refills: 3 | Status: SHIPPED | OUTPATIENT
Start: 2024-10-16 | End: 2024-10-21

## 2024-10-16 NOTE — TELEPHONE ENCOUNTER
Patient called in stating that mirabegron was supposed to be sent to [facility] on file and was sent to [facility]. She is asking if we could send it to [facility]. Please advise.

## 2024-10-21 ENCOUNTER — TELEPHONE (OUTPATIENT)
Dept: UROLOGY | Facility: CLINIC | Age: 69
End: 2024-10-21

## 2024-10-21 DIAGNOSIS — R32 URINARY INCONTINENCE, UNSPECIFIED TYPE: Primary | ICD-10-CM

## 2024-10-21 RX ORDER — MIRABEGRON 25 MG/1
25 TABLET, FILM COATED, EXTENDED RELEASE ORAL DAILY
Qty: 90 TABLET | Refills: 3 | Status: SHIPPED | OUTPATIENT
Start: 2024-10-21

## 2024-11-07 DIAGNOSIS — I10 BENIGN ESSENTIAL HYPERTENSION: ICD-10-CM

## 2024-11-07 RX ORDER — OLMESARTAN MEDOXOMIL 40 MG/1
40 TABLET ORAL DAILY
Qty: 30 TABLET | Refills: 0 | Status: SHIPPED | OUTPATIENT
Start: 2024-11-07

## 2024-11-08 ENCOUNTER — RA CDI HCC (OUTPATIENT)
Dept: OTHER | Facility: HOSPITAL | Age: 69
End: 2024-11-08

## 2024-11-13 ENCOUNTER — TELEPHONE (OUTPATIENT)
Dept: FAMILY MEDICINE CLINIC | Facility: CLINIC | Age: 69
End: 2024-11-13

## 2024-11-13 NOTE — TELEPHONE ENCOUNTER
Name of procedure: ARTHROSCOPIC BICEPS TENODESIS, possible biceps tenotomy (Left: Shoulder)  Diagnosis: Biceps tendonitis on left [M75.22]      S/P left rotator cuff repair [Z98.890]  Date of procedure (within 30 days): 12/2/24  Surgeon: Marbin Arevalo MD  Location of procedure (hospital or out patient facility name): [facility] date/location/ type (Which labs? EKG? CXR?): CMP, CBC at platelet, APTT, INR, EKG  Records (on epic or requested): epic  Type of anaesthesia:  General w/ Interscalene Block  Paperwork to complete for Pre-op (patient bringing vs. calling office to obtain prior to appointment): note in epic  Pre-op appointment scheduled (date/time): 11/18/24 at 2:30pm

## 2024-11-14 ENCOUNTER — TELEPHONE (OUTPATIENT)
Dept: ADMINISTRATIVE | Facility: OTHER | Age: 69
End: 2024-11-14

## 2024-11-14 NOTE — TELEPHONE ENCOUNTER
11/14/24 11:20 AM    Patient contacted to bring Advance Directive, POLST, or Living Will document to next scheduled pcp visit.[facility] Department spoke with patient/ caregiver.    Thank you.  Stephan Suero MA  [facility]

## 2024-11-16 PROBLEM — E66.09 CLASS 1 OBESITY DUE TO EXCESS CALORIES WITH SERIOUS COMORBIDITY AND BODY MASS INDEX (BMI) OF 34.0 TO 34.9 IN ADULT: Status: ACTIVE | Noted: 2018-09-17

## 2024-11-16 PROBLEM — G47.33 OSA (OBSTRUCTIVE SLEEP APNEA): Status: ACTIVE | Noted: 2024-11-16

## 2024-11-16 PROBLEM — E66.811 CLASS 1 OBESITY DUE TO EXCESS CALORIES WITH SERIOUS COMORBIDITY AND BODY MASS INDEX (BMI) OF 34.0 TO 34.9 IN ADULT: Status: ACTIVE | Noted: 2018-09-17

## 2024-11-17 NOTE — PROGRESS NOTES
Presurgical Evaluation    Subjective:      Patient ID: Carrie Sow is a 69 y.o. female.    Chief Complaint   Patient presents with    Pre-op Exam     Pre op -   Declines flu shot    Varicose Veins     Patient would like a referral to a vascular specialist. Pt states that her varicose veins have gotten worse and clusters bad in her right knee area.       HPI    The following portions of the patient's history were reviewed and updated as appropriate: allergies, current medications, past family history, past medical history, past social history, past surgical history, and problem list.    Case: [id number] Date/Time: 12/02/24 0730   Procedure: ARTHROSCOPIC BICEPS TENODESIS, possible biceps tenotomy (Left: Shoulder)   Anesthesia type: General w/ Interscalene Block   Diagnosis:      Biceps tendonitis on left [M75.22]      S/P left rotator cuff repair [Z98.890]   Pre-op diagnosis:      Biceps tendonitis on left [M75.22]      S/P left rotator cuff repair [Z98.890]   Location: [place] [address] / [facility]   Surgeons: Marbin Arevalo MD        Prior anesthesia: Yes   General; Complications:  None / Tolerated well    CAD History: CAD NSTEMI 2021     Pulmonary History: REGI (Sleep Apnea) moderate - does not tolerate CPAP, not being treated. Follows with sleep medicine at [facility]. [facility] visit 9/12/24    Renal history: None    Diabetes History:  None     Neurological History: None     On Immunosuppressant meds/biologics: No      Review of Systems      Current Outpatient Medications   Medication Sig Dispense Refill    acetaminophen (TYLENOL) 325 mg tablet Take 650 mg by mouth every 6 (six) hours as needed for mild pain      amLODIPine (NORVASC) 5 mg tablet TAKE 1 TABLET (5 MG TOTAL) BY MOUTH DAILY 90 tablet 0    aspirin 81 mg chewable tablet Chew 1 tablet (81 mg total) daily 30 tablet 0    BIOTIN PO Take by mouth in the morning      cholecalciferol (VITAMIN D3) 1,000 units tablet Take 1,000 Units by mouth  daily      esomeprazole (NexIUM) 40 MG capsule TAKE 1 CAPSULE EVERY DAY 90 capsule 1    Ting, Zingiber officinalis, (TING ROOT PO) Take by mouth in the morning      hydroCHLOROthiazide 25 mg tablet TAKE 1 TABLET (25 MG TOTAL) BY MOUTH DAILY 90 tablet 0    latanoprost (XALATAN) 0.005 % ophthalmic solution Administer to both eyes daily at bedtime      Magnesium 400 MG CAPS Take 2 capsules (800 mg total) by mouth in the morning      metoprolol succinate (TOPROL-XL) 50 mg 24 hr tablet Take 1 tablet (50 mg total) by mouth daily at bedtime 90 tablet 3    multivitamin (THERAGRAN) TABS Take 1 tablet by mouth daily      Myrbetriq 25 MG TB24 Take 25 mg by mouth in the morning 90 tablet 3    olmesartan (BENICAR) 40 mg tablet TAKE 1 TABLET (40 MG TOTAL) BY MOUTH DAILY 30 tablet 0    Sulfacetamide Sodium (Sodium Sulfacetamide Wash) 10 % LIQD Apply twice daily to affected area on face 177 mL 2    vitamin B-12 (VITAMIN B-12) 1,000 mcg tablet Take 1,000 mcg by mouth daily       No current facility-administered medications for this visit.       Allergies on file:   Hydrocodone-acetaminophen, Irbesartan-hydrochlorothiazide, Lisinopril, Pollen extract, and Atorvastatin    Patient Active Problem List   Diagnosis    Allergic rhinitis    Benign essential hypertension    Dermatitis    GERD without esophagitis    Hyperlipoproteinemia    Nontoxic single thyroid nodule    Osteoarthritis of knee    Rectocele    Uterovaginal prolapse    Cystocele, lateral    Varicose veins of bilateral lower extremities with pain    Family history of sudden cardiac death    Pure hypercholesterolemia    Class 1 obesity due to excess calories with serious comorbidity and body mass index (BMI) of 34.0 to 34.9 in adult    Chronic venous insufficiency    Actinic keratosis    Rosacea    Acute left-sided low back pain without sciatica    Arthralgia    Urge incontinence of urine    Long-term current use of proton pump inhibitor therapy    Left upper quadrant  "abdominal pain    Biceps tendonitis on left    REGI (obstructive sleep apnea)        Past Medical History:   Diagnosis Date    Breast lump     Chest pain 05/28/2019    COVID-19 04/09/2021    Disease of thyroid gland     nodule, benign    Eczema     facial rash- could also be rosacea    Elevated troponin I level 05/21/2021    GERD (gastroesophageal reflux disease)     History of transfusion     at 3 years old for bleeding during tonsillectomy, no reaction    Hypertension     Nontoxic single thyroid nodule 07/25/2012    Rectocele     Rotator cuff tear, left     Uterine leiomyoma     Varicose veins of both lower extremities        Past Surgical History:   Procedure Laterality Date    BLADDER SURGERY  08/2012    Bladder \"lift\"    BREAST BIOPSY Right 2000    BREAST LUMPECTOMY Right 2000    benign    COLONOSCOPY      HYSTERECTOMY  1990    JOINT REPLACEMENT  6/2017 and 5/2022    Knees    KNEE SURGERY Right     arthroscopy x2    NOSE SURGERY      deviated septum    RI LARYNGOSCOPY W/BIOPSY MICROSCOPE/TELESCOPE Left 04/02/2018    Procedure: MICROLARYNGOSCOPY AND EXCISION OF LEFT VOCAL FOLD POLYP;  Surgeon: Ochoa Cherry MD;  Location:  Main OR;  Service: ENT    RI SURGICAL ARTHROSCOPY SHOULDER W/ROTATOR CUFF RPR Left 03/28/2023    Procedure: Shoulder Arthroscopy with Rotator Cuff Repair and Subacromial decompression;  Surgeon: Suman Kessler MD;  Location: [facility] OR;  Service: Orthopedics    RI XCAPSL CTRC RMVL INSJ IO LENS PROSTH W/O ECP Left 11/28/2022    Procedure: EXTRACTION EXTRACAPSULAR CATARACT PHACO INTRAOCULAR LENS (IOL);  Surgeon: Duc Nash MD;  Location: [facility] MAIN OR;  Service: Ophthalmology    TENDON REPAIR Right     achilles tendon    TONSILLECTOMY      TUBAL LIGATION      VARICOSE VEIN SURGERY Left 12/2009       Family History   Problem Relation Age of Onset    Breast cancer Mother 66    Hypertension Mother     Varicose Veins Mother         of lower extremities    Glaucoma Mother         I " "also have glaucoma    Hypertension Father     Coronary artery disease Father     Coronary artery disease Sister     Heart disease Sister     Varicose Veins Sister         of lower extremities    No Known Problems Sister     No Known Problems Sister     Hypertension Brother     No Known Problems Daughter     Diabetes Maternal Grandmother         Also nephew William Fenstermacher    Breast cancer Maternal Aunt 65    No Known Problems Maternal Aunt     Thyroid disease Sister        Social History     Tobacco Use    Smoking status: Former     Current packs/day: 0.00     Average packs/day: 0.5 packs/day for 4.0 years (2.0 ttl pk-yrs)     Types: Cigarettes     Start date: 1972     Quit date: 1976     Years since quittin.9    Smokeless tobacco: Never   Vaping Use    Vaping status: Never Used   Substance Use Topics    Alcohol use: Yes     Comment: seldom    Drug use: Never       Objective:    Vitals:    24 1409   BP: 118/62   BP Location: Left arm   Patient Position: Sitting   Cuff Size: Large   Pulse: 85   Resp: 18   Temp: 97.8 °F (36.6 °C)   SpO2: 96%   Weight: 96 kg (211 lb 9.6 oz)   Height: 5' 5\" (1.651 m)        Physical Exam  Vitals reviewed.   Constitutional:       Appearance: Normal appearance.   HENT:      Head: Normocephalic.   Cardiovascular:      Rate and Rhythm: Normal rate and regular rhythm.   Pulmonary:      Effort: Pulmonary effort is normal.      Breath sounds: Normal breath sounds.   Abdominal:      Palpations: Abdomen is soft.      Tenderness: There is no abdominal tenderness.   Skin:     General: Skin is dry.   Neurological:      Mental Status: She is alert. Mental status is at baseline.   Psychiatric:         Mood and Affect: Mood normal.           Preop labs/testing available and reviewed: yes  Pt did not have pre-op blood work done.   Last labs 24 , GFR 82, Cr 0.74, LDL 56  Last CBC 2023 normal   eGFR   Date Value Ref Range Status   2024 76 ml/min/1.73sq m Final "     WBC   Date Value Ref Range Status   11/18/2024 6.80 4.31 - 10.16 Thousand/uL Final     Hemoglobin   Date Value Ref Range Status   11/18/2024 13.7 11.5 - 15.4 g/dL Final     Hematocrit   Date Value Ref Range Status   11/18/2024 43.4 34.8 - 46.1 % Final     Platelets   Date Value Ref Range Status   11/18/2024 193 149 - 390 Thousands/uL Final     INR   Date Value Ref Range Status   11/18/2024 0.95 0.85 - 1.19 Final       EKG yes -  per cardio     Echo no    Stress test/cath no    PFT/Naman no    Functional capacity: Climb stairs                        4 Mets   Pick the highest level patient can comfortably perform   4 mets or greater for surgery    RCRI  High Risk surgery?        [x]1 Point  CAD History:        [x]1 Point   MI; Positive Stress Test; CP due to Mi;  Nitrate Usage to control Angina; Pathologic Q wave on EKG  CHF Active:        []1 Point   Pulm Edema; Paroxysmal Nocturnal Dyspnea;  Bibasilar Rales (crackles);S3; CHF on CXR  Cerebrovascular Disease (TIA or CVA):    []1 Point  DM on Insulin:       []1 Point  Serum Creat >2.0 mg/dl:      []1 Point          Total Points: 2     Scoring: []0: Class I, Very Low Risk (0.4%)     [x]1: Class II, Low risk (0.9%)     []2: Class III Moderate (6.6%)     []3: Class IV High (>11%)      SOFI Risk:  GFR:   eGFR   Date Value Ref Range Status   11/18/2024 76 ml/min/1.73sq m Final         For PCP:  If GFR>60, Hold ACE/ARB/Diuretic on the day of surgery, and NSAIDS 10 days before.    If GFR<45, Consider PRE and POST op Nephrology Consult.    If 46 <GFR> 59 : Has Patient had SOFI in last 6 Months? no   If YES: Preop Nephrology consult   If No:  Post Op Nephrology consult.           Assessment/Plan:    Patient is medically optimized (cleared) for the planned procedure.    Further testing/evaluation is not required.    Postop concerns: no    Problem List Items Addressed This Visit          Unprioritized    Benign essential hypertension    Pure hypercholesterolemia    Class 1  "obesity due to excess calories with serious comorbidity and body mass index (BMI) of 34.0 to 34.9 in adult    REGI (obstructive sleep apnea)     Other Visit Diagnoses         Pre-op examination    -  Primary      Biceps tendonitis, left          Disorder of rotator cuff, left          Varicose veins of both lower extremities with pain              Assessment & Plan  Pre-op examination         Biceps tendonitis, left         Disorder of rotator cuff, left  Pt cleared for surgery        Benign essential hypertension  Well controlled        Pure hypercholesterolemia  ASCVD risk 16%. Pt follows with cardio. Reports side effects to all statins and Leviqo. She does not want to try more medications at this time. A copy of her blood work was sent to her cardiologist.        Class 1 obesity due to excess calories with serious comorbidity and body mass index (BMI) of 34.0 to 34.9 in adult    Encouraged diet and exercise to help avoid weight related complications.         REGI (obstructive sleep apnea)  Moderate. Pt not able to tolerate CPAP.        Varicose veins of both lower extremities with pain  Pt requesting referral to vascular - already in chart                Per cardiology note 9/10/24  \"Addendum-9/24/2024-we were contacted after this office visit as the patient was planning to undergo arthroscopic shoulder surgery in early December 2024. Based on the above evaluation, she should be at low risk for perioperative cardiac complications. Her EKG from yesterday demonstrates normal sinus rhythm with nonspecific T abnormalities. SJK \"    A copy of this note sent to surgeon via shared medical record.   "

## 2024-11-18 ENCOUNTER — APPOINTMENT (OUTPATIENT)
Dept: LAB | Facility: CLINIC | Age: 69
End: 2024-11-18
Payer: MEDICARE

## 2024-11-18 ENCOUNTER — CONSULT (OUTPATIENT)
Dept: FAMILY MEDICINE CLINIC | Facility: CLINIC | Age: 69
End: 2024-11-18
Payer: MEDICARE

## 2024-11-18 ENCOUNTER — RESULTS FOLLOW-UP (OUTPATIENT)
Dept: FAMILY MEDICINE CLINIC | Facility: CLINIC | Age: 69
End: 2024-11-18

## 2024-11-18 VITALS
HEIGHT: 65 IN | SYSTOLIC BLOOD PRESSURE: 118 MMHG | DIASTOLIC BLOOD PRESSURE: 62 MMHG | HEART RATE: 85 BPM | WEIGHT: 211.6 LBS | BODY MASS INDEX: 35.25 KG/M2 | TEMPERATURE: 97.8 F | OXYGEN SATURATION: 96 % | RESPIRATION RATE: 18 BRPM

## 2024-11-18 DIAGNOSIS — E66.01 CLASS 2 SEVERE OBESITY DUE TO EXCESS CALORIES WITH SERIOUS COMORBIDITY AND BODY MASS INDEX (BMI) OF 35.0 TO 35.9 IN ADULT (HCC): ICD-10-CM

## 2024-11-18 DIAGNOSIS — Z01.818 PRE-OP EXAMINATION: Primary | ICD-10-CM

## 2024-11-18 DIAGNOSIS — I10 BENIGN ESSENTIAL HYPERTENSION: ICD-10-CM

## 2024-11-18 DIAGNOSIS — E78.00 PURE HYPERCHOLESTEROLEMIA: ICD-10-CM

## 2024-11-18 DIAGNOSIS — M75.22 BICEPS TENDONITIS ON LEFT: ICD-10-CM

## 2024-11-18 DIAGNOSIS — R73.01 IMPAIRED FASTING GLUCOSE: ICD-10-CM

## 2024-11-18 DIAGNOSIS — M67.912 DISORDER OF ROTATOR CUFF, LEFT: ICD-10-CM

## 2024-11-18 DIAGNOSIS — G47.33 OSA (OBSTRUCTIVE SLEEP APNEA): ICD-10-CM

## 2024-11-18 DIAGNOSIS — M75.22 BICEPS TENDONITIS, LEFT: ICD-10-CM

## 2024-11-18 DIAGNOSIS — E66.812 CLASS 2 SEVERE OBESITY DUE TO EXCESS CALORIES WITH SERIOUS COMORBIDITY AND BODY MASS INDEX (BMI) OF 35.0 TO 35.9 IN ADULT (HCC): ICD-10-CM

## 2024-11-18 DIAGNOSIS — I83.813 VARICOSE VEINS OF BOTH LOWER EXTREMITIES WITH PAIN: ICD-10-CM

## 2024-11-18 LAB
ALBUMIN SERPL BCG-MCNC: 4.4 G/DL (ref 3.5–5)
ALP SERPL-CCNC: 65 U/L (ref 34–104)
ALT SERPL W P-5'-P-CCNC: 21 U/L (ref 7–52)
ANION GAP SERPL CALCULATED.3IONS-SCNC: 6 MMOL/L (ref 4–13)
APTT PPP: 26 SECONDS (ref 23–34)
AST SERPL W P-5'-P-CCNC: 21 U/L (ref 13–39)
BASOPHILS # BLD AUTO: 0.01 THOUSANDS/ÂΜL (ref 0–0.1)
BASOPHILS NFR BLD AUTO: 0 % (ref 0–1)
BILIRUB SERPL-MCNC: 0.47 MG/DL (ref 0.2–1)
BUN SERPL-MCNC: 20 MG/DL (ref 5–25)
CALCIUM SERPL-MCNC: 9.3 MG/DL (ref 8.4–10.2)
CHLORIDE SERPL-SCNC: 103 MMOL/L (ref 96–108)
CHOLEST SERPL-MCNC: 190 MG/DL (ref ?–200)
CO2 SERPL-SCNC: 30 MMOL/L (ref 21–32)
CREAT SERPL-MCNC: 0.78 MG/DL (ref 0.6–1.3)
EOSINOPHIL # BLD AUTO: 0.21 THOUSAND/ÂΜL (ref 0–0.61)
EOSINOPHIL NFR BLD AUTO: 3 % (ref 0–6)
ERYTHROCYTE [DISTWIDTH] IN BLOOD BY AUTOMATED COUNT: 14.7 % (ref 11.6–15.1)
EST. AVERAGE GLUCOSE BLD GHB EST-MCNC: 128 MG/DL
GFR SERPL CREATININE-BSD FRML MDRD: 76 ML/MIN/1.73SQ M
GLUCOSE P FAST SERPL-MCNC: 93 MG/DL (ref 65–99)
HBA1C MFR BLD: 6.1 %
HCT VFR BLD AUTO: 43.4 % (ref 34.8–46.1)
HDLC SERPL-MCNC: 56 MG/DL
HGB BLD-MCNC: 13.7 G/DL (ref 11.5–15.4)
IMM GRANULOCYTES # BLD AUTO: 0.02 THOUSAND/UL (ref 0–0.2)
IMM GRANULOCYTES NFR BLD AUTO: 0 % (ref 0–2)
INR PPP: 0.95 (ref 0.85–1.19)
LDLC SERPL CALC-MCNC: 109 MG/DL (ref 0–100)
LYMPHOCYTES # BLD AUTO: 2.05 THOUSANDS/ÂΜL (ref 0.6–4.47)
LYMPHOCYTES NFR BLD AUTO: 30 % (ref 14–44)
MCH RBC QN AUTO: 29.7 PG (ref 26.8–34.3)
MCHC RBC AUTO-ENTMCNC: 31.6 G/DL (ref 31.4–37.4)
MCV RBC AUTO: 94 FL (ref 82–98)
MONOCYTES # BLD AUTO: 0.35 THOUSAND/ÂΜL (ref 0.17–1.22)
MONOCYTES NFR BLD AUTO: 5 % (ref 4–12)
NEUTROPHILS # BLD AUTO: 4.16 THOUSANDS/ÂΜL (ref 1.85–7.62)
NEUTS SEG NFR BLD AUTO: 62 % (ref 43–75)
NRBC BLD AUTO-RTO: 0 /100 WBCS
PLATELET # BLD AUTO: 193 THOUSANDS/UL (ref 149–390)
PMV BLD AUTO: 11.5 FL (ref 8.9–12.7)
POTASSIUM SERPL-SCNC: 4.6 MMOL/L (ref 3.5–5.3)
PROT SERPL-MCNC: 6.9 G/DL (ref 6.4–8.4)
PROTHROMBIN TIME: 13.3 SECONDS (ref 12.3–15)
RBC # BLD AUTO: 4.61 MILLION/UL (ref 3.81–5.12)
SODIUM SERPL-SCNC: 139 MMOL/L (ref 135–147)
TRIGL SERPL-MCNC: 123 MG/DL (ref ?–150)
WBC # BLD AUTO: 6.8 THOUSAND/UL (ref 4.31–10.16)

## 2024-11-18 PROCEDURE — 80053 COMPREHEN METABOLIC PANEL: CPT

## 2024-11-18 PROCEDURE — 99214 OFFICE O/P EST MOD 30 MIN: CPT | Performed by: FAMILY MEDICINE

## 2024-11-18 PROCEDURE — 85610 PROTHROMBIN TIME: CPT

## 2024-11-18 PROCEDURE — 85730 THROMBOPLASTIN TIME PARTIAL: CPT

## 2024-11-18 PROCEDURE — G2211 COMPLEX E/M VISIT ADD ON: HCPCS | Performed by: FAMILY MEDICINE

## 2024-11-18 PROCEDURE — 85025 COMPLETE CBC W/AUTO DIFF WBC: CPT

## 2024-11-18 PROCEDURE — 36415 COLL VENOUS BLD VENIPUNCTURE: CPT

## 2024-11-18 PROCEDURE — 83036 HEMOGLOBIN GLYCOSYLATED A1C: CPT

## 2024-11-18 PROCEDURE — 80061 LIPID PANEL: CPT

## 2024-11-18 NOTE — ASSESSMENT & PLAN NOTE
ASCVD risk 16%. Pt follows with cardio. Reports side effects to all statins and Leviqo. She does not want to try more medications at this time. A copy of her blood work was sent to her cardiologist.

## 2024-11-25 NOTE — PRE-PROCEDURE INSTRUCTIONS
Pre-Surgery Instructions:   Medication Instructions    acetaminophen (TYLENOL) 325 mg tablet Uses PRN- OK to take day of surgery    amLODIPine (NORVASC) 5 mg tablet Take day of surgery.    aspirin 81 mg chewable tablet Stop taking 7 days prior to surgery.    BIOTIN PO Stop taking 7 days prior to surgery.    cholecalciferol (VITAMIN D3) 1,000 units tablet Stop taking 7 days prior to surgery.    esomeprazole (NexIUM) 40 MG capsule Take day of surgery.    Ginger, Zingiber officinalis, (GINGER ROOT PO) Stop taking 7 days prior to surgery.    hydroCHLOROthiazide 25 mg tablet Hold day of surgery.    latanoprost (XALATAN) 0.005 % ophthalmic solution Take night before surgery    Magnesium 400 MG CAPS Stop taking 7 days prior to surgery.    metoprolol succinate (TOPROL-XL) 50 mg 24 hr tablet Take night before surgery    multivitamin (THERAGRAN) TABS Stop taking 7 days prior to surgery.    Myrbetriq 25 MG TB24 Take day of surgery.    olmesartan (BENICAR) 40 mg tablet Hold day of surgery.    Sulfacetamide Sodium (Sodium Sulfacetamide Wash) 10 % LIQD Hold day of surgery.    vitamin B-12 (VITAMIN B-12) 1,000 mcg tablet Stop taking 7 days prior to surgery.    Medication instructions for day surgery reviewed. Please use only a sip of water to take your instructed medications. Avoid all over the counter vitamins, supplements and NSAIDS for one week prior to surgery per anesthesia guidelines. Tylenol is ok to take as needed.     You will receive a call one business day prior to surgery with an arrival time and hospital directions. If your surgery is scheduled on a Monday, the hospital will be calling you on the Friday prior to your surgery. If you have not heard from anyone by 8pm, please call the hospital supervisor through the hospital  at [phone]. ([place] [phone] or [place] [phone]).    Do not eat or drink anything after midnight the night before your surgery, including candy, mints, lifesavers, or  chewing gum. Do not drink alcohol 24hrs before your surgery. Try not to smoke at least 24hrs before your surgery.       Follow the pre surgery showering instructions as listed in the “My Surgical Experience Booklet” or otherwise provided by your surgeon's office. Do not use a blade to shave the surgical area 1 week before surgery. It is okay to use a clean electric clippers up to 24 hours before surgery. Do not apply any lotions, creams, including makeup, cologne, deodorant, or perfumes after showering on the day of your surgery. Do not use dry shampoo, hair spray, hair gel, or any type of hair products.     No contact lenses, eye make-up, or artificial eyelashes. Remove nail polish, including gel polish, and any artificial, gel, or acrylic nails if possible. Remove all jewelry including rings and body piercing jewelry.     Wear causal clothing that is easy to take on and off. Consider your type of surgery.    Keep any valuables, jewelry, piercings at home. Please bring any specially ordered equipment (sling, braces) if indicated.    Arrange for a responsible person to drive you to and from the hospital on the day of your surgery. Please confirm the visitor policy for the day of your procedure when you receive your phone call with an arrival time.     Call the surgeon's office with any new illnesses, exposures, or additional questions prior to surgery.    Please reference your “My Surgical Experience Booklet” for additional information to prepare for your upcoming surgery.

## 2024-12-01 ENCOUNTER — ANESTHESIA EVENT (OUTPATIENT)
Dept: PERIOP | Facility: HOSPITAL | Age: 69
End: 2024-12-01
Payer: MEDICARE

## 2024-12-02 ENCOUNTER — HOSPITAL ENCOUNTER (OUTPATIENT)
Facility: HOSPITAL | Age: 69
Setting detail: OUTPATIENT SURGERY
Discharge: HOME | End: 2024-12-02
Attending: ORTHOPAEDIC SURGERY | Admitting: ORTHOPAEDIC SURGERY
Payer: MEDICARE

## 2024-12-02 ENCOUNTER — ANESTHESIA (OUTPATIENT)
Dept: PERIOP | Facility: HOSPITAL | Age: 69
End: 2024-12-02
Payer: MEDICARE

## 2024-12-02 VITALS
BODY MASS INDEX: 34.56 KG/M2 | OXYGEN SATURATION: 95 % | WEIGHT: 207.45 LBS | DIASTOLIC BLOOD PRESSURE: 55 MMHG | RESPIRATION RATE: 16 BRPM | SYSTOLIC BLOOD PRESSURE: 138 MMHG | HEIGHT: 65 IN | HEART RATE: 72 BPM | TEMPERATURE: 97.2 F

## 2024-12-02 DIAGNOSIS — M75.22 BICEPS TENDONITIS ON LEFT: Primary | ICD-10-CM

## 2024-12-02 DIAGNOSIS — I10 BENIGN ESSENTIAL HYPERTENSION: ICD-10-CM

## 2024-12-02 PROCEDURE — 29828 SHO ARTHRS SRG BICP TENODSIS: CPT | Performed by: PHYSICIAN ASSISTANT

## 2024-12-02 PROCEDURE — C1713 ANCHOR/SCREW BN/BN,TIS/BN: HCPCS | Performed by: ORTHOPAEDIC SURGERY

## 2024-12-02 PROCEDURE — 29828 SHO ARTHRS SRG BICP TENODSIS: CPT | Performed by: ORTHOPAEDIC SURGERY

## 2024-12-02 PROCEDURE — C9290 INJ, BUPIVACAINE LIPOSOME: HCPCS | Performed by: ANESTHESIOLOGY

## 2024-12-02 PROCEDURE — NC001 PR NO CHARGE: Performed by: ORTHOPAEDIC SURGERY

## 2024-12-02 DEVICE — 4.75MM BC KNOTLESS SWIVELOCK
Type: IMPLANTABLE DEVICE | Site: SHOULDER | Status: FUNCTIONAL
Brand: ARTHREX®

## 2024-12-02 RX ORDER — ONDANSETRON 4 MG/1
4 TABLET, FILM COATED ORAL EVERY 8 HOURS PRN
Qty: 20 TABLET | Refills: 0 | Status: SHIPPED | OUTPATIENT
Start: 2024-12-02

## 2024-12-02 RX ORDER — ONDANSETRON 2 MG/ML
INJECTION INTRAMUSCULAR; INTRAVENOUS AS NEEDED
Status: DISCONTINUED | OUTPATIENT
Start: 2024-12-02 | End: 2024-12-02

## 2024-12-02 RX ORDER — SODIUM CHLORIDE, SODIUM LACTATE, POTASSIUM CHLORIDE, CALCIUM CHLORIDE 600; 310; 30; 20 MG/100ML; MG/100ML; MG/100ML; MG/100ML
INJECTION, SOLUTION INTRAVENOUS CONTINUOUS PRN
Status: DISCONTINUED | OUTPATIENT
Start: 2024-12-02 | End: 2024-12-02

## 2024-12-02 RX ORDER — ACETAMINOPHEN 500 MG
1000 TABLET ORAL EVERY 8 HOURS
Qty: 42 TABLET | Refills: 0 | Status: SHIPPED | OUTPATIENT
Start: 2024-12-02 | End: 2024-12-09

## 2024-12-02 RX ORDER — PROPOFOL 10 MG/ML
INJECTION, EMULSION INTRAVENOUS AS NEEDED
Status: DISCONTINUED | OUTPATIENT
Start: 2024-12-02 | End: 2024-12-02

## 2024-12-02 RX ORDER — NAPROXEN 500 MG/1
500 TABLET ORAL 2 TIMES DAILY WITH MEALS
Qty: 60 TABLET | Refills: 0 | Status: SHIPPED | OUTPATIENT
Start: 2024-12-02

## 2024-12-02 RX ORDER — ALBUTEROL SULFATE 0.83 MG/ML
2.5 SOLUTION RESPIRATORY (INHALATION) ONCE AS NEEDED
Status: DISCONTINUED | OUTPATIENT
Start: 2024-12-02 | End: 2024-12-02 | Stop reason: HOSPADM

## 2024-12-02 RX ORDER — FENTANYL CITRATE/PF 50 MCG/ML
25 SYRINGE (ML) INJECTION
Status: DISCONTINUED | OUTPATIENT
Start: 2024-12-02 | End: 2024-12-02 | Stop reason: HOSPADM

## 2024-12-02 RX ORDER — LIDOCAINE HYDROCHLORIDE 10 MG/ML
INJECTION, SOLUTION EPIDURAL; INFILTRATION; INTRACAUDAL; PERINEURAL AS NEEDED
Status: DISCONTINUED | OUTPATIENT
Start: 2024-12-02 | End: 2024-12-02

## 2024-12-02 RX ORDER — CHLORHEXIDINE GLUCONATE ORAL RINSE 1.2 MG/ML
15 SOLUTION DENTAL ONCE
Status: COMPLETED | OUTPATIENT
Start: 2024-12-02 | End: 2024-12-02

## 2024-12-02 RX ORDER — BUPIVACAINE HYDROCHLORIDE 5 MG/ML
INJECTION, SOLUTION EPIDURAL; INTRACAUDAL
Status: COMPLETED | OUTPATIENT
Start: 2024-12-02 | End: 2024-12-02

## 2024-12-02 RX ORDER — OXYCODONE HYDROCHLORIDE 5 MG/1
5 TABLET ORAL EVERY 4 HOURS PRN
Qty: 20 TABLET | Refills: 0 | Status: SHIPPED | OUTPATIENT
Start: 2024-12-02

## 2024-12-02 RX ORDER — CEFAZOLIN SODIUM 2 G/50ML
2000 SOLUTION INTRAVENOUS ONCE
Status: COMPLETED | OUTPATIENT
Start: 2024-12-02 | End: 2024-12-02

## 2024-12-02 RX ORDER — ASPIRIN 81 MG/1
81 TABLET, CHEWABLE ORAL 2 TIMES DAILY
Qty: 56 TABLET | Refills: 0 | Status: SHIPPED | OUTPATIENT
Start: 2024-12-02 | End: 2024-12-30

## 2024-12-02 RX ORDER — DEXAMETHASONE SODIUM PHOSPHATE 10 MG/ML
INJECTION, SOLUTION INTRAMUSCULAR; INTRAVENOUS AS NEEDED
Status: DISCONTINUED | OUTPATIENT
Start: 2024-12-02 | End: 2024-12-02

## 2024-12-02 RX ORDER — ONDANSETRON 2 MG/ML
4 INJECTION INTRAMUSCULAR; INTRAVENOUS ONCE AS NEEDED
Status: DISCONTINUED | OUTPATIENT
Start: 2024-12-02 | End: 2024-12-02 | Stop reason: HOSPADM

## 2024-12-02 RX ORDER — MIDAZOLAM HYDROCHLORIDE 2 MG/2ML
INJECTION, SOLUTION INTRAMUSCULAR; INTRAVENOUS AS NEEDED
Status: DISCONTINUED | OUTPATIENT
Start: 2024-12-02 | End: 2024-12-02

## 2024-12-02 RX ADMIN — ONDANSETRON 4 MG: 2 INJECTION INTRAMUSCULAR; INTRAVENOUS at 07:34

## 2024-12-02 RX ADMIN — PROPOFOL 200 MG: 10 INJECTION, EMULSION INTRAVENOUS at 07:34

## 2024-12-02 RX ADMIN — BUPIVACAINE 20 ML: 13.3 INJECTION, SUSPENSION, LIPOSOMAL INFILTRATION at 07:17

## 2024-12-02 RX ADMIN — SODIUM CHLORIDE, SODIUM LACTATE, POTASSIUM CHLORIDE, AND CALCIUM CHLORIDE: .6; .31; .03; .02 INJECTION, SOLUTION INTRAVENOUS at 07:07

## 2024-12-02 RX ADMIN — LIDOCAINE HYDROCHLORIDE 50 MG: 10 INJECTION, SOLUTION EPIDURAL; INFILTRATION; INTRACAUDAL; PERINEURAL at 07:34

## 2024-12-02 RX ADMIN — PHENYLEPHRINE HYDROCHLORIDE 20 MCG/MIN: 10 INJECTION INTRAVENOUS at 07:43

## 2024-12-02 RX ADMIN — BUPIVACAINE HYDROCHLORIDE 10 ML: 5 INJECTION, SOLUTION EPIDURAL; INTRACAUDAL; PERINEURAL at 07:17

## 2024-12-02 RX ADMIN — CHLORHEXIDINE GLUCONATE 15 ML: 1.2 RINSE ORAL at 06:39

## 2024-12-02 RX ADMIN — DEXAMETHASONE SODIUM PHOSPHATE 8 MG: 10 INJECTION, SOLUTION INTRAMUSCULAR; INTRAVENOUS at 07:34

## 2024-12-02 RX ADMIN — MIDAZOLAM 2 MG: 1 INJECTION INTRAMUSCULAR; INTRAVENOUS at 07:10

## 2024-12-02 RX ADMIN — CEFAZOLIN SODIUM 2000 MG: 2 SOLUTION INTRAVENOUS at 07:21

## 2024-12-02 RX ADMIN — PHENYLEPHRINE HYDROCHLORIDE 10 MCG/MIN: 10 INJECTION INTRAVENOUS at 07:36

## 2024-12-02 NOTE — ANESTHESIA POSTPROCEDURE EVALUATION
Post-Op Assessment Note    CV Status:  Stable  Pain Score: 0    Pain management: adequate       Mental Status:  Alert and awake   Hydration Status:  Euvolemic   PONV Controlled:  Controlled   Airway Patency:  Patent  Two or more mitigation strategies used for obstructive sleep apnea   Post Op Vitals Reviewed: Yes    No anethesia notable event occurred.    Staff: CRNA           Last Filed PACU Vitals:  Vitals Value Taken Time   Temp     Pulse 82    /72    Resp 16    SpO2 96 RA        Modified Varsha:  No data recorded

## 2024-12-02 NOTE — PERIOPERATIVE NURSING NOTE
Left shoulder dsgs dry, intact. Ice and sling intact.  Pt able to wiggle fingers of left hand.  Denies pain at present.

## 2024-12-02 NOTE — ANESTHESIA PROCEDURE NOTES
Peripheral Block    Patient location during procedure: holding area  Start time: 12/2/2024 7:17 AM  Reason for block: at surgeon's request and post-op pain management  Staffing  Performed by: Caridad Jenkins MD  Authorized by: Caridad Jenkins MD    Preanesthetic Checklist  Completed: patient identified, IV checked, site marked, risks and benefits discussed, surgical consent, monitors and equipment checked, pre-op evaluation and timeout performed  Peripheral Block  Patient position: supine  Prep: ChloraPrep  Patient monitoring: continuous pulse oximetry, frequent blood pressure checks and heart rate  Block type: Interscalene  Laterality: left  Injection technique: single-shot  Procedures: ultrasound guided, Ultrasound guidance required for the procedure to increase accuracy and safety of medication placement and decrease risk of complications.  Ultrasound permanent image saved  bupivacaine (PF) (MARCAINE) 0.5 % injection 20 mL - Perineural   10 mL - 12/2/2024 7:17:00 AM  bupivacaine liposomal (EXPAREL) 1.3 % injection 20 mL - Perineural   20 mL - 12/2/2024 7:17:00 AM  Needle  Needle type: Stimuplex   Needle gauge: 22 G  Needle length: 4 in  Needle localization: ultrasound guidance  Assessment  Injection assessment: frequent aspiration, incremental injection, needle tip visualized at all times, injected with ease, negative aspiration, negative for heart rate change, no symptoms of intraneural/intravenous injection and no paresthesia on injection  Paresthesia pain: none  Post-procedure:  site cleaned  patient tolerated the procedure well with no immediate complications

## 2024-12-02 NOTE — DISCHARGE INSTR - AVS FIRST PAGE
POSTOPERATIVE INSTRUCTIONS following SHOULDER SURGERY    MEDICATIONS:  Resume all home medications unless otherwise instructed by your surgeon.  Pain Medication:   Take Tylenol 1000mg three times a day and Naproxen 500mg twice daily on prescribed schedule for 7 days  Take 5mg Oxycodone as needed every 4-6 hours for severe pain   If you were given a regional anesthetic (nerve block), it is helpful to take your pain medication before the block wear off.    Possible side effects include nausea, constipation, and urinary retention.  If you experience these side effects, please call our office for assistance.  Pain med refills are authorized only during office hours (8am-4pm, Mon-Fri).  Nausea Medication:   Zofran 4mg, take 1 tablet every 6 hours as needed for nausea or vomiting   Fill prescription ONLY if you expericnce severe nausea.  Blood Clot Prevention:   Pump your foot up and down 20 times per hour while you are less mobile.  Ambulate with your crutches at least once every hour   Take Aspirin 81mg Twice daily for 4 weeks    WOUND CARE:  Keep the dressing clean and dry.  Light drainage may occur the first 2 days postop.  You may remove the dressings and get the incision wet in the shower 72 hours after surgery.  DO NOT remove steri-strips or sutures.  DO NOT immerse the incision under water.  Carefully pat the incision dry.  If there is wound drainage, re-apply a fresh dry gauze dressing.  Please call our office ([phone]) if you experience either of the following:  Sudden increase in swelling, redness, or warmth at the surgical site  Excessive incisional drainage that persists beyond the 3rd day after surgery  Oral temperature greater than 101 degrees, not relieved with Tylenol  Shortness of breath, chest pain, nausea, or any other concerning symptoms  If it is after hours and you cannot reach someone, go to the nearest emergency room    ICE:  You may use Ice to help with pain control   Place ice on the  operative site for 20 minutes at a time when you are in pain     SLING:  Wear your sling AT ALL TIMES (including sleep) until your first postoperative office visit.  You may remove the sling for showering but must keep your arm at your side.    ACTIVITY:   DO NOT lift, carry, push, or pull anything with your operative arm.  Shoulder:  DO NOT actively (on your own) raise your operative arm away from the side of you body or rotate it out away from your body unless instructed by your surgeon or physical therapist.  Place a pillow behind the elbow while lying down.  Sleeping in a more upright position (recliner) may be more comfortable initially.  Elbow, Wrist, and Finger Motion:  You may take your elbow out of the slight carefully to move your elbow, wrist, and fingers. Follow your motion precautions for the shoulder. Replace the sling immediately after.     PHYSICAL THERAPY:  Begin therapy 5 TO 7 DAYS AFTER SURGERY.  You were given a prescription for therapy at your preoperative office visit.  If you do not have physical therapy scheduled yet, please call our office for assistance.    FOLLOW-UP APPOINTMENT:  2 weeks after surgery with:      Marbin Arevalo MD    [facility], [address], [address], [address]  [place] [address]    [facility] [id number] [place] [id number]      Phone:   [phone]

## 2024-12-02 NOTE — ANESTHESIA POSTPROCEDURE EVALUATION
Post-Op Assessment Note    Last Filed PACU Vitals:  Vitals Value Taken Time   Temp 97.2 °F (36.2 °C) 12/02/24 0830   Pulse 74 12/02/24 0854   /57 12/02/24 0854   Resp 20 12/02/24 0854   SpO2 96 % 12/02/24 0854       Modified Varsha:  Activity: 2 (12/2/2024  8:30 AM)  Respiration: 2 (12/2/2024  8:30 AM)  Circulation: 2 (12/2/2024  8:30 AM)  Consciousness: 1 (12/2/2024  8:30 AM)  Oxygen Saturation: 2 (12/2/2024  8:30 AM)  Modified Varsha Score: 9 (12/2/2024  8:30 AM)

## 2024-12-02 NOTE — OP NOTE
OPERATIVE REPORT  PATIENT NAME: Carrie Sow    :  1955  MRN: [id number]  Pt Location: [facility] OR ROOM 04    SURGERY DATE: 2024    Surgeons and Role:     * Marbin Arevalo MD - Primary     * Mayank Saavedra PA-C - Assisting    Preop Diagnosis:  Biceps tendonitis on left [M75.22]  S/P left rotator cuff repair [Z98.890]    Post-Op Diagnosis Codes:     * Biceps tendonitis on left [M75.22]     * S/P left rotator cuff repair [Z98.890]    Procedure(s):  Left - ARTHROSCOPIC BICEPS TENODESIS.   Left Shoulder arthroscopic limited debridement  Left shoulder arthroscopic SUBACROMIAL DECOMPRESSION    Specimen(s):  * No specimens in log *    Estimated Blood Loss:   Minimal    Drains:  * No LDAs found *    Anesthesia Type:   General w/ Interscalene Block    Operative Indications:  Biceps tendonitis on left [M75.22]  S/P left rotator cuff repair [Z98.890]  69-year-old female who presented for evaluation of the left shoulder.  She has a history of rotator cuff repair on 8/10/2023.  She continues to have biceps pain continued after surgery.  Originally she was managed nonoperatively with physical therapy, multiple ultrasound-guided injections of the biceps tendon.  These injections did help with her pain however they wore off.  We continue to discuss operative versus nonoperative treatment options.  After discussion of the risk benefits alternatives she elected to pursue operative intervention in the form of left shoulder arthroscopy and biceps tenodesis versus tenotomy.    Operative Findings:  Intact rotator cuff repair.  Signs of bicipital tendinitis.      Complications:   None    Procedure and Technique:      The patient was greeted in the preoperative holding area, where history, physical exam, review of surgical plan, and operative site aminah were performed. The patient was transferred to the operative suite, placed supine on the operative table.  General endotracheal anesthesia was induced, and perioperative  intravenous antibiotics were administered, 2 g of Ancef.  The patient was safely repositioned to the beach-chair position with cervical spine in neutral position. All bony prominences were well padded. SCDs were placed on bilateral lower extremities.      Exam under anesthesia was then performed which demonstrated full, symmetric range of motion. Stability exam showed stable load and shift.      The operative shoulder was prepped and draped in usual sterile fashion. A time out was performed with the participation of the entire operative and anesthesia team. Standard posterior glenohumeral and anterior rotator interval portals were established, through which the scope and probe were inserted respectively, which aided and assisted in performance of a complete and thorough diagnostic arthroscopy, which demonstrated the following findings:    Operative Findings:  Humeral head: Normal  Glenoid: Mild thinning anterior inferior glenoid with mild fraying  Biceps tendon and biceps anchor: Inflammation on the biceps anchor and along the anterior supraspinatus  Rotator Cuff: Repair intact, no signs of retear  Glenoid Labrum mild degenerative fraying  Subacromial moderate bursitis.    At this point, I then performed an limited glenohumeral debridement of frayed labral tissue,  hypertrophic synovium, and the residual bicep stump.     I then performed a biceps tenodesis.  Using the anterior portal a cannula was placed.  A loop and tack technique was performed, intra-articularly.  A fiber link was brought in through the anterior portal and circumferentially passed around the biceps tendon and looped upon itself.  Then using a scorpion he was brought back through the tendon.  This gave excellent capture the biceps tendon.  Biceps tendon was then tenotomized at the insertion of the superior labrum using electrocautery.  At this point the space at the top of the bicipital groove just above the subscapularis tendon, was prepared using  arthroscopic shaver and radiofrequency ablator for the tenodesis.  The bone was then punched for a 4.75 mm swivel lock anchor.  The fiber link was then brought through the anchor and the anchor was placed in the bone with excellent purchase and bite and good capture and stability of the biceps tendon with probing and humeral head motion.    I then placed the arthroscope into the subacromial space. After localization with a spinal needle, I made a lateral incision to establish a lateral portal.  I performed an extensive bursectomy , removed the soft tissue from the undersurface of the acromion. There  no prominent subacromial spur and adequate room for the rotator cuff without signs of impingement.     After bursectomy and SAD was completed evaluation of the rotator cuff again showed good healing of the rotator cuff with no further tears.            The shoulder was copiously irrigated and drained. The arthroscopic wounds were closed with 3-0 Nylon sutures. A sterile dressing and shoulder immobilizer were placed on the operative extremity.  The patient emerged from anesthesia and was taken to the recovery room in stable condition.  There were no apparent complications.     I was present for the entire procedure., A qualified resident physician was not available., and A physician assistant was required during the procedure for retraction, tissue handling, dissection and suturing.    Patient Disposition:  PACU              SIGNATURE: Marbin Arevalo MD  DATE: December 2, 2024  TIME: 10:17 AM        Biceps Tenodesis Post-operative Rehabilitation Protocol  Marbin Arevalo MD  [facility]  Phase 1: 0-2 weeks post-operatively   Goals  Protect repair   Minimize pain  Minimize swelling   Begin passive range of motion exercises, NO active extremity ROM   Sling use/ precautions  Sling for 3-4 weeks after surgery to be determined by surgeon  Must sleep in sling for first 3 weeks   Range of Motion    Avoid resisted elbow flexion/forearm supination for first 6 weeks o Elbow PROM flexion/extension and forearm supination/pronation   NO active external rotation, extension or abduction for 4-6 weeks   Scapulothoracic AROM in all planes   Passive and gentle active assisted ROM exercises   Pendulums   Flexion and scaption to 90°  ER to 40° for first 4 weeks o IR to 45°   Strengthening  Submaximal isometrics for glenohumeral and scapular musculature   Work on scapular stabilization  Arm immobilized seated scapular retraction   Hand gripping exercises - utilize red ball on sling   Home Instructions   Keep surgical dressings clean and dry  Change surgical bandages on the 3RD day after surgery (keep covered until first clinic visit)   Can bathe on the 2nd day after surgery (do not scrub, soak, or submerge the incisions)   Remove arm from sling only for writing, typing, hygiene and gentle ROM exercises (pendulums)   Take Aspirin (or other DVT medication) for 4 weeks after surgery    Criterion to progress to Phase II :  Full passive shoulder ROM   Full passive elbow ROM  Full passive forearm supination/pronation     Phase 2: 3-6 weeks post-op   Goals   Protect repair   Minimize pain   Minimize swelling   Sling use/ precautions   · Can discontinue sling at the discretion of the surgeon   Range of Motion   Weeks 3-4 post-op   No biceps tension for 6 weeks  No ER to 40°  Progress from passive range of motion to active as tolerated   Can do scar massage- no cross friction   Weeks 5-6 post-op   Flexion and scaption to 145° (can progress further if tolerated)   ER to 50°   IR to 60°   Initiate limited AROM/AAROM of shoulder to 90° flexion/abduction   Begin AROM supination with no resistance/elbow flexed  NO biceps loading until week 10   *full ROM should be achieved at 8-10 weeks  Strengthening  Initiate scapulothoracic isometrics  Initiate submaximal shoulder isometrics to include IR, ER, ABD, & ADD   Criterion to progress to  phase 3:   Pain-free, full shoulder, elbow flexion and extension, and forearm supination and pronation AROM  Dynamic scapular control with AROM     Phase 3: 7-12 weeks post-op   Goals   Progress/maintain full restoration of AROM   After week 8, can return to biking, running, Stairmaster, golf with proper kinematics   Range of Motion   Continue PROM to AROM of shoulder and elbow   NO biceps loading until week 10   Weeks 7-12 post-op   Resisted IR and ER at 30° ABD progressing to 90°  Slowly and cautiously progress resisted biceps curl, supination, pronation   Strengthening   Gain muscle endurance with high reps, low resistance   Isotonic IR and ER light resistance resisted movement with wrist in neutral (no supination)  Supine ABC and SA punches with high reps, low resistance   At week 6, begin prone scapular stability program   Weeks 7-12 post-op   Progress prone Scap 6 to Supine 5   Resisted SA punch and bear hugs while standing   Resisted low row, prone 30°/45°/90° to standing  Push-up plus: wall, counter, knees on floor, and floor   Rhythmic stabilization   ER & IR in scapular plane  Flexion, extension, ABD & ADD at various angles of elevation  Supine to standing diagonal patterns; D1 & D2   Resisted biceps curl, supination, and pronation   Begin closed chain stabilization exercises   Criterion to progress to Phase 4:   · Pain-free, full AROM of shoulder and elbow with normal scapulohumeral rhythm   · 5/5 MMT for RTC 90° ABD in scapular plane   · 5/5 MMT for scapulothoracic musculature   Phase 4: Weeks 12+   Goals   Return to Sport/Activity   Maintain full non-painful AROM  Progress strength and power without having to compensate  Progress higher level slowly as tolerated by patient   Return to sports progression: throwing/swimming   Range of Motion   Maintain full active range of motion Strengthening   Continue as above  Initiate plyometric training below shoulder to overhead: begin with both arms and progress  to a single arm   Low to higher velocity strengthening and plyometric activities: ball drops in prone to D2 reverse throws   Criterion to return to sports/activity:  Pain-free, stability & control with higher velocity movements including sport specific patterns and change of direction movements  Proper kinematic control transfer from the hip & core to the shoulder with dynamic movement

## 2024-12-02 NOTE — ANESTHESIA PREPROCEDURE EVALUATION
Procedure:  ARTHROSCOPIC BICEPS TENODESIS, possible biceps tenotomy (Left: Shoulder)    Relevant Problems   CARDIO   (+) Benign essential hypertension   (+) Chronic venous insufficiency   (+) Pure hypercholesterolemia   (+) Varicose veins of bilateral lower extremities with pain      GI/HEPATIC   (+) GERD without esophagitis      MUSCULOSKELETAL   (+) Acute left-sided low back pain without sciatica   (+) Osteoarthritis of knee      PULMONARY   (+) REGI (obstructive sleep apnea)        Physical Exam    Airway    Mallampati score: II  TM Distance: >3 FB  Neck ROM: full     Dental   No notable dental hx     Cardiovascular  Cardiovascular exam normal    Pulmonary  Pulmonary exam normal     Other Findings  post-pubertal.      Anesthesia Plan  ASA Score- 2     Anesthesia Type- general with ASA Monitors.         Additional Monitors:     Airway Plan: LMA.           Plan Factors-Exercise tolerance (METS): >4 METS.    Chart reviewed.   Existing labs reviewed. Patient summary reviewed.    Patient is not a current smoker.      Obstructive sleep apnea risk education given perioperatively.        Induction- intravenous.    Postoperative Plan- Plan for postoperative opioid use.     Perioperative Resuscitation Plan - Level 1 - Full Code.       Informed Consent- Anesthetic plan and risks discussed with patient.  I personally reviewed this patient with the CRNA. Discussed and agreed on the Anesthesia Plan with the CRNA..

## 2024-12-02 NOTE — H&P
Assessment/Plan:  1. Biceps tendonitis on left  Sling     Case request operating room: ARTHROSCOPIC BICEPS TENODESIS     Comprehensive metabolic panel     CBC and Platelet     APTT     Protime-INR     EKG 12 lead     Case request operating room: ARTHROSCOPIC BICEPS TENODESIS       2. S/P left rotator cuff repair  Case request operating room: ARTHROSCOPIC BICEPS TENODESIS     Case request operating room: ARTHROSCOPIC BICEPS TENODESIS          Scribe Attestation       I,:  Lulu Mooreandres am acting as a scribe while in the presence of the attending physician.:       I,:  Marbin Arevalo MD personally performed the services described in this documentation    as scribed in my presence.:                 Previous Discussion (No changes today):       Carrie is a pleasant 69 y.o. female who presents for follow-up of the left shoulder and MRI review. There was no evidence of rotator cuff re-tear since her surgery on 3/28/2023. There was inflammation surrounding the biceps tendon visualized. We discussed non operative versus operative treatment. Non operative treatment includes repeat corticosteroid injection of the biceps tendon sheath and physical therapy.  She has already had multiple injections of her biceps tendon and physical therapy.  She feels like she has now failed nonoperative treatment.  Operative treatment includes left shoulder arthroscopy with biceps tenodesis versus biceps tenotomy. The recovery times were discussed for both procedures today. She would remain in a sling for 3 weeks following the biceps tenodesis and begin physical therapy one week after surgery. A biceps tenotomy would require her to wear a sling for about less than a  week. She may experience difficulty with supination or pulling motions using the left arm. She expressed she enjoys cooking and uses the left arm frequently. She does have a trip planned for November and feels it would be best to wait until after the trip to undergo surgery. She  elected to proceed with the tenodesis procedure, . Risks and benefits were discussed today including infection and stiffness. A sling was provided to her today. She will meet with the surgical scheduler today to set up a date for the procedure.        Given that the patient has failed conservative treatment and continues to have severe pain and/or dysfunction that limits their activities of daily living, they would like to proceed with operative intervention.  We discussed risks, benefits and alternatives to surgery today in the clinic. We discussed with the patient the risks of no treatment, non-operative treatment, and operative treatment. The risks of operative intervention were discussed and include but are not limited to: Infection, bleeding, stiffness, loss of range of motion, blood clot, failure of surgery, fracture, delayed union, nonunion, malunion, risk of potential future arthritis, continued problems with swelling, injury to surrounding structures/nerve/artery/vein, recurrence of cysts, failure of hardware, retained hardware and/or foreign body, symptomatic hardware, and continued instability, pain, dysfunction, or disability despite repair.      Specifically, for         Biceps Tenodesis:  Risk of failure of repair or retear, biceps failure with kyree deformity, cramping, weakness, Arthrofibrosis and loss of motion needing further treatment, Continued pain/CPRS           Subjective:   Carrie Sow is a 69 y.o. female who presents for follow-up of the left shoulder and MRI review. There was no evidence of rotator cuff re-tear since her surgery on 3/28/2023. There was inflammation surrounding the biceps tendon visualized. She reports her pain is unchanged since her last visit.        Review of Systems   Constitutional:  Positive for activity change. Negative for chills and fever.   HENT:  Negative for ear pain and sore throat.    Eyes:  Negative for pain and visual disturbance.   Respiratory:  Negative  "for cough and shortness of breath.    Cardiovascular:  Negative for chest pain and palpitations.   Gastrointestinal:  Negative for abdominal pain and vomiting.   Genitourinary:  Negative for dysuria and hematuria.   Musculoskeletal:  Positive for myalgias. Negative for arthralgias and back pain.   Skin:  Negative for color change and rash.   Neurological:  Negative for seizures and syncope.   All other systems reviewed and are negative.           Medical History        Past Medical History:   Diagnosis Date    Breast lump      Chest pain 05/28/2019    COVID-19 04/09/2021    Disease of thyroid gland       nodule, benign    Eczema       facial rash- could also be rosacea    Elevated troponin I level 05/21/2021    GERD (gastroesophageal reflux disease)      History of transfusion       at 3 years old for bleeding during tonsillectomy, no reaction    Hypertension      Nontoxic single thyroid nodule 07/25/2012    Rectocele      Rotator cuff tear, left      Uterine leiomyoma      Varicose veins of both lower extremities              Surgical History         Past Surgical History:   Procedure Laterality Date    BLADDER SURGERY   08/2012     Bladder \"lift\"    BREAST BIOPSY Right 2000    BREAST LUMPECTOMY Right 2000     benign    COLONOSCOPY        HYSTERECTOMY   1990    JOINT REPLACEMENT   6/2017 and 5/2022     Knees    KNEE SURGERY Right       arthroscopy x2    NOSE SURGERY         deviated septum    NC LARYNGOSCOPY W/BIOPSY MICROSCOPE/TELESCOPE Left 04/02/2018     Procedure: MICROLARYNGOSCOPY AND EXCISION OF LEFT VOCAL FOLD POLYP;  Surgeon: Ochoa Cherry MD;  Location: [facility];  Service: ENT    NC SURGICAL ARTHROSCOPY SHOULDER W/ROTATOR CUFF RPR Left 03/28/2023     Procedure: Shoulder Arthroscopy with Rotator Cuff Repair and Subacromial decompression;  Surgeon: Suman Kessler MD;  Location: [facility];  Service: Orthopedics    NC XCAPSL CTRC RMVL INSJ IO LENS PROSTH W/O ECP Left 11/28/2022     Procedure: " EXTRACTION EXTRACAPSULAR CATARACT PHACO INTRAOCULAR LENS (IOL);  Surgeon: Duc Nash MD;  Location: [facility] MAIN OR;  Service: Ophthalmology    TENDON REPAIR Right       achilles tendon    TONSILLECTOMY        TUBAL LIGATION        VARICOSE VEIN SURGERY Left 2009            Family History         Family History   Problem Relation Age of Onset    Breast cancer Mother 66    Hypertension Mother      Varicose Veins Mother           of lower extremities    Glaucoma Mother           I also have glaucoma    Hypertension Father      Coronary artery disease Father      Coronary artery disease Sister      Heart disease Sister      Varicose Veins Sister           of lower extremities    No Known Problems Sister      No Known Problems Sister      Hypertension Brother      No Known Problems Daughter      Diabetes Maternal Grandmother           Also nephew William Fenstermacher    Breast cancer Maternal Aunt 65    No Known Problems Maternal Aunt      Thyroid disease Sister              Social History            Occupational History    Not on file   Tobacco Use    Smoking status: Former       Current packs/day: 0.00       Average packs/day: 0.5 packs/day for 4.0 years (2.0 ttl pk-yrs)       Types: Cigarettes       Start date: 1972       Quit date: 1976       Years since quittin.7    Smokeless tobacco: Never   Vaping Use    Vaping status: Never Used   Substance and Sexual Activity    Alcohol use: Yes       Comment: seldom    Drug use: Never    Sexual activity: Not Currently       Partners: Male       Birth control/protection: Female Sterilization           Current Medications      Current Outpatient Medications:     acetaminophen (TYLENOL) 325 mg tablet, Take 650 mg by mouth every 6 (six) hours as needed for mild pain, Disp: , Rfl:     amLODIPine (NORVASC) 5 mg tablet, Take 1 tablet (5 mg total) by mouth daily, Disp: 90 tablet, Rfl: 3    aspirin 81 mg chewable tablet, Chew 1 tablet (81 mg total) daily, Disp: 30  tablet, Rfl: 0    BIOTIN PO, Take by mouth in the morning, Disp: , Rfl:     cholecalciferol (VITAMIN D3) 1,000 units tablet, Take 1,000 Units by mouth daily, Disp: , Rfl:     esomeprazole (NexIUM) 40 MG capsule, TAKE 1 CAPSULE EVERY DAY, Disp: 90 capsule, Rfl: 1    Ting, Zingiber officinalis, (TING ROOT PO), Take by mouth in the morning, Disp: , Rfl:     hydrochlorothiazide (HYDRODIURIL) 25 mg tablet, Take 1 tablet (25 mg total) by mouth daily, Disp: 90 tablet, Rfl: 3    latanoprost (XALATAN) 0.005 % ophthalmic solution, Administer to both eyes daily at bedtime, Disp: , Rfl:     Magnesium 400 MG CAPS, Take 2 capsules (800 mg total) by mouth in the morning (Patient taking differently: Take 1 capsule by mouth in the morning), Disp: , Rfl:     metoprolol succinate (TOPROL-XL) 50 mg 24 hr tablet, Take 1 tablet (50 mg total) by mouth daily at bedtime, Disp: 90 tablet, Rfl: 3    multivitamin (THERAGRAN) TABS, Take 1 tablet by mouth daily, Disp: , Rfl:     olmesartan (BENICAR) 40 mg tablet, Take 1 tablet (40 mg total) by mouth daily, Disp: 90 tablet, Rfl: 3    Sulfacetamide Sodium (Sodium Sulfacetamide Wash) 10 % LIQD, Apply twice daily to affected area on face, Disp: 177 mL, Rfl: 2    vitamin B-12 (VITAMIN B-12) 1,000 mcg tablet, Take 1,000 mcg by mouth daily, Disp: , Rfl:         Allergies         Allergies   Allergen Reactions    Hydrocodone-Acetaminophen Dizziness    Irbesartan-Hydrochlorothiazide Other (See Comments) and Headache       Arm heaviness     Lisinopril Cough and Edema       swollen legs    Pollen Extract Hives       Ragweed etc.     Atorvastatin Myalgia            Objective:      Vitals:     09/11/24 1050   BP: 148/79   Pulse: 74         Left Shoulder Exam      Tenderness   The patient is experiencing tenderness in the biceps tendon.     Range of Motion   Forward flexion:  160      Muscle Strength   Abduction: 4/5   External rotation: 5/5   Biceps: 4/5      Tests   Drop arm: negative     Other    Erythema: absent  Sensation: normal  Pulse: present      Comments:  Positive empty can test. Positive speeds test                 Physical Exam  Vitals and nursing note reviewed.   Constitutional:       Appearance: Normal appearance.   HENT:      Head: Normocephalic and atraumatic.      Right Ear: External ear normal.      Left Ear: External ear normal.      Nose: Nose normal.   Eyes:      General: No scleral icterus.     Extraocular Movements: Extraocular movements intact.      Conjunctiva/sclera: Conjunctivae normal.   Cardiovascular:      Rate and Rhythm: Normal rate.   Pulmonary:      Effort: Pulmonary effort is normal. No respiratory distress.   Musculoskeletal:      Cervical back: Normal range of motion and neck supple.      Comments: See ortho exam   Skin:     General: Skin is warm and dry.   Neurological:      Mental Status: She is alert and oriented to person, place, and time.   Psychiatric:         Mood and Affect: Mood normal.         Behavior: Behavior normal.            I have personally reviewed pertinent films in PACS and my interpretation is as follows:  MRI of the left shoulder obtained on 8/21/2024 demonstrates Status post supraspinatus tendon repair without recurrent tear. Unchanged mild long head of biceps tenosynovitis without tear.        This document was created using speech voice recognition software.   Grammatical errors, random word insertions, pronoun errors, and incomplete sentences are an occasional consequence of this system due to software limitations, ambient noise, and hardware issues.   Any formal questions or concerns about content, text, or information contained within the body of this dictation should be directly addressed to the provider for clarification.

## 2024-12-03 RX ORDER — OLMESARTAN MEDOXOMIL 40 MG/1
40 TABLET ORAL EVERY MORNING
Qty: 90 TABLET | Refills: 3 | Status: SHIPPED | OUTPATIENT
Start: 2024-12-03

## 2024-12-06 ENCOUNTER — OFFICE VISIT (OUTPATIENT)
Dept: PHYSICAL THERAPY | Facility: CLINIC | Age: 69
End: 2024-12-06
Payer: MEDICARE

## 2024-12-06 DIAGNOSIS — Z98.890 STATUS POST SUBACROMIAL DECOMPRESSION: ICD-10-CM

## 2024-12-06 DIAGNOSIS — M75.22 BICEPS TENDONITIS ON LEFT: ICD-10-CM

## 2024-12-06 DIAGNOSIS — M25.512 LEFT SHOULDER PAIN, UNSPECIFIED CHRONICITY: Primary | ICD-10-CM

## 2024-12-06 PROCEDURE — 97161 PT EVAL LOW COMPLEX 20 MIN: CPT

## 2024-12-06 PROCEDURE — 97110 THERAPEUTIC EXERCISES: CPT

## 2024-12-06 NOTE — LETTER
2024    Marbin Arevalo MD  [address], [address]  [facility] [address]    Patient: Carrie Sow   YOB: 1955   Date of Visit: 2024     Encounter Diagnosis     ICD-10-CM    1. Left shoulder pain, unspecified chronicity  M25.512       2. Status post subacromial decompression  Z98.890       3. Biceps tendonitis on left  M75.22           Dear Dr. Arevalo:    Thank you for your recent referral of Carrie Sow. Please review the attached evaluation summary from Carrie's recent visit.     Please verify that you agree with the plan of care by signing the attached order.     If you have any questions or concerns, please do not hesitate to call.     I sincerely appreciate the opportunity to share in the care of one of your patients and hope to have another opportunity to work with you in the near future.       Sincerely,    Evita Arango, PT      Referring Provider:      I certify that I have read the below Plan of Care and certify the need for these services furnished under this plan of treatment while under my care.                    Marbin Arevalo MD  [address], [address]  [facility] [address]  Via In Basket          PT Evaluation     Today's date: 2024  Patient name: Carrie Sow  : 1955  MRN: [id number]  Referring provider: Jessica Dunaway MD  Dx: No diagnosis found.    Start Time: 1100  Stop Time: 1200  Total time in clinic (min): 60 minutes    Assessment  Impairments: abnormal muscle firing, abnormal muscle tone, abnormal or restricted ROM, abnormal movement, activity intolerance, impaired physical strength, lacks appropriate home exercise program, pain with function, poor posture , poor body mechanics, unable to perform ADL, participation limitations, activity limitations and endurance  Symptom irritability: moderate    Assessment details: Carrie Sow is a 69 y.o. year old female  presenting s/p L RTC repair on 12/02/24 who presents s/p L shoulder arthroscopic biceps tenodesis, limited debridement, and subacromial decompression performed by Dr. MARI Holland on 12/02/2024. Noted impairments include decreased L shoulder A/PROM, decreased L shoulder strength, decreased elbow AROM and muscle strength, decreased L  strength. Noted impairments limit the patients ability to perform functional LUE activities including ADLs and iADLs, OH movements, and sleeping. Patient would benefit from skilled PT to address noted impairments and promote return to PLOF and to maximize functional strength, ROM, and performance. PT reviewed HEP with Pt and pt demonstrated a good understanding of technique and dosage. Patient was instructed to discontinue exercises should symptoms be exacerbated.  Understanding of Dx/Px/POC: good     Prognosis: good    Goals  Short Terms Goals: (4 weeks)  Patient will be independent with HEP  Patient will report a 50% decrease in pain complaints.    Long Term Goals: (8 weeks)  Patient will demonstrate L=R shoulder AROM by 8-10 weeks.   Patient will demonstrate L=R elbow   Patient will demonstrate 5/5 MMT for RTC 90 degree abd in scapular plane by d/c.   Patient will return to all recreational activities without restriction.  FOTO score will be greater than or equal to predicted value.     Plan  Patient would benefit from: skilled physical therapy  Planned modality interventions: cryotherapy, neuromuscular electric stimulation and thermotherapy: hydrocollator packs    Planned therapy interventions: activity modification, IASTM, joint mobilization, manual therapy, massage, ADL retraining, ADL training, behavior modification, body mechanics training, nerve gliding, neuromuscular re-education, patient/caregiver education, postural training, self care, strengthening, flexibility, functional ROM exercises, stretching, therapeutic activities, therapeutic exercise, therapeutic training, home  "exercise program and IADL retraining    Frequency: 1-2x week  Duration in weeks: 12  Plan of Care beginning date: 2024  Treatment plan discussed with: patient  Plan details: Thank you for referring Carrie Sow to PT at [facility] and for the opportunity to coordinate care.       Subjective Evaluation    History of Present Illness  Date of surgery: 2024  Mechanism of injury: surgery  Mechanism of injury: Carrie Sow is a 69 y.o. F with hx of L RTC repair on 24 who presents s/p L shoulder arthroscopic biceps tenodesis, limited debridement, and subacromial decompression performed by Dr. MARI Holland on 2024. Reports she has been following her post-op guidelines, including the use of her sling, and post-op precautions. Report she will f/u with surgeon on 24. States she has been sleeping with her sling and using her prescribed medication, tylenol and ice for pain. Pt goals for PT include to be able to cook, increased strength, and restore ROM.  Quality of life: good    Patient Goals  Patient goals for therapy: decreased edema, decreased pain, increased motion, return to sport/leisure activities, independence with ADLs/IADLs and increased strength    Pain  Current pain ratin  At best pain ratin (At rest)  At worst pain ratin  Quality: dull ache    Hand dominance: left      Diagnostic Tests    FCE comments: MRI L Shoulder (24): \"IMPRESSION: (PRE-OP)  Status post supraspinatus tendon repair without recurrent tear (series 8 images 11-14.)  Unchanged mild long head of biceps tenosynovitis without tear (series 5 image 20.)\"  Treatments  Previous treatment: physical therapy and injection treatment      Objective    Red Flags: numbness/tingling, night pain, night sweats, fever, lack of coordination, ataxia, changes in vision, clumsy gait, B&B changes, unexplained weightloss: DENIES    Postural Findings: forward head, rounded shoulders, increased thoracic kyphosis    Range of " Motion measurements for the Cervical Spine (% Limited, or Degrees using Inclinometer) : WNL and pain free t/o reports some tightness with R SB on L     Range of Motion measurements for Shoulder (% limited, or degrees using goniometer)   IE   Joint Motion RIGHT  LEFT    Flexion 170 Not assessed this visit d/t post-op   Extension WNL    Abduction 170    Adduction WNL    Lateral Rotation, 0 abd WNL    Medial Rotation, 0 abd WNL    Functional ER WNL    Functional IR WNL      Elbow ROM: AROM wnl on R, WNL PROM on L (involved) for elbow flex, ext, pronation and supination as well as wrist flex,ext    Strength: MMT revealed the following findings.   IE   Joint Motion Right Left   Sh. Flexion 4+/5 Not assessed d/t post-op   Sh. Abduction 4/5    Sh. ER  (0 abd) 4/5    Sh. IR (0 abd) 4+/5    Shoulder extension 4/5    Shoulder Adduction 4+/5     Strength 5/5 4/5   Elbow Flexion 4/5 Not assessed d/t post-op   Elbow Extension 4/5 Not assessed d/t post-op     Neuro Screen:    UE Dermatomes Notes: Intact b/l t/o except reports some numbness in L C6 at thumb as anticipated post-op, with gradual improvement since end of surgery      Palpation: TTP L anterior shoulder at insertion site           Precautions: See Protocol  Weeks 0-2 : NO active extremity ROM, begin PROM    Manuals 12/6            Sh PROM per protocol             Elbow PROM per protocol                                       Neuro Re-Ed             Scap retraction, in sling 10x            Pendulums CW/CCW            Scapulothoracic AROM             Gentle AAROM, flex & scaption to 90             Gripping 30x                                      Ther Ex                                                                                                                     Ther Activity             Pt education Post-op protocol, ice, HEP, pgx, dgx                         Gait Training                                       Modalities             ICE

## 2024-12-06 NOTE — PROGRESS NOTES
PT Evaluation     Today's date: 2024  Patient name: Carrie Sow  : 1955  MRN: [id number]  Referring provider: Jessica Dunaway MD  Dx: No diagnosis found.    Start Time: 1100  Stop Time: 1200  Total time in clinic (min): 60 minutes    Assessment  Impairments: abnormal muscle firing, abnormal muscle tone, abnormal or restricted ROM, abnormal movement, activity intolerance, impaired physical strength, lacks appropriate home exercise program, pain with function, poor posture , poor body mechanics, unable to perform ADL, participation limitations, activity limitations and endurance  Symptom irritability: moderate    Assessment details: Carrie Sow is a 69 y.o. year old female presenting s/p L RTC repair on 24 who presents s/p L shoulder arthroscopic biceps tenodesis, limited debridement, and subacromial decompression performed by Dr. MARI Holland on 2024. Noted impairments include decreased L shoulder A/PROM, decreased L shoulder strength, decreased elbow AROM and muscle strength, decreased L  strength. Noted impairments limit the patients ability to perform functional LUE activities including ADLs and iADLs, OH movements, and sleeping. Patient would benefit from skilled PT to address noted impairments and promote return to PLOF and to maximize functional strength, ROM, and performance. PT reviewed HEP with Pt and pt demonstrated a good understanding of technique and dosage. Patient was instructed to discontinue exercises should symptoms be exacerbated.  Understanding of Dx/Px/POC: good     Prognosis: good    Goals  Short Terms Goals: (4 weeks)  Patient will be independent with HEP  Patient will report a 50% decrease in pain complaints.    Long Term Goals: (8 weeks)  Patient will demonstrate L=R shoulder AROM by 8-10 weeks.   Patient will demonstrate L=R elbow   Patient will demonstrate 5/5 MMT for RTC 90 degree abd in scapular plane by d/c.   Patient will return to all recreational  activities without restriction.  FOTO score will be greater than or equal to predicted value.     Plan  Patient would benefit from: skilled physical therapy  Planned modality interventions: cryotherapy, neuromuscular electric stimulation and thermotherapy: hydrocollator packs    Planned therapy interventions: activity modification, IASTM, joint mobilization, manual therapy, massage, ADL retraining, ADL training, behavior modification, body mechanics training, nerve gliding, neuromuscular re-education, patient/caregiver education, postural training, self care, strengthening, flexibility, functional ROM exercises, stretching, therapeutic activities, therapeutic exercise, therapeutic training, home exercise program and IADL retraining    Frequency: 1-2x week  Duration in weeks: 12  Plan of Care beginning date: 2024  Treatment plan discussed with: patient  Plan details: Thank you for referring Carrie Sow to PT at [facility] and for the opportunity to coordinate care.       Subjective Evaluation    History of Present Illness  Date of surgery: 2024  Mechanism of injury: surgery  Mechanism of injury: Carrie Sow is a 69 y.o. F with hx of L RTC repair on 24 who presents s/p L shoulder arthroscopic biceps tenodesis, limited debridement, and subacromial decompression performed by Dr. MARI Holland on 2024. Reports she has been following her post-op guidelines, including the use of her sling, and post-op precautions. Report she will f/u with surgeon on 24. States she has been sleeping with her sling and using her prescribed medication, tylenol and ice for pain. Pt goals for PT include to be able to cook, increased strength, and restore ROM.  Quality of life: good    Patient Goals  Patient goals for therapy: decreased edema, decreased pain, increased motion, return to sport/leisure activities, independence with ADLs/IADLs and increased strength    Pain  Current pain ratin  At best pain  "ratin (At rest)  At worst pain ratin  Quality: dull ache    Hand dominance: left      Diagnostic Tests    FCE comments: MRI L Shoulder (24): \"IMPRESSION: (PRE-OP)  Status post supraspinatus tendon repair without recurrent tear (series 8 images 11-14.)  Unchanged mild long head of biceps tenosynovitis without tear (series 5 image 20.)\"  Treatments  Previous treatment: physical therapy and injection treatment      Objective    Red Flags: numbness/tingling, night pain, night sweats, fever, lack of coordination, ataxia, changes in vision, clumsy gait, B&B changes, unexplained weightloss: DENIES    Postural Findings: forward head, rounded shoulders, increased thoracic kyphosis    Range of Motion measurements for the Cervical Spine (% Limited, or Degrees using Inclinometer) : WNL and pain free t/o reports some tightness with R SB on L     Range of Motion measurements for Shoulder (% limited, or degrees using goniometer)   IE   Joint Motion RIGHT  LEFT    Flexion 170 Not assessed this visit d/t post-op   Extension WNL    Abduction 170    Adduction WNL    Lateral Rotation, 0 abd WNL    Medial Rotation, 0 abd WNL    Functional ER WNL    Functional IR WNL      Elbow ROM: AROM wnl on R, WNL PROM on L (involved) for elbow flex, ext, pronation and supination as well as wrist flex,ext    Strength: MMT revealed the following findings.   IE   Joint Motion Right Left   Sh. Flexion 4+/5 Not assessed d/t post-op   Sh. Abduction 4/5    Sh. ER  (0 abd) 4/5    Sh. IR (0 abd) 4+/5    Shoulder extension 4/5    Shoulder Adduction 4+/5     Strength 5/5 4/5   Elbow Flexion 4/5 Not assessed d/t post-op   Elbow Extension 4/5 Not assessed d/t post-op     Neuro Screen:    UE Dermatomes Notes: Intact b/l t/o except reports some numbness in L C6 at thumb as anticipated post-op, with gradual improvement since end of surgery      Palpation: TTP L anterior shoulder at insertion site           Precautions: See Protocol  Weeks 0-2 : NO " active extremity ROM, begin PROM    Manuals 12/6            Sh PROM per protocol             Elbow PROM per protocol                                       Neuro Re-Ed             Scap retraction, in sling 10x            Pendulums CW/CCW            Scapulothoracic AROM             Gentle AAROM, flex & scaption to 90             Gripping 30x                                      Ther Ex                                                                                                                     Ther Activity             Pt education Post-op protocol, ice, HEP, pgx, dgx                         Gait Training                                       Modalities             ICE

## 2024-12-11 ENCOUNTER — OFFICE VISIT (OUTPATIENT)
Dept: PHYSICAL THERAPY | Facility: CLINIC | Age: 69
End: 2024-12-11
Payer: MEDICARE

## 2024-12-11 DIAGNOSIS — M25.512 LEFT SHOULDER PAIN, UNSPECIFIED CHRONICITY: Primary | ICD-10-CM

## 2024-12-11 DIAGNOSIS — Z98.890 STATUS POST SUBACROMIAL DECOMPRESSION: ICD-10-CM

## 2024-12-11 DIAGNOSIS — M75.22 BICEPS TENDONITIS ON LEFT: ICD-10-CM

## 2024-12-11 PROCEDURE — 97110 THERAPEUTIC EXERCISES: CPT

## 2024-12-11 PROCEDURE — 97140 MANUAL THERAPY 1/> REGIONS: CPT

## 2024-12-11 NOTE — PROGRESS NOTES
"Daily Note     Today's date: 2024  Patient name: Carrie Sow  : 1955  MRN: [id number]  Referring provider: Jessica Dunaway MD  Dx:   Encounter Diagnosis     ICD-10-CM    1. Left shoulder pain, unspecified chronicity  M25.512       2. Status post subacromial decompression  Z98.890       3. Biceps tendonitis on left  M75.22           Start Time: 0900  Stop Time: 0930  Total time in clinic (min): 30 minutes    Subjective: States her shoulder today is a little achy.       Objective: See treatment diary below      Assessment: Tolerated treatment well. Required v/t/c and demonstration for pendulums. Patient demonstrated fatigue post treatment, exhibited good technique with therapeutic exercises, and would benefit from continued PT      Plan: Continue per plan of care.      Precautions: See Protocol    Weeks 0-2: NO active extremity ROM, begin PROM    In Sling for 3-4 wks, d/c per surgeon (24-24)    Phase 1 (-) : Passive and gentle active assisted ROM exercises; pendulums, flexion and scaption to 90 degrees, ER to 40 for first 4 weeks, IR to 45 degrees    NO ACTIVE ER, EXTENSION OR ABDUCTION FOR 4-6 WEEKS.  AVOID RESISTED ELBOW FLEXION/FOREARM SUPINATION FOR FIRST 6 WEEKS (-)    Manuals            Sh PROM per protocol  IB  ER/IR, Flex & scap to 90           Elbow PROM per protocol  IB                                     Neuro Re-Ed             Scap retraction, in sling 10x 20x5\"           Pendulums CW/CCW 30x CW/CCW           Scapulothoracic AROM             Gentle AAROM, flex & scaption to 90             Gripping 30x 30x           C/s UT & LS stretch  3x30s                        Ther Ex                                                                                                                     Ther Activity             Pt education Post-op protocol, ice, HEP, pgx, dgx                         Gait Training                                       Modalities      "        ICE

## 2024-12-13 ENCOUNTER — OFFICE VISIT (OUTPATIENT)
Dept: PHYSICAL THERAPY | Facility: CLINIC | Age: 69
End: 2024-12-13
Payer: MEDICARE

## 2024-12-13 DIAGNOSIS — M25.512 LEFT SHOULDER PAIN, UNSPECIFIED CHRONICITY: Primary | ICD-10-CM

## 2024-12-13 DIAGNOSIS — M75.22 BICEPS TENDONITIS ON LEFT: ICD-10-CM

## 2024-12-13 DIAGNOSIS — Z98.890 STATUS POST SUBACROMIAL DECOMPRESSION: ICD-10-CM

## 2024-12-13 PROCEDURE — 97140 MANUAL THERAPY 1/> REGIONS: CPT

## 2024-12-13 PROCEDURE — 97110 THERAPEUTIC EXERCISES: CPT

## 2024-12-13 NOTE — PROGRESS NOTES
"Daily Note     Today's date: 2024  Patient name: Carrie Sow  : 1955  MRN: [id number]  Referring provider: Jessica Dunaway MD  Dx:   Encounter Diagnosis     ICD-10-CM    1. Left shoulder pain, unspecified chronicity  M25.512       2. Status post subacromial decompression  Z98.890       3. Biceps tendonitis on left  M75.22             Start Time: 0900  Stop Time: 09  Total time in clinic (min): 30 minutes    Subjective: States her shoulder continues to be a little achy here and there but nothing tylenol and ice hasn't helped.      Objective: See treatment diary below      Assessment: Tolerated treatment well. Continued with PROM and exercises per protocol where pt demos improved form with pendulums. Continue to progress per protocol. Patient demonstrated fatigue post treatment, exhibited good technique with therapeutic exercises, and would benefit from continued PT      Plan: Progress treament per protocol.      Precautions: See Protocol    Weeks 0-2: NO active extremity ROM, begin PROM    In Sling for 3-4 wks, d/c per surgeon (24-24)    Phase 1 (-) : Passive and gentle active assisted ROM exercises; pendulums, flexion and scaption to 90 degrees, ER to 40 for first 4 weeks, IR to 45 degrees    NO ACTIVE ER, EXTENSION OR ABDUCTION FOR 4-6 WEEKS.  AVOID RESISTED ELBOW FLEXION/FOREARM SUPINATION FOR FIRST 6 WEEKS (-)    Manuals           Sh PROM per protocol  IB  ER/IR, Flex & scap to 90 IB ER/IR, Flex & Scap to 90          Elbow PROM per protocol  IB IB          PROM forearm sup/pro   IB                       Neuro Re-Ed             Scap retraction, in sling 10x 20x5\" 20x5\"          Pendulums CW/CCW 30x CW/CCW 30x CW/CCW          Gentle AAROM, flex & scaption to 90             Gripping 30x 30x 30x          C/s UT & LS stretch  3x30s 3x30s          C/s AROM   2x10          Ther Ex                                                                            "                                          Ther Activity             Pt education Post-op protocol, ice, HEP, pgx, dgx                         Gait Training                                       Modalities             ICE

## 2024-12-15 DIAGNOSIS — I10 BENIGN ESSENTIAL HYPERTENSION: ICD-10-CM

## 2024-12-16 ENCOUNTER — APPOINTMENT (OUTPATIENT)
Dept: LAB | Facility: CLINIC | Age: 69
End: 2024-12-16
Payer: MEDICARE

## 2024-12-16 ENCOUNTER — NURSE TRIAGE (OUTPATIENT)
Age: 69
End: 2024-12-16

## 2024-12-16 DIAGNOSIS — R39.9 UTI SYMPTOMS: Primary | ICD-10-CM

## 2024-12-16 LAB
BACTERIA UR QL AUTO: ABNORMAL /HPF
BILIRUB UR QL STRIP: NEGATIVE
CLARITY UR: ABNORMAL
COLOR UR: ABNORMAL
GLUCOSE UR STRIP-MCNC: NEGATIVE MG/DL
HGB UR QL STRIP.AUTO: ABNORMAL
KETONES UR STRIP-MCNC: ABNORMAL MG/DL
LEUKOCYTE ESTERASE UR QL STRIP: ABNORMAL
NITRITE UR QL STRIP: NEGATIVE
NON-SQ EPI CELLS URNS QL MICRO: ABNORMAL /HPF
PH UR STRIP.AUTO: 6.5 [PH]
PROT UR STRIP-MCNC: ABNORMAL MG/DL
RBC #/AREA URNS AUTO: ABNORMAL /HPF
SP GR UR STRIP.AUTO: 1.01 (ref 1–1.03)
UROBILINOGEN UR STRIP-ACNC: <2 MG/DL
WBC #/AREA URNS AUTO: ABNORMAL /HPF
WBC CLUMPS # UR AUTO: PRESENT /UL

## 2024-12-16 PROCEDURE — 87086 URINE CULTURE/COLONY COUNT: CPT

## 2024-12-16 PROCEDURE — 87077 CULTURE AEROBIC IDENTIFY: CPT

## 2024-12-16 PROCEDURE — 81001 URINALYSIS AUTO W/SCOPE: CPT

## 2024-12-16 PROCEDURE — 87186 SC STD MICRODIL/AGAR DIL: CPT

## 2024-12-16 RX ORDER — CEPHALEXIN 500 MG/1
500 CAPSULE ORAL EVERY 12 HOURS SCHEDULED
Qty: 20 CAPSULE | Refills: 0 | Status: SHIPPED | OUTPATIENT
Start: 2024-12-16 | End: 2024-12-26

## 2024-12-16 NOTE — TELEPHONE ENCOUNTER
Patient of Elisha's last seen in the [place] office called in with concerns of hematuria that started today. States she also is experiencing lower back pain, dysuria and urinary frequency. Denies any fevers, blood clots or vomiting. Patient had recent shoulder surgery and is currently taking two baby aspirins a day. Advised to increase water intake and avoid bladder irritants. ED precautions reviewed. Placed urine orders to rule out infection. Please advise on any further recommendations.     Reason for Disposition   The patient has gross hematuria without clots, new onset, no urine testing    Answer Assessment - Initial Assessment Questions  1. When did you start with blood in your urine, and can you describe things in detail? Do you have any blood clots, is the hematuria continuous or intermittent and can you describe the color of the blood in your urine in relation to a beverage?   Today, dark pink   2. Do you have lower back, flank, or lower abdominal/bladder pain?   Lower back pain  3. Are you experiencing urinary frequency, urgency, pain or burning or any other changes in your urination like being unable to urinate?   Dysuria, frequency   4. Do you take any blood thinners?   On a double dose of baby aspirin   6. Have you had any recent urine testing or imaging? (UA with micro, urine culture, kidney ultrasound, CT scan)? Or any recent urologic surgery or procedures?   No    Protocols used: Urology-Gross Hemaruria-ADULT-OH

## 2024-12-16 NOTE — TELEPHONE ENCOUNTER
Patient called in regarding urine testing that she did today. Patient can see urinalysis resulted with many abnormalities. Did advise her that the cultures are not yet back.     [facility] - [place], [place] - [address] [[id number]]          Component  Ref Range & Units (hover) 12/16/24 1115 10/11/24 1424   Color, UA Dark Brown yellow   Clarity, UA Extra Turbid clear   Specific Gravity, UA 1.015 1.025 R   pH, UA 6.5 6.0 R   Leukocytes, UA Large Abnormal  - R   Nitrite, UA Negative - R   Protein,  (2+) Abnormal  - R   Glucose, UA Negative - R   Ketones, UA Trace Abnormal  - R   Urobilinogen, UA <2.0    Bilirubin, UA Negative    Occult Blood, UA Large Abnormal  - R   RBC, UA Innumerable Abnormal     WBC, UA Innumerable Abnormal     Epithelial Cells Occasional    Bacteria, UA Moderate Abnormal     WBC Clumps Present    SL AMB POCT UROBILINOGEN  0.2   BILIRUBIN,UA

## 2024-12-17 ENCOUNTER — OFFICE VISIT (OUTPATIENT)
Dept: PHYSICAL THERAPY | Facility: CLINIC | Age: 69
End: 2024-12-17
Payer: MEDICARE

## 2024-12-17 ENCOUNTER — OFFICE VISIT (OUTPATIENT)
Dept: OBGYN CLINIC | Facility: CLINIC | Age: 69
End: 2024-12-17

## 2024-12-17 VITALS
HEIGHT: 65 IN | WEIGHT: 207 LBS | DIASTOLIC BLOOD PRESSURE: 78 MMHG | SYSTOLIC BLOOD PRESSURE: 147 MMHG | HEART RATE: 80 BPM | BODY MASS INDEX: 34.49 KG/M2

## 2024-12-17 DIAGNOSIS — Z98.890 S/P ARTHROSCOPY OF LEFT SHOULDER: Primary | ICD-10-CM

## 2024-12-17 DIAGNOSIS — Z98.890 STATUS POST SUBACROMIAL DECOMPRESSION: ICD-10-CM

## 2024-12-17 DIAGNOSIS — M25.512 LEFT SHOULDER PAIN, UNSPECIFIED CHRONICITY: Primary | ICD-10-CM

## 2024-12-17 DIAGNOSIS — M75.22 BICEPS TENDONITIS ON LEFT: ICD-10-CM

## 2024-12-17 PROCEDURE — 97140 MANUAL THERAPY 1/> REGIONS: CPT

## 2024-12-17 PROCEDURE — 99024 POSTOP FOLLOW-UP VISIT: CPT | Performed by: ORTHOPAEDIC SURGERY

## 2024-12-17 PROCEDURE — 97110 THERAPEUTIC EXERCISES: CPT

## 2024-12-17 RX ORDER — HYDROCHLOROTHIAZIDE 25 MG/1
25 TABLET ORAL DAILY
Qty: 90 TABLET | Refills: 3 | Status: SHIPPED | OUTPATIENT
Start: 2024-12-17

## 2024-12-17 RX ORDER — AMLODIPINE BESYLATE 5 MG/1
5 TABLET ORAL EVERY MORNING
Qty: 90 TABLET | Refills: 3 | Status: SHIPPED | OUTPATIENT
Start: 2024-12-17

## 2024-12-17 NOTE — PROGRESS NOTES
Assessment/Plan:  1. S/P arthroscopy of left shoulder          The patient is doing well. She will continue her sling for 1 more week and then may remove this. She will continue physical therapy on the biceps tenodesis protocol. She will FU in 4 weeks for repeat evaluation.     Subjective:   Carrie Sow is a 69 y.o. female who presents today for follow-up of her left shoulder, now about 2 weeks status post arthroscopic biceps tenodesis. She has been compliant with her sling. She notes her pain has been well controlled. She notes good sensation of the left upper extremity. She has started physical therapy.      Review of Systems   Constitutional: Negative.  Negative for chills and fever.   HENT: Negative.  Negative for ear pain and sore throat.    Eyes: Negative.  Negative for pain and redness.   Respiratory: Negative.  Negative for shortness of breath and wheezing.    Cardiovascular:  Negative for chest pain and palpitations.   Gastrointestinal: Negative.  Negative for abdominal pain and blood in stool.   Endocrine: Negative.  Negative for polydipsia and polyuria.   Genitourinary: Negative.  Negative for difficulty urinating and dysuria.   Musculoskeletal:         As noted in HPI   Skin: Negative.  Negative for pallor and rash.   Neurological: Negative.  Negative for dizziness and numbness.   Hematological: Negative.  Negative for adenopathy. Does not bruise/bleed easily.   Psychiatric/Behavioral: Negative.  Negative for confusion and suicidal ideas.          Past Medical History:   Diagnosis Date   • Breast lump    • Chest pain 05/28/2019   • Cough 11/25/2024    runny nose-advised to see PCP prior to surgery,call surgeon if not improved   • COVID-19 04/09/2021   • Disease of thyroid gland     nodule, benign   • Eczema     facial rash- could also be rosacea   • Elevated troponin I level 05/21/2021   • GERD (gastroesophageal reflux disease)    • History of transfusion     at 3 years old for bleeding during  "tonsillectomy, no reaction   • Hypertension    • Nontoxic single thyroid nodule 07/25/2012   • Rectocele    • Rotator cuff tear, left    • Sleep apnea     mild case of sleep apnea unable to wear CPAP   • Uterine leiomyoma    • Varicose veins of both lower extremities    • Wears glasses     for reading       Past Surgical History:   Procedure Laterality Date   • BLADDER SURGERY  08/2012    Bladder \"lift\"   • BREAST BIOPSY Right 2000   • BREAST LUMPECTOMY Right 2000    benign   • COLONOSCOPY     • HYSTERECTOMY  1990   • JOINT REPLACEMENT  6/2017 and 5/2022    Knees   • KNEE SURGERY Right     arthroscopy x2   • LASIK Bilateral    • NOSE SURGERY      deviated septum   • NE LARYNGOSCOPY W/BIOPSY MICROSCOPE/TELESCOPE Left 04/02/2018    Procedure: MICROLARYNGOSCOPY AND EXCISION OF LEFT VOCAL FOLD POLYP;  Surgeon: Ochoa Cherry MD;  Location:  Main OR;  Service: ENT   • NE SURGICAL ARTHROSCOPY SHOULDER BICEPS TENODESIS Left 12/2/2024    Procedure: ARTHROSCOPIC BICEPS TENODESIS, SUBACROMIAL DECOMPRESSION;  Surgeon: Marbin Arevalo MD;  Location: [facility];  Service: Orthopedics   • NE SURGICAL ARTHROSCOPY SHOULDER W/ROTATOR CUFF RPR Left 03/28/2023    Procedure: Shoulder Arthroscopy with Rotator Cuff Repair and Subacromial decompression;  Surgeon: Suman Kessler MD;  Location: [facility];  Service: Orthopedics   • NE XCAPSL CTRC RMVL INSJ IO LENS PROSTH W/O ECP Left 11/28/2022    Procedure: EXTRACTION EXTRACAPSULAR CATARACT PHACO INTRAOCULAR LENS (IOL);  Surgeon: Duc Nash MD;  Location: [facility] MAIN OR;  Service: Ophthalmology   • TENDON REPAIR Right     achilles tendon   • TONSILLECTOMY     • TUBAL LIGATION     • VARICOSE VEIN SURGERY Left 12/2009       Family History   Problem Relation Age of Onset   • Breast cancer Mother 66   • Hypertension Mother    • Varicose Veins Mother         of lower extremities   • Glaucoma Mother         I also have glaucoma   • Hypertension Father    • Coronary artery " disease Father    • Coronary artery disease Sister    • Heart disease Sister    • Varicose Veins Sister         of lower extremities   • No Known Problems Sister    • No Known Problems Sister    • Hypertension Brother    • No Known Problems Daughter    • Diabetes Maternal Grandmother         Also nephew William Hectorermacher   • Breast cancer Maternal Aunt 65   • No Known Problems Maternal Aunt    • Thyroid disease Sister        Social History     Occupational History   • Not on file   Tobacco Use   • Smoking status: Former     Current packs/day: 0.00     Average packs/day: 0.5 packs/day for 4.0 years (2.0 ttl pk-yrs)     Types: Cigarettes     Start date: 1972     Quit date: 1976     Years since quittin.9   • Smokeless tobacco: Never   Vaping Use   • Vaping status: Never Used   Substance and Sexual Activity   • Alcohol use: Yes     Comment: seldom   • Drug use: Never   • Sexual activity: Not Currently     Partners: Male     Birth control/protection: Female Sterilization         Current Outpatient Medications:   •  aspirin 81 mg chewable tablet, Chew 1 tablet (81 mg total) 2 (two) times a day for 28 days, Disp: 56 tablet, Rfl: 0  •  BIOTIN PO, Take by mouth in the morning, Disp: , Rfl:   •  cephalexin (KEFLEX) 500 mg capsule, Take 1 capsule (500 mg total) by mouth every 12 (twelve) hours for 10 days, Disp: 20 capsule, Rfl: 0  •  cholecalciferol (VITAMIN D3) 1,000 units tablet, Take 1,000 Units by mouth daily, Disp: , Rfl:   •  esomeprazole (NexIUM) 40 MG capsule, TAKE 1 CAPSULE EVERY DAY (Patient taking differently: Take 40 mg by mouth every morning), Disp: 90 capsule, Rfl: 1  •  Ting, Zingiber officinalis, (TING ROOT PO), Take by mouth in the morning, Disp: , Rfl:   •  latanoprost (XALATAN) 0.005 % ophthalmic solution, Administer to both eyes daily at bedtime, Disp: , Rfl:   •  Magnesium 400 MG CAPS, Take 2 capsules (800 mg total) by mouth in the morning, Disp: , Rfl:   •  metoprolol succinate  (TOPROL-XL) 50 mg 24 hr tablet, Take 1 tablet (50 mg total) by mouth daily at bedtime, Disp: 90 tablet, Rfl: 3  •  multivitamin (THERAGRAN) TABS, Take 1 tablet by mouth every morning, Disp: , Rfl:   •  Myrbetriq 25 MG TB24, Take 25 mg by mouth in the morning, Disp: 90 tablet, Rfl: 3  •  olmesartan (BENICAR) 40 mg tablet, Take 1 tablet (40 mg total) by mouth every morning, Disp: 90 tablet, Rfl: 3  •  Sulfacetamide Sodium (Sodium Sulfacetamide Wash) 10 % LIQD, Apply twice daily to affected area on face, Disp: 177 mL, Rfl: 2  •  vitamin B-12 (VITAMIN B-12) 1,000 mcg tablet, Take 1,000 mcg by mouth every morning, Disp: , Rfl:   •  amLODIPine (NORVASC) 5 mg tablet, Take 1 tablet (5 mg total) by mouth every morning, Disp: 90 tablet, Rfl: 3  •  hydroCHLOROthiazide 25 mg tablet, TAKE 1 TABLET (25 MG TOTAL) BY MOUTH DAILY, Disp: 90 tablet, Rfl: 3  •  naproxen (Naprosyn) 500 mg tablet, Take 1 tablet (500 mg total) by mouth 2 (two) times a day with meals (Patient not taking: Reported on 12/17/2024), Disp: 60 tablet, Rfl: 0  •  nitrofurantoin (MACROBID) 100 mg capsule, Take 1 capsule (100 mg total) by mouth 2 (two) times a day for 7 days, Disp: 14 capsule, Rfl: 0  •  ondansetron (ZOFRAN) 4 mg tablet, Take 1 tablet (4 mg total) by mouth every 8 (eight) hours as needed for nausea or vomiting (Patient not taking: Reported on 12/17/2024), Disp: 20 tablet, Rfl: 0  •  oxyCODONE (Roxicodone) 5 immediate release tablet, Take 1 tablet (5 mg total) by mouth every 4 (four) hours as needed for moderate pain for up to 20 doses Max Daily Amount: 30 mg (Patient not taking: Reported on 12/17/2024), Disp: 20 tablet, Rfl: 0    Allergies   Allergen Reactions   • Hydrocodone-Acetaminophen Dizziness   • Irbesartan-Hydrochlorothiazide Other (See Comments) and Headache     Arm heaviness    • Lisinopril Cough and Edema     swollen legs   • Pollen Extract Hives     Ragweed etc.    • Atorvastatin Myalgia       Objective:  Vitals:    12/17/24 1109   BP:  147/78   Pulse: 80     Pain Score:   4      Ortho Exam  Left shoulder: Sutures removed. Incisions CDI. No erythema. No pain with gentle ROM of the shoulder. Sensation intact LUE. 2+ radial pulse.     Physical Exam  Constitutional:       General: She is not in acute distress.     Appearance: She is well-developed.   HENT:      Head: Normocephalic and atraumatic.   Eyes:      General: No scleral icterus.     Conjunctiva/sclera: Conjunctivae normal.   Neck:      Vascular: No JVD.   Cardiovascular:      Rate and Rhythm: Normal rate.   Pulmonary:      Effort: Pulmonary effort is normal. No respiratory distress.   Musculoskeletal:      Comments: As per HPI   Skin:     General: Skin is warm.   Neurological:      Mental Status: She is alert and oriented to person, place, and time.      Coordination: Coordination normal.             This document was created using speech voice recognition software.   Grammatical errors, random word insertions, pronoun errors, and incomplete sentences are an occasional consequence of this system due to software limitations, ambient noise, and hardware issues.   Any formal questions or concerns about content, text, or information contained within the body of this dictation should be directly addressed to the provider for clarification.

## 2024-12-17 NOTE — TELEPHONE ENCOUNTER
Called pt and let her know about abx. She was made aware that   If we need to change the abx based on her UC, she would receive a phone call.

## 2024-12-17 NOTE — PROGRESS NOTES
"Daily Note     Today's date: 2024  Patient name: Carrie Sow  : 1955  MRN: [id number]  Referring provider: Jessica Dunaway MD  Dx:   Encounter Diagnosis     ICD-10-CM    1. Left shoulder pain, unspecified chronicity  M25.512       2. Status post subacromial decompression  Z98.890       3. Biceps tendonitis on left  M75.22           Start Time: 1600  Stop Time: 1630  Total time in clinic (min): 30 minutes    Subjective: States her shoulder continues to be a little achy here and there but nothing tylenol and ice hasn't helped.      Objective: See treatment diary below      Assessment: Tolerated treatment well. Began AAROM today where pt demonstrates good form and able to tolerate well. Updated HEP to include wand AAROM flex, abd, and ER. Patient demonstrated fatigue post treatment, exhibited good technique with therapeutic exercises, and would benefit from continued PT      Plan: Progress treament per protocol.      Precautions: See Protocol    In Sling for one more week (ends 24)    Phase 1 (-) : Passive and gentle active assisted ROM exercises; pendulums, flexion and scaption to 90 degrees, ER to 40 for first 4 weeks, IR to 45 degrees    NO ACTIVE ER, EXTENSION OR ABDUCTION FOR 4-6 WEEKS.  AVOID RESISTED ELBOW FLEXION/FOREARM SUPINATION FOR FIRST 6 WEEKS (-)    Access Code: [email or web address]    Manuals          Sh PROM per protocol  IB  ER/IR, Flex & scap to 90 IB ER/IR, Flex & Scap to 90 IB         Elbow PROM per protocol  IB IB          PROM forearm sup/pro   IB                       Neuro Re-Ed             Scap retraction, in sling 10x 20x5\" 20x5\" 20x5\"         Pendulums CW/CCW 30x CW/CCW 30x CW/CCW 30x CW/CCW         Gentle AAROM, flex & scaption to 90             Gripping 30x 30x 30x 30x         C/s UT & LS stretch  3x30s 3x30s 3x30s         C/s AROM   2x10          Ther Ex             AAROM Sh Flexiion wand    10x10\"         AAROM Sh ER wand    10x10\"      "                                                                                  Ther Activity             Pt education Post-op protocol, ice, HEP, pgx, dgx                         Gait Training                                       Modalities             ICE

## 2024-12-18 ENCOUNTER — RESULTS FOLLOW-UP (OUTPATIENT)
Dept: UROLOGY | Facility: CLINIC | Age: 69
End: 2024-12-18

## 2024-12-18 DIAGNOSIS — R39.9 UTI SYMPTOMS: Primary | ICD-10-CM

## 2024-12-18 RX ORDER — NITROFURANTOIN 25; 75 MG/1; MG/1
100 CAPSULE ORAL 2 TIMES DAILY
Qty: 14 CAPSULE | Refills: 0 | Status: SHIPPED | OUTPATIENT
Start: 2024-12-18 | End: 2024-12-25

## 2024-12-18 NOTE — TELEPHONE ENCOUNTER
----- Message from WILLIAMS Hutchison sent at 12/18/2024 10:28 AM EST -----  Patient should stop keflex and start macrobid. Previous abx is not susceptible to treating her infection.

## 2024-12-19 ENCOUNTER — OFFICE VISIT (OUTPATIENT)
Dept: PHYSICAL THERAPY | Facility: CLINIC | Age: 69
End: 2024-12-19
Payer: MEDICARE

## 2024-12-19 DIAGNOSIS — M25.512 LEFT SHOULDER PAIN, UNSPECIFIED CHRONICITY: Primary | ICD-10-CM

## 2024-12-19 DIAGNOSIS — Z98.890 STATUS POST SUBACROMIAL DECOMPRESSION: ICD-10-CM

## 2024-12-19 DIAGNOSIS — M75.22 BICEPS TENDONITIS ON LEFT: ICD-10-CM

## 2024-12-19 LAB
BACTERIA UR CULT: ABNORMAL
BACTERIA UR CULT: ABNORMAL

## 2024-12-19 PROCEDURE — 97110 THERAPEUTIC EXERCISES: CPT

## 2024-12-19 PROCEDURE — 97140 MANUAL THERAPY 1/> REGIONS: CPT

## 2024-12-19 NOTE — PROGRESS NOTES
"Daily Note     Today's date: 2024  Patient name: Carrie Sow  : 1955  MRN: [id number]  Referring provider: Jessica Dunaway MD  Dx:   Encounter Diagnosis     ICD-10-CM    1. Left shoulder pain, unspecified chronicity  M25.512       2. Status post subacromial decompression  Z98.890       3. Biceps tendonitis on left  M75.22             Start Time: 1100  Stop Time: 1130  Total time in clinic (min): 30 minutes    Subjective: States she is continuing to do well. Says she did not need to use ice the other day and she is eager to remove her sling.      Objective: See treatment diary below      Assessment: Tolerated treatment well. Continued with exercises per protocol where pt able to tolerate well. Plan to assess ROM NV and progress to Phase II of protocol pending full passive Sh ROM. Patient demonstrated fatigue post treatment, exhibited good technique with therapeutic exercises, and would benefit from continued PT      Plan: Progress treament per protocol.      Precautions: See Protocol    In Sling for one more week (ends 24)    Phase 1 (-) : Passive and gentle active assisted ROM exercises; pendulums, flexion and scaption to 90 degrees, ER to 40 for first 4 weeks, IR to 45 degrees      NO ACTIVE ER, EXTENSION OR ABDUCTION FOR 4-6 WEEKS.  AVOID RESISTED ELBOW FLEXION/FOREARM SUPINATION FOR FIRST 6 WEEKS (-)    Access Code: [email or web address]    Manuals         Sh PROM per protocol  IB  ER/IR, Flex & scap to 90 IB ER/IR, Flex & Scap to 90 IB IB        Elbow PROM per protocol  IB IB  IB        PROM forearm sup/pro   IB  IB                     Neuro Re-Ed             Scap retraction, in sling 10x 20x5\" 20x5\" 20x5\" 20x5\"        Pendulums CW/CCW 30x CW/CCW 30x CW/CCW 30x CW/CCW 30x CW/CCW        Gentle AAROM, flex & scaption to 90             Gripping 30x 30x 30x 30x 20x Blue digiflex        C/s UT & LS stretch  3x30s 3x30s 3x30s 3x30s        C/s AROM   2x10        " "  Scap Stabilization             Ther Ex             [facility] Flexiion wanchelita, per protocol    10x10\" 10x10\"        [facility], per protocol    10x10\" 10x10\"        [facility] Abd, wanchelita, per protocol     10x10\"                                                                         Ther Activity             Pt education Post-op protocol, ice, HEP, pgx, dgx                         Gait Training                                       Modalities             ICE                               "

## 2024-12-23 ENCOUNTER — OFFICE VISIT (OUTPATIENT)
Dept: PHYSICAL THERAPY | Facility: CLINIC | Age: 69
End: 2024-12-23
Payer: MEDICARE

## 2024-12-23 DIAGNOSIS — M75.22 BICEPS TENDONITIS ON LEFT: ICD-10-CM

## 2024-12-23 DIAGNOSIS — M25.512 LEFT SHOULDER PAIN, UNSPECIFIED CHRONICITY: Primary | ICD-10-CM

## 2024-12-23 DIAGNOSIS — Z98.890 STATUS POST SUBACROMIAL DECOMPRESSION: ICD-10-CM

## 2024-12-23 PROCEDURE — 97140 MANUAL THERAPY 1/> REGIONS: CPT

## 2024-12-23 PROCEDURE — 97110 THERAPEUTIC EXERCISES: CPT

## 2024-12-23 NOTE — PROGRESS NOTES
"Daily Note     Today's date: 2024  Patient name: Carrie Sow  : 1955  MRN: [id number]  Referring provider: Jessica Dunaway MD  Dx:   Encounter Diagnosis     ICD-10-CM    1. Left shoulder pain, unspecified chronicity  M25.512       2. Status post subacromial decompression  Z98.890       3. Biceps tendonitis on left  M75.22             Start Time: 1135  Stop Time: 1205  Total time in clinic (min): 30 minutes    Subjective: States she is continuing to do well. Says she did not need to use ice the other day and she is eager to remove her sling.      Objective: See treatment diary below      Assessment: Tolerated treatment well. Pt progressing well with ROM. Reports some mild discomfort with sh abd, though ROM improved since LV. Work on scapular stabilization as well without difficulty. Patient demonstrated fatigue post treatment, exhibited good technique with therapeutic exercises, and would benefit from continued PT      Plan: Progress treament per protocol.      Precautions: See printed protocol    In Sling for one more week (ends 24)    NO ACTIVE ER, EXTENSION OR ABDUCTION FOR 4-6 WEEKS.  AVOID RESISTED ELBOW FLEXION/FOREARM SUPINATION FOR FIRST 6 WEEKS (-)    Access Code: [email or web address]    Manuals        Sh PROM per protocol  IB  ER/IR, Flex & scap to 90 IB ER/IR, Flex & Scap to 90 IB IB IB       Elbow PROM per protocol  IB IB  IB IB       PROM forearm sup/pro   IB  IB IB                    Neuro Re-Ed             Scap retraction, in sling 10x 20x5\" 20x5\" 20x5\" 20x5\" 20x5\"       Pendulums CW/CCW 30x CW/CCW 30x CW/CCW 30x CW/CCW 30x CW/CCW        Gentle AAROM, flex & scaption to 90             Gripping 30x 30x 30x 30x 20x Blue digiflex        C/s UT & LS stretch  3x30s 3x30s 3x30s 3x30s        C/s AROM   2x10          Scap Stabilization      20x       Bicep curl      2x10       Ther Ex             AAROM Sh Flexiion wand, per protocol    10x10\" 10x10\" 30x    " "   [facility], per protocol    10x10\" 10x10\" 30x       ALYSON  deyanira Tyler, per protocol     10x10\" 30x       Scaption, seated to 90      2x10                                                           Ther Activity             Pt education Post-op protocol, ice, HEP, pgx, dgx                         Gait Training                                       Modalities             ICE                               "

## 2024-12-26 ENCOUNTER — APPOINTMENT (OUTPATIENT)
Dept: PHYSICAL THERAPY | Facility: CLINIC | Age: 69
End: 2024-12-26
Payer: MEDICARE

## 2024-12-29 NOTE — PROGRESS NOTES
"Daily Note     Today's date: 2024  Patient name: Carrie Sow  : 1955  MRN: [id number]  Referring provider: Jessica Dunaway MD  Dx:   Encounter Diagnosis     ICD-10-CM    1. Left shoulder pain, unspecified chronicity  M25.512       2. Status post subacromial decompression  Z98.890       3. Biceps tendonitis on left  M75.22         Start Time: 1130  Stop Time: 1200  Total time in clinic (min): 30 minutes    Subjective: States she is continuing to do well. Says she did not need to use ice the other day and she is eager to remove her sling.      Objective: See treatment diary below      Assessment: Tolerated treatment well. ROM progressing well, with only minimal limitation with L shoulder abd d/t mild stretch. Incorporated Patient demonstrated fatigue post treatment, exhibited good technique with therapeutic exercises, and would benefit from continued PT      Plan: Progress treament per protocol.      Precautions: See printed protocol    NO ACTIVE ER, EXTENSION OR ABDUCTION FOR 4-6 WEEKS.  AVOID RESISTED ELBOW FLEXION/FOREARM SUPINATION FOR FIRST 6 WEEKS (-)    Access Code: [email or web address]    Manuals       Sh PROM per protocol  IB  ER/IR, Flex & scap to 90 IB ER/IR, Flex & Scap to 90 IB IB IB       Elbow PROM per protocol  IB IB  IB IB       PROM forearm sup/pro   IB  IB IB                    Neuro Re-Ed             Scap retraction, in sling 10x 20x5\" 20x5\" 20x5\" 20x5\" 20x5\" DC      Pendulums CW/CCW 30x CW/CCW 30x CW/CCW 30x CW/CCW 30x CW/CCW        Gentle AAROM, flex & scaption to 90             Gripping 30x 30x 30x 30x 20x Blue digiflex        C/s UT & LS stretch  3x30s 3x30s 3x30s 3x30s        C/s AROM   2x10          Scap Stabilization      20x 20x      Bicep curl      2x10 2x10      Ther Ex             AAROM Sh Flexiion wand, per protocol    10x10\" 10x10\" 30x       AAROM Sh ER wand, per protocol    10x10\" 10x10\" 30x       AAROM Sh Abd, wand, per protocol  " "   10x10\" 30x 20x      Scaption, seated to 90      2x10 3x10      AROM, Forearm supination       20x      AROM, sh flex       3x10      Rows       10x YTB      Pully       5 min      Finger ladder       5 abd      Ther Activity             Pt education Post-op protocol, ice, HEP, pgx, dgx                         Gait Training                                       Modalities             ICE                               "

## 2024-12-30 ENCOUNTER — OFFICE VISIT (OUTPATIENT)
Dept: PHYSICAL THERAPY | Facility: CLINIC | Age: 69
End: 2024-12-30
Payer: MEDICARE

## 2024-12-30 DIAGNOSIS — Z98.890 STATUS POST SUBACROMIAL DECOMPRESSION: ICD-10-CM

## 2024-12-30 DIAGNOSIS — M25.512 LEFT SHOULDER PAIN, UNSPECIFIED CHRONICITY: Primary | ICD-10-CM

## 2024-12-30 DIAGNOSIS — M75.22 BICEPS TENDONITIS ON LEFT: ICD-10-CM

## 2024-12-30 PROCEDURE — 97112 NEUROMUSCULAR REEDUCATION: CPT

## 2024-12-30 PROCEDURE — 97110 THERAPEUTIC EXERCISES: CPT

## 2025-01-03 ENCOUNTER — NURSE TRIAGE (OUTPATIENT)
Age: 70
End: 2025-01-03

## 2025-01-03 ENCOUNTER — APPOINTMENT (OUTPATIENT)
Dept: LAB | Facility: CLINIC | Age: 70
End: 2025-01-03
Payer: MEDICARE

## 2025-01-03 ENCOUNTER — OFFICE VISIT (OUTPATIENT)
Dept: PHYSICAL THERAPY | Facility: CLINIC | Age: 70
End: 2025-01-03
Payer: MEDICARE

## 2025-01-03 DIAGNOSIS — R39.9 UTI SYMPTOMS: Primary | ICD-10-CM

## 2025-01-03 DIAGNOSIS — M25.512 LEFT SHOULDER PAIN, UNSPECIFIED CHRONICITY: Primary | ICD-10-CM

## 2025-01-03 DIAGNOSIS — R39.9 UTI SYMPTOMS: ICD-10-CM

## 2025-01-03 DIAGNOSIS — M75.22 BICEPS TENDONITIS ON LEFT: ICD-10-CM

## 2025-01-03 DIAGNOSIS — Z98.890 STATUS POST SUBACROMIAL DECOMPRESSION: ICD-10-CM

## 2025-01-03 LAB
BACTERIA UR QL AUTO: ABNORMAL /HPF
BILIRUB UR QL STRIP: NEGATIVE
CLARITY UR: ABNORMAL
COLOR UR: ABNORMAL
GLUCOSE UR STRIP-MCNC: NEGATIVE MG/DL
HGB UR QL STRIP.AUTO: ABNORMAL
KETONES UR STRIP-MCNC: NEGATIVE MG/DL
LEUKOCYTE ESTERASE UR QL STRIP: ABNORMAL
NITRITE UR QL STRIP: NEGATIVE
NON-SQ EPI CELLS URNS QL MICRO: ABNORMAL /HPF
PH UR STRIP.AUTO: 6.5 [PH]
PROT UR STRIP-MCNC: ABNORMAL MG/DL
RBC #/AREA URNS AUTO: ABNORMAL /HPF
SP GR UR STRIP.AUTO: 1.01 (ref 1–1.03)
UROBILINOGEN UR STRIP-ACNC: <2 MG/DL
WBC #/AREA URNS AUTO: ABNORMAL /HPF

## 2025-01-03 PROCEDURE — 87181 SC STD AGAR DILUTION PER AGT: CPT

## 2025-01-03 PROCEDURE — 87077 CULTURE AEROBIC IDENTIFY: CPT

## 2025-01-03 PROCEDURE — 81001 URINALYSIS AUTO W/SCOPE: CPT

## 2025-01-03 PROCEDURE — 97110 THERAPEUTIC EXERCISES: CPT

## 2025-01-03 PROCEDURE — 87186 SC STD MICRODIL/AGAR DIL: CPT

## 2025-01-03 PROCEDURE — 87086 URINE CULTURE/COLONY COUNT: CPT

## 2025-01-03 NOTE — TELEPHONE ENCOUNTER
She should continue Myrbetriq. Will have appointment as scheduled later this month for PVR check. Thanks

## 2025-01-03 NOTE — TELEPHONE ENCOUNTER
Inquiring if Mybetriq can cause bladder infections and if she should hold it right now.   As of the end of December patient reports dysuria, bladder pressure/discomfort. Able to void, denies any other symptoms at this time. Reports staying well hydrated with water and avoiding bladder irritants. Reviewed bladder irritants to avoid, and to stay hydrated with at least 64 oz. Of water/day as long as no fluid restrictions. Emergency room precautions reviewed as well.    Reason for Disposition   The patient has an unknown case of mild dysuria    Answer Assessment - Initial Assessment Questions  1. When did your symptoms start?   End of December.   2. Do you experience pain or burning with every void?   yes  3. Do you have any other urinary symptoms such as urinary frequency, urgency, incontinence, blood in your urine?   Bladder pressure   4. Do you have any abdominal pain or flank pain?  Yes, pressure   5. Do you have a fever of 101 or higher? How did you take your temperature?   no  5. Have you become unable to urinate?   no    Protocols used: Urology-Dysuria-ADULT-[place]  Verified pharmacy:   [facility] - [address] [[id number]]   If able to call back today she requests we call her cell#: [phone] (Mobile)

## 2025-01-03 NOTE — PROGRESS NOTES
"Daily Note     Today's date: 1/3/2025  Patient name: Carrie Sow  : 1955  MRN: [id number]  Referring provider: Jessica Dunaway MD  Dx:   Encounter Diagnosis     ICD-10-CM    1. Left shoulder pain, unspecified chronicity  M25.512       2. Biceps tendonitis on left  M75.22       3. Status post subacromial decompression  Z98.890           Start Time: 1100  Stop Time: 1126  Total time in clinic (min): 26 minutes    Subjective: States she's been doing well and did not have any soreness after her previous appointment.       Objective: See treatment diary below      Assessment: Tolerated treatment well. L sh abduction WNL this session. Patient demonstrated fatigue post treatment, exhibited good technique with therapeutic exercises, and would benefit from continued PT      Plan: Progress treament per protocol.      Precautions: See printed protocol    NO ACTIVE ER, EXTENSION OR ABDUCTION FOR 4-6 WEEKS.  AVOID RESISTED ELBOW FLEXION/FOREARM SUPINATION FOR FIRST 6 WEEKS (-)    Access Code: [email or web address]    Manuals 12/13 12/17 12/19 12/23 12/30 1/3     Sh PROM per protocol IB ER/IR, Flex & Scap to 90 IB IB IB       Elbow PROM per protocol IB  IB IB       PROM forearm sup/pro IB  IB IB                  Neuro Re-Ed           Scap retraction, in sling 20x5\" 20x5\" 20x5\" 20x5\"       Pendulums 30x CW/CCW 30x CW/CCW 30x CW/CCW        Gentle AAROM, flex & scaption to 90           Gripping 30x 30x 20x Blue digiflex        C/s UT & LS stretch 3x30s 3x30s 3x30s        C/s AROM 2x10          Scap Stabilization    20x 20x      Bicep curl    2x10 2x10 30x     Ther Ex           Seated t/s ext      30x     AAROM Sh Flexiion wand, per protocol  10x10\" 10x10\" 30x       AAROM Sh ER wand, per protocol  10x10\" 10x10\" 30x       AAROM Sh Abd, wand, per protocol   10x10\" 30x 20x      Scaption, seated to 90    2x10 3x10 3x10     AROM, Forearm supination     20x 30x     AROM, sh flex     3x10 3x10     AROM, sh abd      3x10     Rows     " 10x YTB 20x YTB     Pully     5 min 5 min     Finger ladder     5 abd 5 abd     Ther Activity           Pt education                      Gait Training                                 Modalities           ICE

## 2025-01-05 LAB
BACTERIA UR CULT: ABNORMAL
BACTERIA UR CULT: ABNORMAL

## 2025-01-06 ENCOUNTER — APPOINTMENT (OUTPATIENT)
Dept: PHYSICAL THERAPY | Facility: CLINIC | Age: 70
End: 2025-01-06
Payer: MEDICARE

## 2025-01-06 ENCOUNTER — RESULTS FOLLOW-UP (OUTPATIENT)
Dept: UROLOGY | Facility: CLINIC | Age: 70
End: 2025-01-06

## 2025-01-06 ENCOUNTER — OFFICE VISIT (OUTPATIENT)
Dept: FAMILY MEDICINE CLINIC | Facility: CLINIC | Age: 70
End: 2025-01-06
Payer: MEDICARE

## 2025-01-06 VITALS
BODY MASS INDEX: 34.69 KG/M2 | DIASTOLIC BLOOD PRESSURE: 52 MMHG | HEART RATE: 77 BPM | TEMPERATURE: 98.2 F | OXYGEN SATURATION: 96 % | SYSTOLIC BLOOD PRESSURE: 98 MMHG | WEIGHT: 208.2 LBS | HEIGHT: 65 IN | RESPIRATION RATE: 16 BRPM

## 2025-01-06 DIAGNOSIS — N39.0 RECURRENT UTI (URINARY TRACT INFECTION): Primary | ICD-10-CM

## 2025-01-06 DIAGNOSIS — E66.09 CLASS 1 OBESITY DUE TO EXCESS CALORIES WITH SERIOUS COMORBIDITY AND BODY MASS INDEX (BMI) OF 34.0 TO 34.9 IN ADULT: ICD-10-CM

## 2025-01-06 DIAGNOSIS — I83.812 VARICOSE VEINS OF LEFT LOWER EXTREMITY WITH PAIN: ICD-10-CM

## 2025-01-06 DIAGNOSIS — E66.811 CLASS 1 OBESITY DUE TO EXCESS CALORIES WITH SERIOUS COMORBIDITY AND BODY MASS INDEX (BMI) OF 34.0 TO 34.9 IN ADULT: ICD-10-CM

## 2025-01-06 DIAGNOSIS — R39.9 UTI SYMPTOMS: Primary | ICD-10-CM

## 2025-01-06 DIAGNOSIS — K21.9 GERD WITHOUT ESOPHAGITIS: ICD-10-CM

## 2025-01-06 LAB
BACTERIA UR CULT: ABNORMAL
BACTERIA UR CULT: ABNORMAL

## 2025-01-06 PROCEDURE — G2211 COMPLEX E/M VISIT ADD ON: HCPCS | Performed by: FAMILY MEDICINE

## 2025-01-06 PROCEDURE — 99214 OFFICE O/P EST MOD 30 MIN: CPT | Performed by: FAMILY MEDICINE

## 2025-01-06 RX ORDER — NITROFURANTOIN 25; 75 MG/1; MG/1
100 CAPSULE ORAL 2 TIMES DAILY
Qty: 14 CAPSULE | Refills: 0 | Status: SHIPPED | OUTPATIENT
Start: 2025-01-06 | End: 2025-01-13

## 2025-01-06 RX ORDER — PANTOPRAZOLE SODIUM 40 MG/1
40 TABLET, DELAYED RELEASE ORAL
Qty: 30 TABLET | Refills: 5 | Status: SHIPPED | OUTPATIENT
Start: 2025-01-06 | End: 2025-07-05

## 2025-01-06 NOTE — PROGRESS NOTES
Assessment & Plan      Assessment & Plan  Recurrent UTI (urinary tract infection)  Urology has sent in another week of macrobid  Reviewed cultures and sensitivies.  Mutldrug resistant e coli.  Same sensitivities overall as prev.  Concern for possible pyelo.  Reviewed to go to the ER if any worsening.    If not improving after two days, she will reach out          GERD without esophagitis  Dc nexium which she has been on x years but is n longer noticing as much benefit  Start pantoprazole  \ok for prn pepcid  Avoid trigger foods  Orders:  •  pantoprazole (PROTONIX) 40 mg tablet; Take 1 tablet (40 mg total) by mouth daily before breakfast    Varicose veins of left lower extremity with pain  Pain in the LLE varicose veins.  Requests referral to anabell possible tretment.  Orders:  •  Ambulatory Referral to Vascular Surgery; Future    Class 1 obesity due to excess calories with serious comorbidity and body mass index (BMI) of 34.0 to 34.9 in adult    Has lost about 10 lbs from OV ove rthe summer  Working on healthy habits.                  Most recent labs reviewed     Subjective:     Carrie is a 69 y.o. female here today and has the below chronic conditions:    Patient Active Problem List   Diagnosis   • Allergic rhinitis   • Benign essential hypertension   • Dermatitis   • GERD without esophagitis   • Hyperlipoproteinemia   • Nontoxic single thyroid nodule   • Osteoarthritis of knee   • Rectocele   • Uterovaginal prolapse   • Cystocele, lateral   • Varicose veins of bilateral lower extremities with pain   • Family history of sudden cardiac death   • Pure hypercholesterolemia   • Class 1 obesity due to excess calories with serious comorbidity and body mass index (BMI) of 34.0 to 34.9 in adult   • Chronic venous insufficiency   • Actinic keratosis   • Rosacea   • Acute left-sided low back pain without sciatica   • Arthralgia   • Urge incontinence of urine   • Long-term current use of proton pump inhibitor therapy    • Left upper quadrant abdominal pain   • Biceps tendonitis on left   • REGI (obstructive sleep apnea)     Current Outpatient Medications   Medication Sig Dispense Refill   • amLODIPine (NORVASC) 5 mg tablet Take 1 tablet (5 mg total) by mouth every morning 90 tablet 3   • BIOTIN PO Take by mouth in the morning     • cholecalciferol (VITAMIN D3) 1,000 units tablet Take 1,000 Units by mouth daily     • Ginger, Zingiber officinalis, (GINGER ROOT PO) Take by mouth in the morning     • hydroCHLOROthiazide 25 mg tablet TAKE 1 TABLET (25 MG TOTAL) BY MOUTH DAILY 90 tablet 3   • latanoprost (XALATAN) 0.005 % ophthalmic solution Administer to both eyes daily at bedtime     • Magnesium 400 MG CAPS Take 2 capsules (800 mg total) by mouth in the morning     • metoprolol succinate (TOPROL-XL) 50 mg 24 hr tablet Take 1 tablet (50 mg total) by mouth daily at bedtime 90 tablet 3   • multivitamin (THERAGRAN) TABS Take 1 tablet by mouth every morning     • Myrbetriq 25 MG TB24 Take 25 mg by mouth in the morning 90 tablet 3   • nitrofurantoin (MACROBID) 100 mg capsule Take 1 capsule (100 mg total) by mouth 2 (two) times a day for 7 days 14 capsule 0   • olmesartan (BENICAR) 40 mg tablet Take 1 tablet (40 mg total) by mouth every morning 90 tablet 3   • pantoprazole (PROTONIX) 40 mg tablet Take 1 tablet (40 mg total) by mouth daily before breakfast 30 tablet 5   • Sulfacetamide Sodium (Sodium Sulfacetamide Wash) 10 % LIQD Apply twice daily to affected area on face 177 mL 2   • vitamin B-12 (VITAMIN B-12) 1,000 mcg tablet Take 1,000 mcg by mouth every morning     • aspirin 81 mg chewable tablet Chew 1 tablet (81 mg total) 2 (two) times a day for 28 days 56 tablet 0   • naproxen (Naprosyn) 500 mg tablet Take 1 tablet (500 mg total) by mouth 2 (two) times a day with meals (Patient not taking: Reported on 12/17/2024) 60 tablet 0   • ondansetron (ZOFRAN) 4 mg tablet Take 1 tablet (4 mg total) by mouth every 8 (eight) hours as needed for  nausea or vomiting (Patient not taking: Reported on 12/17/2024) 20 tablet 0   • oxyCODONE (Roxicodone) 5 immediate release tablet Take 1 tablet (5 mg total) by mouth every 4 (four) hours as needed for moderate pain for up to 20 doses Max Daily Amount: 30 mg (Patient not taking: Reported on 12/17/2024) 20 tablet 0     No current facility-administered medications for this visit.          Chief Complaint   Patient presents with   • Follow-up     6 mo f/u. Dx with UTI on 12/16, had urine re-tested 1/3, has not heard anything about results yet      HPI:  - CC above per clinical staff and reviewed.    History of Present Illness    History of Present Illness  The patient presents for evaluation of a UTI and abdominal pain.    UTI  - Diagnosed with a UTI in mid-December 2024, completed a 7-day course of Macrobid, last dose on [place] day.  - Symptoms persisted, submitted another urine sample on Friday.  - Reports feeling unwell, disrupted sleep, back pain, hematuria, and clotting during initial infection.  - Under care of urogynecologist Elisha for frequent urination and incontinence, prescribed Myrbetriq.  - Questions if Myrbetriq contributes to current symptoms.  - Urinary output minimal, requires large pads at night due to incontinence.  - Experienced chills for the past two nights, no fever.  - Maintains adequate hydration, urine slightly cloudy.  - Experiences pelvic pressure, discomfort when sitting, left-sided back pain, frequent nausea, reduced food intake, continues to drink water.  - Upcoming urogynecologist appointment on 01/21/2025.    Abdominal Pain  - Experiencing severe abdominal pain for several years, necessitates frequent position changes for relief.  - On long-term Nexium, concerned about potential ulcer development.  - Has not consulted a gastroenterologist.  - Bowel movements regular.    ALLERGIES  - No known allergies    MEDICATIONS  - Current: Myrbetriq, Nexium  - Past: Macrobid    The following  "portions of the patient's history were reviewed and updated as appropriate: allergies, current medications, past family history, past medical history, past social history, past surgical history and problem list.    ROS:  Review of Systems   As noted above.    Objective:      BP 98/52   Pulse 77   Temp 98.2 °F (36.8 °C) (Temporal)   Resp 16   Ht 5' 5\" (1.651 m)   Wt 94.4 kg (208 lb 3.2 oz)   SpO2 96%   BMI 34.65 kg/m²   BP Readings from Last 3 Encounters:   01/06/25 98/52   12/17/24 147/78   12/02/24 138/55     Wt Readings from Last 3 Encounters:   01/06/25 94.4 kg (208 lb 3.2 oz)   12/17/24 93.9 kg (207 lb)   12/02/24 94.1 kg (207 lb 7.3 oz)               Physical Exam:   Physical Exam  Vitals and nursing note reviewed.   Constitutional:       Appearance: Normal appearance. She is well-developed.   HENT:      Head: Normocephalic and atraumatic.   Cardiovascular:      Rate and Rhythm: Normal rate and regular rhythm.      Heart sounds: Normal heart sounds. No murmur heard.  Pulmonary:      Effort: Pulmonary effort is normal. No respiratory distress.      Breath sounds: Normal breath sounds. No wheezing.   Abdominal:      Palpations: Abdomen is soft.      Tenderness: There is abdominal tenderness (suprapubic tenderness and epigastric tenderness (mild-mod) without guarding or rebound). There is no guarding or rebound.   Musculoskeletal:      Cervical back: Neck supple.      Right lower leg: No edema.      Left lower leg: No edema.   Lymphadenopathy:      Cervical: No cervical adenopathy.   Skin:     General: Skin is warm and dry.   Neurological:      Mental Status: She is alert and oriented to person, place, and time.   Psychiatric:         Behavior: Behavior normal.     No CVA tenderness.    Physical Exam           This office visit note was generated in part with the use of [employer] and/or voice recognition dictation.   "

## 2025-01-06 NOTE — ASSESSMENT & PLAN NOTE
Ghassan nexium which she has been on x years but is n longer noticing as much benefit  Start pantoprazole  \ok for prn pepcid  Avoid trigger foods  Orders:  •  pantoprazole (PROTONIX) 40 mg tablet; Take 1 tablet (40 mg total) by mouth daily before breakfast

## 2025-01-07 NOTE — TELEPHONE ENCOUNTER
Spoke directly with patient to advise of note below:    ----- Message from WILLIAMS Hutchison sent at 1/6/2025  8:54 AM EST -----  Patient's urine positive for infection. Abx sent to pharmacy.       Patient stated they were at the PCP yesterday and PCP told them about the ABX. PCP also advised that if patient is not feeling better in 48 hours to go to the ER.

## 2025-01-09 ENCOUNTER — APPOINTMENT (OUTPATIENT)
Dept: PHYSICAL THERAPY | Facility: CLINIC | Age: 70
End: 2025-01-09
Payer: MEDICARE

## 2025-01-13 ENCOUNTER — OFFICE VISIT (OUTPATIENT)
Dept: PHYSICAL THERAPY | Facility: CLINIC | Age: 70
End: 2025-01-13
Payer: MEDICARE

## 2025-01-13 DIAGNOSIS — M25.512 LEFT SHOULDER PAIN, UNSPECIFIED CHRONICITY: Primary | ICD-10-CM

## 2025-01-13 DIAGNOSIS — Z98.890 STATUS POST SUBACROMIAL DECOMPRESSION: ICD-10-CM

## 2025-01-13 DIAGNOSIS — M75.22 BICEPS TENDONITIS ON LEFT: ICD-10-CM

## 2025-01-13 PROCEDURE — 97110 THERAPEUTIC EXERCISES: CPT

## 2025-01-13 PROCEDURE — 97164 PT RE-EVAL EST PLAN CARE: CPT

## 2025-01-13 NOTE — PROGRESS NOTES
PT RE-EVALUATION    Today's date: 2025  Patient name: Carrie Sow  : 1955  MRN: [id number]  Referring provider: Jessica Dunaway MD  Dx:   Encounter Diagnosis     ICD-10-CM    1. Left shoulder pain, unspecified chronicity  M25.512       2. Biceps tendonitis on left  M75.22       3. Status post subacromial decompression  Z98.890           Start Time: 1130  Stop Time: 1200  Total time in clinic (min): 30 minutes    Assessment  Impairments: abnormal muscle firing, abnormal muscle tone, abnormal or restricted ROM, activity intolerance, impaired physical strength, lacks appropriate home exercise program, pain with function, poor posture , poor body mechanics, unable to perform ADL, participation limitations, activity limitations and endurance  Symptom irritability: low    Assessment details: Carrie Sow is a 69 y.o. year old female presenting s/p L RTC repair on 24 who presents s/p L shoulder arthroscopic biceps tenodesis, limited debridement, and subacromial decompression performed by Dr. MARI Holland on 2024. Noted impairments include decreased L shoulder A/PROM, decreased L shoulder strength, decreased elbow AROM and muscle strength, decreased L  strength. Noted impairments limit the patients ability to perform functional LUE activities including ADLs and iADLs, OH movements, and sleeping. Patient would benefit from skilled PT to address noted impairments and promote return to PLOF and to maximize functional strength, ROM, and performance. PT reviewed HEP with Pt and pt demonstrated a good understanding of technique and dosage. Patient was instructed to discontinue exercises should symptoms be exacerbated.    PT Re-Eval (25): Carrie Sow presents to PT for re-evaluation of her L shoulder following a L RTC repair with arthroscopic biceps tenodesis and subacromial decompression performed by Dr. Arevalo on 24. Pt demonstrates improved and WNL b/l pain free shoulder ROM and  elbow ROM. Pt demonstrates mild strength deficits, particularly with shoulder flex, abd, and elbow flexion which limited her ability to lift objects and perform OH activities without limitation d/t muscle weakness and fatigue. Pt goals have been updated to reflect accomplishments to date and a new goal added to focus on OH reaching. Pt would continue to benefit from skilled PT to maximize functional strength and performance to promote return to PLOF.   Understanding of Dx/Px/POC: good     Prognosis: good    Goals  Short Terms Goals: (4 weeks)  Patient will be independent with HEP - MET 1/13  Patient will report a 50% decrease in pain complaints. - MET 1/13    Long Term Goals: (8 weeks)  Patient will demonstrate L=R shoulder AROM by 8-10 weeks. - MET on 1/13  Patient will demonstrate L=R elbow - MET 1/13  Patient will demonstrate 5/5 MMT for RTC 90 degree abd in scapular plane by d/c. - PROGRESSING  Patient will be able to lift at least 10 lbs overhead without limitation d/t L shoulder weakness by d/c. - PROGRESSING  Patient will return to all recreational activities without restriction. - PROGRESSING  FOTO score will be greater than or equal to predicted value. - PROGRESSING    Plan  Patient would benefit from: skilled physical therapy  Planned modality interventions: cryotherapy, neuromuscular electric stimulation and thermotherapy: hydrocollator packs    Planned therapy interventions: activity modification, IASTM, joint mobilization, manual therapy, massage, ADL retraining, ADL training, behavior modification, body mechanics training, nerve gliding, neuromuscular re-education, patient/caregiver education, postural training, self care, strengthening, flexibility, functional ROM exercises, stretching, therapeutic activities, therapeutic exercise, therapeutic training, home exercise program and IADL retraining    Frequency: 1-2x week  Duration in weeks: 8  Plan of Care beginning date: 12/6/2024  Treatment plan  "discussed with: patient  Plan details: Thank you for referring Carrie Sow to PT at [facility] and for the opportunity to coordinate care.         Subjective Evaluation    History of Present Illness  Date of surgery: 2024  Mechanism of injury: surgery  Mechanism of injury: Carrie Sow is a 69 y.o. F with hx of L RTC repair on 24 who presents s/p L shoulder arthroscopic biceps tenodesis, limited debridement, and subacromial decompression performed by Dr. MARI Holland on 2024. Reports she has been following her post-op guidelines, including the use of her sling, and post-op precautions. Report she will f/u with surgeon on 24. States she has been sleeping with her sling and using her prescribed medication, tylenol and ice for pain. Pt goals for PT include to be able to cook, increased strength, and restore ROM.    PT Re-Eval (25): Pt states that since starting PT her ROM has improved. Reports continued difficulty with OH activities, particularly into her cabinets when she is moving around heavier bowls. Reports that yesterday she reached into her cabinet and moved a bowl which reports has made her shoulder sore.   Quality of life: good    Patient Goals  Patient goals for therapy: decreased edema, decreased pain, increased motion, return to sport/leisure activities, independence with ADLs/IADLs and increased strength    Pain  Current pain ratin  At best pain ratin (At rest)  At worst pain ratin  Quality: dull ache    Hand dominance: left      Diagnostic Tests    FCE comments: MRI L Shoulder (24): \"IMPRESSION: (PRE-OP)  Status post supraspinatus tendon repair without recurrent tear (series 8 images 11-14.)  Unchanged mild long head of biceps tenosynovitis without tear (series 5 image 20.)\"  Treatments  Previous treatment: physical therapy and injection treatment        Objective    Range of Motion measurements for Shoulder (% limited, or degrees using goniometer)   IE RE " "  Joint Motion RIGHT  LEFT  RIGHT LEFT   Flexion 170 Not assessed this visit d/t post-op 166, 170 165, 167   Extension WNL  80 80   Abduction 170  166, 168 165, 167   Adduction WNL  WNL WNL   Lateral Rotation, 0 abd WNL  WNL WNL   Medial Rotation, 0 abd WNL  WNL WNL   Functional ER WNL  T2 T2   Functional IR WNL  T12 T12     Elbow ROM: WNL t/o b/l, reports no  pain or discomfort with movement    Strength: MMT revealed the following findings.   IE RE (1/13/25)   Joint Motion Right Left RIGHT LEFT   Sh. Flexion 4+/5 Not assessed d/t post-op 4+/5 4-/5   Sh. Abduction 4/5  4/5 4-/5   Sh. ER  (0 abd) 4/5  4/5 4/5   Sh. IR (0 abd) 4+/5  4+/5 4/5   Shoulder extension 4/5  4/5 4/5   Shoulder Adduction 4+/5  4+/5 4+/5    Strength 5/5 4/5 5/5 5/5   Elbow Flexion 4/5 Not assessed d/t post-op 4/5 4/5   Elbow Extension 4/5 Not assessed d/t post-op 4/5 4-/5     Palpation: Denies TTP L long head biceps, supraspinatus, infraspinatus    Observation:  OH Reaching: Pt able to achieve WFL Sh ROM, with no visible UT compensation.          Precautions: See Protocol    Manuals 12/13 12/17 12/19 12/23 12/30 1/3 1/13    Sh PROM per protocol IB ER/IR, Flex & Scap to 90 IB IB IB       Elbow PROM per protocol IB  IB IB       PROM forearm sup/pro IB  IB IB                  Neuro Re-Ed           Scap retraction, in sling 20x5\" 20x5\" 20x5\" 20x5\"       Pendulums 30x CW/CCW 30x CW/CCW 30x CW/CCW        Gentle AAROM, flex & scaption to 90           Scap Stabilization    20x 20x      Bicep curl    2x10 2x10 30x 3x10, 1#    Ther Ex           Re-Eval       8 min    Seated t/s ext      30x     AAROM Sh Flexiion wand, per protocol  10x10\" 10x10\" 30x       AAROM Sh ER wand, per protocol  10x10\" 10x10\" 30x       AAROM Sh Abd, wand, per protocol   10x10\" 30x 20x      Scaption, seated to 90    2x10 3x10 3x10 NV    AROM, Forearm supination     20x 30x 2x10, 1#    AROM, sh flex     3x10 3x10 3x10, 1#    AROM, sh abd      3x10 3x10, 1#    Rows     10x YTB " 20x YTB 30x RTB    LPD       30x RTB    Pully     5 min 5 min     Finger ladder     5 abd 5 abd     Cabinet Reaches       3x10    Ther Activity           Pt education       HEP, POC               Gait Training                                 Modalities           ICE

## 2025-01-14 ENCOUNTER — OFFICE VISIT (OUTPATIENT)
Dept: OBGYN CLINIC | Facility: CLINIC | Age: 70
End: 2025-01-14

## 2025-01-14 VITALS — WEIGHT: 208 LBS | HEIGHT: 65 IN | BODY MASS INDEX: 34.66 KG/M2

## 2025-01-14 DIAGNOSIS — Z98.890 S/P ARTHROSCOPY OF LEFT SHOULDER: Primary | ICD-10-CM

## 2025-01-14 PROCEDURE — 99024 POSTOP FOLLOW-UP VISIT: CPT | Performed by: ORTHOPAEDIC SURGERY

## 2025-01-14 NOTE — PROGRESS NOTES
Assessment/Plan:  1. S/P arthroscopy of left shoulder          The patient is doing well and will continue physical therapy for strengthening of the shoulder. She can gradually increase her activity. She will FU one last time in 6 weeks for repeat evaluation.     Subjective:   Carrie Sow is a 69 y.o. female who presents today for follow-up of her left shoulder, now about 6 weeks status post arthroscopic biceps tenodesis and subacromial decompression. She notes minimal pain and good ROM of the shoulder. She has been compliant with physical therapy and just started some strengthening.       Review of Systems   Constitutional: Negative.  Negative for chills and fever.   HENT: Negative.  Negative for ear pain and sore throat.    Eyes: Negative.  Negative for pain and redness.   Respiratory: Negative.  Negative for shortness of breath and wheezing.    Cardiovascular:  Negative for chest pain and palpitations.   Gastrointestinal: Negative.  Negative for abdominal pain and blood in stool.   Endocrine: Negative.  Negative for polydipsia and polyuria.   Genitourinary: Negative.  Negative for difficulty urinating and dysuria.   Musculoskeletal:         As noted in HPI   Skin: Negative.  Negative for pallor and rash.   Neurological: Negative.  Negative for dizziness and numbness.   Hematological: Negative.  Negative for adenopathy. Does not bruise/bleed easily.   Psychiatric/Behavioral: Negative.  Negative for confusion and suicidal ideas.          Past Medical History:   Diagnosis Date   • Breast lump    • Chest pain 05/28/2019   • Cough 11/25/2024    runny nose-advised to see PCP prior to surgery,call surgeon if not improved   • COVID-19 04/09/2021   • Disease of thyroid gland     nodule, benign   • Eczema     facial rash- could also be rosacea   • Elevated troponin I level 05/21/2021   • GERD (gastroesophageal reflux disease)    • History of transfusion     at 3 years old for bleeding during tonsillectomy, no reaction  "  • Hypertension    • Nontoxic single thyroid nodule 07/25/2012   • Rectocele    • Rotator cuff tear, left    • Sleep apnea     mild case of sleep apnea unable to wear CPAP   • Uterine leiomyoma    • Varicose veins of both lower extremities    • Wears glasses     for reading       Past Surgical History:   Procedure Laterality Date   • BLADDER SURGERY  08/2012    Bladder \"lift\"   • BREAST BIOPSY Right 2000   • BREAST LUMPECTOMY Right 2000    benign   • COLONOSCOPY     • HYSTERECTOMY  1990   • JOINT REPLACEMENT  6/2017 and 5/2022    Knees   • KNEE SURGERY Right     arthroscopy x2   • LASIK Bilateral    • NOSE SURGERY      deviated septum   • AL LARYNGOSCOPY W/BIOPSY MICROSCOPE/TELESCOPE Left 04/02/2018    Procedure: MICROLARYNGOSCOPY AND EXCISION OF LEFT VOCAL FOLD POLYP;  Surgeon: Ochoa Cherry MD;  Location:  Main OR;  Service: ENT   • AL SURGICAL ARTHROSCOPY SHOULDER BICEPS TENODESIS Left 12/2/2024    Procedure: ARTHROSCOPIC BICEPS TENODESIS, SUBACROMIAL DECOMPRESSION;  Surgeon: Marbin Arevalo MD;  Location: [facility];  Service: Orthopedics   • AL SURGICAL ARTHROSCOPY SHOULDER W/ROTATOR CUFF RPR Left 03/28/2023    Procedure: Shoulder Arthroscopy with Rotator Cuff Repair and Subacromial decompression;  Surgeon: Suman Kessler MD;  Location: [facility];  Service: Orthopedics   • AL XCAPSL CTRC RMVL INSJ IO LENS PROSTH W/O ECP Left 11/28/2022    Procedure: EXTRACTION EXTRACAPSULAR CATARACT PHACO INTRAOCULAR LENS (IOL);  Surgeon: Duc Nash MD;  Location: [facility] MAIN OR;  Service: Ophthalmology   • TENDON REPAIR Right     achilles tendon   • TONSILLECTOMY     • TUBAL LIGATION     • VARICOSE VEIN SURGERY Left 12/2009       Family History   Problem Relation Age of Onset   • Breast cancer Mother 66   • Hypertension Mother    • Varicose Veins Mother         of lower extremities   • Glaucoma Mother         I also have glaucoma   • Hypertension Father    • Coronary artery disease Father    • Coronary " artery disease Sister    • Heart disease Sister    • Varicose Veins Sister         of lower extremities   • No Known Problems Sister    • No Known Problems Sister    • Hypertension Brother    • No Known Problems Daughter    • Diabetes Maternal Grandmother         Also nephew William Dumontacher   • Breast cancer Maternal Aunt 65   • No Known Problems Maternal Aunt    • Thyroid disease Sister        Social History     Occupational History   • Not on file   Tobacco Use   • Smoking status: Former     Current packs/day: 0.00     Average packs/day: 0.5 packs/day for 4.0 years (2.0 ttl pk-yrs)     Types: Cigarettes     Start date: 1972     Quit date: 1976     Years since quittin.0     Passive exposure: Never   • Smokeless tobacco: Never   • Tobacco comments:     None   Vaping Use   • Vaping status: Never Used   Substance and Sexual Activity   • Alcohol use: Yes     Comment: seldom   • Drug use: Never   • Sexual activity: Not Currently     Partners: Male     Birth control/protection: Female Sterilization         Current Outpatient Medications:   •  amLODIPine (NORVASC) 5 mg tablet, Take 1 tablet (5 mg total) by mouth every morning, Disp: 90 tablet, Rfl: 3  •  BIOTIN PO, Take by mouth in the morning, Disp: , Rfl:   •  cholecalciferol (VITAMIN D3) 1,000 units tablet, Take 1,000 Units by mouth daily, Disp: , Rfl:   •  Ting, Zingiber officinalis, (TING ROOT PO), Take by mouth in the morning, Disp: , Rfl:   •  hydroCHLOROthiazide 25 mg tablet, TAKE 1 TABLET (25 MG TOTAL) BY MOUTH DAILY, Disp: 90 tablet, Rfl: 3  •  latanoprost (XALATAN) 0.005 % ophthalmic solution, Administer to both eyes daily at bedtime, Disp: , Rfl:   •  Magnesium 400 MG CAPS, Take 2 capsules (800 mg total) by mouth in the morning, Disp: , Rfl:   •  metoprolol succinate (TOPROL-XL) 50 mg 24 hr tablet, Take 1 tablet (50 mg total) by mouth daily at bedtime, Disp: 90 tablet, Rfl: 3  •  multivitamin (THERAGRAN) TABS, Take 1 tablet by mouth every  morning, Disp: , Rfl:   •  olmesartan (BENICAR) 40 mg tablet, Take 1 tablet (40 mg total) by mouth every morning, Disp: 90 tablet, Rfl: 3  •  pantoprazole (PROTONIX) 40 mg tablet, Take 1 tablet (40 mg total) by mouth daily before breakfast, Disp: 30 tablet, Rfl: 5  •  Sulfacetamide Sodium (Sodium Sulfacetamide Wash) 10 % LIQD, Apply twice daily to affected area on face, Disp: 177 mL, Rfl: 2  •  vitamin B-12 (VITAMIN B-12) 1,000 mcg tablet, Take 1,000 mcg by mouth every morning, Disp: , Rfl:   •  aspirin 81 mg chewable tablet, Chew 1 tablet (81 mg total) 2 (two) times a day for 28 days, Disp: 56 tablet, Rfl: 0  •  Myrbetriq 25 MG TB24, Take 25 mg by mouth in the morning, Disp: 90 tablet, Rfl: 3  •  naproxen (Naprosyn) 500 mg tablet, Take 1 tablet (500 mg total) by mouth 2 (two) times a day with meals (Patient not taking: Reported on 12/17/2024), Disp: 60 tablet, Rfl: 0  •  ondansetron (ZOFRAN) 4 mg tablet, Take 1 tablet (4 mg total) by mouth every 8 (eight) hours as needed for nausea or vomiting (Patient not taking: Reported on 12/17/2024), Disp: 20 tablet, Rfl: 0  •  oxyCODONE (Roxicodone) 5 immediate release tablet, Take 1 tablet (5 mg total) by mouth every 4 (four) hours as needed for moderate pain for up to 20 doses Max Daily Amount: 30 mg (Patient not taking: Reported on 12/17/2024), Disp: 20 tablet, Rfl: 0    Allergies   Allergen Reactions   • Hydrocodone-Acetaminophen Dizziness   • Irbesartan-Hydrochlorothiazide Other (See Comments) and Headache     Arm heaviness    • Lisinopril Cough and Edema     swollen legs   • Pollen Extract Hives     Ragweed etc.    • Atorvastatin Myalgia       Objective:  There were no vitals filed for this visit.  Pain Score:   2      Left Shoulder Exam     Tenderness   The patient is experiencing no tenderness.     Range of Motion   External rotation:  70   Forward flexion:  170   Internal rotation 0 degrees:  L2     Tests   Drop arm: negative    Other   Erythema: absent  Scars:  present  Sensation: normal  Pulse: present             Physical Exam  Constitutional:       General: She is not in acute distress.     Appearance: She is well-developed.   HENT:      Head: Normocephalic and atraumatic.   Eyes:      General: No scleral icterus.     Conjunctiva/sclera: Conjunctivae normal.   Neck:      Vascular: No JVD.   Cardiovascular:      Rate and Rhythm: Normal rate.   Pulmonary:      Effort: Pulmonary effort is normal. No respiratory distress.   Musculoskeletal:      Comments: As per HPI   Skin:     General: Skin is warm.   Neurological:      Mental Status: She is alert and oriented to person, place, and time.      Coordination: Coordination normal.               This document was created using speech voice recognition software.   Grammatical errors, random word insertions, pronoun errors, and incomplete sentences are an occasional consequence of this system due to software limitations, ambient noise, and hardware issues.   Any formal questions or concerns about content, text, or information contained within the body of this dictation should be directly addressed to the provider for clarification.

## 2025-01-16 ENCOUNTER — OFFICE VISIT (OUTPATIENT)
Dept: PHYSICAL THERAPY | Facility: CLINIC | Age: 70
End: 2025-01-16
Payer: MEDICARE

## 2025-01-16 DIAGNOSIS — Z98.890 STATUS POST SUBACROMIAL DECOMPRESSION: ICD-10-CM

## 2025-01-16 DIAGNOSIS — M25.512 LEFT SHOULDER PAIN, UNSPECIFIED CHRONICITY: Primary | ICD-10-CM

## 2025-01-16 DIAGNOSIS — M75.22 BICEPS TENDONITIS ON LEFT: ICD-10-CM

## 2025-01-16 PROCEDURE — 97530 THERAPEUTIC ACTIVITIES: CPT

## 2025-01-16 PROCEDURE — 97110 THERAPEUTIC EXERCISES: CPT

## 2025-01-16 NOTE — PROGRESS NOTES
"Daily Note     Today's date: 2025  Patient name: Carrie Sow  : 1955  MRN: [id number]  Referring provider: Jessica Dunaway MD  Dx:   Encounter Diagnosis     ICD-10-CM    1. Left shoulder pain, unspecified chronicity  M25.512       2. Biceps tendonitis on left  M75.22       3. Status post subacromial decompression  Z98.890           Start Time: 1100  Stop Time: 1130  Total time in clinic (min): 30 minutes    Subjective: Reports no soreness after her previous session. States her f/u with MD went well.       Objective: See treatment diary below      Assessment: Tolerated treatment well. Able to progress resistance this visit w/o complaints. Patient demonstrated fatigue post treatment, exhibited good technique with therapeutic exercises, and would benefit from continued PT      Plan: Continue per plan of care.      Precautions: See Protocol    Manuals 12/13 12/17 12/19 12/23 12/30 1/3 1/13 1/16   Sh PROM per protocol IB ER/IR, Flex & Scap to 90 IB IB IB       Elbow PROM per protocol IB  IB IB       PROM forearm sup/pro IB  IB IB                  Neuro Re-Ed           Scap retraction, in sling 20x5\" 20x5\" 20x5\" 20x5\"       Pendulums 30x CW/CCW 30x CW/CCW 30x CW/CCW        Gentle AAROM, flex & scaption to 90           Scap Stabilization    20x 20x      Bicep curl    2x10 2x10 30x 3x10, 1# 3x10, 2#   Ther Ex           Re-Eval       8 min    Seated t/s ext      30x     AAROM Sh Flexiion wand, per protocol  10x10\" 10x10\" 30x       AAROM Sh ER wand, per protocol  10x10\" 10x10\" 30x       AAROM Sh Abd, wand, per protocol   10x10\" 30x 20x      Scaption, seated to 90    2x10 3x10 3x10 NV 2x10, 1#   AROM, Forearm supination     20x 30x 2x10, 1# 2x10, 2#   AROM, sh flex     3x10 3x10 3x10, 1# 3x10, 2#   AROM, sh abd      3x10 3x10, 1# 3x10, 2#   Rows     10x YTB 20x YTB 30x RTB    LPD       30x RTB    Pully     5 min 5 min     Finger ladder     5 abd 5 abd     Cabinet Reaches       3x10 3x10 1#   Ther Activity     "       Pt education       HEP, POC HEP              Gait Training                                 Modalities           ICE

## 2025-01-20 ENCOUNTER — APPOINTMENT (OUTPATIENT)
Dept: PHYSICAL THERAPY | Facility: CLINIC | Age: 70
End: 2025-01-20
Payer: MEDICARE

## 2025-01-21 ENCOUNTER — OFFICE VISIT (OUTPATIENT)
Dept: UROLOGY | Facility: CLINIC | Age: 70
End: 2025-01-21
Payer: MEDICARE

## 2025-01-21 VITALS
OXYGEN SATURATION: 98 % | HEIGHT: 65 IN | SYSTOLIC BLOOD PRESSURE: 110 MMHG | WEIGHT: 204 LBS | BODY MASS INDEX: 33.99 KG/M2 | HEART RATE: 85 BPM | DIASTOLIC BLOOD PRESSURE: 62 MMHG

## 2025-01-21 DIAGNOSIS — R32 URINARY INCONTINENCE, UNSPECIFIED TYPE: ICD-10-CM

## 2025-01-21 DIAGNOSIS — R39.9 UTI SYMPTOMS: Primary | ICD-10-CM

## 2025-01-21 LAB — POST-VOID RESIDUAL VOLUME, ML POC: 26 ML

## 2025-01-21 PROCEDURE — 99213 OFFICE O/P EST LOW 20 MIN: CPT

## 2025-01-21 PROCEDURE — 51798 US URINE CAPACITY MEASURE: CPT

## 2025-01-21 RX ORDER — SOLIFENACIN SUCCINATE 5 MG/1
5 TABLET, FILM COATED ORAL DAILY
Qty: 30 TABLET | Refills: 3 | Status: SHIPPED | OUTPATIENT
Start: 2025-01-21

## 2025-01-21 NOTE — PROGRESS NOTES
1/21/2025      No chief complaint on file.        Assessment and Plan    69 y.o. female     Urge Urinary Incontinence  -PVR 26 mls  -Patient did previously try trospium for about 3 months.  She states she stopped taking this, this was cost prohibitive.  Patient states also had a headache from this medication, she also felt like she was not emptying her bladder as well.  We did discuss other overactive bladder medications.  It does appear that Vesicare is covered under patient's insurance plan, Rx sent to pharmacy.  Side effect and usage discussed.       2.  UTI prevention  -Patient's urine culture grew 70-79,000 E. coli ESBL on 1-3-25.  Prior to this, she had a positive urine culture on 12- greater than 100,000 and E. coli ESBL.  -States she does not get recurrent UTIs.  -She denies dysuria, flank pain, gross hematuria.  -We did discuss importance of adequate fluid intake, cranberry, vitamin C, probiotics, we also discussed topical vaginal estrogen.    -Patient will return in 3 months for follow-up with PVR.       History of Present Illness  Carrie Sow is a 69 y.o. female here for follow-up of urge urinary incontinence. She has a pmhx of HTN, chronic venous insufficiency, GERD, bicep tendonitis, urge urinary incontinence, cystocele.     Patient previously had tried trospium in October 2024.  She states she stopped taking this medication this month.  This medication was cost prohibitive, patient was also getting headaches, and felt like she could not empty her bladder as well.  Patient did have 2 recent urinary tract infections in December 2024 and in January 2025.Patient's urine culture grew 70-79,000 E. coli ESBL on 1-3-25.  Prior to this, she had a positive urine culture on 12- greater than 100,000 and E. coli ESBL.    Patient states not prone to recurrent UTIs.  She denies current dysuria, flank pain, gross hematuria.  Patient's PVR is 26 mL today.    Patient is bothered by urge urinary  incontinence.  We did discuss avoidance of bladder irritants.  We will try Vesicare.  Patient denies incontinence with coughing, laughing, sneezing.     She states she wears a pad daily, sometimes has to change a few times per day. She has urgency with leakage. She wakes up 2-4 x per night. She states she went for a sleep study and she was told she has sleep apnea. She couldn't tolerate wearing CPAP d/t claustrophobia.      Denies hx of recurrent UTIs and kidney stones.     Denies dysuria and gross hematuria.           Review of Systems   Constitutional:  Negative for chills and fever.   HENT:  Negative for ear pain and sore throat.    Eyes:  Negative for pain and visual disturbance.   Respiratory:  Negative for cough and shortness of breath.    Cardiovascular:  Negative for chest pain and palpitations.   Gastrointestinal:  Negative for abdominal pain, nausea and vomiting.   Genitourinary:  Positive for urgency. Negative for difficulty urinating, dysuria, flank pain, frequency and hematuria.        Urge urinary incontinence   Musculoskeletal:  Negative for arthralgias and back pain.   Skin:  Negative for color change and rash.   Neurological:  Negative for seizures and syncope.   All other systems reviewed and are negative.               Vitals  There were no vitals filed for this visit.    Physical Exam  Constitutional:       Appearance: Normal appearance.   HENT:      Head: Normocephalic.   Eyes:      Extraocular Movements: Extraocular movements intact.      Pupils: Pupils are equal, round, and reactive to light.   Pulmonary:      Effort: Pulmonary effort is normal. No respiratory distress.   Musculoskeletal:         General: Normal range of motion.      Cervical back: Normal range of motion.   Neurological:      Mental Status: She is alert and oriented to person, place, and time.   Psychiatric:         Mood and Affect: Mood normal.         Behavior: Behavior normal.         Thought Content: Thought content  normal.         Judgment: Judgment normal.           Past History  Past Medical History:   Diagnosis Date    Breast lump     Chest pain 2019    Cough 2024    runny nose-advised to see PCP prior to surgery,call surgeon if not improved    COVID-19 2021    Disease of thyroid gland     nodule, benign    Eczema     facial rash- could also be rosacea    Elevated troponin I level 2021    GERD (gastroesophageal reflux disease)     History of transfusion     at 3 years old for bleeding during tonsillectomy, no reaction    Hypertension     Nontoxic single thyroid nodule 2012    Rectocele     Rotator cuff tear, left     Sleep apnea     mild case of sleep apnea unable to wear CPAP    Uterine leiomyoma     Varicose veins of both lower extremities     Wears glasses     for reading     Social History     Socioeconomic History    Marital status: /Civil Union     Spouse name: Not on file    Number of children: Not on file    Years of education: Not on file    Highest education level: Not on file   Occupational History    Not on file   Tobacco Use    Smoking status: Former     Current packs/day: 0.00     Average packs/day: 0.5 packs/day for 4.0 years (2.0 ttl pk-yrs)     Types: Cigarettes     Start date: 1972     Quit date: 1976     Years since quittin.0     Passive exposure: Never    Smokeless tobacco: Never    Tobacco comments:     None   Vaping Use    Vaping status: Never Used   Substance and Sexual Activity    Alcohol use: Yes     Comment: seldom    Drug use: Never    Sexual activity: Not Currently     Partners: Male     Birth control/protection: Female Sterilization   Other Topics Concern    Not on file   Social History Narrative    Not on file     Social Drivers of Health     Financial Resource Strain: Low Risk  (7/3/2024)    Overall Financial Resource Strain (CARDIA)     Difficulty of Paying Living Expenses: Not hard at all   Food Insecurity: No Food Insecurity (2024)     Nursing - Inadequate Food Risk Classification     Worried About Running Out of Food in the Last Year: Never true     Ran Out of Food in the Last Year: Never true     Ran Out of Food in the Last Year: Not on file   Transportation Needs: No Transportation Needs (2024)    PRAPARE - Transportation     Lack of Transportation (Medical): No     Lack of Transportation (Non-Medical): No   Physical Activity: Not on file   Stress: Not on file   Social Connections: Not on file   Intimate Partner Violence: Not on file   Housing Stability: Low Risk  (7/3/2024)    Housing Stability Vital Sign     Unable to Pay for Housing in the Last Year: No     Number of Times Moved in the Last Year: 1     Homeless in the Last Year: No     Social History     Tobacco Use   Smoking Status Former    Current packs/day: 0.00    Average packs/day: 0.5 packs/day for 4.0 years (2.0 ttl pk-yrs)    Types: Cigarettes    Start date: 1972    Quit date: 1976    Years since quittin.0    Passive exposure: Never   Smokeless Tobacco Never   Tobacco Comments    None     Family History   Problem Relation Age of Onset    Breast cancer Mother 66    Hypertension Mother     Varicose Veins Mother         of lower extremities    Glaucoma Mother         I also have glaucoma    Hypertension Father     Coronary artery disease Father     Coronary artery disease Sister     Heart disease Sister     Varicose Veins Sister         of lower extremities    No Known Problems Sister     No Known Problems Sister     Hypertension Brother     No Known Problems Daughter     Diabetes Maternal Grandmother         Also nephew William Fenstermacher    Breast cancer Maternal Aunt 65    No Known Problems Maternal Aunt     Thyroid disease Sister        The following portions of the patient's history were reviewed and updated as appropriate: allergies, current medications, past medical history, past social history, past surgical history and problem list.    Results  No results  "found for this or any previous visit (from the past hour).]  No results found for: \"PSA\"  Lab Results   Component Value Date    CALCIUM 9.3 11/18/2024     01/18/2017    K 4.6 11/18/2024    CO2 30 11/18/2024     11/18/2024    BUN 20 11/18/2024    CREATININE 0.78 11/18/2024     Lab Results   Component Value Date    WBC 6.80 11/18/2024    HGB 13.7 11/18/2024    HCT 43.4 11/18/2024    MCV 94 11/18/2024     11/18/2024       WILLIAMS Hutchison  "

## 2025-01-23 ENCOUNTER — OFFICE VISIT (OUTPATIENT)
Dept: PHYSICAL THERAPY | Facility: CLINIC | Age: 70
End: 2025-01-23
Payer: MEDICARE

## 2025-01-23 DIAGNOSIS — Z98.890 STATUS POST SUBACROMIAL DECOMPRESSION: ICD-10-CM

## 2025-01-23 DIAGNOSIS — M75.22 BICEPS TENDONITIS ON LEFT: ICD-10-CM

## 2025-01-23 DIAGNOSIS — M25.512 LEFT SHOULDER PAIN, UNSPECIFIED CHRONICITY: Primary | ICD-10-CM

## 2025-01-23 PROCEDURE — 97110 THERAPEUTIC EXERCISES: CPT

## 2025-01-23 PROCEDURE — 97530 THERAPEUTIC ACTIVITIES: CPT

## 2025-01-23 NOTE — PROGRESS NOTES
"Daily Note     Today's date: 2025  Patient name: Carrie Sow  : 1955  MRN: [id number]  Referring provider: Jessica Dunaway MD  Dx:   Encounter Diagnosis     ICD-10-CM    1. Left shoulder pain, unspecified chronicity  M25.512       2. Biceps tendonitis on left  M75.22       3. Status post subacromial decompression  Z98.890             Start Time: 1100  Stop Time: 1130  Total time in clinic (min): 30 minutes    Subjective: States she has some soreness in her L bicep muscle belly today. States she has been increasing her activities around the home such as cooking and cleaning. Reports reaching into [place] cabinets has been improving, however she notices the most discomfort when cutting foods.       Objective: See treatment diary below      Assessment: Tolerated treatment well. Reduced resistance with sh flex, abd, and scaption exercises where pt reported no adverse effects. Advised pt to reduce resistance at home to 1# and to not progress resistance until she is able to tolerate 3x10 with 1# without muscle soreness. Patient demonstrated fatigue post treatment, exhibited good technique with therapeutic exercises, and would benefit from continued PT      Plan: Continue per plan of care.      Precautions: See Protocol  Access Code: [email or web address]     Manuals 12/13 12/17 12/19 12/23 12/30 1/3 1/13 1/16 1/23   Sh PROM per protocol IB ER/IR, Flex & Scap to 90 IB IB IB        Elbow PROM per protocol IB  IB IB        PROM forearm sup/pro IB  IB IB                    Neuro Re-Ed            Scap retraction, in sling 20x5\" 20x5\" 20x5\" 20x5\"        Pendulums 30x CW/CCW 30x CW/CCW 30x CW/CCW         Gentle AAROM, flex & scaption to 90            Scap Stabilization    20x 20x       Bicep curl    2x10 2x10 30x 3x10, 1# 3x10, 2# 3x10 1#   Ther Ex            Re-Eval       8 min     Seated t/s ext      30x      AAROM Sh Flexiion wand, per protocol  10x10\" 10x10\" 30x        AAROM Sh ER wand, per protocol  10x10\" 10x10\" 30x      " "  AAROM Sh Abd, wand, per protocol   10x10\" 30x 20x       Scaption, seated to 90    2x10 3x10 3x10 NV 2x10, 1# 2x10, 1#   AROM, Forearm supination     20x 30x 2x10, 1# 2x10, 2# 2x10 2#   AROM, sh flex     3x10 3x10 3x10, 1# 3x10, 2# 2x10, 1#   AROM, sh abd      3x10 3x10, 1# 3x10, 2# 2x10, 1#    Rows     10x YTB 20x YTB 30x RTB  2x10, 2#, standing   LPD       30x RTB     Pully     5 min 5 min   5 min   Finger ladder     5 abd 5 abd      Cabinet Reaches       3x10 3x10 1# 3x10   Ther Activity            Pt education       HEP, POC HEP                Gait Training                                    Modalities            ICE                               "

## 2025-01-27 ENCOUNTER — APPOINTMENT (OUTPATIENT)
Dept: PHYSICAL THERAPY | Facility: CLINIC | Age: 70
End: 2025-01-27
Payer: MEDICARE

## 2025-01-30 ENCOUNTER — OFFICE VISIT (OUTPATIENT)
Dept: PHYSICAL THERAPY | Facility: CLINIC | Age: 70
End: 2025-01-30
Payer: MEDICARE

## 2025-01-30 DIAGNOSIS — M25.512 LEFT SHOULDER PAIN, UNSPECIFIED CHRONICITY: Primary | ICD-10-CM

## 2025-01-30 DIAGNOSIS — Z98.890 STATUS POST SUBACROMIAL DECOMPRESSION: ICD-10-CM

## 2025-01-30 DIAGNOSIS — M75.22 BICEPS TENDONITIS ON LEFT: ICD-10-CM

## 2025-01-30 PROCEDURE — 97110 THERAPEUTIC EXERCISES: CPT

## 2025-01-30 PROCEDURE — 97112 NEUROMUSCULAR REEDUCATION: CPT

## 2025-01-30 NOTE — PROGRESS NOTES
"Daily Note     Today's date: 2025  Patient name: Carrie Sow  : 1955  MRN: [id number]  Referring provider: Jessica Dunaway MD  Dx:   Encounter Diagnosis     ICD-10-CM    1. Left shoulder pain, unspecified chronicity  M25.512       2. Biceps tendonitis on left  M75.22       3. Status post subacromial decompression  Z98.890               Start Time: 1050  Stop Time: 1130  Total time in clinic (min): 40 minutes    Subjective: States she been doing her HEP with the 1# DB without any irritability. States she cleaned her entire house which left her a little sore. States \"it doesn't really hurt its just achy\". States cutting vegetables no longer bothers her.      Objective: See treatment diary below      Assessment: Tolerated treatment well. Continues to demonstrate sustained improvement in shoulder A/PROM. Continuing with progressive strengthening, with focus on pericapsular strengthening and control. Advised pt to avoid aggravating activities, particularly large cleaning activities and any heavy lifting that may cause strain on the involved bicep musculature. Patient demonstrated fatigue post treatment, exhibited good technique with therapeutic exercises, and would benefit from continued PT      Plan: Continue per plan of care.      Precautions: See Protocol  Access Code: [email or web address]     Manuals 12/13 12/17 12/19 12/23 12/30 1/3 1/13 1/16 1/23 1/30   Sh PROM per protocol IB ER/IR, Flex & Scap to 90 IB IB IB         Elbow PROM per protocol IB  IB IB         PROM forearm sup/pro IB  IB IB                      Neuro Re-Ed             Scap retraction, in sling 20x5\" 20x5\" 20x5\" 20x5\"         Pendulums 30x CW/CCW 30x CW/CCW 30x CW/CCW          Gentle AAROM, flex & scaption to 90             Scap Stabilization    20x 20x        Bicep curl    2x10 2x10 30x 3x10, 1# 3x10, 2# 3x10 1# 2x10 1#   Ther Ex             Re-Eval       8 min      Scaption, seated to 90    2x10 3x10 3x10 NV 2x10, 1# 2x10, 1# 3x10, 1#   AROM, " Forearm supination     20x 30x 2x10, 1# 2x10, 2# 2x10 2# 20x 2#   AROM, sh flex     3x10 3x10 3x10, 1# 3x10, 2# 2x10, 1# 2x10, 2#   AROM, sh abd      3x10 3x10, 1# 3x10, 2# 2x10, 1#  2x10, 2#   Rows     10x YTB 20x YTB 30x RTB  2x10, 2#, standing    TB ER/IR          20x YTB   Prone Sh Ext          20x 1#   Prone Row          20x #1 30 abd   SA Punch          3x10, 1#   Pully     5 min 5 min   5 min 5 min   Finger ladder     5 abd 5 abd       Cabinet Reaches       3x10 3x10 1# 3x10 20x   Ther Activity             Pt education       HEP, POC HEP                  Gait Training                                       Modalities             ICE

## 2025-01-31 ENCOUNTER — HOSPITAL ENCOUNTER (OUTPATIENT)
Dept: RADIOLOGY | Facility: HOSPITAL | Age: 70
Discharge: HOME | End: 2025-01-31
Payer: MEDICARE

## 2025-01-31 VITALS — HEIGHT: 65 IN | BODY MASS INDEX: 33.99 KG/M2 | WEIGHT: 204 LBS

## 2025-01-31 DIAGNOSIS — Z78.0 POSTMENOPAUSE: ICD-10-CM

## 2025-01-31 PROCEDURE — 77080 DXA BONE DENSITY AXIAL: CPT

## 2025-02-06 ENCOUNTER — APPOINTMENT (OUTPATIENT)
Dept: PHYSICAL THERAPY | Facility: CLINIC | Age: 70
End: 2025-02-06
Payer: MEDICARE

## 2025-02-06 NOTE — PROGRESS NOTES
"Daily Note     Today's date: 2025  Patient name: Carrie Sow  : 1955  MRN: [id number]  Referring provider: Jessica Dunaway MD  Dx:   No diagnosis found.                     Subjective: States she been doing her HEP with the 1# DB without any irritability. States she cleaned her entire house which left her a little sore. States \"it doesn't really hurt its just achy\". States cutting vegetables no longer bothers her.      Objective: See treatment diary below      Assessment: Tolerated treatment well. Continues to demonstrate sustained improvement in shoulder A/PROM. Continuing with progressive strengthening, with focus on pericapsular strengthening and control. Advised pt to avoid aggravating activities, particularly large cleaning activities and any heavy lifting that may cause strain on the involved bicep musculature. Patient demonstrated fatigue post treatment, exhibited good technique with therapeutic exercises, and would benefit from continued PT      Plan: Continue per plan of care.      Precautions: See Protocol  Access Code: [email or web address]     Manuals 12/13 12/17 12/19 12/23 12/30 1/3 1/13 1/16 1/23 1/30   Sh PROM per protocol IB ER/IR, Flex & Scap to 90 IB IB IB         Elbow PROM per protocol IB  IB IB         PROM forearm sup/pro IB  IB IB                      Neuro Re-Ed             Scap retraction, in sling 20x5\" 20x5\" 20x5\" 20x5\"         Pendulums 30x CW/CCW 30x CW/CCW 30x CW/CCW          Gentle AAROM, flex & scaption to 90             Scap Stabilization    20x 20x        Bicep curl    2x10 2x10 30x 3x10, 1# 3x10, 2# 3x10 1# 2x10 1#   Ther Ex             Re-Eval       8 min      Scaption, seated to 90    2x10 3x10 3x10 NV 2x10, 1# 2x10, 1# 3x10, 1#   AROM, Forearm supination     20x 30x 2x10, 1# 2x10, 2# 2x10 2# 20x 2#   AROM, sh flex     3x10 3x10 3x10, 1# 3x10, 2# 2x10, 1# 2x10, 2#   AROM, sh abd      3x10 3x10, 1# 3x10, 2# 2x10, 1#  2x10, 2#   Rows     10x YTB 20x YTB 30x RTB  2x10, 2#, " standing    TB ER/IR          20x YTB   Prone Sh Ext          20x 1#   Prone Row          20x #1 30 abd   SA Punch          3x10, 1#   Pully     5 min 5 min   5 min 5 min   Finger ladder     5 abd 5 abd       Cabinet Reaches       3x10 3x10 1# 3x10 20x   Ther Activity             Pt education       HEP, POC HEP                  Gait Training                                       Modalities             ICE

## 2025-02-10 ENCOUNTER — CONSULT (OUTPATIENT)
Dept: VASCULAR SURGERY | Facility: CLINIC | Age: 70
End: 2025-02-10
Payer: MEDICARE

## 2025-02-10 VITALS
DIASTOLIC BLOOD PRESSURE: 80 MMHG | HEART RATE: 81 BPM | OXYGEN SATURATION: 97 % | SYSTOLIC BLOOD PRESSURE: 148 MMHG | RESPIRATION RATE: 18 BRPM | WEIGHT: 207 LBS | BODY MASS INDEX: 34.49 KG/M2 | HEIGHT: 65 IN

## 2025-02-10 DIAGNOSIS — I83.812 VARICOSE VEINS OF LEFT LOWER EXTREMITY WITH PAIN: ICD-10-CM

## 2025-02-10 DIAGNOSIS — I83.813 VARICOSE VEINS OF BILATERAL LOWER EXTREMITIES WITH PAIN: Primary | ICD-10-CM

## 2025-02-10 DIAGNOSIS — I87.2 CHRONIC VENOUS INSUFFICIENCY: Chronic | ICD-10-CM

## 2025-02-10 PROCEDURE — 99204 OFFICE O/P NEW MOD 45 MIN: CPT | Performed by: PHYSICIAN ASSISTANT

## 2025-02-10 NOTE — ASSESSMENT & PLAN NOTE
Hx R GSV ablation and stabs    -Presents for daily L thigh bulging, painful, burning veins with swelling in the thigh and knee  -No tired, heavy legs; no Hx DVT.    -Exam: L thigh with bulky varicose veins 15+ mm over the skin.  Mild stasis changes.  Few scattered smaller spider/reticular. Small varicosities at the ankles. No significant ankle edema appreciated.    Plan:  -Bulky 15 mm plus varicose veins to the L anterior thigh and vv L lateral calf  -Detailed discussion regarding pathophysiology and treatment venous disease  -Discussed the benefits of compression stockings which all treatment centers upon  -Recommend medical compression stockings to be worn daily and removed at night  -Prescription for thigh-high compression  -Compression, periodic leg elevation, low-sodium diet, regular exercise, weight loss and skin care  -Due to patient's severity of symptoms and request, after 3 months of compression, can check venous reflux study and have her return with surgeon for reevaluation and to discuss any additional potential options if she remains symptomatic -  which may include phlebectomy of the L anterior thigh/calf veins which are particularly troubling her daily.  Orders:    Compression Stocking    Compression Stocking    VAS Lower extremity venous insufficiency duplex, Single leg w/ measurements; Future

## 2025-02-10 NOTE — PATIENT INSTRUCTIONS
Varicose veins  Order for prescription graded compression stockings of 20-30 mm Hg  Wear stocking EVERY day to help control swelling in legs and remove at night  Elevate legs while at rest  Continue healthy life-style changes; heart-healthy, low sodium diet; regular exercise for weight loss  Patient education for venous insufficiency.  Reflux study in 3 months followed by non-urgent OV with surgeon

## 2025-02-12 ENCOUNTER — EVALUATION (OUTPATIENT)
Dept: PHYSICAL THERAPY | Facility: CLINIC | Age: 70
End: 2025-02-12
Payer: MEDICARE

## 2025-02-12 DIAGNOSIS — M25.512 LEFT SHOULDER PAIN, UNSPECIFIED CHRONICITY: Primary | ICD-10-CM

## 2025-02-12 DIAGNOSIS — Z98.890 STATUS POST SUBACROMIAL DECOMPRESSION: ICD-10-CM

## 2025-02-12 DIAGNOSIS — M75.22 BICEPS TENDONITIS ON LEFT: ICD-10-CM

## 2025-02-12 PROCEDURE — 97110 THERAPEUTIC EXERCISES: CPT

## 2025-02-12 PROCEDURE — 97164 PT RE-EVAL EST PLAN CARE: CPT

## 2025-02-13 ENCOUNTER — APPOINTMENT (OUTPATIENT)
Dept: PHYSICAL THERAPY | Facility: CLINIC | Age: 70
End: 2025-02-13
Payer: MEDICARE

## 2025-02-20 ENCOUNTER — OFFICE VISIT (OUTPATIENT)
Dept: PHYSICAL THERAPY | Facility: CLINIC | Age: 70
End: 2025-02-20
Payer: MEDICARE

## 2025-02-20 DIAGNOSIS — M75.22 BICEPS TENDONITIS ON LEFT: ICD-10-CM

## 2025-02-20 DIAGNOSIS — Z98.890 STATUS POST SUBACROMIAL DECOMPRESSION: ICD-10-CM

## 2025-02-20 DIAGNOSIS — M25.512 LEFT SHOULDER PAIN, UNSPECIFIED CHRONICITY: Primary | ICD-10-CM

## 2025-02-20 PROCEDURE — 97112 NEUROMUSCULAR REEDUCATION: CPT

## 2025-02-20 PROCEDURE — 97110 THERAPEUTIC EXERCISES: CPT

## 2025-02-20 NOTE — PROGRESS NOTES
"Daily Note     Today's date: 2025  Patient name: Carrie Sow  : 1955  MRN: [id number]  Referring provider: Jessica Dunaway MD  Dx:   Encounter Diagnosis     ICD-10-CM    1. Left shoulder pain, unspecified chronicity  M25.512       2. Status post subacromial decompression  Z98.890       3. Biceps tendonitis on left  M75.22           Start Time: 1100  Stop Time: 1130  Total time in clinic (min): 30 minutes    Subjective: Pt reports she has been doing well. States she gets some achiness from time to time but has been doing her typical housework without difficulty. Reports she will f/u with her surgeon next week. Feels she is ready for independent management with HEP at this time.       Objective: See treatment diary below      Assessment: Tolerated treatment well. Continued with UE and periscapular strengthening without difficulty. Incorporated box carries without difficulty. Discussed benefits of continued PT to work on eccentrically loading the biceps to which pt feels confidant progressing independently at home. Advised pt to return should pain or sx irritability increase. Patient exhibited good technique with therapeutic exercises and would benefit from continued PT      Plan: Continue per plan of care.      Precautions: See Protocol    Manuals 12/13 12/17 12/19 12/23 12/30 1/3 1/13 2/12 2/20   Sh PROM per protocol IB ER/IR, Flex & Scap to 90 IB IB IB        Elbow PROM per protocol IB  IB IB        PROM forearm sup/pro IB  IB IB                    Neuro Re-Ed            Scap retraction, in sling 20x5\" 20x5\" 20x5\" 20x5\"        Pendulums 30x CW/CCW 30x CW/CCW 30x CW/CCW         Scap Stabilization    20x 20x       Bicep curl    2x10 2x10 30x 3x10, 1# 2x10, 3# 3x10, 2#   Ther Ex            Re-Eval       8 min 10'    Seated t/s ext      30x      AAROM Sh Flexiion wand, per protocol  10x10\" 10x10\" 30x        AAROM Sh ER wand, per protocol  10x10\" 10x10\" 30x        AAROM Sh Abd, wand, per protocol   " "10x10\" 30x 20x       Scaption, seated to 90    2x10 3x10 3x10 NV 3x10, 2# 3x10, 2#   Wand Hair cut        30x    AROM, Forearm supination     20x 30x 2x10, 1#     AROM, sh flex     3x10 3x10 3x10, 1# 3x10, 2# 3x10, 2#   AROM, sh abd      3x10 3x10, 1# 3x10, 2# 3x10, 2#   Rows     10x YTB 20x YTB 30x RTB 3x10, 3# standing with DB 3x10, 3#   Standing with DB   Tricep         2x10, 3#   LPD       30x RTB     Pully     5 min 5 min  5 min 5'   Finger ladder     5 abd 5 abd   10x   Cabinet Reaches       3x10 2x10 1# 2x10, 1#   Box Carry         2x10   Ther Activity            Pt education       HEP, POC HEP                Gait Training                                    Modalities            ICE                               "

## 2025-02-25 ENCOUNTER — OFFICE VISIT (OUTPATIENT)
Dept: OBGYN CLINIC | Facility: CLINIC | Age: 70
End: 2025-02-25

## 2025-02-25 VITALS — HEIGHT: 65 IN | WEIGHT: 207 LBS | BODY MASS INDEX: 34.49 KG/M2

## 2025-02-25 DIAGNOSIS — Z98.890 S/P ARTHROSCOPY OF LEFT SHOULDER: Primary | ICD-10-CM

## 2025-02-25 PROCEDURE — 99024 POSTOP FOLLOW-UP VISIT: CPT | Performed by: ORTHOPAEDIC SURGERY

## 2025-02-25 NOTE — PROGRESS NOTES
Patient Name: Carrie Sow      : 1955       MRN: [id number]   Encounter Provider: Marbin Arevalo MD   Encounter Date: 25  Encounter department: [facility]         Assessment & Plan  S/P arthroscopy of left shoulder            Plan:  Carrie is 3 months S/P left shoulder arthroscopy with bicep tenodesis and subacromial decompression. I am very pleased with her clinical exam today, she has excellent range of motion and strength. Continue with [facility], activities as tolerated. She no longer requires orthopedic follow up.     Follow-up:  Return if symptoms worsen or fail to improve.     _____________________________________________________  CHIEF COMPLAINT:  Chief Complaint   Patient presents with   • Left Shoulder - Post-op         SUBJECTIVE:  Carrie Sow is a 69 y.o. female who presents 3 months S/P left shoulder arthroscopy with biceps tenodesis and subacromial decompression. Patient is doing very well post operatively and states her pain is controlled. She has recently been discharged from physical therapy to [facility]. She notes some discomfort when she vacuums but other then that states she is doing very well.     PAST MEDICAL HISTORY:  Past Medical History:   Diagnosis Date   • Breast lump    • Chest pain 2019   • Cough 2024    runny nose-advised to see PCP prior to surgery,call surgeon if not improved   • COVID-19 2021   • Disease of thyroid gland     nodule, benign   • Eczema     facial rash- could also be rosacea   • Elevated troponin I level 2021   • GERD (gastroesophageal reflux disease)    • History of transfusion     at 3 years old for bleeding during tonsillectomy, no reaction   • Hypertension    • Nontoxic single thyroid nodule 2012   • Rectocele    • Rotator cuff tear, left    • Sleep apnea     mild case of sleep apnea unable to wear CPAP   • Uterine leiomyoma    • Varicose veins of both lower extremities    • Wears glasses  "    for reading       PAST SURGICAL HISTORY:  Past Surgical History:   Procedure Laterality Date   • BLADDER SURGERY  08/2012    Bladder \"lift\"   • BREAST BIOPSY Right 2000   • BREAST LUMPECTOMY Right 2000    benign   • COLONOSCOPY     • HYSTERECTOMY  1990   • JOINT REPLACEMENT  6/2017 and 5/2022    Knees   • KNEE SURGERY Right     arthroscopy x2   • LASIK Bilateral    • NOSE SURGERY      deviated septum   • NH LARYNGOSCOPY W/BIOPSY MICROSCOPE/TELESCOPE Left 04/02/2018    Procedure: MICROLARYNGOSCOPY AND EXCISION OF LEFT VOCAL FOLD POLYP;  Surgeon: Ochoa Cherry MD;  Location:  Main OR;  Service: ENT   • NH SURGICAL ARTHROSCOPY SHOULDER BICEPS TENODESIS Left 12/2/2024    Procedure: ARTHROSCOPIC BICEPS TENODESIS, SUBACROMIAL DECOMPRESSION;  Surgeon: Marbin Arevalo MD;  Location: [facility];  Service: Orthopedics   • NH SURGICAL ARTHROSCOPY SHOULDER W/ROTATOR CUFF RPR Left 03/28/2023    Procedure: Shoulder Arthroscopy with Rotator Cuff Repair and Subacromial decompression;  Surgeon: Suman Kessler MD;  Location: [facility];  Service: Orthopedics   • NH XCAPSL CTRC RMVL INSJ IO LENS PROSTH W/O ECP Left 11/28/2022    Procedure: EXTRACTION EXTRACAPSULAR CATARACT PHACO INTRAOCULAR LENS (IOL);  Surgeon: Duc Nash MD;  Location: [facility] MAIN OR;  Service: Ophthalmology   • TENDON REPAIR Right     achilles tendon   • TONSILLECTOMY     • TUBAL LIGATION     • VARICOSE VEIN SURGERY Left 12/2009       FAMILY HISTORY:  Family History   Problem Relation Age of Onset   • Breast cancer Mother 66   • Hypertension Mother    • Varicose Veins Mother         of lower extremities   • Glaucoma Mother         I also have glaucoma   • Hypertension Father    • Coronary artery disease Father    • Coronary artery disease Sister    • Heart disease Sister    • Varicose Veins Sister         of lower extremities   • No Known Problems Sister    • No Known Problems Sister    • Hypertension Brother    • No Known Problems Daughter  "   • Diabetes Maternal Grandmother         Also nephew William Apodaca   • Breast cancer Maternal Aunt 65   • No Known Problems Maternal Aunt    • Thyroid disease Sister        SOCIAL HISTORY:  Social History     Tobacco Use   • Smoking status: Former     Current packs/day: 0.00     Average packs/day: 0.5 packs/day for 4.0 years (2.0 ttl pk-yrs)     Types: Cigarettes     Start date: 1972     Quit date: 1976     Years since quittin.1     Passive exposure: Never   • Smokeless tobacco: Never   • Tobacco comments:     None   Vaping Use   • Vaping status: Never Used   Substance Use Topics   • Alcohol use: Yes     Comment: seldom   • Drug use: Never       MEDICATIONS:    Current Outpatient Medications:   •  BIOTIN PO, Take by mouth in the morning, Disp: , Rfl:   •  cholecalciferol (VITAMIN D3) 1,000 units tablet, Take 1,000 Units by mouth daily, Disp: , Rfl:   •  Ting, Zingiber officinalis, (TING ROOT PO), Take by mouth in the morning, Disp: , Rfl:   •  hydroCHLOROthiazide 25 mg tablet, TAKE 1 TABLET (25 MG TOTAL) BY MOUTH DAILY, Disp: 90 tablet, Rfl: 3  •  latanoprost (XALATAN) 0.005 % ophthalmic solution, Administer to both eyes daily at bedtime, Disp: , Rfl:   •  Magnesium 400 MG CAPS, Take 2 capsules (800 mg total) by mouth in the morning, Disp: , Rfl:   •  multivitamin (THERAGRAN) TABS, Take 1 tablet by mouth every morning, Disp: , Rfl:   •  olmesartan (BENICAR) 40 mg tablet, Take 1 tablet (40 mg total) by mouth every morning, Disp: 90 tablet, Rfl: 3  •  pantoprazole (PROTONIX) 40 mg tablet, Take 1 tablet (40 mg total) by mouth daily before breakfast, Disp: 30 tablet, Rfl: 5  •  solifenacin (VESICARE) 5 mg tablet, Take 1 tablet (5 mg total) by mouth daily, Disp: 30 tablet, Rfl: 3  •  Sulfacetamide Sodium (Sodium Sulfacetamide Wash) 10 % LIQD, Apply twice daily to affected area on face, Disp: 177 mL, Rfl: 2  •  vitamin B-12 (VITAMIN B-12) 1,000 mcg tablet, Take 1,000 mcg by mouth every morning,  "Disp: , Rfl:   •  amLODIPine (NORVASC) 5 mg tablet, Take 1 tablet (5 mg total) by mouth every morning, Disp: 90 tablet, Rfl: 3  •  aspirin 81 mg chewable tablet, Chew 1 tablet (81 mg total) 2 (two) times a day for 28 days (Patient taking differently: Chew 81 mg once), Disp: 56 tablet, Rfl: 0    ALLERGIES:  Allergies   Allergen Reactions   • Hydrocodone-Acetaminophen Dizziness   • Irbesartan-Hydrochlorothiazide Other (See Comments) and Headache     Arm heaviness    • Lisinopril Cough and Edema     swollen legs   • Pollen Extract Hives     Ragweed etc.    • Atorvastatin Myalgia       LABS:  HgA1c:   Lab Results   Component Value Date    HGBA1C 6.1 (H) 11/18/2024     BMP:   Lab Results   Component Value Date    CALCIUM 9.3 11/18/2024     01/18/2017    K 4.6 11/18/2024    CO2 30 11/18/2024     11/18/2024    BUN 20 11/18/2024    CREATININE 0.78 11/18/2024     CBC: No components found for: \"CBC\"    _____________________________________________________  Review of Systems   Constitutional:  Negative for chills and fever.   HENT:  Negative for ear pain and sore throat.    Eyes:  Negative for pain and visual disturbance.   Respiratory:  Negative for cough and shortness of breath.    Cardiovascular:  Negative for chest pain and palpitations.   Gastrointestinal:  Negative for abdominal pain and vomiting.   Genitourinary:  Negative for dysuria and hematuria.   Musculoskeletal:  Negative for arthralgias and back pain.   Skin:  Negative for color change and rash.   Neurological:  Negative for seizures and syncope.   All other systems reviewed and are negative.       Left Shoulder Exam     Tenderness   The patient is experiencing no tenderness.     Range of Motion   Active abduction:  160   Forward flexion:  170   Internal rotation 0 degrees:  L3     Muscle Strength   Internal rotation: 5/5   External rotation: 5/5     Other   Erythema: absent  Sensation: normal  Pulse: present     Comments:    Skin is warm and dry to " touch with no signs of erythema, ecchymosis, or infection  Demonstrates normal elbow, wrist, and finger motion  2+  distal radial pulse with brisk capillary refill to the fingers  Radial, median, ulnar and motor  and sensory distribution intact  Sensation to light touch intact distally               Physical Exam  Vitals and nursing note reviewed.   Constitutional:       Appearance: Normal appearance.   HENT:      Head: Normocephalic.      Right Ear: External ear normal.      Left Ear: External ear normal.   Eyes:      Extraocular Movements: Extraocular movements intact.      Conjunctiva/sclera: Conjunctivae normal.   Cardiovascular:      Rate and Rhythm: Normal rate.      Pulses: Normal pulses.   Pulmonary:      Effort: Pulmonary effort is normal.      Breath sounds: Normal breath sounds.   Abdominal:      General: Abdomen is flat.   Musculoskeletal:         General: Normal range of motion.      Cervical back: Normal range of motion and neck supple.   Skin:     General: Skin is warm and dry.   Neurological:      General: No focal deficit present.      Mental Status: She is alert.   Psychiatric:         Mood and Affect: Mood normal.         Behavior: Behavior normal.        _____________________________________________________  STUDIES REVIEWED:  I personally reviewed the pertinent images and my independent interpretation is as follows:  No new images obtained today     PROCEDURES PERFORMED:  No Procedures performed today    Scribe Attestation    I,:  Eleonora Colindres am acting as a scribe while in the presence of the attending physician.:       I,:  Marbin Arevalo MD personally performed the services described in this documentation    as scribed in my presence.:

## 2025-03-12 ENCOUNTER — HOSPITAL ENCOUNTER (OUTPATIENT)
Dept: RADIOLOGY | Facility: HOSPITAL | Age: 70
Discharge: HOME | End: 2025-03-12
Payer: MEDICARE

## 2025-03-12 VITALS — BODY MASS INDEX: 34.49 KG/M2 | WEIGHT: 207 LBS | HEIGHT: 65 IN

## 2025-03-12 DIAGNOSIS — Z12.31 ENCOUNTER FOR SCREENING MAMMOGRAM FOR BREAST CANCER: ICD-10-CM

## 2025-03-12 PROCEDURE — 77063 BREAST TOMOSYNTHESIS BI: CPT

## 2025-03-12 PROCEDURE — 77067 SCR MAMMO BI INCL CAD: CPT

## 2025-03-21 ENCOUNTER — TELEPHONE (OUTPATIENT)
Age: 70
End: 2025-03-21

## 2025-04-07 DIAGNOSIS — R32 URINARY INCONTINENCE, UNSPECIFIED TYPE: ICD-10-CM

## 2025-04-08 RX ORDER — SOLIFENACIN SUCCINATE 5 MG/1
5 TABLET, FILM COATED ORAL DAILY
Qty: 90 TABLET | Refills: 3 | Status: SHIPPED | OUTPATIENT
Start: 2025-04-08

## 2025-05-12 ENCOUNTER — OFFICE VISIT (OUTPATIENT)
Dept: UROLOGY | Facility: CLINIC | Age: 70
End: 2025-05-12
Payer: MEDICARE

## 2025-05-12 VITALS
HEART RATE: 77 BPM | WEIGHT: 207 LBS | HEIGHT: 65 IN | SYSTOLIC BLOOD PRESSURE: 126 MMHG | OXYGEN SATURATION: 99 % | BODY MASS INDEX: 34.49 KG/M2 | DIASTOLIC BLOOD PRESSURE: 84 MMHG

## 2025-05-12 DIAGNOSIS — N39.41 URGE INCONTINENCE OF URINE: Primary | ICD-10-CM

## 2025-05-12 LAB — POST-VOID RESIDUAL VOLUME, ML POC: 30 ML

## 2025-05-12 PROCEDURE — 51798 US URINE CAPACITY MEASURE: CPT

## 2025-05-12 PROCEDURE — 99213 OFFICE O/P EST LOW 20 MIN: CPT

## 2025-05-12 RX ORDER — SOLIFENACIN SUCCINATE 10 MG/1
10 TABLET, FILM COATED ORAL DAILY
Qty: 30 TABLET | Refills: 6 | Status: SHIPPED | OUTPATIENT
Start: 2025-05-12

## 2025-05-12 NOTE — ASSESSMENT & PLAN NOTE
-PVR 30 mls today.   -Continue Vesicare, tolerating medication well. We discussed increasing dose to 10 mg once daily. Patient has noticed symptom improvement, but still experiencing some urgency with leakage.  -We also discussed reducing bladder irritants.    Orders:    POCT Measure PVR    solifenacin (VESICARE) 10 MG tablet; Take 1 tablet (10 mg total) by mouth daily

## 2025-05-12 NOTE — PROGRESS NOTES
Name: Carrie Sow      : 1955      MRN: [id number]  Encounter Provider: WILLIAMS Hutchison  Encounter Date: 2025   Encounter department: [facility] [place]  :  Assessment & Plan  Urge incontinence of urine  -PVR 30 mls today.   -Continue Vesicare, tolerating medication well. We discussed increasing dose to 10 mg once daily. Patient has noticed symptom improvement, but still experiencing some urgency with leakage.  -We also discussed reducing bladder irritants.    Orders:    POCT Measure PVR    solifenacin (VESICARE) 10 MG tablet; Take 1 tablet (10 mg total) by mouth daily    -She will return in 6 months for follow-up, or sooner if needed. All questions addressed. Please do not hesitate to reach out with further questions or concerns.     History of Present Illness   Carrie Sow is a 69 y.o. female who presents here for follow-up of urge urinary incontinence. Patient last seen by me in 2025. She has a pmhx of HTN, chronic venous insufficiency, GERD, bicep tendonitis, urge urinary incontinence, cystocele.      Patient previously had tried trospium in 2024.This medication was cost prohibitive, patient was also getting headaches, and felt like she could not empty her bladder as well. She was initiated on 5 mg daily vesicare in January. She is tolerating medication, has seen some improvement in urgency, still having urgency so we will try increasing this to 10 mg once daily.    Patient has not had any recent UTIs. Denies dysuria, flank pain, gross hematuria.           Review of Systems   Constitutional:  Negative for chills and fever.   HENT:  Negative for ear pain and sore throat.    Eyes:  Negative for pain and visual disturbance.   Respiratory:  Negative for cough and shortness of breath.    Cardiovascular:  Negative for chest pain and palpitations.   Gastrointestinal:  Negative for abdominal pain and vomiting.   Genitourinary:  Positive for urgency. Negative for  "difficulty urinating, dysuria, flank pain, frequency and hematuria.   Musculoskeletal:  Negative for arthralgias and back pain.   Skin:  Negative for color change and rash.   Neurological:  Negative for seizures and syncope.   All other systems reviewed and are negative.         Objective   /84 (BP Location: Left arm, Patient Position: Sitting, Cuff Size: Large)   Pulse 77   Ht 5' 5\" (1.651 m)   Wt 93.9 kg (207 lb)   SpO2 99%   BMI 34.45 kg/m²     Physical Exam  Vitals reviewed.   Constitutional:       General: She is not in acute distress.     Appearance: She is well-developed.   HENT:      Head: Normocephalic and atraumatic.   Eyes:      Conjunctiva/sclera: Conjunctivae normal.   Pulmonary:      Effort: Pulmonary effort is normal. No respiratory distress.   Neurological:      Mental Status: She is alert and oriented to person, place, and time.   Psychiatric:         Mood and Affect: Mood normal.          Results   No results found for: \"PSA\"  Lab Results   Component Value Date    CALCIUM 9.3 11/18/2024     01/18/2017    K 4.6 11/18/2024    CO2 30 11/18/2024     11/18/2024    BUN 20 11/18/2024    CREATININE 0.78 11/18/2024     Lab Results   Component Value Date    WBC 6.80 11/18/2024    HGB 13.7 11/18/2024    HCT 43.4 11/18/2024    MCV 94 11/18/2024     11/18/2024       Office Urine Dip  Recent Results (from the past hour)   POCT Measure PVR    Collection Time: 05/12/25 12:42 PM   Result Value Ref Range    POST-VOID RESIDUAL VOLUME, ML POC 30 mL         "

## 2025-05-27 ENCOUNTER — HOSPITAL ENCOUNTER (OUTPATIENT)
Dept: NON INVASIVE DIAGNOSTICS | Facility: CLINIC | Age: 70
Discharge: HOME | End: 2025-05-27
Payer: MEDICARE

## 2025-05-27 DIAGNOSIS — I83.813 VARICOSE VEINS OF BILATERAL LOWER EXTREMITIES WITH PAIN: ICD-10-CM

## 2025-05-27 PROCEDURE — 93971 EXTREMITY STUDY: CPT | Performed by: SURGERY

## 2025-05-27 PROCEDURE — 93971 EXTREMITY STUDY: CPT

## 2025-05-28 ENCOUNTER — RESULTS FOLLOW-UP (OUTPATIENT)
Dept: VASCULAR SURGERY | Facility: CLINIC | Age: 70
End: 2025-05-28

## 2025-06-09 DIAGNOSIS — K21.9 GERD WITHOUT ESOPHAGITIS: ICD-10-CM

## 2025-06-10 RX ORDER — PANTOPRAZOLE SODIUM 40 MG/1
TABLET, DELAYED RELEASE ORAL
Qty: 90 TABLET | Refills: 1 | Status: SHIPPED | OUTPATIENT
Start: 2025-06-10

## 2025-06-16 NOTE — PROGRESS NOTES
Name: Carrie Sow      : 1955      MRN: [id number]  Encounter Provider: Filemon Perdomo DO  Encounter Date: 2025   Encounter department: [facility]  :  Assessment & Plan  Varicose veins of bilateral lower extremities with pain  69-year-old female presents for follow-up for varicose veins.  She has a remote history of some sort of a great saphenous vein procedure for varicosities and she recently saw our vascular [facility].  She has symptomatic varicosities primarily in the anterior left thigh and lateral left calf.  She has been wearing compression stockings and these have not been helping her in a significant way.  Her venous reflux duplex was also reviewed with her which demonstrates no DVT and reflux in her anterior accessory branch.  She does also have some varicosities in her right calf that are not quite as bothersome at this time.  I discussed her diagnosis with her as well as options of conservative management versus surgical intervention.  Given her anatomy and the fact her GSV has been previously closed I recommended a ligation of her anterior accessory saphenous as well as phlebectomies.  She would require between probably 15 and 20 total phlebectomies after discussing risks benefits and alternative therapies with her in detail she consented to proceed.  If she has a good result from her left leg intervention will need to perform a reflux duplex on the right and consider treatment of her varicosities on the right at a later date.  Risk benefits and alternative therapies were discussed with her in detail she consented to proceed.    Orders:    Case request operating room: left anterior accessory saphenous ligation and left leg phlebectomies; Standing        History of Present Illness     Patient presents today to review LEVDR done 25. LLE VV with pain, burning and swelling. Wearing compression on occasion. Prior h/o LLE vein surgery in .     HPI  Carrie Sow is a 69  "y.o. female   History obtained from: patient    Review of Systems   Constitutional: Negative.    HENT: Negative.     Eyes: Negative.    Respiratory: Negative.     Cardiovascular:  Positive for leg swelling.        Painful veins   Gastrointestinal: Negative.    Endocrine: Negative.    Genitourinary: Negative.    Musculoskeletal: Negative.    Skin: Negative.    Allergic/Immunologic: Negative.    Neurological: Negative.    Hematological: Negative.    Psychiatric/Behavioral: Negative.       I have reviewed the ROS above and made changes as needed.         Objective   /78 (BP Location: Left arm, Patient Position: Sitting)   Pulse 86   Ht 5' 5\" (1.651 m)   Wt 95.7 kg (211 lb)   BMI 35.11 kg/m²      Physical Exam    General  Exam: alert, awake, oriented, no distress, consistent with stated age    Chest and Lung  Exam: chest normal without deformity, bilaterally expansive, clear to auscultation    Cardiovascular  Exam: regular rate, regular rhythm, no murmurs, no rubs or gallops    Adbomen  Exam: soft, non-tender, non-distended, no pulsatile abdominal masses, no abdominal bruit      Lower Extremity:  Palpation: Femoral pulse- Bilateral 2+         Popliteal pulse - Bilateral 2+        Dorsalis Pedis - Bilateral 2+         Right calf medial cluster of varicose veins  Left anterior thigh and lateral left calf varicosities      Neurologic  Exam:alert, non-focal, oriented x 3    Administrative Statements   I have spent a total time of 30 minutes in caring for this patient on the day of the visit/encounter including Counseling / Coordination of care, Documenting in the medical record, and Reviewing/placing orders in the medical record (including tests, medications, and/or procedures).        EVLT Operative Scheduling Information:    Hospital:  [facility]    Physician:  Leanne    Surgery: Left anterior accessory saphenous vein ligation and left leg phlebectomies    Urgency:  Standard    Level:  Level 4: Outpatients to " be scheduled for screening procedures and elective surgery that can be delayed for longer than one month without reasonable expectation of detriment to patient.    Case Length:  Normal    Post-op Bed:  Outpatient    OR Table:  Standard    Equipment Needs:  None    Medication Instructions:  Aspirin:   Continue (do not hold)    Hydration:  No    Contrast Allergy:  No    Venous Clinical Severity Scores (VCSS)  Item Absent   (0 points) Mild   (1 point) Moderate   (2 points) Severe   (3 points)   Pain [] None [] Occasional [] Daily [x] Daily limiting   Varicose veins [] None [] Few [] Calf or thigh [x] Calf and thigh   Venous edema [] None [x] Foot and ankle [] Above ankle, below knee [] To knee of above   Skin pigmentation [x] None [] Perimalleolar [] Diffuse, lower 1/3 calf [] Wider, above lower 1/3 calf   Inflammation [x] None [] Perimalleolar [] Diffuse, lower 1/3 calf [] Wider, above lower 1/3 calf   Induration [x] None [] Perimalleolar [] Diffuse, lower 1/3 calf [] Wider, above lower 1/3 calf   No. active ulcers [x] None [] 1 [] 2 [] >=3   Active ulcer size [x] None [] <2 cm [] 2 - 6 cm [] >6 cm   Ulcer duration [x] None [] <3 months [] 3 - 12 months [] >1 year   Compression therapy [] None [] Intermittent [] Most days [x] Fully comply   Total 10          CEAP Clinical Classification  [x] Symptomatic   [] Asymptomatic     [] Class 0 No visible or palpable signs of venous disease   [] Class 1 Telangiectasies or reticular veins   [] Class 2 Varicose veins; distinguished from reticular veins by a diameter of 3mm or more   [x] Class 3 Edema   [] Class 4 Changes in skin and subcutaneous tissue secondary to CVD    [] Class 4a Pigmentation or eczema   [] Class 4b Lipodermatosclerosis or atrophie jenn   [] Class 5 Healed venous ulcer   [] Class 6 Active venous ulcer

## 2025-06-18 ENCOUNTER — OFFICE VISIT (OUTPATIENT)
Dept: VASCULAR SURGERY | Facility: CLINIC | Age: 70
End: 2025-06-18
Payer: MEDICARE

## 2025-06-18 ENCOUNTER — TELEPHONE (OUTPATIENT)
Dept: VASCULAR SURGERY | Facility: CLINIC | Age: 70
End: 2025-06-18

## 2025-06-18 VITALS
WEIGHT: 211 LBS | DIASTOLIC BLOOD PRESSURE: 78 MMHG | BODY MASS INDEX: 35.16 KG/M2 | SYSTOLIC BLOOD PRESSURE: 138 MMHG | HEART RATE: 86 BPM | HEIGHT: 65 IN

## 2025-06-18 DIAGNOSIS — I83.813 VARICOSE VEINS OF BILATERAL LOWER EXTREMITIES WITH PAIN: Primary | ICD-10-CM

## 2025-06-18 PROCEDURE — 99215 OFFICE O/P EST HI 40 MIN: CPT | Performed by: SURGERY

## 2025-06-18 RX ORDER — CHLORHEXIDINE GLUCONATE ORAL RINSE 1.2 MG/ML
15 SOLUTION DENTAL ONCE
OUTPATIENT
Start: 2025-06-18 | End: 2025-06-18

## 2025-06-18 RX ORDER — CEFAZOLIN SODIUM 2 G/50ML
2000 SOLUTION INTRAVENOUS ONCE
OUTPATIENT
Start: 2025-06-18 | End: 2025-06-18

## 2025-06-18 NOTE — TELEPHONE ENCOUNTER
EVLT Operative Scheduling Information:     Hospital:  [facility]     Physician:  Leanne     Surgery: Left anterior accessory saphenous vein ligation and left leg phlebectomies     Urgency:  Standard     Level:  Level 4: Outpatients to be scheduled for screening procedures and elective surgery that can be delayed for longer than one month without reasonable expectation of detriment to patient.     Case Length:  Normal     Post-op Bed:  Outpatient     OR Table:  Standard     Equipment Needs:  None     Medication Instructions:  Aspirin:   Continue (do not hold)     Hydration:  No     Contrast Allergy:  No     Venous Clinical Severity Scores (VCSS)  Item Absent   (0 points) Mild   (1 point) Moderate   (2 points) Severe   (3 points)   Pain [] None [] Occasional [] Daily [x] Daily limiting   Varicose veins [] None [] Few [] Calf or thigh [x] Calf and thigh   Venous edema [] None [x] Foot and ankle [] Above ankle, below knee [] To knee of above   Skin pigmentation [x] None [] Perimalleolar [] Diffuse, lower 1/3 calf [] Wider, above lower 1/3 calf   Inflammation [x] None [] Perimalleolar [] Diffuse, lower 1/3 calf [] Wider, above lower 1/3 calf   Induration [x] None [] Perimalleolar [] Diffuse, lower 1/3 calf [] Wider, above lower 1/3 calf   No. active ulcers [x] None [] 1 [] 2 [] >=3   Active ulcer size [x] None [] <2 cm [] 2 - 6 cm [] >6 cm   Ulcer duration [x] None [] <3 months [] 3 - 12 months [] >1 year   Compression therapy [] None [] Intermittent [] Most days [x] Fully comply   Total 10               CEAP Clinical Classification  [x] Symptomatic   [] Asymptomatic      [] Class 0 No visible or palpable signs of venous disease   [] Class 1 Telangiectasies or reticular veins   [] Class 2 Varicose veins; distinguished from reticular veins by a diameter of 3mm or more   [x] Class 3 Edema   [] Class 4 Changes in skin and subcutaneous tissue secondary to CVD    [] Class 4a Pigmentation or eczema   [] Class 4b  Lipodermatosclerosis or atrophie jenn   [] Class 5 Healed venous ulcer   [] Class 6 Active venous ulcer

## 2025-06-18 NOTE — TELEPHONE ENCOUNTER
REMINDER: Under Reason For Call, comments MUST be formatted as:   (Surgeon's Initials) / (Procedure)      Special Instructions/FYI/Dialysis Days: Level 4  Patient requests [place] location    Clearances: None    Consent: I certify that patient has signed, printed, timed, and dated their surgery consent.  I certify that the patient's LEGAL NAME and DATE OF BIRTH are written in the upper left corner on BOTH sides of the consent.  I certify that BOTH sides of the completed surgery consent have been scanned into the patient's Epic chart by myself on 6/18/2025.  Yes, I have LABELED the consent in Epic as Consent for Vascular Procedure.     For Surgical Clearances     Levels   1-3   ROUTE this encounter to [facility] Coordinator Pool     Level   4   ROUTE this encounter to [facility] Coordinator Pool       HYDRATION CLEARANCES   ONLY ROUTE TO  [facility] Surgery Coordinator Pool       Yes, I have ROUTED this encounter to [facility] Coord[facility].

## 2025-06-18 NOTE — ASSESSMENT & PLAN NOTE
69-year-old female presents for follow-up for varicose veins.  She has a remote history of some sort of a great saphenous vein procedure for varicosities and she recently saw our vascular [facility].  She has symptomatic varicosities primarily in the anterior left thigh and lateral left calf.  She has been wearing compression stockings and these have not been helping her in a significant way.  Her venous reflux duplex was also reviewed with her which demonstrates no DVT and reflux in her anterior accessory branch.  She does also have some varicosities in her right calf that are not quite as bothersome at this time.  I discussed her diagnosis with her as well as options of conservative management versus surgical intervention.  Given her anatomy and the fact her GSV has been previously closed I recommended a ligation of her anterior accessory saphenous as well as phlebectomies.  She would require between probably 15 and 20 total phlebectomies after discussing risks benefits and alternative therapies with her in detail she consented to proceed.  If she has a good result from her left leg intervention will need to perform a reflux duplex on the right and consider treatment of her varicosities on the right at a later date.  Risk benefits and alternative therapies were discussed with her in detail she consented to proceed.    Orders:    Case request operating room: left anterior accessory saphenous ligation and left leg phlebectomies; Standing     Patient in ED

## 2025-06-19 DIAGNOSIS — I87.2 CHRONIC VENOUS INSUFFICIENCY: ICD-10-CM

## 2025-06-19 DIAGNOSIS — I83.812 VARICOSE VEINS OF LEFT LOWER EXTREMITY WITH PAIN: ICD-10-CM

## 2025-06-19 DIAGNOSIS — I83.813 VARICOSE VEINS OF BILATERAL LOWER EXTREMITIES WITH PAIN: Primary | ICD-10-CM

## 2025-07-09 ENCOUNTER — PREP FOR PROCEDURE (OUTPATIENT)
Dept: VASCULAR SURGERY | Facility: CLINIC | Age: 70
End: 2025-07-09

## 2025-07-21 ENCOUNTER — OFFICE VISIT (OUTPATIENT)
Dept: FAMILY MEDICINE CLINIC | Facility: CLINIC | Age: 70
End: 2025-07-21
Payer: MEDICARE

## 2025-07-21 VITALS
DIASTOLIC BLOOD PRESSURE: 70 MMHG | TEMPERATURE: 97.5 F | SYSTOLIC BLOOD PRESSURE: 130 MMHG | WEIGHT: 209 LBS | HEART RATE: 80 BPM | OXYGEN SATURATION: 97 % | HEIGHT: 65 IN | BODY MASS INDEX: 34.82 KG/M2

## 2025-07-21 DIAGNOSIS — L71.9 ROSACEA: ICD-10-CM

## 2025-07-21 DIAGNOSIS — Z00.00 MEDICARE ANNUAL WELLNESS VISIT, SUBSEQUENT: Primary | ICD-10-CM

## 2025-07-21 DIAGNOSIS — R73.01 IMPAIRED FASTING GLUCOSE: ICD-10-CM

## 2025-07-21 DIAGNOSIS — M25.551 RIGHT HIP PAIN: ICD-10-CM

## 2025-07-21 PROCEDURE — G0439 PPPS, SUBSEQ VISIT: HCPCS | Performed by: FAMILY MEDICINE

## 2025-07-21 PROCEDURE — G2211 COMPLEX E/M VISIT ADD ON: HCPCS | Performed by: FAMILY MEDICINE

## 2025-07-21 PROCEDURE — 99214 OFFICE O/P EST MOD 30 MIN: CPT | Performed by: FAMILY MEDICINE

## 2025-07-21 RX ORDER — SODIUM SULFACETAMIDE 100 MG/ML
LIQUID TOPICAL
Qty: 177 ML | Refills: 2 | Status: SHIPPED | OUTPATIENT
Start: 2025-07-21 | End: 2025-07-23 | Stop reason: SDUPTHER

## 2025-07-21 NOTE — PATIENT INSTRUCTIONS
Patient Education     Hip Bursitis Exercises   About this topic   A bursa is a small, fluid-filled sac. It acts as a cushion between your bone and tendon. A tendon is a thick band that attaches your muscle to the bone. Bursae help the tendons glide and let your joints move easier. In the hip, there are three sets of bursae. There is one around the outer radha part of your hip joint. It is called the greater trochanteric bursae. Another set of bursae is in front of your hip near the groin area. These are called iliopsoas bursae. The last bursae is the ischial bursae. It covers the bones in your pelvis that you sit on. These bursae can get swollen and hurt. This problem is called hip bursitis. Exercises can help make this problem better.  General   Before starting with a program, ask your doctor if you are healthy enough to do these exercises. Your doctor may have you work with a  or physical therapist to make a safe exercise program to meet your needs.  Stretching Exercises   Stretching exercises keep your muscles flexible. They also stop them from getting tight. Start by doing each of these stretches 2 to 3 times. In order for your body to make changes, you will need to hold these stretches for 20 to 30 seconds. Try to do the stretches 2 to 3 times each day. Do all exercises slowly.  Single knee to chest stretches ? Lie on your back. Pull one knee towards your chest until you feel a stretch in your lower back and buttock area. Repeat with the other knee. If you have knee problems, pull your knee up by grabbing the back of your thigh instead of the front of your knee. You can also do this exercise by grabbing both knees at the same time.  Deep hip stretches lying down ? Lie on your back and bend one knee, keeping that foot flat on the floor. Cross the other leg over your knee. Pull the bottom leg towards your chest until you feel a stretch in the other buttock. Repeat using the opposite leg as the bottom  leg.  Hamstring stretches on back ? Lie on your back with both knees bent and feet flat on the floor. Grab the back of your left thigh. Straighten your knee until you feel a stretch at the back of your thigh. Now, pull your toes down towards your head. Repeat on the other leg.  Iliotibial band stretches:  To stretch the left IT band: Stand up with your right leg crossed over the left one. Try to point the toes of the feet towards each other. Your toes should almost be touching. This is a very awkward position. Lean your upper body towards the right while bending your right knee. You should feel a stretch near the left hip. Hold and repeat.  To stretch the right IT band: Stand up with your left leg crossed over the right one. Try to point the toes of the feet towards each other. Your toes should almost be touching. This is a very awkward position. Lean your upper body towards the left while bending your left knee. You should feel a stretch near the right hip. Hold and repeat.  Strengthening Exercises   Strengthening exercises keep your muscles firm and strong. Start by repeating each exercise 2 to 3 times. Work up to doing each exercise 10 times. Try to do the exercises 2 to 3 times each day. Do all exercises slowly.  Side leg lifts ? Lie on your side. Have your legs straight and lined up with your back. Lift the top leg up while keeping the knee straight. Do not let your leg go forward. Then, do this exercise on your other side.  Bent knee side leg lifts ? Lie on your side with your painful hip on top. Bend your knees up slightly and have your knees and feet together. Lift your top leg up while keeping your feet together. Lower your leg back down and repeat.               What will the results be?   Less pain and swelling  Better range of motion  Increased strength  Easier to walk and do other activities  Helpful tips   Stay active and work out to keep your muscles strong and flexible.  Keep a healthy weight to  avoid putting too much stress on your spine. Eat a healthy diet to keep your muscles healthy.  Be sure you do not hold your breath when exercising. This can raise your blood pressure. If you tend to hold your breath, try counting out loud when exercising. If any exercise bothers you, stop right away.  Always warm up before stretching. Heated muscles stretch much easier than cool muscles. Stretching cool muscles can lead to injury.  Try walking or cycling at an easy pace for a few minutes to warm up your muscles. Do this again after exercising.  Never bounce when doing stretches.  Doing exercises before a meal may be a good way to get into a routine.  After exercising, it is a good idea to use ice. Place an ice pack or a bag of frozen peas wrapped in a towel over the painful part. Never put ice right on the skin. Do not leave the ice on more than 10 to 15 minutes at a time. Ice after activity may help decrease pain and swelling. Never ice before stretching.  Exercise may be slightly uncomfortable, but you should not have sharp pains. If you do get sharp pains, stop what you are doing. If the sharp pains continue, call your doctor.  Avoid sitting on hard surfaces and use a cushion.  Last Reviewed Date   2024-05-09  Consumer Information Use and Disclaimer   This generalized information is a limited summary of diagnosis, treatment, and/or medication information. It is not meant to be comprehensive and should be used as a tool to help the user understand and/or assess potential diagnostic and treatment options. It does NOT include all information about conditions, treatments, medications, side effects, or risks that may apply to a specific patient. It is not intended to be medical advice or a substitute for the medical advice, diagnosis, or treatment of a health care provider based on the health care provider's examination and assessment of a patient’s specific and unique circumstances. Patients must speak with a health  care provider for complete information about their health, medical questions, and treatment options, including any risks or benefits regarding use of medications. This information does not endorse any treatments or medications as safe, effective, or approved for treating a specific patient. [employer] and its affiliates disclaim any warranty or liability relating to this information or the use thereof. The use of this information is governed by the Terms of Use, available at https://www.[employer]uwer.com/en/know/clinical-effectiveness-terms   Copyright   Copyright © 2024 [employer] and its affiliates and/or licensors. All rights reserved.

## 2025-07-21 NOTE — PROGRESS NOTES
Name: Carrie Sow      : 1955      MRN: [id number]  Encounter Provider: Jessica Dunaway MD  Encounter Date: 2025   Encounter department: [facility]  :  Assessment & Plan  Medicare annual wellness visit, subsequent  See plan below  Declines PCV20, shingrix, Tdap       Impaired fasting glucose  CMP and A1C ordered.  Orders:  •  Hemoglobin A1C; Future  •  Comprehensive metabolic panel; Future    Rosacea  Has used sulfacetamide wash in the past for rosacea with relief.  Requests refill, was prev prescribed by derm.   Refill sent  Orders:  •  Sulfacetamide Sodium (Sodium Sulfacetamide Wash) 10 % LIQD; Apply daily to affected area on face    Right hip pain  Tenderness lateral aspect of hip and discomfort worse with external rotation of the hip  Possible bursitis  Info printed otc analgesia prn  She declines referral at this point but will reach out of she would like one.          Preventive health issues were discussed with patient, and age appropriate screening tests were ordered as noted in patient's After Visit Summary. Personalized health advice and appropriate referrals for health education or preventive services given if needed, as noted in patient's After Visit Summary.    History of Present Illness     HPI     Pt here for annual medicare wellness visit.     She is following up with Dr Weaver for htn and cholesterol.  Had had intolerance to statins and leqvio.   She has been seeing vascular surgery and is planning on phlebectomies and vein treatment in the fall.     Medications through cardiology except for protonix for gerd.   Used a prescription wash for rosacea through dermatologist, but hasn't been there in over a year.  Thinks that derm might not be there anymore. Would like refill.    Does not wish to have any vaccines    History of Present Illness  The patient presents for evaluation of hip pain, stomach issues, bladder issues, and health maintenance.    Hip  Pain  - She experiences hip pain- R sided, worse first thing in the morning.    - The pain intensifies during activities such as walking the dog or shopping.  - She also experiences back pain, which she suspects might be due to sciatica.  - She manages the pain with Tylenol, taken at night before bed.  - She prefers to sit and relax with coffee in the morning to alleviate the discomfort- takes a little time for the discomfort to subside..  - She typically sleeps on her left side.  Not sure if she has pain to lay on the R side.   Pain lateral on the R hip     Stomach Issues  - She is currently taking Protonix for her reflux issues, which provides some relief.  - She needs to be cautious about her diet, avoiding foods like tomato sauce that can upset her stomach.  - She finds flex tablets helpful in managing any breakthrough symptoms.      - She is on Vesicare for bladder issues, which provides some relief but does not completely resolve her symptoms.  - She experiences dry mouth as a side effect of Vesicare, but manages it by sipping water throughout the day.  - Sees urology    Supplemental Information  - She has been under the care of a vascular specialist for her leg veins, withsurgery planned for 11/2025  - She reports no chest pain or breathing difficulties.  - She maintains a healthy diet and hydration.    - She has declined further pneumonia, shingles, and tetanus vaccines.  - She is up to date with her mammogram, colonoscopy, and DEXA scan.  - She has a living will and power of  in place.  - She uses a sulfacetamide wash for her face, prescribed by her dermatologist, which she finds beneficial.          Patient Care Team:  Jessica Dunaway MD as PCP - General (Family Medicine)  CECI Rodarte MD Christine Block, MD (Family Medicine)    Review of Systems  Medical History Reviewed by provider this encounter:  Tobacco  Allergies  Meds  Problems  Med Hx  Surg Hx  Fam Hx        Annual Wellness Visit Questionnaire   Carrie is here for her Subsequent Wellness visit.     Health Risk Assessment:   Patient rates overall health as good. Patient feels that their physical health rating is same. Patient is satisfied with their life. Eyesight was rated as same. Hearing was rated as same. Patient feels that their emotional and mental health rating is same. Patients states they are never, rarely angry. Patient states they are sometimes unusually tired/fatigued. Pain experienced in the last 7 days has been some. Patient's pain rating has been 3/10. Patient states that she has experienced no weight loss or gain in last 6 months.     Depression Screening:   PHQ-2 Score: 0      Fall Risk Screening:   In the past year, patient has experienced: no history of falling in past year      Urinary Incontinence Screening:   Patient has leaked urine accidently in the last six months.     Home Safety:  Patient does not have trouble with stairs inside or outside of their home. Patient has working smoke alarms and has working carbon monoxide detector. Home safety hazards include: none.     Nutrition:   Current diet is Regular and Limited junk food.     Medications:   Patient is currently taking over-the-counter supplements. OTC medications include: see medication list. Patient is able to manage medications.     Activities of Daily Living (ADLs)/Instrumental Activities of Daily Living (IADLs):   Walk and transfer into and out of bed and chair?: Yes  Dress and groom yourself?: Yes    Bathe or shower yourself?: Yes    Feed yourself? Yes  Do your laundry/housekeeping?: Yes  Manage your money, pay your bills and track your expenses?: Yes  Make your own meals?: Yes    Do your own shopping?: Yes    Durable Medical Equipment Suppliers  0    Previous Hospitalizations:   Any hospitalizations or ED visits within the last 12 months?: No      Advance Care Planning:   Living will: Yes    Durable POA for healthcare: Yes    Advanced  directive: Yes      Cognitive Screening:   Provider or family/friend/caregiver concerned regarding cognition?: No    Preventive Screenings      Cardiovascular Screening:    General: Screening Not Indicated and History Lipid Disorder      Diabetes Screening:     General: Screening Current      Colorectal Cancer Screening:     General: Screening Current      Breast Cancer Screening:     General: Screening Current      Cervical Cancer Screening:    General: Screening Not Indicated      Osteoporosis Screening:    General: Screening Current      Abdominal Aortic Aneurysm (AAA) Screening:        General: Screening Not Indicated      Lung Cancer Screening:     General: Screening Not Indicated      Hepatitis C Screening:    General: Screening Current    Immunizations:  - Immunizations due: Zoster (Shingrix)    Screening, Brief Intervention, and Referral to Treatment (SBIRT)     Screening  Typical number of drinks in a day: 0  Typical number of drinks in a week: 0  Interpretation: Low risk drinking behavior.    AUDIT-C Screenin) How often did you have a drink containing alcohol in the past year? never  2) How many drinks did you have on a typical day when you were drinking in the past year? 0  3) How often did you have 6 or more drinks on one occasion in the past year? never    AUDIT-C Score: 0  Interpretation: Score 0-2 (female): Negative screen for alcohol misuse    Single Item Drug Screening:  How often have you used an illegal drug (including marijuana) or a prescription medication for non-medical reasons in the past year? never    Single Item Drug Screen Score: 0  Interpretation: Negative screen for possible drug use disorder    Social Drivers of Health     Financial Resource Strain: Low Risk  (7/3/2024)    Overall Financial Resource Strain (CARDIA)    • Difficulty of Paying Living Expenses: Not hard at all   Food Insecurity: No Food Insecurity (2025)    Nursing - Inadequate Food Risk Classification    •  "Worried About Running Out of Food in the Last Year: Never true    • Ran Out of Food in the Last Year: Never true   Transportation Needs: No Transportation Needs (7/16/2025)    PRAPARE - Transportation    • Lack of Transportation (Medical): No    • Lack of Transportation (Non-Medical): No   Housing Stability: Low Risk  (7/16/2025)    Housing Stability Vital Sign    • Unable to Pay for Housing in the Last Year: No    • Number of Times Moved in the Last Year: 0    • Homeless in the Last Year: No   Utilities: Not At Risk (7/16/2025)    [facility] Utilities    • Threatened with loss of utilities: No     No results found.    Objective   /70   Pulse 80   Temp 97.5 °F (36.4 °C) (Tympanic)   Ht 5' 5\" (1.651 m)   Wt 94.8 kg (209 lb)   SpO2 97%   BMI 34.78 kg/m²     Physical Exam  Vitals and nursing note reviewed.   Constitutional:       General: She is not in acute distress.     Appearance: Normal appearance. She is well-developed. She is not ill-appearing.   HENT:      Head: Normocephalic and atraumatic.     Eyes:      Conjunctiva/sclera: Conjunctivae normal.     Neck:      Vascular: No carotid bruit.     Cardiovascular:      Rate and Rhythm: Normal rate and regular rhythm.      Heart sounds: No murmur heard.  Pulmonary:      Effort: Pulmonary effort is normal. No respiratory distress.      Breath sounds: Normal breath sounds.   Abdominal:      Palpations: Abdomen is soft.      Tenderness: There is no abdominal tenderness.     Musculoskeletal:      Cervical back: Neck supple.      Right lower leg: No edema.      Left lower leg: No edema.      Comments: Tenderness over the lateral aspect of R hip.   Pain lateral R hip with external rotation of the R hip  Normal ROM R hip   Lymphadenopathy:      Cervical: No cervical adenopathy.     Skin:     General: Skin is warm and dry.     Neurological:      Mental Status: She is alert and oriented to person, place, and time.     Psychiatric:         Mood and Affect: Mood normal.    "      Behavior: Behavior normal.

## 2025-07-21 NOTE — ASSESSMENT & PLAN NOTE
Has used sulfacetamide wash in the past for rosacea with relief.  Requests refill, was prev prescribed by derm.   Refill sent  Orders:  •  Sulfacetamide Sodium (Sodium Sulfacetamide Wash) 10 % LIQD; Apply daily to affected area on face

## 2025-07-23 ENCOUNTER — OFFICE VISIT (OUTPATIENT)
Dept: CARDIOLOGY CLINIC | Facility: CLINIC | Age: 70
End: 2025-07-23
Payer: MEDICARE

## 2025-07-23 VITALS
WEIGHT: 211.8 LBS | SYSTOLIC BLOOD PRESSURE: 150 MMHG | DIASTOLIC BLOOD PRESSURE: 76 MMHG | HEIGHT: 65 IN | HEART RATE: 78 BPM | BODY MASS INDEX: 35.29 KG/M2 | OXYGEN SATURATION: 96 %

## 2025-07-23 DIAGNOSIS — I83.813 VARICOSE VEINS OF BILATERAL LOWER EXTREMITIES WITH PAIN: ICD-10-CM

## 2025-07-23 DIAGNOSIS — L71.9 ROSACEA: ICD-10-CM

## 2025-07-23 DIAGNOSIS — I10 BENIGN ESSENTIAL HYPERTENSION: Primary | ICD-10-CM

## 2025-07-23 DIAGNOSIS — E78.00 PURE HYPERCHOLESTEROLEMIA: ICD-10-CM

## 2025-07-23 PROCEDURE — 93000 ELECTROCARDIOGRAM COMPLETE: CPT | Performed by: INTERNAL MEDICINE

## 2025-07-23 PROCEDURE — 99214 OFFICE O/P EST MOD 30 MIN: CPT | Performed by: INTERNAL MEDICINE

## 2025-07-23 NOTE — PATIENT INSTRUCTIONS
Recommendations:  1. Continue current medications.   2. Increase fluid intake to approx 64 oz/day.    3. Follow up in 6 months.

## 2025-07-23 NOTE — PROGRESS NOTES
Cardiology   Carrei Sow 70 y.o. female MRN: [id number]        Impression:  1. Hypertension - borderline control.    2. Dyslipidemia - Unable to tolerate statins or Leqvio.  3. Varicose veins - patient is at acceptable cardiovascular risk to proceed with surgery.      Recommendations:  1. Continue current medications.   2. Increase fluid intake to approx 64 oz/day.    3. Follow up in 6 months.         HPI: Carrie Sow is a 70 y.o. year old female with hypertension, dyslipidemia, and non-obstructive CAD by cath 5/21 who presents for follow up.  Admitted 5/21 with chest pain and elevated troponin - no significant CAD and thought to be due to hypertensive urgency.  Echo demonstrated normal LV systolic function.  No chest pain, shortness of breath, or palpitations. Occasionally gets dizzy spells if rushing around.  Lasts for a few seconds.          Review of Systems   Constitutional: Negative.    HENT: Negative.     Eyes: Negative.    Respiratory:  Negative for chest tightness and shortness of breath.    Cardiovascular:  Negative for chest pain, palpitations and leg swelling.   Gastrointestinal: Negative.    Endocrine: Negative.    Genitourinary: Negative.    Musculoskeletal: Negative.    Skin: Negative.    Allergic/Immunologic: Negative.    Neurological:  Positive for dizziness.   Hematological: Negative.    Psychiatric/Behavioral: Negative.     All other systems reviewed and are negative.        Past Medical History[1]  Past Surgical History[2]  Social History     Substance and Sexual Activity   Alcohol Use Yes    Comment: seldom     Social History     Substance and Sexual Activity   Drug Use Never     Tobacco Use History[3]  Family History[4]    Allergies:  Allergies[5]    Medications:   Current Medications[6]      Wt Readings from Last 3 Encounters:   07/23/25 96.1 kg (211 lb 12.8 oz)   07/21/25 94.8 kg (209 lb)   06/18/25 95.7 kg (211 lb)     Temp Readings from Last 3 Encounters:   07/21/25 97.5 °F (36.4 °C)  (Tympanic)   01/06/25 98.2 °F (36.8 °C) (Temporal)   12/02/24 (!) 97.2 °F (36.2 °C)     BP Readings from Last 3 Encounters:   07/23/25 150/76   07/21/25 130/70   06/18/25 138/78     Pulse Readings from Last 3 Encounters:   07/23/25 78   07/21/25 80   06/18/25 86         Physical Exam  HENT:      Head: Atraumatic.      Mouth/Throat:      Mouth: Mucous membranes are moist.     Eyes:      Extraocular Movements: Extraocular movements intact.       Cardiovascular:      Rate and Rhythm: Normal rate and regular rhythm.      Heart sounds: Normal heart sounds.   Pulmonary:      Effort: Pulmonary effort is normal.      Breath sounds: Normal breath sounds.   Abdominal:      General: Abdomen is flat.     Musculoskeletal:         General: Normal range of motion.      Cervical back: Normal range of motion.     Skin:     General: Skin is warm.     Neurological:      General: No focal deficit present.      Mental Status: She is alert and oriented to person, place, and time.     Psychiatric:         Mood and Affect: Mood normal.           Laboratory Studies:  CMP:  Lab Results   Component Value Date     01/18/2017    K 4.6 11/18/2024     11/18/2024    CO2 30 11/18/2024    BUN 20 11/18/2024    CREATININE 0.78 11/18/2024    AST 21 11/18/2024    ALT 21 11/18/2024    BILITOT 0.4 01/18/2017    EGFR 76 11/18/2024       Lipid Profile:   Lab Results   Component Value Date    CHOL 211 (H) 01/18/2017     Lab Results   Component Value Date    HDL 56 11/18/2024     Lab Results   Component Value Date    LDLCALC 109 (H) 11/18/2024     Lab Results   Component Value Date    TRIG 123 11/18/2024       Cardiac testing:   EKG reviewed personally: NSR 78 Nml  Results for orders placed during the hospital encounter of 05/21/21    Echo complete with contrast if indicated    [facility]  [address] [place] [address] [phone]    Transthoracic Echocardiogram  2D, M-mode, Doppler, and Color Doppler    Study  date:  22-May-2021    Patient: MAYO ZAYAS  MR number: [id number]  Account number: [id number]  : 1955  Age: 65 years  Gender: Female  Status: Inpatient  Location: Bedside  Height: 65 in  Weight: 209 lb  BP: 147/ 76 mmHg    Indications: Chest Pain    Diagnoses: R07.9 - Chest pain, unspecified    Sonographer:  Duc Brown RDCS  Primary Physician:  Tito Alcaraz MD  Referring Physician:  WILLIAMS Power  Group:  [facility]  Interpreting Physician:  Darrell Mcneill MD    SUMMARY    LEFT VENTRICLE:  Systolic function was normal by visual assessment. Ejection fraction was estimated in the range of 55 % to 60 %.  Although no diagnostic regional wall motion abnormality was identified, this possibility cannot be completely excluded on the basis of this study.  Wall thickness was mildly increased.  There was mild concentric hypertrophy.  Doppler parameters were consistent with abnormal left ventricular relaxation (grade 1 diastolic dysfunction).    MITRAL VALVE:  There was mild annular calcification.  There was trace regurgitation.    HISTORY: PRIOR HISTORY: GERD, CP, COVID 19, Elevated Troponin, Hypertension    PROCEDURE: The procedure was performed at the bedside. This was a routine study. The transthoracic approach was used. The study included complete 2D imaging, M-mode, complete spectral Doppler, and color Doppler. The heart rate was 77 bpm,  at the start of the study. Images were obtained from the parasternal, apical, subcostal, and suprasternal notch acoustic windows. Echocardiographic views were limited due to decreased penetration and lung interference. This was a  technically difficult study.    LEFT VENTRICLE: Size was normal. Systolic function was normal by visual assessment. Ejection fraction was estimated in the range of 55 % to 60 %. Although no diagnostic regional wall motion abnormality was identified, this possibility  cannot be completely excluded on the basis of  this study. Wall thickness was mildly increased. There was mild concentric hypertrophy. DOPPLER: Doppler parameters were consistent with abnormal left ventricular relaxation (grade 1 diastolic  dysfunction).    RIGHT VENTRICLE: The size was normal. Systolic function was normal. Wall thickness was normal.    LEFT ATRIUM: Size was normal.    RIGHT ATRIUM: Size was normal.    MITRAL VALVE: There was mild annular calcification. There was mild thickening. DOPPLER: There was no evidence for stenosis. There was trace regurgitation.    AORTIC VALVE: The valve was trileaflet. Leaflets exhibited normal thickness, normal cuspal separation, and sclerosis. DOPPLER: Transaortic velocity was within the normal range. There was no evidence for stenosis. There was no  regurgitation.    TRICUSPID VALVE: The valve structure was normal. There was normal leaflet separation. DOPPLER: The transtricuspid velocity was within the normal range. There was no evidence for stenosis. There was no regurgitation.    PULMONIC VALVE: Leaflets exhibited normal thickness, no calcification, and normal cuspal separation. DOPPLER: The transpulmonic velocity was within the normal range. There was no regurgitation.    PERICARDIUM: There was no pericardial effusion. The pericardium was normal in appearance.    AORTA: The root exhibited normal size.    SYSTEMIC VEINS: IVC: The inferior vena cava was normal in size and course. Respirophasic changes were normal.    SYSTEM MEASUREMENT TABLES    2D  %FS: 25.05 %  Ao Diam: 2.8 cm  Ao asc: 2.9 cm  EDV(Teich): 61.72 ml  EF(Teich): 50.26 %  ESV(Teich): 30.7 ml  IVSd: 1.21 cm  LA Area: 20.4 cm2  LA Diam: 3.52 cm  LVEDV MOD A4C: 39.76 ml  LVEF MOD A4C: 64.34 %  LVESV MOD A4C: 14.18 ml  LVIDd: 3.79 cm  LVIDs: 2.84 cm  LVLd A4C: 6.65 cm  LVLs A4C: 5.76 cm  LVPWd: 1.23 cm  RA Area: 11.01 cm2  RVIDd: 2.84 cm  SV MOD A4C: 25.58 ml  SV(Teich): 31.02 ml    MM  TAPSE: 2.74 cm    PW  E' Sept: 0.08 m/s  E/E' Sept: 10.92  MV A  Jagjit: 0.91 m/s  MV Dec Weber: 3.87 m/s2  MV DecT: 219.75 ms  MV E Jagjit: 0.85 m/s  MV E/A Ratio: 0.93  MV PHT: 63.73 ms  MVA By PHT: 3.45 cm2    [employer] Commission Accredited Echocardiography Laboratory    Prepared and electronically signed by    Darrell Mcneill MD  Signed 23-May-2021 09:33:14    No results found for this or any previous visit.    Results for orders placed during the hospital encounter of 21    Cardiac catheterization    [facility]  [address], [place] [id number] [phone]    ([place]ankline)    Invasive Cardiovascular Lab Complete Report    Patient: MAYO ZAYAS  MR number: [id number]  Account number: [id number]  Study date: 2021  Gender: Female  : 1955  Height: 65 in  Weight: 208.1 lb  BSA: 2.01 mï¾²    Allergies: LISINOPRIL, HYDROCODONE-ACETAMINOPHEN, IRBESARTAN-HYDROCHLOROTHIAZIDE    Diagnostic Cardiologist:  Kevin Stevens MD  Primary Physician:  Tito Alcaraz MD    SUMMARY    CORONARY CIRCULATION:  The coronary circulation is right dominant.  Left main: Normal.  LAD: The vessel was small to medium sized. Angiography showed no evidence of disease.  Circumflex: The vessel was medium sized.  1st obtuse marginal: The vessel was medium sized. Angiography showed mild atherosclerosis.  Ramus intermedius: The vessel was medium to large sized. Angiography showed no evidence of disease.  RCA: The vessel was medium to large sized. There was ostial spasm with catheter engagement relieved with intracoronary nitroglycerin.  Ostial RCA: There was a 20 % stenosis.    PROCEDURES PERFORMED    --  Left coronary angiography.  --  Right coronary angiography.  --  Inpatient.  --  Mod Sedation Same Physician Initial 15min.  --  Coronary Catheterization (w/o [facility]).    PROCEDURE: The risks and alternatives of the procedures and conscious sedation were explained to the patient and informed consent was obtained. The patient was brought to the cath lab and placed  on the table. The planned puncture sites  were prepped and draped in the usual sterile fashion.    --  Right radial artery access. After performing an Rasheed's test to verify adequate ulnar artery supply to the hand, the radial site was prepped. The puncture site was infiltrated with local anesthetic. The vessel was accessed using the  modified Seldinger technique, a wire was advanced into the vessel, and a sheath was advanced over the wire into the vessel.    --  Left coronary artery angiography. A catheter was advanced over a guidewire into the aorta and positioned in the left coronary artery ostium under fluoroscopic guidance. Angiography was performed.    --  Right coronary artery angiography. A catheter was advanced over a guidewire into the aorta and positioned in the right coronary artery ostium under fluoroscopic guidance. Angiography was performed.    --  Inpatient.    --  Mod Sedation Same Physician Initial 15min.    --  Coronary Catheterization (w/o [facility]).    PROCEDURE COMPLETION: The patient tolerated the procedure well and was discharged from the cath lab. TIMING: Test started at 11:15. Test concluded at 11:38. HEMOSTASIS: The sheath was removed. The site was compressed with a Hemoband  device. Hemostasis was obtained. MEDICATIONS GIVEN: Versed (2mg/2ml), 2 mg, IV, at 11:15. Fentanyl (1OOmcg/2 ml), 50 mcg, IV, at 11:15. 1% Lidocaine, 1 ml, subcutaneously, at 11:17. Nitroglycerin (200mcg/ml), 200 mcg, at 11:23. Verapamil  (5mg/2ml), 2.5 mg, IV, at 11:23. Heparin 1000 units/ml, 4,000 units, IV, at 11:23. Nitroglycerine (200mcg/ml), 200 mcg, at 11:31. Nitroglycerine (200mcg/ml), 200 mcg, at 11:32. CONTRAST GIVEN: 65 ml Omnipaque (350mg I /ml). RADIATION  EXPOSURE: Fluoroscopy time: 4.25 min.    CORONARY VESSELS:   --  The coronary circulation is right dominant.  --  Left main: Normal.  --  LAD: The vessel was small to medium sized. Angiography showed no evidence of disease.  --  Circumflex: The vessel was  medium sized.  --  1st obtuse marginal: The vessel was medium sized. Angiography showed mild atherosclerosis.  --  Ramus intermedius: The vessel was medium to large sized. Angiography showed no evidence of disease.  --  RCA: The vessel was medium to large sized. There was ostial spasm with catheter engagement relieved with intracoronary nitroglycerin.  --  Ostial RCA: There was a 20 % stenosis.    Prepared and signed by    Kevin Stevens MD  Signed 2021 11:44:53    Study diagram    Angiographic findings  Native coronary lesions:  ï¾·Ostial RCA: Lesion 1: 20 % stenosis.    Hemodynamic tables    Pressures:  Baseline  Pressures:  - HR: 79  Pressures:  - Rhythm:  Pressures:  -- Aortic Pressure (S/D/M): 114/59/83    Outputs:  Baseline  Outputs:  -- CALCULATIONS[age]  Outputs:  -- CALCULATIONS: Body Surface Area: 2.01  Outputs:  -- CALCULATIONS: Height in cm: 165.00  Outputs:  -- CALCULATIONS: Sex: Female  Outputs:  -- CALCULATIONS: Weight in k.60  Outputs:  -- OUTPUTS: O2 consumption: 251.60  Outputs:  -- OUTPUTS: Vo2 Indexed: 125.00    No results found for this or any previous visit.                 [1]   Past Medical History:  Diagnosis Date    Breast lump     Chest pain 2019    Cough 2024    runny nose-advised to see PCP prior to surgery,call surgeon if not improved    COVID-19 2021    Disease of thyroid gland     nodule, benign    Eczema     facial rash- could also be rosacea    Elevated troponin I level 2021    GERD (gastroesophageal reflux disease)     History of transfusion     at 3 years old for bleeding during tonsillectomy, no reaction    Hypertension     Nontoxic single thyroid nodule 2012    Rectocele     Rotator cuff tear, left     Sleep apnea     mild case of sleep apnea unable to wear CPAP    Uterine leiomyoma     Varicose veins of both lower extremities     Wears glasses     for reading   [2]   Past Surgical History:  Procedure Laterality Date     "BLADDER SURGERY  2012    Bladder \"lift\"    BREAST BIOPSY Right 2000    BREAST LUMPECTOMY Right 2000    benign    COLONOSCOPY      HYSTERECTOMY  1990    JOINT REPLACEMENT  2017 and 2022    Knees    KNEE SURGERY Right     arthroscopy x2    LASIK Bilateral     NOSE SURGERY      deviated septum    NY LARYNGOSCOPY W/BIOPSY MICROSCOPE/TELESCOPE Left 2018    Procedure: MICROLARYNGOSCOPY AND EXCISION OF LEFT VOCAL FOLD POLYP;  Surgeon: Ochoa Cherry MD;  Location:  Main OR;  Service: ENT    NY SURGICAL ARTHROSCOPY SHOULDER BICEPS TENODESIS Left 2024    Procedure: ARTHROSCOPIC BICEPS TENODESIS, SUBACROMIAL DECOMPRESSION;  Surgeon: Marbin Arevalo MD;  Location: [facility] MAIN OR;  Service: Orthopedics    NY SURGICAL ARTHROSCOPY SHOULDER W/ROTATOR CUFF RPR Left 2023    Procedure: Shoulder Arthroscopy with Rotator Cuff Repair and Subacromial decompression;  Surgeon: Suman Kessler MD;  Location: [facility];  Service: Orthopedics    NY XCAPSL CTRC RMVL INSJ IO LENS PROSTH W/O ECP Left 2022    Procedure: EXTRACTION EXTRACAPSULAR CATARACT PHACO INTRAOCULAR LENS (IOL);  Surgeon: Duc Nash MD;  Location: [facility] MAIN OR;  Service: Ophthalmology    TENDON REPAIR Right     achilles tendon    TONSILLECTOMY      TUBAL LIGATION      VARICOSE VEIN SURGERY Left 2009   [3]   Social History  Tobacco Use   Smoking Status Former    Current packs/day: 0.00    Average packs/day: 0.5 packs/day for 4.0 years (2.0 ttl pk-yrs)    Types: Cigarettes    Start date: 1972    Quit date: 1976    Years since quittin.5    Passive exposure: Never   Smokeless Tobacco Never   Tobacco Comments    None   [4]   Family History  Problem Relation Name Age of Onset    Breast cancer Mother Katy Mason (mother) 66    Hypertension Mother Katy Marlon (mother)     Varicose Veins Mother Katy Mason (mother)         of lower extremities    Glaucoma Mother Katy Mason (mother)         I also have glaucoma    Hypertension " CXR w/ b/l opacities c/w COVID PNA. Pt w/ known + COVID contacts.  - COVID PCR (+)  - albuterol HFA prn for SOB  -enrolled in remdesivir trial Father Everardo rojas father     Coronary artery disease Father Everardo rojas father     Coronary artery disease Sister Etelvina Denise (sister)     Heart disease Sister Etelvina Denise (sister)     Varicose Veins Sister Etelvina Denise (sister)         of lower extremities    No Known Problems Sister Gita     No Known Problems Sister Olena     Hypertension Brother Everardo rojas jr     No Known Problems Daughter      Diabetes Maternal Grandmother Patience rojas grandmother         Also nephew William Fenstermacher    Breast cancer Maternal Aunt emmie 65    No Known Problems Maternal Aunt karin     Thyroid disease Sister Gita Will (sister)    [5]   Allergies  Allergen Reactions    Hydrocodone-Acetaminophen Dizziness    Irbesartan-Hydrochlorothiazide Other (See Comments) and Headache     Arm heaviness     Lisinopril Cough and Edema     swollen legs    Pollen Extract Hives     Ragweed etc.     Atorvastatin Myalgia   [6]   Current Outpatient Medications:     amLODIPine (NORVASC) 5 mg tablet, Take 1 tablet (5 mg total) by mouth every morning, Disp: 90 tablet, Rfl: 3    aspirin 81 mg chewable tablet, Chew 1 tablet (81 mg total) 2 (two) times a day for 28 days, Disp: 56 tablet, Rfl: 0    BIOTIN PO, Take by mouth in the morning, Disp: , Rfl:     cholecalciferol (VITAMIN D3) 1,000 units tablet, Take 1,000 Units by mouth in the morning., Disp: , Rfl:     Jorje, Zingiber officinalis, (JORJE ROOT PO), Take by mouth in the morning, Disp: , Rfl:     hydroCHLOROthiazide 25 mg tablet, TAKE 1 TABLET (25 MG TOTAL) BY MOUTH DAILY, Disp: 90 tablet, Rfl: 3    latanoprost (XALATAN) 0.005 % ophthalmic solution, Administer to both eyes daily at bedtime, Disp: , Rfl:     Magnesium 400 MG CAPS, Take 2 capsules (800 mg total) by mouth in the morning, Disp: , Rfl:     multivitamin (THERAGRAN) TABS, Take 1 tablet by mouth every morning, Disp: , Rfl:     olmesartan (BENICAR) 40 mg tablet, Take 1 tablet (40 mg total) by mouth every morning, Disp: 90 tablet, Rfl: 3    pantoprazole  (PROTONIX) 40 mg tablet, TAKE 1 TABLET EVERY DAY BEFORE BREAKFAST, Disp: 90 tablet, Rfl: 1    solifenacin (VESICARE) 10 MG tablet, Take 1 tablet (10 mg total) by mouth daily, Disp: 30 tablet, Rfl: 6    Sulfacetamide Sodium (Sodium Sulfacetamide Wash) 10 % LIQD, Apply daily to affected area on face, Disp: 177 mL, Rfl: 2    vitamin B-12 (VITAMIN B-12) 1,000 mcg tablet, Take 1,000 mcg by mouth every morning, Disp: , Rfl:

## 2025-07-25 RX ORDER — SODIUM SULFACETAMIDE 100 MG/ML
LIQUID TOPICAL
Qty: 177 ML | Refills: 0 | Status: SHIPPED | OUTPATIENT
Start: 2025-07-25

## (undated) DEVICE — CANNULA 7 X70MM THRD SEAL SIDE PORT

## (undated) DEVICE — PACK ARTHROSCOPY

## (undated) DEVICE — POSITIONER HEAD DISP STRL

## (undated) DEVICE — GLOVE SRG BIOGEL 8

## (undated) DEVICE — PROBE ABLATION  APOLLO RF ASPIRING 90 DEG

## (undated) DEVICE — CANNULA BUTTON 8 X 30MM PASSPORT

## (undated) DEVICE — TUBING ARTHROSCOPIC WAVE  MAIN PUMP

## (undated) DEVICE — GLOVE SRG BIOGEL 6.5

## (undated) DEVICE — THE MONARCH® "D" CARTRIDGE IS A SINGLE-USE POLYPROPYLENE CARTRIDGE FOR POSTERIOR CHAMBER IOL DELIVERY: Brand: MONARCH® III

## (undated) DEVICE — TUBING SUCTION 5MM X 12 FT

## (undated) DEVICE — GLOVE SRG BIOGEL 7.5

## (undated) DEVICE — STRL UNIVERSAL OUTPATIENT PACK: Brand: CARDINAL HEALTH

## (undated) DEVICE — INTENDED FOR TISSUE SEPARATION, AND OTHER PROCEDURES THAT REQUIRE A SHARP SURGICAL BLADE TO PUNCTURE OR CUT.: Brand: BARD-PARKER ® CARBON RIB-BACK BLADES

## (undated) DEVICE — POSITIONER TRIMANO LIMB BEACH CHAIR

## (undated) DEVICE — GLOVE INDICATOR PI UNDERGLOVE SZ 8 BLUE

## (undated) DEVICE — BLADE SHAVER TORPEDO 4MM 13CM  COOLCUT

## (undated) DEVICE — ACTIVE FMS W/ INTREPID* ULTRA SLEEVES, 0.9MM 45° ABS* INTREPID* BALANCED TIP: Brand: ALCON

## (undated) DEVICE — CANNULA 5.75 X 70MM BARREL SHAPED BOWL

## (undated) DEVICE — NEEDLE SUT SCORPION MULTIFIRE

## (undated) DEVICE — GLOVE SRG BIOGEL 7

## (undated) DEVICE — CHLORAPREP HI-LITE 26ML ORANGE

## (undated) DEVICE — 3M™ TEGADERM™ CHG DRESSING 25/CARTON 4 CARTONS/CASE 1659: Brand: TEGADERM™

## (undated) DEVICE — CLEARCUT® SLIT KNIFE INTREPID MICRO-COAXIAL SYSTEM 2.4 SB: Brand: CLEARCUT®; INTREPID

## (undated) DEVICE — TOWEL SURG XR DETECT GREEN STRL RFD

## (undated) DEVICE — B-H IRRIGATING CAN 19GA FLAT ANGLED 8MM: Brand: OPHTHALMIC CANNULA

## (undated) DEVICE — GLOVE INDICATOR PI UNDERGLOVE SZ 6.5 BLUE

## (undated) DEVICE — SCD SEQUENTIAL COMPRESSION COMFORT SLEEVE MEDIUM KNEE LENGTH: Brand: KENDALL SCD

## (undated) DEVICE — TELFA NON-ADHERENT ABSORBENT DRESSING: Brand: TELFA

## (undated) DEVICE — BURR  OVAL 4MM 13CM 8 FLUTE COOLCUT

## (undated) DEVICE — SPONGE GAUZE 4 X 8 12 PLY STRL LF

## (undated) DEVICE — 3M™ TEGADERM™ TRANSPARENT FILM DRESSING FRAME STYLE, 1626W, 4 IN X 4-3/4 IN (10 CM X 12 CM), 50/CT 4CT/CASE: Brand: 3M™ TEGADERM™

## (undated) DEVICE — DUAL SPIKE ADAPTER: Brand: CONMED

## (undated) DEVICE — AIR INJECT CANNULA 27GA: Brand: OPHTHALMIC CANNULA

## (undated) DEVICE — GLOVE INDICATOR PI UNDERGLOVE SZ 7.5 BLUE

## (undated) DEVICE — NEEDLE SUT SCORPION MEGALOADER

## (undated) DEVICE — PROBE ABLATION  APOLLORF 90 DEG MULTI PORT

## (undated) DEVICE — TIBURON BEACH CHAIR SHOULDER DRAPE: Brand: CONVERTORS

## (undated) DEVICE — NEEDLE 18 G X 1 1/2 SAFETY

## (undated) DEVICE — INTREPID® TRANSFORMER IA HP: Brand: INTREPID®

## (undated) DEVICE — OCCLUSIVE GAUZE STRIP,3% BISMUTH TRIBROMOPHENATE IN PETROLATUM BLEND: Brand: XEROFORM

## (undated) DEVICE — U-DRAPE: Brand: CONVERTORS

## (undated) DEVICE — MICROSURGICAL INSTRUMENT IRR. CYSTITOME 25GA STRAIGHT-REVERSE CUTTING: Brand: ALCON

## (undated) DEVICE — SYRINGE 50ML LL

## (undated) DEVICE — SURGICEL 4 X 8

## (undated) DEVICE — ASTOUND IMPERVIOUS SURGICAL GOWN: Brand: CONVERTORS

## (undated) DEVICE — INTENDED FOR TISSUE SEPARATION, AND OTHER PROCEDURES THAT REQUIRE A SHARP SURGICAL BLADE TO PUNCTURE OR CUT.: Brand: BARD-PARKER SAFETY BLADES SIZE 11, STERILE

## (undated) DEVICE — ASTOUND SURGICAL GOWN, XXX LARGE, X-LONG: Brand: CONVERTORS

## (undated) DEVICE — IV BUTTERFLY 25G SAFETY

## (undated) DEVICE — EYE PACK CUSTOM -FINNEGAN